# Patient Record
Sex: FEMALE | Race: WHITE
[De-identification: names, ages, dates, MRNs, and addresses within clinical notes are randomized per-mention and may not be internally consistent; named-entity substitution may affect disease eponyms.]

---

## 2017-01-02 RX ORDER — RAMIPRIL 10 MG/1
10 CAPSULE ORAL
Qty: 90 CAPSULE | Refills: 0 | Status: SHIPPED | OUTPATIENT
Start: 2017-01-02 | End: 2018-04-17

## 2017-01-02 RX ORDER — RAMIPRIL 10 MG/1
CAPSULE ORAL
Qty: 90 CAPSULE | Refills: 2 | Status: SHIPPED | OUTPATIENT
Start: 2017-01-02 | End: 2017-01-02

## 2017-01-23 RX ORDER — TEMAZEPAM 15 MG/1
15 CAPSULE ORAL NIGHTLY PRN
Qty: 11 CAPSULE | Refills: 0 | Status: SHIPPED | OUTPATIENT
Start: 2017-01-23 | End: 2017-02-25

## 2017-02-27 ENCOUNTER — PRIOR ORIGINAL RECORDS (OUTPATIENT)
Dept: OTHER | Age: 82
End: 2017-02-27

## 2017-02-27 ENCOUNTER — OFFICE VISIT (OUTPATIENT)
Dept: INTERNAL MEDICINE CLINIC | Facility: CLINIC | Age: 82
End: 2017-02-27

## 2017-02-27 VITALS
SYSTOLIC BLOOD PRESSURE: 160 MMHG | HEIGHT: 62 IN | BODY MASS INDEX: 25.03 KG/M2 | OXYGEN SATURATION: 98 % | RESPIRATION RATE: 16 BRPM | TEMPERATURE: 97 F | DIASTOLIC BLOOD PRESSURE: 100 MMHG | WEIGHT: 136 LBS | HEART RATE: 74 BPM

## 2017-02-27 DIAGNOSIS — J44.9 CHRONIC OBSTRUCTIVE PULMONARY DISEASE, UNSPECIFIED COPD TYPE (HCC): ICD-10-CM

## 2017-02-27 DIAGNOSIS — F41.9 ANXIETY: ICD-10-CM

## 2017-02-27 DIAGNOSIS — H05.20 EXOPHTHALMIA: ICD-10-CM
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
## 2017-02-27 DIAGNOSIS — L30.9 ECZEMA, UNSPECIFIED TYPE: ICD-10-CM

## 2017-02-27 DIAGNOSIS — I89.0 LYMPHEDEMA: ICD-10-CM

## 2017-02-27 DIAGNOSIS — Z85.3 HX OF BREAST CANCER: ICD-10-CM

## 2017-02-27 DIAGNOSIS — M17.12 PRIMARY OSTEOARTHRITIS OF LEFT KNEE: Primary | ICD-10-CM

## 2017-02-27 DIAGNOSIS — M43.16 SPONDYLOLISTHESIS OF LUMBAR REGION: ICD-10-CM

## 2017-02-27 DIAGNOSIS — I10 ESSENTIAL HYPERTENSION WITH GOAL BLOOD PRESSURE LESS THAN 140/90: ICD-10-CM

## 2017-02-27 DIAGNOSIS — E04.1 THYROID NODULE: ICD-10-CM

## 2017-02-27 LAB
ALBUMIN SERPL-MCNC: 3.9 G/DL (ref 3.5–4.8)
ALP LIVER SERPL-CCNC: 121 U/L (ref 55–142)
ALT SERPL-CCNC: 30 U/L (ref 14–54)
AST SERPL-CCNC: 23 U/L (ref 15–41)
BASOPHILS # BLD AUTO: 0.04 X10(3) UL (ref 0–0.1)
BASOPHILS NFR BLD AUTO: 0.9 %
BILIRUB SERPL-MCNC: 0.4 MG/DL (ref 0.1–2)
BUN BLD-MCNC: 22 MG/DL (ref 8–20)
CALCIUM BLD-MCNC: 9.4 MG/DL (ref 8.3–10.3)
CHLORIDE: 104 MMOL/L (ref 101–111)
CO2: 28 MMOL/L (ref 22–32)
CREAT BLD-MCNC: 0.73 MG/DL (ref 0.55–1.02)
EOSINOPHIL # BLD AUTO: 0.12 X10(3) UL (ref 0–0.3)
EOSINOPHIL NFR BLD AUTO: 2.6 %
ERYTHROCYTE [DISTWIDTH] IN BLOOD BY AUTOMATED COUNT: 12.6 % (ref 11.5–16)
GLUCOSE BLD-MCNC: 96 MG/DL (ref 70–99)
HCT VFR BLD AUTO: 43.6 % (ref 34–50)
HGB BLD-MCNC: 14.4 G/DL (ref 12–16)
IMMATURE GRANULOCYTE COUNT: 0.02 X10(3) UL (ref 0–1)
IMMATURE GRANULOCYTE RATIO %: 0.4 %
LYMPHOCYTES # BLD AUTO: 1.33 X10(3) UL (ref 0.9–4)
LYMPHOCYTES NFR BLD AUTO: 29 %
M PROTEIN MFR SERPL ELPH: 7.3 G/DL (ref 6.1–8.3)
MCH RBC QN AUTO: 32.4 PG (ref 27–33.2)
MCHC RBC AUTO-ENTMCNC: 33 G/DL (ref 31–37)
MCV RBC AUTO: 98 FL (ref 81–100)
MONOCYTES # BLD AUTO: 0.47 X10(3) UL (ref 0.1–0.6)
MONOCYTES NFR BLD AUTO: 10.2 %
NEUTROPHIL ABS PRELIM: 2.61 X10 (3) UL (ref 1.3–6.7)
NEUTROPHILS # BLD AUTO: 2.61 X10(3) UL (ref 1.3–6.7)
NEUTROPHILS NFR BLD AUTO: 56.9 %
PLATELET # BLD AUTO: 198 10(3)UL (ref 150–450)
POTASSIUM SERPL-SCNC: 4 MMOL/L (ref 3.6–5.1)
RBC # BLD AUTO: 4.45 X10(6)UL (ref 3.8–5.1)
RED CELL DISTRIBUTION WIDTH-SD: 45.3 FL (ref 35.1–46.3)
SODIUM SERPL-SCNC: 138 MMOL/L (ref 136–144)
WBC # BLD AUTO: 4.6 X10(3) UL (ref 4–13)

## 2017-02-27 PROCEDURE — 80053 COMPREHEN METABOLIC PANEL: CPT | Performed by: INTERNAL MEDICINE

## 2017-02-27 PROCEDURE — G0439 PPPS, SUBSEQ VISIT: HCPCS | Performed by: INTERNAL MEDICINE

## 2017-02-27 PROCEDURE — 85025 COMPLETE CBC W/AUTO DIFF WBC: CPT | Performed by: INTERNAL MEDICINE

## 2017-02-27 PROCEDURE — 36415 COLL VENOUS BLD VENIPUNCTURE: CPT | Performed by: INTERNAL MEDICINE

## 2017-02-27 RX ORDER — TEMAZEPAM 15 MG/1
CAPSULE ORAL
Qty: 90 CAPSULE | Refills: 3 | Status: SHIPPED | OUTPATIENT
Start: 2017-02-27 | End: 2017-03-13

## 2017-02-27 NOTE — PATIENT INSTRUCTIONS
Hansa Whitman's SCREENING SCHEDULE   Tests on this list are recommended by your physician but may not be covered, or covered at this frequency, by your insurer. Please check with your insurance carrier before scheduling to verify coverage.     Evelina Olivarez visit on 05/11/15  -PNEUMOCOCCAL VACC, 13 GARRETT IM   -ADMIN PNEUMOCOCCAL VACCINE    Update Immunization Activity if applicable    Hepatitis B No orders found for this or any previous visit.  Update Immunization Activity if applicable    Tetanus No orders foun

## 2017-02-27 NOTE — PROGRESS NOTES
Emily Woodward is a 80year old female who presents for a Medicare Annual Wellness visit.         Patient Care Team: Patient Care Team:  Zay Thomas MD as PCP - General (Internal Medicine)    Patient Active Problem List:     Lymphedema     Exop 136 lb  12/29/16 : 136 lb  10/06/16 : 136 lb  10/03/16 : 135 lb 3.2 oz  09/22/16 : 137 lb  08/02/16 : 136 lb    Body mass index is 24.87 kg/(m^2).       Lab Results  Component Value Date   * 04/18/2016   GLU 91 11/13/2015   GLU 85 11/17/2014       La without help    Are you able to afford your medications?: Yes    Hearing Problems?: Yes (deaf in right ear)     Functional Status     Hearing Problems?: Yes (deaf in right ear)    Vision Problems? : Yes (glaucoma)    Difficulty walking?: Yes    Difficulty ----------    Cardiovascular Disease Screening     LDL Annually LDL CHOLESTEROL (mg/dL)   Date Value   11/13/2015 97        EKG - w/ Initial Preventative Physical Exam only, or if medically necessary     Colorectal Cancer Screening      Colonoscopy Scree Persistent     Medications (ACE/ARB, digoxin, diuretics)    Potassium  Annually POTASSIUM (mmol/L)   Date Value   04/18/2016 4.2   03/20/2014 5.0    No flowsheet data found.     Creatinine  Annually CREATININE (mg/dL)   Date Value   04/18/2016 0.82   03/20/ HCL-TIMOLOL MAL OP Apply to both eyes twice daily Disp:  Rfl:    Acidophilus/Pectin (PROBIOTIC) Oral Cap Take 1 capsule by mouth daily. Disp:  Rfl:    Multiple Vitamins-Minerals (COMPLETE DAILY/LUTEIN) Oral Tab Take 1 tablet by mouth 3 (three) times daily. Hermann Reddy MD;  Location: [facility]    PATIENT DOCUMENTED NOT TO HAVE EXPERIENCED ANY OF THE FOLLOWING EVENTS N/A 4/4/2016    Comment Procedure: LUMBAR EPIDURAL;  Surgeon: Hermann Reddy MD;  Location: [address] [place] [address] no axillary lymphadenopathy   LUNGS: clear to auscultation  CARDIO: RRR without murmur  GI: good BS's, no masses, HSM or tenderness  : deferred  RECTAL: deferred  MUSCULOSKELETAL: back is not tender, FROM of the back.  Lymphedema Left arm  EXTREMITIES: no and agrees to the plan.   The patient is asked to return in 1 month for HTN  Exercise counseling perfomed    SUGGESTED VACCINATIONS - Influenza, Pneumococcal, Zoster, Tetanus     Immunization History   Administered Date(s) Administered   • HIGH DOSE FLU 65

## 2017-03-13 ENCOUNTER — TELEPHONE (OUTPATIENT)
Dept: INTERNAL MEDICINE CLINIC | Facility: CLINIC | Age: 82
End: 2017-03-13

## 2017-03-13 ENCOUNTER — OFFICE VISIT (OUTPATIENT)
Dept: INTERNAL MEDICINE CLINIC | Facility: CLINIC | Age: 82
End: 2017-03-13

## 2017-03-13 VITALS
DIASTOLIC BLOOD PRESSURE: 90 MMHG | OXYGEN SATURATION: 97 % | TEMPERATURE: 98 F | BODY MASS INDEX: 25 KG/M2 | WEIGHT: 136 LBS | HEART RATE: 84 BPM | RESPIRATION RATE: 16 BRPM | SYSTOLIC BLOOD PRESSURE: 160 MMHG

## 2017-03-13 DIAGNOSIS — I10 ELEVATED BLOOD PRESSURE READING IN OFFICE WITH DIAGNOSIS OF HYPERTENSION: ICD-10-CM

## 2017-03-13 DIAGNOSIS — J22 LRTI (LOWER RESPIRATORY TRACT INFECTION): Primary | ICD-10-CM

## 2017-03-13 DIAGNOSIS — F41.9 ANXIETY: ICD-10-CM

## 2017-03-13 PROCEDURE — 99214 OFFICE O/P EST MOD 30 MIN: CPT | Performed by: INTERNAL MEDICINE

## 2017-03-13 RX ORDER — AMOXICILLIN 500 MG/1
500 CAPSULE ORAL 2 TIMES DAILY
Qty: 14 CAPSULE | Refills: 0 | Status: SHIPPED | OUTPATIENT
Start: 2017-03-13 | End: 2017-03-23

## 2017-03-13 RX ORDER — TEMAZEPAM 15 MG/1
CAPSULE ORAL
Qty: 180 CAPSULE | Refills: 0 | Status: SHIPPED | OUTPATIENT
Start: 2017-03-13 | End: 2017-09-01

## 2017-03-13 RX ORDER — ALPRAZOLAM 0.25 MG/1
TABLET ORAL
Qty: 90 TABLET | Refills: 0 | Status: SHIPPED | OUTPATIENT
Start: 2017-03-13 | End: 2017-04-25

## 2017-03-13 RX ORDER — ALPRAZOLAM 0.25 MG/1
TABLET ORAL
Qty: 180 TABLET | Refills: 3 | Status: SHIPPED | OUTPATIENT
Start: 2017-03-13 | End: 2017-03-13

## 2017-03-13 NOTE — PROGRESS NOTES
Patient states that she has not been feeling well last couple days does have a nonproductive cough feels feverish muscle aches and pains. Patient has history of hypertension.   States she takes her blood pressure at home and systolic and diastolic below 14

## 2017-04-04 ENCOUNTER — OFFICE VISIT (OUTPATIENT)
Dept: INTERNAL MEDICINE CLINIC | Facility: CLINIC | Age: 82
End: 2017-04-04

## 2017-04-04 VITALS
DIASTOLIC BLOOD PRESSURE: 75 MMHG | WEIGHT: 138.38 LBS | SYSTOLIC BLOOD PRESSURE: 160 MMHG | RESPIRATION RATE: 16 BRPM | OXYGEN SATURATION: 99 % | HEART RATE: 82 BPM | BODY MASS INDEX: 23 KG/M2

## 2017-04-04 DIAGNOSIS — I10 ELEVATED BLOOD PRESSURE READING IN OFFICE WITH DIAGNOSIS OF HYPERTENSION: Primary | ICD-10-CM

## 2017-04-04 DIAGNOSIS — F41.9 ANXIETY: ICD-10-CM

## 2017-04-04 DIAGNOSIS — M54.16 LUMBAR RADICULAR PAIN: ICD-10-CM

## 2017-04-04 PROCEDURE — 99213 OFFICE O/P EST LOW 20 MIN: CPT | Performed by: INTERNAL MEDICINE

## 2017-04-04 NOTE — PROGRESS NOTES
Patient is a very anxious individual.  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 She also has chronic low back pain. She is seeing a back specialist and is receiving injections.   She denies any shortness of breath or chest pain no

## 2017-04-06 PROBLEM — M48.061 LUMBAR STENOSIS: Status: ACTIVE | Noted: 2017-04-06

## 2017-04-25 NOTE — PROGRESS NOTES
Patient denies headaches dizziness. She does feel tired and fatigued. She has been taking the temazepam at night along with the appraisal on. She denies cough shortness of breath no wheezing. No chest pain.   Appetite is good bowel movements normal.  No

## 2017-05-23 NOTE — PROGRESS NOTES
Patient is a very anxious individual.  She did stop her beta-blocker because it made her feel tired. She denies any chest pain shortness of breath. No orthopnea PND. She is having a recital in 3 days.   States that the beta-blocker is making her unable t

## 2017-06-27 PROBLEM — M47.816 LUMBAR SPONDYLOSIS: Status: ACTIVE | Noted: 2017-06-27

## 2017-07-03 ENCOUNTER — HOSPITAL ENCOUNTER (OUTPATIENT)
Dept: ULTRASOUND IMAGING | Facility: HOSPITAL | Age: 82
Discharge: HOME | End: 2017-07-03
Attending: OTOLARYNGOLOGY
Payer: MEDICARE

## 2017-07-03 DIAGNOSIS — E04.2 NONTOXIC MULTINODULAR GOITER: ICD-10-CM

## 2017-07-03 PROCEDURE — 76536 US EXAM OF HEAD AND NECK: CPT | Performed by: OTOLARYNGOLOGY

## 2017-07-19 ENCOUNTER — PRIOR ORIGINAL RECORDS (OUTPATIENT)
Dept: OTHER | Age: 82
End: 2017-07-19

## 2017-07-26 LAB
ALBUMIN: 3.9 G/DL
ALKALINE PHOSPHATATE(ALK PHOS): 121 IU/L
BILIRUBIN TOTAL: 0.4 MG/DL
BUN: 22 MG/DL
CALCIUM: 9.4 MG/DL
CHLORIDE: 104 MEQ/L
CREATININE, SERUM: 0.73 MG/DL
GLUCOSE: 96 MG/DL
POTASSIUM, SERUM: 4 MEQ/L
PROTEIN, TOTAL: 7.3 G/DL
SGOT (AST): 23 IU/L
SGPT (ALT): 30 IU/L
SODIUM: 138 MEQ/L

## 2017-08-02 ENCOUNTER — PRIOR ORIGINAL RECORDS (OUTPATIENT)
Dept: OTHER | Age: 82
End: 2017-08-02

## 2017-08-03 ENCOUNTER — HOSPITAL ENCOUNTER (OUTPATIENT)
Dept: CV DIAGNOSTICS | Facility: HOSPITAL | Age: 82
Discharge: HOME | End: 2017-08-03
Attending: INTERNAL MEDICINE
Payer: MEDICARE

## 2017-08-03 DIAGNOSIS — I47.1 PAROXYSMAL SUPRAVENTRICULAR TACHYCARDIA (HCC): ICD-10-CM

## 2017-08-03 DIAGNOSIS — I49.1 SUPRAVENTRICULAR PREMATURE BEATS: ICD-10-CM

## 2017-08-03 PROCEDURE — 93226 XTRNL ECG REC<48 HR SCAN A/R: CPT | Performed by: INTERNAL MEDICINE

## 2017-08-03 PROCEDURE — 93227 XTRNL ECG REC<48 HR R&I: CPT | Performed by: INTERNAL MEDICINE

## 2017-08-03 PROCEDURE — 93225 XTRNL ECG REC<48 HRS REC: CPT | Performed by: INTERNAL MEDICINE

## 2017-08-09 ENCOUNTER — PRIOR ORIGINAL RECORDS (OUTPATIENT)
Dept: OTHER | Age: 82
End: 2017-08-09

## 2017-08-15 ENCOUNTER — TELEPHONE (OUTPATIENT)
Dept: INTERNAL MEDICINE CLINIC | Facility: CLINIC | Age: 82
End: 2017-08-15

## 2017-08-15 NOTE — TELEPHONE ENCOUNTER
Faxed face sheet to [facility] [phone].   Patient reached out to this company for a Pneumatic Compression Device that was recommended by her Lymphedema therapist

## 2017-08-22 ENCOUNTER — TELEPHONE (OUTPATIENT)
Dept: INTERNAL MEDICINE CLINIC | Facility: CLINIC | Age: 82
End: 2017-08-22

## 2017-08-22 NOTE — TELEPHONE ENCOUNTER
Documents received from [facility] concerning pneumatic compression device related to pts lymphedema.     Call placed to pt left message asking who her therapist is treating lymphedema because questions on this paperwork would be best answered by thera

## 2017-08-25 ENCOUNTER — TELEPHONE (OUTPATIENT)
Dept: INTERNAL MEDICINE CLINIC | Facility: CLINIC | Age: 82
End: 2017-08-25

## 2017-08-25 NOTE — TELEPHONE ENCOUNTER
Mrs. Calixto Franklin called back and provided a phone number to call to speak with the appropriate people concerning her lymphedema. She states her therapist was located at a place called [facility] [phone].   Therapist name is Nadja Flores but she is

## 2017-08-28 ENCOUNTER — TELEPHONE (OUTPATIENT)
Dept: INTERNAL MEDICINE CLINIC | Facility: CLINIC | Age: 82
End: 2017-08-28

## 2017-08-28 PROBLEM — M54.32 LEFT SIDED SCIATICA: Status: ACTIVE | Noted: 2017-08-28

## 2017-08-29 ENCOUNTER — MYAURORA ACCOUNT LINK (OUTPATIENT)
Dept: OTHER | Age: 82
End: 2017-08-29

## 2017-08-29 ENCOUNTER — PRIOR ORIGINAL RECORDS (OUTPATIENT)
Dept: OTHER | Age: 82
End: 2017-08-29

## 2017-08-29 NOTE — TELEPHONE ENCOUNTER
Call received from Yoly Marr, Lymphedema Specialist [phone] who stated she is therapist who treated pts arm lymphedema.   She states she would be happy to complete documents supporting request for pneumatic compression device through [employer]

## 2017-09-01 DIAGNOSIS — F41.9 ANXIETY: ICD-10-CM

## 2017-09-01 RX ORDER — TEMAZEPAM 15 MG/1
CAPSULE ORAL
Qty: 180 CAPSULE | Refills: 0 | Status: SHIPPED | OUTPATIENT
Start: 2017-09-01 | End: 2018-02-13

## 2017-09-01 RX ORDER — ALPRAZOLAM 0.25 MG/1
TABLET ORAL
Qty: 90 TABLET | Refills: 0 | Status: SHIPPED | OUTPATIENT
Start: 2017-09-01 | End: 2018-04-12

## 2017-09-05 NOTE — TELEPHONE ENCOUNTER
Medical records received from Adeola Monique and faxed to [facility].  [phone]. I asked her to communicate directly with Adeola Monique.

## 2017-09-06 ENCOUNTER — TELEPHONE (OUTPATIENT)
Dept: INTERNAL MEDICINE CLINIC | Facility: CLINIC | Age: 82
End: 2017-09-06

## 2017-09-06 NOTE — TELEPHONE ENCOUNTER
Patient had been seeing lymphedema specialist Pat Farmer at [facility] [phone] (self pay). Rasheeda Castellon recommended getting a lymphedema pump from [facility], Representative is Mau Arshad [phone].  Fax: [phone] Taina Daniel is Medicare Specialist who work

## 2017-09-26 RX ORDER — RAMIPRIL 10 MG/1
CAPSULE ORAL
Qty: 90 CAPSULE | Refills: 1 | Status: SHIPPED | OUTPATIENT
Start: 2017-09-26 | End: 2017-10-05

## 2017-09-27 ENCOUNTER — IMMUNIZATION (OUTPATIENT)
Dept: INTERNAL MEDICINE CLINIC | Facility: CLINIC | Age: 82
End: 2017-09-27

## 2017-09-27 PROCEDURE — 90653 IIV ADJUVANT VACCINE IM: CPT | Performed by: INTERNAL MEDICINE

## 2017-09-27 PROCEDURE — G0008 ADMIN INFLUENZA VIRUS VAC: HCPCS | Performed by: INTERNAL MEDICINE

## 2017-10-04 ENCOUNTER — PRIOR ORIGINAL RECORDS (OUTPATIENT)
Dept: OTHER | Age: 82
End: 2017-10-04

## 2017-10-05 ENCOUNTER — OFFICE VISIT (OUTPATIENT)
Dept: INTERNAL MEDICINE CLINIC | Facility: CLINIC | Age: 82
End: 2017-10-05

## 2017-10-05 VITALS
HEART RATE: 86 BPM | TEMPERATURE: 97 F | WEIGHT: 141.38 LBS | OXYGEN SATURATION: 98 % | DIASTOLIC BLOOD PRESSURE: 70 MMHG | RESPIRATION RATE: 18 BRPM | SYSTOLIC BLOOD PRESSURE: 132 MMHG | BODY MASS INDEX: 23 KG/M2

## 2017-10-05 DIAGNOSIS — I89.0 SECONDARY LYMPHEDEMA: Primary | ICD-10-CM

## 2017-10-05 DIAGNOSIS — Z85.3 HX OF BREAST CANCER: ICD-10-CM

## 2017-10-05 DIAGNOSIS — J44.9 CHRONIC OBSTRUCTIVE PULMONARY DISEASE, UNSPECIFIED COPD TYPE (HCC): ICD-10-CM

## 2017-10-05 LAB
ALBUMIN: 4.2 G/DL
ALKALINE PHOSPHATATE(ALK PHOS): 92 IU/L
BILIRUBIN TOTAL: 0.6 MG/DL
BUN: 18 MG/DL
CALCIUM: 9.3 MG/DL
CHLORIDE: 102 MEQ/L
CHOLESTEROL, TOTAL: 214 MG/DL
CREATININE, SERUM: 0.72 MG/DL
GLOBULIN: 2.4 G/DL
GLUCOSE: 83 MG/DL
HDL CHOLESTEROL: 59 MG/DL
HEMATOCRIT: 43.7 %
HEMOGLOBIN: 14.5 G/DL
IRON, TOTAL: 104 MCG/DL
LDH: 180 IU/L
LDL CHOLESTEROL: 138 MG/DL
PLATELETS: 184 K/UL
POTASSIUM, SERUM: 4.3 MEQ/L
PROTEIN, TOTAL: 6.6 G/DL
RED BLOOD COUNT: 4.61 X 10-6/U
SGOT (AST): 18 IU/L
SGPT (ALT): 16 IU/L
SODIUM: 143 MEQ/L
TRIGLYCERIDES: 84 MG/DL
URIC ACID: 5.4 MG/DL
WHITE BLOOD COUNT: 4.5 X 10-3/U

## 2017-10-05 PROCEDURE — 99214 OFFICE O/P EST MOD 30 MIN: CPT | Performed by: INTERNAL MEDICINE

## 2017-10-05 NOTE — PROGRESS NOTES
Enrique Hdz has secondary lymphedema left upper extremity due to breast cancer surgery and lymph node dissection. Patient has been using conservative therapies such as compression sleeve without much success. Patient also has a history of COPD.   She denies a

## 2017-10-10 ENCOUNTER — OFFICE VISIT (OUTPATIENT)
Dept: INTERNAL MEDICINE CLINIC | Facility: CLINIC | Age: 82
End: 2017-10-10

## 2017-10-10 VITALS
OXYGEN SATURATION: 95 % | RESPIRATION RATE: 18 BRPM | TEMPERATURE: 98 F | BODY MASS INDEX: 23 KG/M2 | DIASTOLIC BLOOD PRESSURE: 70 MMHG | SYSTOLIC BLOOD PRESSURE: 135 MMHG | WEIGHT: 140.19 LBS | HEART RATE: 88 BPM

## 2017-10-10 DIAGNOSIS — K43.9 VENTRAL HERNIA WITHOUT OBSTRUCTION OR GANGRENE: Primary | ICD-10-CM

## 2017-10-10 PROCEDURE — 99213 OFFICE O/P EST LOW 20 MIN: CPT | Performed by: INTERNAL MEDICINE

## 2017-10-12 ENCOUNTER — OFFICE VISIT (OUTPATIENT)
Dept: SURGERY | Facility: CLINIC | Age: 82
End: 2017-10-12

## 2017-10-12 ENCOUNTER — TELEPHONE (OUTPATIENT)
Dept: SURGERY | Facility: CLINIC | Age: 82
End: 2017-10-12

## 2017-10-12 VITALS
HEIGHT: 63 IN | TEMPERATURE: 98 F | DIASTOLIC BLOOD PRESSURE: 74 MMHG | SYSTOLIC BLOOD PRESSURE: 150 MMHG | WEIGHT: 140 LBS | BODY MASS INDEX: 24.8 KG/M2 | HEART RATE: 90 BPM

## 2017-10-12 DIAGNOSIS — K42.9 UMBILICAL HERNIA WITHOUT OBSTRUCTION OR GANGRENE: Primary | ICD-10-CM

## 2017-10-12 DIAGNOSIS — K42.9 UMBILICAL HERNIA WITHOUT OBSTRUCTION AND WITHOUT GANGRENE: Primary | ICD-10-CM

## 2017-10-12 PROCEDURE — 99204 OFFICE O/P NEW MOD 45 MIN: CPT | Performed by: COLON & RECTAL SURGERY

## 2017-10-12 NOTE — PATIENT INSTRUCTIONS
Assessment   Umbilical hernia without obstruction and without gangrene  (primary encounter diagnosis)    There is no evidence of incarceration or strength relation, there is no evidence of overlying skin breakdown, there is no evidence of intestinal obst

## 2017-10-12 NOTE — H&P
New Patient Visit Note       Active Problems      1.  Umbilical hernia without obstruction and without gangrene        Chief Complaint   Painful bulge at bellybutton    History of Present Illness   Ama Haddad is an 49-year-old woman referred to me b lumpectomy left breast & 19 lymph nodes 1986   • Cancer ([address]    • COPD (chronic obstructive pulmonary disease) (HCC)    • Glaucoma    • High blood pressure    • Lymph edema     left arm     Past Surgical History:  No date: EYE SURGERY      Comment: joyce Age of Onset   • Cancer Father    • Other Tommy Milton Father    • Cancer Mother    • Breast Cancer Mother      dx age 66's   • Breast Cancer Daughter      dx age 36     Social History    Marital status:               Spouse name:                       Emmanuel Gimenez by mouth daily. Disp:  Rfl:    Multiple Vitamins-Minerals (COMPLETE DAILY/LUTEIN) Oral Tab Take 1 tablet by mouth 3 (three) times daily. Disp:  Rfl:          Review of Systems  The Review of Systems has been reviewed by me during today.   Review of Systems upper back off the table  Muskuloskeletal: Hunched over during ambulation, 5/5 strength all four extremities  Lymphatic: Left upper extremity edema with compression garment in place  Skin: No rashes,  No lesions  Neurologic: No focal deficits, sensation in treatment options.     Claudia Shirley MD

## 2017-10-16 ENCOUNTER — NURSE ONLY (OUTPATIENT)
Dept: INTERNAL MEDICINE CLINIC | Facility: CLINIC | Age: 82
End: 2017-10-16

## 2017-10-16 DIAGNOSIS — I10 ESSENTIAL HYPERTENSION WITH GOAL BLOOD PRESSURE LESS THAN 140/90: Primary | ICD-10-CM

## 2017-10-16 PROCEDURE — 93000 ELECTROCARDIOGRAM COMPLETE: CPT | Performed by: INTERNAL MEDICINE

## 2017-10-16 NOTE — PROGRESS NOTES
EKG result and lab results from [facility] sent to [facility] Pre Admission.

## 2017-10-18 ENCOUNTER — HOSPITAL ENCOUNTER (OUTPATIENT)
Facility: HOSPITAL | Age: 82
Setting detail: HOSPITAL OUTPATIENT SURGERY
Discharge: HOME | End: 2017-10-18
Attending: COLON & RECTAL SURGERY | Admitting: COLON & RECTAL SURGERY
Payer: MEDICARE

## 2017-10-18 ENCOUNTER — ANESTHESIA EVENT (OUTPATIENT)
Dept: SURGERY | Facility: HOSPITAL | Age: 82
End: 2017-10-18
Payer: MEDICARE

## 2017-10-18 ENCOUNTER — SURGERY (OUTPATIENT)
Age: 82
End: 2017-10-18

## 2017-10-18 ENCOUNTER — ANESTHESIA (OUTPATIENT)
Dept: SURGERY | Facility: HOSPITAL | Age: 82
End: 2017-10-18
Payer: MEDICARE

## 2017-10-18 VITALS
HEART RATE: 70 BPM | WEIGHT: 136.38 LBS | OXYGEN SATURATION: 96 % | SYSTOLIC BLOOD PRESSURE: 134 MMHG | HEIGHT: 63 IN | TEMPERATURE: 99 F | BODY MASS INDEX: 24.16 KG/M2 | RESPIRATION RATE: 13 BRPM | DIASTOLIC BLOOD PRESSURE: 63 MMHG

## 2017-10-18 DIAGNOSIS — K42.9 UMBILICAL HERNIA WITHOUT OBSTRUCTION OR GANGRENE: ICD-10-CM

## 2017-10-18 PROCEDURE — 49585 REPAIR UMBILICAL HERN,5+Y/O,REDUC: CPT | Performed by: COLON & RECTAL SURGERY

## 2017-10-18 PROCEDURE — 0WQF0ZZ REPAIR ABDOMINAL WALL, OPEN APPROACH: ICD-10-PCS | Performed by: COLON & RECTAL SURGERY

## 2017-10-18 RX ORDER — TRAMADOL HYDROCHLORIDE 50 MG/1
TABLET ORAL
Status: DISCONTINUED
Start: 2017-10-18 | End: 2017-10-18

## 2017-10-18 RX ORDER — TRAMADOL HYDROCHLORIDE 50 MG/1
50 TABLET ORAL EVERY 6 HOURS PRN
Status: DISCONTINUED | OUTPATIENT
Start: 2017-10-18 | End: 2017-10-18

## 2017-10-18 RX ORDER — SODIUM CHLORIDE, SODIUM LACTATE, POTASSIUM CHLORIDE, CALCIUM CHLORIDE 600; 310; 30; 20 MG/100ML; MG/100ML; MG/100ML; MG/100ML
INJECTION, SOLUTION INTRAVENOUS CONTINUOUS
Status: DISCONTINUED | OUTPATIENT
Start: 2017-10-18 | End: 2017-10-18

## 2017-10-18 RX ORDER — MIDAZOLAM HYDROCHLORIDE 1 MG/ML
1 INJECTION INTRAMUSCULAR; INTRAVENOUS EVERY 5 MIN PRN
Status: DISCONTINUED | OUTPATIENT
Start: 2017-10-18 | End: 2017-10-18

## 2017-10-18 RX ORDER — HYDROMORPHONE HYDROCHLORIDE 1 MG/ML
INJECTION, SOLUTION INTRAMUSCULAR; INTRAVENOUS; SUBCUTANEOUS
Status: COMPLETED
Start: 2017-10-18 | End: 2017-10-18

## 2017-10-18 RX ORDER — DEXAMETHASONE SODIUM PHOSPHATE 4 MG/ML
4 VIAL (ML) INJECTION AS NEEDED
Status: DISCONTINUED | OUTPATIENT
Start: 2017-10-18 | End: 2017-10-18

## 2017-10-18 RX ORDER — LABETALOL HYDROCHLORIDE 5 MG/ML
5 INJECTION, SOLUTION INTRAVENOUS EVERY 5 MIN PRN
Status: DISCONTINUED | OUTPATIENT
Start: 2017-10-18 | End: 2017-10-18

## 2017-10-18 RX ORDER — ONDANSETRON 2 MG/ML
4 INJECTION INTRAMUSCULAR; INTRAVENOUS AS NEEDED
Status: DISCONTINUED | OUTPATIENT
Start: 2017-10-18 | End: 2017-10-18

## 2017-10-18 RX ORDER — HYDROCODONE BITARTRATE AND ACETAMINOPHEN 5; 325 MG/1; MG/1
2 TABLET ORAL AS NEEDED
Status: DISCONTINUED | OUTPATIENT
Start: 2017-10-18 | End: 2017-10-18

## 2017-10-18 RX ORDER — DIPHENHYDRAMINE HYDROCHLORIDE 50 MG/ML
12.5 INJECTION INTRAMUSCULAR; INTRAVENOUS AS NEEDED
Status: DISCONTINUED | OUTPATIENT
Start: 2017-10-18 | End: 2017-10-18

## 2017-10-18 RX ORDER — MEPERIDINE HYDROCHLORIDE 25 MG/ML
12.5 INJECTION INTRAMUSCULAR; INTRAVENOUS; SUBCUTANEOUS AS NEEDED
Status: DISCONTINUED | OUTPATIENT
Start: 2017-10-18 | End: 2017-10-18

## 2017-10-18 RX ORDER — METOCLOPRAMIDE HYDROCHLORIDE 5 MG/ML
10 INJECTION INTRAMUSCULAR; INTRAVENOUS AS NEEDED
Status: DISCONTINUED | OUTPATIENT
Start: 2017-10-18 | End: 2017-10-18

## 2017-10-18 RX ORDER — NALOXONE HYDROCHLORIDE 0.4 MG/ML
80 INJECTION, SOLUTION INTRAMUSCULAR; INTRAVENOUS; SUBCUTANEOUS AS NEEDED
Status: DISCONTINUED | OUTPATIENT
Start: 2017-10-18 | End: 2017-10-18

## 2017-10-18 RX ORDER — HYDROCODONE BITARTRATE AND ACETAMINOPHEN 5; 325 MG/1; MG/1
1 TABLET ORAL AS NEEDED
Status: DISCONTINUED | OUTPATIENT
Start: 2017-10-18 | End: 2017-10-18

## 2017-10-18 RX ORDER — CEFAZOLIN SODIUM 1 G/3ML
INJECTION, POWDER, FOR SOLUTION INTRAMUSCULAR; INTRAVENOUS
Status: DISCONTINUED | OUTPATIENT
Start: 2017-10-18 | End: 2017-10-18 | Stop reason: HOSPADM

## 2017-10-18 RX ORDER — HYDROMORPHONE HYDROCHLORIDE 1 MG/ML
0.4 INJECTION, SOLUTION INTRAMUSCULAR; INTRAVENOUS; SUBCUTANEOUS EVERY 5 MIN PRN
Status: DISCONTINUED | OUTPATIENT
Start: 2017-10-18 | End: 2017-10-18

## 2017-10-18 RX ORDER — BUPIVACAINE HYDROCHLORIDE AND EPINEPHRINE 5; 5 MG/ML; UG/ML
INJECTION, SOLUTION EPIDURAL; INTRACAUDAL; PERINEURAL AS NEEDED
Status: DISCONTINUED | OUTPATIENT
Start: 2017-10-18 | End: 2017-10-18 | Stop reason: HOSPADM

## 2017-10-18 RX ORDER — TRAMADOL HYDROCHLORIDE 50 MG/1
50 TABLET ORAL EVERY 6 HOURS PRN
Qty: 20 TABLET | Refills: 0 | Status: SHIPPED | OUTPATIENT
Start: 2017-10-18 | End: 2017-11-06

## 2017-10-18 NOTE — H&P (VIEW-ONLY)
New Patient Visit Note       Active Problems      1.  Umbilical hernia without obstruction and without gangrene        Chief Complaint   [facility]    History of Present Illness   Dawson Alcaraz is an 60-year-old woman referred to me b lumpectomy left breast & 19 lymph nodes 1986   • Cancer ([address]    • COPD (chronic obstructive pulmonary disease) (HCC)    • Glaucoma    • High blood pressure    • Lymph edema     left arm     Past Surgical History:  No date: EYE SURGERY      Comment: joyce Age of Onset   • Cancer Father    • Other Gwenevere Efrain Father    • Cancer Mother    • Breast Cancer Mother      dx age 66's   • Breast Cancer Daughter      dx age 36     Social History    Marital status:               Spouse name:                       Addie Toussaint by mouth daily. Disp:  Rfl:    Multiple Vitamins-Minerals (COMPLETE DAILY/LUTEIN) Oral Tab Take 1 tablet by mouth 3 (three) times daily. Disp:  Rfl:          Review of Systems  The Review of Systems has been reviewed by me during today.   Review of Systems upper back off the table  Muskuloskeletal: Hunched over during ambulation, 5/5 strength all four extremities  Lymphatic: Left upper extremity edema with compression garment in place  Skin: No rashes,  No lesions  Neurologic: No focal deficits, sensation in treatment options.     Allyssa Hermosillo MD

## 2017-10-18 NOTE — INTERVAL H&P NOTE
Pre-op Diagnosis: Umbilical hernia without obstruction or gangrene [K42.9]    The above referenced H&P was reviewed by Evelyn Westfall MD on 10/18/2017, the patient was examined and no significant changes have occurred in the patient's condition sinc

## 2017-10-18 NOTE — ANESTHESIA POSTPROCEDURE EVALUATION
[address] [facility] Patient Status:  Hospital Outpatient Surgery   Age/Gender 80year old female MRN [id number]   [facility] SURGERY Attending Pamla Ahumada, MD   Hosp Day # 0 PCP MD Pedro Cade

## 2017-10-18 NOTE — OPERATIVE REPORT
[facility]  Operative Note    Sedonia Sings Location: OR   CSN [id number] MRN [id number]   Admission Date 10/18/2017 Operation Date 10/18/2017   Attending Physician Casi De La Torre MD Operating Physician MD Analy Clarke scalpel to incise the skin and subcutaneous tissue. Blunt dissection is carried down to the level of the fascia with electrocautery being used to achieve hemostasis.  Blunt dissection is then used to circumferentially dissect the umbilical stalk away from t

## 2017-10-19 ENCOUNTER — TELEPHONE (OUTPATIENT)
Dept: SURGERY | Facility: CLINIC | Age: 82
End: 2017-10-19

## 2017-10-19 NOTE — TELEPHONE ENCOUNTER
Patient 1 day post-op  umbilical hernia repair. C/o pressure to lower abdomen and urgency with urination. Patient denies pin with voiding, just urgency, she does report she feels she is emptying her bladder.  Told patient to monitor and if no improvement to

## 2017-10-25 ENCOUNTER — TELEPHONE (OUTPATIENT)
Dept: INTERNAL MEDICINE CLINIC | Facility: CLINIC | Age: 82
End: 2017-10-25

## 2017-10-25 NOTE — TELEPHONE ENCOUNTER
Returned call to [facility]. He did measurements for Mrs. Whitman on 10/5/17. He needs a copy of the ov and progress notes.      He asked for us to fax it to [phone]

## 2017-11-06 ENCOUNTER — OFFICE VISIT (OUTPATIENT)
Dept: SURGERY | Facility: CLINIC | Age: 82
End: 2017-11-06

## 2017-11-06 VITALS
SYSTOLIC BLOOD PRESSURE: 142 MMHG | WEIGHT: 138 LBS | HEART RATE: 85 BPM | BODY MASS INDEX: 24.45 KG/M2 | HEIGHT: 63 IN | TEMPERATURE: 97 F | DIASTOLIC BLOOD PRESSURE: 82 MMHG

## 2017-11-06 DIAGNOSIS — Z09 FOLLOW-UP EXAMINATION: Primary | ICD-10-CM

## 2017-11-06 DIAGNOSIS — Z87.19 HISTORY OF HERNIA SURGERY: ICD-10-CM

## 2017-11-06 DIAGNOSIS — Z98.890 HISTORY OF HERNIA SURGERY: ICD-10-CM

## 2017-11-06 PROCEDURE — 99024 POSTOP FOLLOW-UP VISIT: CPT | Performed by: COLON & RECTAL SURGERY

## 2017-11-06 NOTE — PROGRESS NOTES
Post Operative Visit Note       Active Problems  1. Follow-up examination    2.  History of hernia surgery         Chief Complaint   Incisional pain       History of Present Illness   Lucila Jacinto is a 80-year-old woman who underwent open umbilical he PAIN MANAGEMENT  4/4/2016: INJECTION, W/WO CONTRAST, DX/THERAPEUTIC SUBST* N/A      Comment: Procedure: LUMBAR EPIDURAL;  Surgeon:                Lamont Lawson MD;  Location: [facility]: LUMPECTOMY LEF status: Never Smoker                                                                Smokeless tobacco: Never Used                        Alcohol use:  No              Drug use: No            Other Topics            Concern  Caffeine Concern        No  Exerc and vomiting. Genitourinary: Negative for difficulty urinating, dysuria, frequency and urgency. Musculoskeletal: Negative for arthralgias and myalgias. Skin: Negative for color change and rash.    Neurological: Negative for tremors, syncope and weakne several weeks. There are no restrictions on the patient's diet. Suggest avoiding any heavy lifting over 15 pounds for another 3 weeks, after that there should be no restrictions on her activity.   The patient is now okay to submerge her incision in wate

## 2017-11-06 NOTE — PATIENT INSTRUCTIONS
Assessment   Follow-up examination  (primary encounter diagnosis)  History of hernia surgery    The patient is progressing well after her surgery. There is no evidence of recurrent hernia at this point.   The firmness deep to the incision is due to healing

## 2017-11-28 ENCOUNTER — PRIOR ORIGINAL RECORDS (OUTPATIENT)
Dept: OTHER | Age: 82
End: 2017-11-28

## 2018-02-13 RX ORDER — TEMAZEPAM 15 MG/1
CAPSULE ORAL
Qty: 180 CAPSULE | Refills: 0 | Status: SHIPPED | OUTPATIENT
Start: 2018-02-13 | End: 2018-04-18

## 2018-02-23 ENCOUNTER — OFFICE VISIT (OUTPATIENT)
Dept: INTERNAL MEDICINE CLINIC | Facility: CLINIC | Age: 83
End: 2018-02-23

## 2018-02-23 VITALS
TEMPERATURE: 97 F | BODY MASS INDEX: 24.75 KG/M2 | DIASTOLIC BLOOD PRESSURE: 86 MMHG | HEART RATE: 102 BPM | RESPIRATION RATE: 16 BRPM | WEIGHT: 139.69 LBS | SYSTOLIC BLOOD PRESSURE: 135 MMHG | OXYGEN SATURATION: 96 % | HEIGHT: 63 IN

## 2018-02-23 DIAGNOSIS — R42 VERTIGO: Primary | ICD-10-CM

## 2018-02-23 DIAGNOSIS — J44.9 CHRONIC OBSTRUCTIVE PULMONARY DISEASE, UNSPECIFIED COPD TYPE (HCC): ICD-10-CM

## 2018-02-23 DIAGNOSIS — M54.16 LUMBAR RADICULOPATHY: ICD-10-CM

## 2018-02-23 PROCEDURE — 99214 OFFICE O/P EST MOD 30 MIN: CPT | Performed by: INTERNAL MEDICINE

## 2018-02-23 RX ORDER — MECLIZINE HCL 12.5 MG/1
12.5 TABLET ORAL 3 TIMES DAILY PRN
Qty: 21 TABLET | Refills: 0 | Status: SHIPPED | OUTPATIENT
Start: 2018-02-23 | End: 2018-04-17 | Stop reason: ALTCHOICE

## 2018-02-23 NOTE — PATIENT INSTRUCTIONS
Gaviota Randhawa want see him back in a week still dizzy. Also no driving until this dizziness clears.

## 2018-02-23 NOTE — PROGRESS NOTES
For last week patient has been complaining of vertigo with rapid head movements. Only lasts for a few seconds. No history of any recent viral infection. Denies any cough shortness of breath or wheezing. Does have history of COPD.   Patient also has lumb

## 2018-03-17 ENCOUNTER — PRIOR ORIGINAL RECORDS (OUTPATIENT)
Dept: OTHER | Age: 83
End: 2018-03-17

## 2018-04-12 DIAGNOSIS — F41.9 ANXIETY: ICD-10-CM

## 2018-04-12 DIAGNOSIS — I10 ESSENTIAL HYPERTENSION: Primary | ICD-10-CM

## 2018-04-16 RX ORDER — ALPRAZOLAM 0.25 MG/1
TABLET ORAL
Qty: 90 TABLET | Refills: 0 | Status: SHIPPED | OUTPATIENT
Start: 2018-04-16 | End: 2018-04-17

## 2018-04-16 RX ORDER — RAMIPRIL 10 MG/1
CAPSULE ORAL
Qty: 90 CAPSULE | Refills: 3 | Status: SHIPPED | OUTPATIENT
Start: 2018-04-16 | End: 2018-04-17

## 2018-04-17 ENCOUNTER — OFFICE VISIT (OUTPATIENT)
Dept: INTERNAL MEDICINE CLINIC | Facility: CLINIC | Age: 83
End: 2018-04-17

## 2018-04-17 VITALS
SYSTOLIC BLOOD PRESSURE: 135 MMHG | OXYGEN SATURATION: 96 % | BODY MASS INDEX: 25 KG/M2 | DIASTOLIC BLOOD PRESSURE: 75 MMHG | HEART RATE: 84 BPM | WEIGHT: 139 LBS | RESPIRATION RATE: 16 BRPM
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
## 2018-04-17 DIAGNOSIS — I10 ESSENTIAL HYPERTENSION: ICD-10-CM

## 2018-04-17 DIAGNOSIS — F41.9 ANXIETY: ICD-10-CM

## 2018-04-17 PROCEDURE — 80053 COMPREHEN METABOLIC PANEL: CPT | Performed by: INTERNAL MEDICINE

## 2018-04-17 PROCEDURE — 36415 COLL VENOUS BLD VENIPUNCTURE: CPT | Performed by: INTERNAL MEDICINE

## 2018-04-17 PROCEDURE — 99213 OFFICE O/P EST LOW 20 MIN: CPT | Performed by: INTERNAL MEDICINE

## 2018-04-17 PROCEDURE — 85025 COMPLETE CBC W/AUTO DIFF WBC: CPT | Performed by: INTERNAL MEDICINE

## 2018-04-17 RX ORDER — ALPRAZOLAM 0.25 MG/1
TABLET ORAL
Qty: 90 TABLET | Refills: 3 | Status: SHIPPED | OUTPATIENT
Start: 2018-04-17 | End: 2019-06-13

## 2018-04-17 RX ORDER — RAMIPRIL 10 MG/1
10 CAPSULE ORAL
Qty: 90 CAPSULE | Refills: 3 | Status: SHIPPED | OUTPATIENT
Start: 2018-04-17 | End: 2019-07-12

## 2018-04-17 NOTE — PROGRESS NOTES
Patient fell once again. Negative had no loss of consciousness.   Problem 2 hypertension she denies headaches dizziness chest pain shortness of breath problem #3 anxiety needs refill on her alprazolam    Pleasant alert well orientated no acute distress nor

## 2018-04-18 RX ORDER — TEMAZEPAM 15 MG/1
CAPSULE ORAL
Qty: 180 CAPSULE | Refills: 0 | Status: SHIPPED | OUTPATIENT
Start: 2018-04-18 | End: 2018-04-19

## 2018-04-19 RX ORDER — TEMAZEPAM 15 MG/1
CAPSULE ORAL
Qty: 180 CAPSULE | Refills: 0 | Status: SHIPPED | OUTPATIENT
Start: 2018-04-19 | End: 2018-10-11

## 2018-04-24 ENCOUNTER — TELEPHONE (OUTPATIENT)
Dept: INTERNAL MEDICINE CLINIC | Facility: CLINIC | Age: 83
End: 2018-04-24

## 2018-04-24 NOTE — TELEPHONE ENCOUNTER
States she has been taking 2 at night due to a recent fall. Script is for 1 - 2 tablets at bedtime. Per Nabor Hwang at [facility] - she received #180 of 2/14/18 and by state law this cannot be refilled until 5/12/18 and she should not be running out.

## 2018-05-04 ENCOUNTER — OFFICE VISIT (OUTPATIENT)
Dept: INTERNAL MEDICINE CLINIC | Facility: CLINIC | Age: 83
End: 2018-05-04

## 2018-05-04 VITALS
BODY MASS INDEX: 24.68 KG/M2 | OXYGEN SATURATION: 97 % | HEIGHT: 63 IN | TEMPERATURE: 97 F | SYSTOLIC BLOOD PRESSURE: 122 MMHG | DIASTOLIC BLOOD PRESSURE: 78 MMHG | HEART RATE: 82 BPM | RESPIRATION RATE: 16 BRPM | WEIGHT: 139.31 LBS

## 2018-05-04 DIAGNOSIS — M41.85 OTHER FORM OF SCOLIOSIS OF THORACOLUMBAR SPINE: ICD-10-CM

## 2018-05-04 DIAGNOSIS — J44.9 CHRONIC OBSTRUCTIVE PULMONARY DISEASE, UNSPECIFIED COPD TYPE (HCC): ICD-10-CM

## 2018-05-04 DIAGNOSIS — M43.16 SPONDYLOLISTHESIS OF LUMBAR REGION: Primary | ICD-10-CM

## 2018-05-04 PROBLEM — M41.9 SCOLIOSIS: Status: ACTIVE | Noted: 2018-05-04

## 2018-05-04 PROCEDURE — 99214 OFFICE O/P EST MOD 30 MIN: CPT | Performed by: INTERNAL MEDICINE

## 2018-05-04 NOTE — PROGRESS NOTES
Patient has spondylolisthesis of lumbar region and scoliosis of the thoracolumbar spine. Recently she was using a walker (however this no longer available. Patient gait now is very unsteady. And she is a fall risk.   Strength in both the upper and lower

## 2018-05-07 ENCOUNTER — TELEPHONE (OUTPATIENT)
Dept: INTERNAL MEDICINE CLINIC | Facility: CLINIC | Age: 83
End: 2018-05-07

## 2018-05-23 ENCOUNTER — TELEPHONE (OUTPATIENT)
Dept: INTERNAL MEDICINE CLINIC | Facility: CLINIC | Age: 83
End: 2018-05-23

## 2018-05-23 NOTE — TELEPHONE ENCOUNTER
Patient received lymphedema therapy from Dr Sade Ahumada. States Dr Erasto Phan is possibly able to come here to give her these treatments if there is a room available with an exam table or massage table.   I informed her that it would be okay for her to do that and to

## 2018-05-24 ENCOUNTER — OFFICE VISIT (OUTPATIENT)
Dept: INTERNAL MEDICINE CLINIC | Facility: CLINIC | Age: 83
End: 2018-05-24

## 2018-05-24 VITALS
HEART RATE: 86 BPM | SYSTOLIC BLOOD PRESSURE: 155 MMHG | DIASTOLIC BLOOD PRESSURE: 80 MMHG | OXYGEN SATURATION: 97 % | WEIGHT: 142.63 LBS | RESPIRATION RATE: 16 BRPM | BODY MASS INDEX: 25 KG/M2 | TEMPERATURE: 97 F

## 2018-05-24 DIAGNOSIS — R42 VERTIGO: Primary | ICD-10-CM

## 2018-05-24 DIAGNOSIS — I10 ELEVATED BLOOD PRESSURE READING IN OFFICE WITH DIAGNOSIS OF HYPERTENSION: ICD-10-CM

## 2018-05-24 PROCEDURE — 99213 OFFICE O/P EST LOW 20 MIN: CPT | Performed by: INTERNAL MEDICINE

## 2018-05-24 RX ORDER — MECLIZINE HCL 12.5 MG/1
12.5 TABLET ORAL 3 TIMES DAILY PRN
Qty: 21 TABLET | Refills: 0 | Status: SHIPPED | OUTPATIENT
Start: 2018-05-24 | End: 2018-07-03

## 2018-05-24 NOTE — PROGRESS NOTES
Lowry Bamberger worked up today with a sensation of vertigo. Also feels tired fatigue and complains of itchy ears. Blood pressure is elevated but she denies any headaches chest pain or shortness of breath. No orthopnea PND. No fever chills or night sweats.     P

## 2018-05-31 ENCOUNTER — OFFICE VISIT (OUTPATIENT)
Dept: INTERNAL MEDICINE CLINIC | Facility: CLINIC | Age: 83
End: 2018-05-31

## 2018-05-31 VITALS
HEART RATE: 88 BPM | BODY MASS INDEX: 25 KG/M2 | OXYGEN SATURATION: 97 % | DIASTOLIC BLOOD PRESSURE: 80 MMHG | WEIGHT: 139.63 LBS | RESPIRATION RATE: 18 BRPM | SYSTOLIC BLOOD PRESSURE: 135 MMHG | TEMPERATURE: 97 F

## 2018-05-31 DIAGNOSIS — I10 ELEVATED BLOOD PRESSURE READING IN OFFICE WITH DIAGNOSIS OF HYPERTENSION: ICD-10-CM

## 2018-05-31 DIAGNOSIS — R42 VERTIGO: Primary | ICD-10-CM

## 2018-05-31 DIAGNOSIS — L29.9 EAR ITCH: ICD-10-CM

## 2018-05-31 PROCEDURE — 99213 OFFICE O/P EST LOW 20 MIN: CPT | Performed by: INTERNAL MEDICINE

## 2018-05-31 NOTE — PROGRESS NOTES
Problem #1 vertigo resolved #2 itch in the right ear has been applying hydrogen peroxide with intermittent improvement. Problem #7 elevated blood pressure the patient with hypertension. Repeat blood pressure within normal range.   Patient states she is st

## 2018-07-03 ENCOUNTER — OFFICE VISIT (OUTPATIENT)
Dept: INTERNAL MEDICINE CLINIC | Facility: CLINIC | Age: 83
End: 2018-07-03

## 2018-07-03 VITALS
BODY MASS INDEX: 25.98 KG/M2 | OXYGEN SATURATION: 98 % | HEIGHT: 61.6 IN | SYSTOLIC BLOOD PRESSURE: 140 MMHG | HEART RATE: 84 BPM | TEMPERATURE: 97 F | WEIGHT: 139.38 LBS | DIASTOLIC BLOOD PRESSURE: 90 MMHG | RESPIRATION RATE: 18 BRPM

## 2018-07-03 DIAGNOSIS — I89.0 SECONDARY LYMPHEDEMA: ICD-10-CM

## 2018-07-03 DIAGNOSIS — J44.9 CHRONIC OBSTRUCTIVE PULMONARY DISEASE, UNSPECIFIED COPD TYPE (HCC): ICD-10-CM

## 2018-07-03 DIAGNOSIS — M43.16 SPONDYLOLISTHESIS OF LUMBAR REGION: ICD-10-CM

## 2018-07-03 DIAGNOSIS — H05.20 EXOPHTHALMIA: Primary | ICD-10-CM

## 2018-07-03 DIAGNOSIS — C44.311 BASAL CELL CARCINOMA (BCC) OF LEFT SIDE OF NOSE: ICD-10-CM

## 2018-07-03 DIAGNOSIS — F41.9 ANXIETY: ICD-10-CM

## 2018-07-03 DIAGNOSIS — Z00.00 ENCOUNTER FOR ANNUAL HEALTH EXAMINATION: ICD-10-CM

## 2018-07-03 DIAGNOSIS — E04.1 THYROID NODULE: ICD-10-CM

## 2018-07-03 DIAGNOSIS — I10 ESSENTIAL HYPERTENSION WITH GOAL BLOOD PRESSURE LESS THAN 140/90: ICD-10-CM

## 2018-07-03 DIAGNOSIS — M51.36 DDD (DEGENERATIVE DISC DISEASE), LUMBAR: ICD-10-CM

## 2018-07-03 DIAGNOSIS — Z85.3 HX OF BREAST CANCER: ICD-10-CM

## 2018-07-03 DIAGNOSIS — M41.85 OTHER FORM OF SCOLIOSIS OF THORACOLUMBAR SPINE: ICD-10-CM

## 2018-07-03 PROBLEM — M48.061 LUMBAR STENOSIS: Status: RESOLVED | Noted: 2017-04-06 | Resolved: 2018-07-03

## 2018-07-03 PROBLEM — M47.816 LUMBAR SPONDYLOSIS: Status: RESOLVED | Noted: 2017-06-27 | Resolved: 2018-07-03

## 2018-07-03 PROBLEM — L30.9 ECZEMA: Status: RESOLVED | Noted: 2017-02-27 | Resolved: 2018-07-03

## 2018-07-03 PROBLEM — M54.32 LEFT SIDED SCIATICA: Status: RESOLVED | Noted: 2017-08-28 | Resolved: 2018-07-03

## 2018-07-03 PROBLEM — K42.9 UMBILICAL HERNIA WITHOUT OBSTRUCTION AND WITHOUT GANGRENE: Status: RESOLVED | Noted: 2017-10-12 | Resolved: 2018-07-03

## 2018-07-03 PROCEDURE — G0439 PPPS, SUBSEQ VISIT: HCPCS | Performed by: INTERNAL MEDICINE

## 2018-07-03 NOTE — PATIENT INSTRUCTIONS
Alejandra Whitman's SCREENING SCHEDULE   Tests on this list are recommended by your physician but may not be covered, or covered at this frequency, by your insurer. Please check with your insurance carrier before scheduling to verify coverage.    PREVENT • Anyone with a family history    Colorectal Cancer Screening  Covered up to Age 76     Colonoscopy Screen   Covered every 10 years- more often if abnormal There are no preventive care reminders to display for this patient.  Update [place] if a [address] ANTIG   Orders placed or performed in visit on 10/26/15  -FLU VACC [employer] ANTIG   Orders placed or performed in visit on 10/22/14  -FLU VACC [address] ANTIG   Orders placed or performed in visit on 10/24/13  -INFLUENZA VIRUS VACCIN anyone to review and print using their home computer and printer. (the forms are also available in [place]  [email or web address] org  This link also has information from the [facility] regarding Advance Directives.

## 2018-07-03 NOTE — PROGRESS NOTES
HPI:   Lucila Jacinto is a 80year old female who presents for a Medicare Subsequent Annual Wellness visit (Pt already had Initial Annual Wellness).     Basal cell left nostril  Her last annual assessment has been over 1 year: Annual Physical due on 02/ functional status. Vision Problems? : Yes (glaucoma)   She has Walking problems based on screening of functional status.    Difficulty walking?: Yes (uses walker)             Depression Screening (PHQ-2/PHQ-9): Over the LAST 2 WEEKS   Little interest or p 04/17/2018   ALT 38 04/17/2018   CA 9.2 04/17/2018   ALB 3.9 04/17/2018   TSH 1.160 04/18/2016   CREATSERUM 0.79 04/17/2018    (H) 04/17/2018        CBC  (most recent labs)     Lab Results  Component Value Date   WBC 4.8 04/17/2018   HGB 14.9 04/17/ documented not to have experienced any of the following events (N/A, 3/11/2016); injection, w/wo contrast, dx/therapeutic substance, epidural/subarachnoid; lumbar/sacral (N/A, 4/4/2016); patient withough preoperative order for iv antibiotic surgical site i Normocephalic, without obvious abnormality, atraumatic   Eyes:  PERRL, conjunctiva/corneas clear, EOM's intact both eyes   Ears:  Normal TM's and external ear canals, both ears   Nose: Nares normal, septum midline,mucosa normal, no drainage or sinus tender spine    Hx of breast cancer S/P lumpecgtony and LN disection    Secondary lymphedema    Basal cell carcinoma (BCC) of left side of nose         Diet assessment: good     PLAN:  The patient indicates understanding of these issues and agrees to the plan.   R Glaucoma, AA>50, > 65 No flowsheet data found. Bone Density Screening      Dexascan Every two years No results found for this or any previous visit. No flowsheet data found.     Pap and Pelvic      Pap: Every 3 yrs age 21-65 or Pap+HPV every 5 yr (mmol/L)   Date Value   04/17/2018 4.0     POTASSIUM (mmol/L)   Date Value   03/20/2014 5.0    No flowsheet data found.     Creatinine  Annually CREATININE (mg/dL)   Date Value   03/20/2014 0.54     Creatinine (mg/dL)   Date Value   04/17/2018 0.79    No fl

## 2018-09-12 ENCOUNTER — TELEPHONE (OUTPATIENT)
Dept: INTERNAL MEDICINE CLINIC | Facility: CLINIC | Age: 83
End: 2018-09-12

## 2018-09-12 NOTE — TELEPHONE ENCOUNTER
PT would like an appt tomorrow for BP check with Dr. Bethany Delcid, because she also needs a letter from him

## 2018-09-20 ENCOUNTER — OFFICE VISIT (OUTPATIENT)
Dept: INTERNAL MEDICINE CLINIC | Facility: CLINIC | Age: 83
End: 2018-09-20
Payer: MEDICARE

## 2018-09-20 VITALS
DIASTOLIC BLOOD PRESSURE: 76 MMHG | WEIGHT: 136.31 LBS | BODY MASS INDEX: 23 KG/M2 | SYSTOLIC BLOOD PRESSURE: 130 MMHG | RESPIRATION RATE: 18 BRPM | HEART RATE: 88 BPM | OXYGEN SATURATION: 96 % | TEMPERATURE: 98 F

## 2018-09-20 DIAGNOSIS — J44.9 CHRONIC OBSTRUCTIVE PULMONARY DISEASE, UNSPECIFIED COPD TYPE (HCC): ICD-10-CM

## 2018-09-20 DIAGNOSIS — M51.36 DDD (DEGENERATIVE DISC DISEASE), LUMBAR: Primary | ICD-10-CM

## 2018-09-20 DIAGNOSIS — C44.311 BASAL CELL CARCINOMA (BCC) OF LEFT SIDE OF NOSE: ICD-10-CM

## 2018-09-20 DIAGNOSIS — Z85.3 HX OF BREAST CANCER: ICD-10-CM

## 2018-09-20 DIAGNOSIS — I10 ESSENTIAL HYPERTENSION WITH GOAL BLOOD PRESSURE LESS THAN 140/90: ICD-10-CM

## 2018-09-20 PROCEDURE — 99214 OFFICE O/P EST MOD 30 MIN: CPT | Performed by: INTERNAL MEDICINE

## 2018-09-20 NOTE — PROGRESS NOTES
Problem 1 lumbar pain. Patient does have scoliosis. Is currently trying new chiropractor and getting some relief.   Problem 2 COPD she denies a cough shortness of breath or wheezing problem #3 hypertension she denies headaches dizziness chest pain shortne

## 2018-10-03 ENCOUNTER — PRIOR ORIGINAL RECORDS (OUTPATIENT)
Dept: OTHER | Age: 83
End: 2018-10-03

## 2018-10-03 ENCOUNTER — MYAURORA ACCOUNT LINK (OUTPATIENT)
Dept: OTHER | Age: 83
End: 2018-10-03

## 2018-10-03 LAB
ALBUMIN: 3.9 G/DL
ALKALINE PHOSPHATATE(ALK PHOS): 203 IU/L
BILIRUBIN TOTAL: 0.4 MG/DL
BUN: 20 MG/DL
CALCIUM: 9.2 MG/DL
CHLORIDE: 106 MEQ/L
CREATININE, SERUM: 0.79 MG/DL
GLUCOSE: 100 MG/DL
HEMATOCRIT: 44.8 %
HEMOGLOBIN: 14.9 G/DL
MCH: 31.4 PG
MCHC: 33.3 G/DL
MCV: 94.5 FL
PLATELETS: 264 K/UL
POTASSIUM, SERUM: 4 MEQ/L
PROTEIN, TOTAL: 7.3 G/DL
RED BLOOD COUNT: 4.74 X 10-6/U
SGOT (AST): 26 IU/L
SGPT (ALT): 38 IU/L
SODIUM: 139 MEQ/L
WHITE BLOOD COUNT: 4.8 X 10-3/U

## 2018-10-11 RX ORDER — TEMAZEPAM 15 MG/1
CAPSULE ORAL
Qty: 180 CAPSULE | Refills: 0 | Status: SHIPPED | OUTPATIENT
Start: 2018-10-11 | End: 2019-06-13 | Stop reason: DRUGHIGH

## 2018-12-03 ENCOUNTER — IMMUNIZATION (OUTPATIENT)
Dept: INTERNAL MEDICINE CLINIC | Facility: CLINIC | Age: 83
End: 2018-12-03
Payer: MEDICARE

## 2018-12-03 PROCEDURE — 90653 IIV ADJUVANT VACCINE IM: CPT | Performed by: INTERNAL MEDICINE

## 2018-12-03 PROCEDURE — G0008 ADMIN INFLUENZA VIRUS VAC: HCPCS | Performed by: INTERNAL MEDICINE

## 2018-12-10 ENCOUNTER — NURSE ONLY (OUTPATIENT)
Dept: INTERNAL MEDICINE CLINIC | Facility: CLINIC | Age: 83
End: 2018-12-10

## 2018-12-10 NOTE — PROGRESS NOTES
Pt comes in for Pneumovax 23    Upon presenting her with ABN she decided not to have immunization until she calls Medicare to ask about coverage.

## 2018-12-17 ENCOUNTER — TELEPHONE (OUTPATIENT)
Dept: INTERNAL MEDICINE CLINIC | Facility: CLINIC | Age: 83
End: 2018-12-17

## 2018-12-17 RX ORDER — AMLODIPINE BESYLATE 5 MG/1
TABLET ORAL
Qty: 90 TABLET | Refills: 1 | Status: SHIPPED | OUTPATIENT
Start: 2018-12-17 | End: 2019-06-13

## 2019-01-09 RX ORDER — TEMAZEPAM 15 MG/1
CAPSULE ORAL
Qty: 180 CAPSULE | Refills: 0 | OUTPATIENT
Start: 2019-01-09

## 2019-01-15 PROCEDURE — 80053 COMPREHEN METABOLIC PANEL: CPT | Performed by: INTERNAL MEDICINE

## 2019-01-15 NOTE — PROGRESS NOTES
Patient needs a refill of temazepam.  States at night for insomnia. We discussed good sleep hygiene. Eliminating caffeine afternoon. Go to bed the same time each evening avoid exercise in the evening.   If that go to sleep she wakes up she is to get out

## 2019-02-07 ENCOUNTER — OFFICE VISIT (OUTPATIENT)
Dept: INTERNAL MEDICINE CLINIC | Facility: CLINIC | Age: 84
End: 2019-02-07
Payer: MEDICARE

## 2019-02-07 VITALS
BODY MASS INDEX: 25 KG/M2 | SYSTOLIC BLOOD PRESSURE: 146 MMHG | DIASTOLIC BLOOD PRESSURE: 70 MMHG | WEIGHT: 132 LBS | RESPIRATION RATE: 18 BRPM | OXYGEN SATURATION: 95 % | HEART RATE: 74 BPM | TEMPERATURE: 98 F

## 2019-02-07 DIAGNOSIS — I10 ELEVATED BLOOD PRESSURE READING IN OFFICE WITH DIAGNOSIS OF HYPERTENSION: ICD-10-CM

## 2019-02-07 DIAGNOSIS — M54.50 LUMBAR PAIN: Primary | ICD-10-CM

## 2019-02-07 DIAGNOSIS — M54.2 CERVICAL SPINE PAIN: ICD-10-CM

## 2019-02-07 PROCEDURE — 99213 OFFICE O/P EST LOW 20 MIN: CPT | Performed by: INTERNAL MEDICINE

## 2019-02-07 NOTE — PROGRESS NOTES
Patient has been complaining of pain essentially the entire back. No history of any recent injury. She does have a scoliosis. On a scale of 1-10 the pain is generally a 7 8 worse with weightbearing. Note blood pressure is elevated at.   She denies any c

## 2019-02-07 NOTE — PATIENT INSTRUCTIONS
Tommy Wood I want to check your blood pressure every day write the numbers down to return office in 2 weeks with those numbers.

## 2019-02-28 VITALS
WEIGHT: 141 LBS | BODY MASS INDEX: 24.07 KG/M2 | DIASTOLIC BLOOD PRESSURE: 90 MMHG | HEIGHT: 64 IN | SYSTOLIC BLOOD PRESSURE: 178 MMHG | HEART RATE: 72 BPM

## 2019-02-28 VITALS
HEIGHT: 64 IN | HEART RATE: 84 BPM | SYSTOLIC BLOOD PRESSURE: 142 MMHG | WEIGHT: 143 LBS | BODY MASS INDEX: 24.41 KG/M2 | DIASTOLIC BLOOD PRESSURE: 66 MMHG

## 2019-02-28 VITALS
HEIGHT: 64 IN | DIASTOLIC BLOOD PRESSURE: 76 MMHG | HEART RATE: 72 BPM | BODY MASS INDEX: 24.07 KG/M2 | SYSTOLIC BLOOD PRESSURE: 138 MMHG | WEIGHT: 141 LBS

## 2019-02-28 VITALS — SYSTOLIC BLOOD PRESSURE: 142 MMHG | DIASTOLIC BLOOD PRESSURE: 72 MMHG | HEART RATE: 80 BPM | WEIGHT: 141 LBS

## 2019-02-28 VITALS
HEART RATE: 76 BPM | DIASTOLIC BLOOD PRESSURE: 72 MMHG | WEIGHT: 137 LBS | SYSTOLIC BLOOD PRESSURE: 135 MMHG | BODY MASS INDEX: 23.39 KG/M2 | HEIGHT: 64 IN

## 2019-03-13 ENCOUNTER — OFFICE VISIT (OUTPATIENT)
Dept: INTERNAL MEDICINE CLINIC | Facility: CLINIC | Age: 84
End: 2019-03-13
Payer: MEDICARE

## 2019-03-13 VITALS
SYSTOLIC BLOOD PRESSURE: 130 MMHG | TEMPERATURE: 98 F | DIASTOLIC BLOOD PRESSURE: 60 MMHG | WEIGHT: 129.19 LBS | BODY MASS INDEX: 24 KG/M2 | RESPIRATION RATE: 16 BRPM | HEART RATE: 87 BPM | OXYGEN SATURATION: 95 %

## 2019-03-13 DIAGNOSIS — F51.01 PRIMARY INSOMNIA: ICD-10-CM

## 2019-03-13 DIAGNOSIS — J22 LRTI (LOWER RESPIRATORY TRACT INFECTION): Primary | ICD-10-CM

## 2019-03-13 DIAGNOSIS — R63.4 WEIGHT LOSS: ICD-10-CM

## 2019-03-13 PROCEDURE — 99214 OFFICE O/P EST MOD 30 MIN: CPT | Performed by: INTERNAL MEDICINE

## 2019-03-13 RX ORDER — TEMAZEPAM 30 MG/1
30 CAPSULE ORAL NIGHTLY PRN
Qty: 30 CAPSULE | Refills: 5 | Status: SHIPPED | OUTPATIENT
Start: 2019-03-13 | End: 2019-06-13

## 2019-03-13 RX ORDER — AMOXICILLIN 500 MG/1
500 CAPSULE ORAL 2 TIMES DAILY
Qty: 30 CAPSULE | Refills: 0 | Status: SHIPPED | OUTPATIENT
Start: 2019-03-13 | End: 2019-03-27

## 2019-03-13 RX ORDER — AMLODIPINE BESYLATE 5 MG/1
5 TABLET ORAL DAILY
Qty: 90 TABLET | Refills: 3 | Status: SHIPPED | OUTPATIENT
Start: 2019-03-13 | End: 2020-03-24

## 2019-03-13 NOTE — PROGRESS NOTES
Asad Garcia states for last week she has had some shortness of breath and coughing. No chest pain. Does feel feverish but no night sweats. She also has been losing weight. Since her last visit she lost about 10 pounds.   She attributed this to the fact that

## 2019-04-01 ENCOUNTER — TELEPHONE (OUTPATIENT)
Dept: INTERNAL MEDICINE CLINIC | Facility: CLINIC | Age: 84
End: 2019-04-01

## 2019-04-01 NOTE — TELEPHONE ENCOUNTER
Meena's dentist gave me a call. Valuate jaw pain. She had a CT scan did show calcifications in the internal carotid arteries in the region of the [place]. No calcification in the carotid artery bifurcation.   He also did fax me 2 pages of image

## 2019-04-01 NOTE — TELEPHONE ENCOUNTER
I left a voicemail today before non asking the patient to call the office back, In regards to an appointment with Dr Jen Haynes     I also called a second time after 3 pm with rocío, but did not leave a voicemail

## 2019-04-02 ENCOUNTER — OFFICE VISIT (OUTPATIENT)
Dept: INTERNAL MEDICINE CLINIC | Facility: CLINIC | Age: 84
End: 2019-04-02
Payer: MEDICARE

## 2019-04-02 VITALS
SYSTOLIC BLOOD PRESSURE: 136 MMHG | OXYGEN SATURATION: 99 % | RESPIRATION RATE: 16 BRPM | HEART RATE: 86 BPM | WEIGHT: 130.31 LBS | TEMPERATURE: 98 F | BODY MASS INDEX: 24 KG/M2 | DIASTOLIC BLOOD PRESSURE: 88 MMHG

## 2019-04-02 DIAGNOSIS — I10 ESSENTIAL HYPERTENSION: ICD-10-CM

## 2019-04-02 DIAGNOSIS — I70.90 ATHEROMATOUS PLAQUE: Primary | ICD-10-CM

## 2019-04-02 DIAGNOSIS — R63.4 WEIGHT LOSS: ICD-10-CM

## 2019-04-02 PROBLEM — H40.1133 PRIMARY OPEN-ANGLE GLAUCOMA, BILATERAL, SEVERE STAGE: Status: ACTIVE | Noted: 2017-11-11

## 2019-04-02 PROBLEM — H02.059 TRICHIASIS: Status: ACTIVE | Noted: 2017-04-25

## 2019-04-02 PROBLEM — H40.1493 PSEUDOEXFOLIATION (PXF) GLAUCOMA, SEVERE STAGE: Status: ACTIVE | Noted: 2017-02-28

## 2019-04-02 PROCEDURE — 99214 OFFICE O/P EST MOD 30 MIN: CPT | Performed by: INTERNAL MEDICINE

## 2019-04-02 NOTE — PROGRESS NOTES
I did receive a phone call and fax information from her dentist.  He apparently ordered a CT scan of the brain that showed calcification of the [place]. 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 She does have history of dyslipidemia.   She is cu

## 2019-04-03 ENCOUNTER — NURSE ONLY (OUTPATIENT)
Dept: INTERNAL MEDICINE CLINIC | Facility: CLINIC | Age: 84
End: 2019-04-03
Payer: MEDICARE

## 2019-04-03 DIAGNOSIS — I70.90 ATHEROMATOUS PLAQUE: ICD-10-CM

## 2019-04-03 PROCEDURE — 80053 COMPREHEN METABOLIC PANEL: CPT | Performed by: INTERNAL MEDICINE

## 2019-04-03 PROCEDURE — 36415 COLL VENOUS BLD VENIPUNCTURE: CPT | Performed by: INTERNAL MEDICINE

## 2019-04-03 PROCEDURE — 80061 LIPID PANEL: CPT | Performed by: INTERNAL MEDICINE

## 2019-04-03 PROCEDURE — 85025 COMPLETE CBC W/AUTO DIFF WBC: CPT | Performed by: INTERNAL MEDICINE

## 2019-06-11 ENCOUNTER — OFFICE VISIT (OUTPATIENT)
Dept: INTERNAL MEDICINE CLINIC | Facility: CLINIC | Age: 84
End: 2019-06-11
Payer: MEDICARE

## 2019-06-11 VITALS
WEIGHT: 130.38 LBS | OXYGEN SATURATION: 96 % | DIASTOLIC BLOOD PRESSURE: 70 MMHG | SYSTOLIC BLOOD PRESSURE: 116 MMHG | HEART RATE: 73 BPM | RESPIRATION RATE: 17 BRPM | BODY MASS INDEX: 24 KG/M2

## 2019-06-11 DIAGNOSIS — I10 ELEVATED BLOOD PRESSURE READING IN OFFICE WITH DIAGNOSIS OF HYPERTENSION: Primary | ICD-10-CM

## 2019-06-11 DIAGNOSIS — I49.9 CARDIAC ARRHYTHMIA, UNSPECIFIED CARDIAC ARRHYTHMIA TYPE: ICD-10-CM

## 2019-06-11 DIAGNOSIS — F41.9 ANXIETY: ICD-10-CM

## 2019-06-11 PROCEDURE — 99214 OFFICE O/P EST MOD 30 MIN: CPT | Performed by: INTERNAL MEDICINE

## 2019-06-11 NOTE — PROGRESS NOTES
This is an anxious individual who is been having some dental issues. A lot of severe pain in the oral cavity. He is seeing an acupuncturist.  Acupuncture is stated that she had ectopic heartbeats.   Recently she is found out that her blood pressure is halie

## 2019-06-11 NOTE — PATIENT INSTRUCTIONS
Rajwinder Zimmer take your ramipril and amlodipine in the evening. Continue monitoring her blood pressure morning and evening. For your next office visit bring all your medications physically with you.

## 2019-06-13 ENCOUNTER — OFFICE VISIT (OUTPATIENT)
Dept: INTERNAL MEDICINE CLINIC | Facility: CLINIC | Age: 84
End: 2019-06-13
Payer: MEDICARE

## 2019-06-13 VITALS
SYSTOLIC BLOOD PRESSURE: 124 MMHG | WEIGHT: 130.63 LBS | RESPIRATION RATE: 18 BRPM | OXYGEN SATURATION: 97 % | TEMPERATURE: 97 F | HEART RATE: 80 BPM | BODY MASS INDEX: 24 KG/M2 | DIASTOLIC BLOOD PRESSURE: 64 MMHG

## 2019-06-13 DIAGNOSIS — I10 ESSENTIAL HYPERTENSION WITH GOAL BLOOD PRESSURE LESS THAN 140/90: ICD-10-CM

## 2019-06-13 DIAGNOSIS — I49.9 CARDIAC ARRHYTHMIA, UNSPECIFIED CARDIAC ARRHYTHMIA TYPE: ICD-10-CM

## 2019-06-13 DIAGNOSIS — F41.9 ANXIETY: ICD-10-CM

## 2019-06-13 PROBLEM — I49.3 PVC (PREMATURE VENTRICULAR CONTRACTION): Status: ACTIVE | Noted: 2019-06-13

## 2019-06-13 PROCEDURE — 93000 ELECTROCARDIOGRAM COMPLETE: CPT | Performed by: INTERNAL MEDICINE

## 2019-06-13 PROCEDURE — 99214 OFFICE O/P EST MOD 30 MIN: CPT | Performed by: INTERNAL MEDICINE

## 2019-06-13 RX ORDER — TEMAZEPAM 15 MG/1
15 CAPSULE ORAL NIGHTLY PRN
Qty: 90 CAPSULE | Refills: 0 | Status: SHIPPED | OUTPATIENT
Start: 2019-06-13 | End: 2019-08-26

## 2019-06-13 RX ORDER — TEMAZEPAM 15 MG/1
15 CAPSULE ORAL NIGHTLY PRN
Qty: 90 CAPSULE | Refills: 0 | Status: SHIPPED | OUTPATIENT
Start: 2019-06-13 | End: 2019-06-13

## 2019-06-13 NOTE — PROGRESS NOTES
Problem 1 hypertension. We did rearrange her medications taken at night.   Did take her blood pressure earlier this morning and it was mildly elevated however bilaterally in our office it was within normal this individual and I suspect this is the cause of

## 2019-07-05 PROBLEM — I49.3 PVC (PREMATURE VENTRICULAR CONTRACTION): Status: RESOLVED | Noted: 2019-06-13 | Resolved: 2019-07-05

## 2019-07-08 ENCOUNTER — OFFICE VISIT (OUTPATIENT)
Dept: INTERNAL MEDICINE CLINIC | Facility: CLINIC | Age: 84
End: 2019-07-08
Payer: MEDICARE

## 2019-07-08 VITALS
TEMPERATURE: 98 F | HEART RATE: 61 BPM | HEIGHT: 64.2 IN | BODY MASS INDEX: 22.2 KG/M2 | SYSTOLIC BLOOD PRESSURE: 138 MMHG | WEIGHT: 130 LBS | RESPIRATION RATE: 17 BRPM | DIASTOLIC BLOOD PRESSURE: 66 MMHG | OXYGEN SATURATION: 97 %

## 2019-07-08 DIAGNOSIS — H40.1493 PSEUDOEXFOLIATION (PXF) GLAUCOMA, SEVERE STAGE: ICD-10-CM

## 2019-07-08 DIAGNOSIS — Z00.00 ENCOUNTER FOR ANNUAL HEALTH EXAMINATION: ICD-10-CM

## 2019-07-08 DIAGNOSIS — M41.85 OTHER FORM OF SCOLIOSIS OF THORACOLUMBAR SPINE: ICD-10-CM

## 2019-07-08 DIAGNOSIS — I10 ESSENTIAL HYPERTENSION WITH GOAL BLOOD PRESSURE LESS THAN 140/90: ICD-10-CM

## 2019-07-08 DIAGNOSIS — Z85.3 HX OF BREAST CANCER: ICD-10-CM

## 2019-07-08 DIAGNOSIS — J44.9 CHRONIC OBSTRUCTIVE PULMONARY DISEASE, UNSPECIFIED COPD TYPE (HCC): ICD-10-CM

## 2019-07-08 DIAGNOSIS — H02.059 TRICHIASIS, UNSPECIFIED LATERALITY: ICD-10-CM

## 2019-07-08 DIAGNOSIS — H40.1133 PRIMARY OPEN-ANGLE GLAUCOMA, BILATERAL, SEVERE STAGE: ICD-10-CM

## 2019-07-08 DIAGNOSIS — M43.16 SPONDYLOLISTHESIS OF LUMBAR REGION: ICD-10-CM

## 2019-07-08 DIAGNOSIS — R30.0 DYSURIA: ICD-10-CM

## 2019-07-08 DIAGNOSIS — F41.9 ANXIETY: ICD-10-CM

## 2019-07-08 DIAGNOSIS — H05.20 EXOPHTHALMIA: Primary | ICD-10-CM

## 2019-07-08 DIAGNOSIS — C44.311 BASAL CELL CARCINOMA (BCC) OF LEFT SIDE OF NOSE: ICD-10-CM

## 2019-07-08 DIAGNOSIS — I89.0 SECONDARY LYMPHEDEMA: ICD-10-CM

## 2019-07-08 DIAGNOSIS — E04.1 THYROID NODULE: ICD-10-CM

## 2019-07-08 DIAGNOSIS — M51.36 DDD (DEGENERATIVE DISC DISEASE), LUMBAR: ICD-10-CM

## 2019-07-08 LAB
APPEARANCE: CLEAR
MULTISTIX LOT#: NORMAL NUMERIC
PH, URINE: 7 (ref 4.5–8)
SPECIFIC GRAVITY: 1.01 (ref 1–1.03)
URINE-COLOR: YELLOW
UROBILINOGEN,SEMI-QN: 0.2 MG/DL (ref 0–1.9)

## 2019-07-08 PROCEDURE — 81003 URINALYSIS AUTO W/O SCOPE: CPT | Performed by: INTERNAL MEDICINE

## 2019-07-08 PROCEDURE — G0439 PPPS, SUBSEQ VISIT: HCPCS | Performed by: INTERNAL MEDICINE

## 2019-07-08 NOTE — PATIENT INSTRUCTIONS
Vee Whitman's SCREENING SCHEDULE   Tests on this list are recommended by your physician but may not be covered, or covered at this frequency, by your insurer. Please check with your insurance carrier before scheduling to verify coverage.    PREVENT family history    Colorectal Cancer Screening  Covered up to Age 76     Colonoscopy Screen   Covered every 10 years- more often if abnormal There are no preventive care reminders to display for this patient.  Update [employer] if applicable    Flex Orders placed or performed in visit on 10/26/15   • FLU VACC [address] ANTIG   Orders placed or performed in visit on 10/22/14   • FLU VACC [address] ANTIG   Orders placed or performed in visit on 10/24/13   • INFLUENZA VIRUS VACCINE, PRESERV NONI review and print using their home computer and printer. (the forms are also available in [facility])  www. [email or web address] org  This link also has information from the [facility] regarding Advance Directives.

## 2019-07-08 NOTE — PROGRESS NOTES
HPI:   Tati Bates is a 80year old female who presents for a Medicare Subsequent Annual Wellness visit (Pt already had Initial Annual Wellness).     Dysuria  Her last annual assessment has been over 1 year: Annual Physical due on 07/03/2019 8.    9.                Advanced Directive:   She does have a Living Will but we do NOT have it on file in [address] [place] [address] Rd.    The patient has this document but we do not have it in [facility], and patient is instructed to get our office a copy of it for scanning into Ep CBC  (most recent labs)   Lab Results   Component Value Date    WBC 4.7 04/03/2019    HGB 15.0 04/03/2019    .0 04/03/2019        ALLERGIES:   She has No Known Allergies.     CURRENT MEDICATIONS:     Outpatient Medications Marked as Taking for th 3/11/2016); patient documented not to have experienced any of the following events (N/A, 3/11/2016); injection, w/wo contrast, dx/therapeutic substance, epidural/subarachnoid; lumbar/sacral (N/A, 4/4/2016); patient withough preoperative order for iv antibi Appearance:  Alert, cooperative, no distress, appears stated age   Head:  Normocephalic, without obvious abnormality, atraumatic   Eyes:  PERRL, conjunctiva/corneas clear, EOM's intact both eyes   Ears:  Normal TM's and external ear canals, both ears   Nos lumbar    Essential hypertension with goal blood pressure less than 140/90    Chronic obstructive pulmonary disease, unspecified COPD type (HCC)    Hx of breast cancer S/P lumpecgtony and LN disection    Secondary lymphedema    Other form of scoliosis of t Maintenance if applicable    Flex Sigmoidoscopy Screen every 10 years No results found for this or any previous visit. No flowsheet data found. Fecal Occult Blood Annually No results found for: FOB No flowsheet data found.     Glaucoma Screening      Op This may be covered with your pharmacy  prescription benefits      SPECIFIC DISEASE MONITORING Internal Lab or Procedure External Lab or Procedure      Annual Monitoring of Persistent     Medications (ACE/ARB, digoxin diuretics, anticonvulsants.)    Kamlesh

## 2019-07-09 ENCOUNTER — TELEPHONE (OUTPATIENT)
Dept: INTERNAL MEDICINE CLINIC | Facility: CLINIC | Age: 84
End: 2019-07-09

## 2019-07-09 NOTE — TELEPHONE ENCOUNTER
Patient states she was in yesterday for an appointment and had to drink water in order to give a specimen but she has to urinate now and would be able to give another one.     I informed patient that her UA was normal yesterday so no need for another one si

## 2019-07-12 RX ORDER — RAMIPRIL 10 MG/1
10 CAPSULE ORAL
Qty: 90 CAPSULE | Refills: 3 | Status: SHIPPED | OUTPATIENT
Start: 2019-07-12 | End: 2020-06-18 | Stop reason: DRUGHIGH

## 2019-07-15 ENCOUNTER — OFFICE VISIT (OUTPATIENT)
Dept: INTERNAL MEDICINE CLINIC | Facility: CLINIC | Age: 84
End: 2019-07-15
Payer: MEDICARE

## 2019-07-15 VITALS
OXYGEN SATURATION: 98 % | DIASTOLIC BLOOD PRESSURE: 68 MMHG | TEMPERATURE: 97 F | SYSTOLIC BLOOD PRESSURE: 128 MMHG | BODY MASS INDEX: 22.36 KG/M2 | WEIGHT: 131 LBS | HEIGHT: 64.2 IN | HEART RATE: 82 BPM | RESPIRATION RATE: 17 BRPM

## 2019-07-15 DIAGNOSIS — N30.00 ACUTE CYSTITIS WITHOUT HEMATURIA: Primary | ICD-10-CM

## 2019-07-15 LAB
APPEARANCE: CLEAR
MULTISTIX LOT#: ABNORMAL NUMERIC
PH, URINE: 7 (ref 4.5–8)
SPECIFIC GRAVITY: 1.01 (ref 1–1.03)
URINE-COLOR: YELLOW
UROBILINOGEN,SEMI-QN: 0.2 MG/DL (ref 0–1.9)

## 2019-07-15 PROCEDURE — 87086 URINE CULTURE/COLONY COUNT: CPT | Performed by: INTERNAL MEDICINE

## 2019-07-15 PROCEDURE — 99213 OFFICE O/P EST LOW 20 MIN: CPT | Performed by: INTERNAL MEDICINE

## 2019-07-15 PROCEDURE — 81003 URINALYSIS AUTO W/O SCOPE: CPT | Performed by: INTERNAL MEDICINE

## 2019-07-15 RX ORDER — SULFAMETHOXAZOLE AND TRIMETHOPRIM 800; 160 MG/1; MG/1
1 TABLET ORAL 2 TIMES DAILY
Qty: 14 TABLET | Refills: 0 | Status: SHIPPED | OUTPATIENT
Start: 2019-07-15 | End: 2019-11-19 | Stop reason: ALTCHOICE

## 2019-07-15 NOTE — PROGRESS NOTES
And is complaining of polyuria dysuria. No fever chills or night sweats. Physical examination very pleasant individual no acute distress abdomen soft nontender no organomegaly rebound or guarding no flank or suprapubic tenderness.   Urine dip was positi

## 2019-07-19 ENCOUNTER — OFFICE VISIT (OUTPATIENT)
Dept: INTERNAL MEDICINE CLINIC | Facility: CLINIC | Age: 84
End: 2019-07-19
Payer: MEDICARE

## 2019-07-19 VITALS
DIASTOLIC BLOOD PRESSURE: 60 MMHG | OXYGEN SATURATION: 97 % | TEMPERATURE: 99 F | BODY MASS INDEX: 22 KG/M2 | RESPIRATION RATE: 16 BRPM | SYSTOLIC BLOOD PRESSURE: 138 MMHG | WEIGHT: 131 LBS | HEART RATE: 52 BPM

## 2019-07-19 DIAGNOSIS — F41.9 ANXIETY: ICD-10-CM

## 2019-07-19 DIAGNOSIS — R10.2 PAIN PELVIC: Primary | ICD-10-CM

## 2019-07-19 PROCEDURE — 99213 OFFICE O/P EST LOW 20 MIN: CPT | Performed by: INTERNAL MEDICINE

## 2019-07-19 RX ORDER — PAROXETINE HYDROCHLORIDE 20 MG/1
20 TABLET, FILM COATED ORAL DAILY
Qty: 30 TABLET | Refills: 3 | Status: SHIPPED | OUTPATIENT
Start: 2019-07-19 | End: 2019-08-20

## 2019-07-19 RX ORDER — PAROXETINE HYDROCHLORIDE 20 MG/1
10 TABLET, FILM COATED ORAL EVERY MORNING
Qty: 30 TABLET | Refills: 5 | Status: SHIPPED | OUTPATIENT
Start: 2019-07-19 | End: 2019-07-19 | Stop reason: CLARIF

## 2019-07-19 NOTE — PROGRESS NOTES
Patient was seen recently complaining of symptoms of UTI. She was swimming in her pool and thought she smelled insecticide. Urine culture was negative. Still complains of a burning sensation in the suprapubic area.   Is a very anxious individual.    Phys

## 2019-08-20 ENCOUNTER — OFFICE VISIT (OUTPATIENT)
Dept: INTERNAL MEDICINE CLINIC | Facility: CLINIC | Age: 84
End: 2019-08-20
Payer: MEDICARE

## 2019-08-20 VITALS
SYSTOLIC BLOOD PRESSURE: 130 MMHG | HEART RATE: 78 BPM | RESPIRATION RATE: 18 BRPM | BODY MASS INDEX: 23 KG/M2 | OXYGEN SATURATION: 96 % | DIASTOLIC BLOOD PRESSURE: 80 MMHG | TEMPERATURE: 98 F | WEIGHT: 133.31 LBS

## 2019-08-20 DIAGNOSIS — F41.9 ANXIETY: ICD-10-CM

## 2019-08-20 DIAGNOSIS — I10 ELEVATED BLOOD PRESSURE READING IN OFFICE WITH DIAGNOSIS OF HYPERTENSION: ICD-10-CM

## 2019-08-20 DIAGNOSIS — I89.0 LYMPHEDEMA OF ARM: Primary | ICD-10-CM

## 2019-08-20 DIAGNOSIS — M54.50 LUMBAR PAIN: ICD-10-CM

## 2019-08-20 PROCEDURE — 99214 OFFICE O/P EST MOD 30 MIN: CPT | Performed by: INTERNAL MEDICINE

## 2019-08-20 NOTE — PROGRESS NOTES
Patient is normal and concern is her lymphedema left upper extremity. States is getting larger. Would like a referral to lymphedema clinic. Problem #2 pain in the lumbar spine. Chronic problem has been reactivated. No recent injury.   It does radiate t

## 2019-08-26 RX ORDER — TEMAZEPAM 15 MG/1
CAPSULE ORAL
Qty: 90 CAPSULE | Refills: 0 | Status: SHIPPED | OUTPATIENT
Start: 2019-08-26 | End: 2019-10-16

## 2019-08-26 NOTE — TELEPHONE ENCOUNTER
Patient would like the script to read 1 - 2 tablets at night as needed. She tries to only take one but most nights she needs two. Is this okay?     Patient requests that staff calls her [facility]

## 2019-09-11 ENCOUNTER — APPOINTMENT (OUTPATIENT)
Dept: OCCUPATIONAL MEDICINE | Facility: HOSPITAL | Age: 84
End: 2019-09-11
Attending: INTERNAL MEDICINE
Payer: MEDICARE

## 2019-09-13 ENCOUNTER — APPOINTMENT (OUTPATIENT)
Dept: OCCUPATIONAL MEDICINE | Facility: HOSPITAL | Age: 84
End: 2019-09-13
Attending: INTERNAL MEDICINE
Payer: MEDICARE

## 2019-09-18 ENCOUNTER — APPOINTMENT (OUTPATIENT)
Dept: OCCUPATIONAL MEDICINE | Facility: HOSPITAL | Age: 84
End: 2019-09-18
Attending: INTERNAL MEDICINE
Payer: MEDICARE

## 2019-09-20 ENCOUNTER — APPOINTMENT (OUTPATIENT)
Dept: OCCUPATIONAL MEDICINE | Facility: HOSPITAL | Age: 84
End: 2019-09-20
Attending: INTERNAL MEDICINE
Payer: MEDICARE

## 2019-09-25 ENCOUNTER — APPOINTMENT (OUTPATIENT)
Dept: OCCUPATIONAL MEDICINE | Facility: HOSPITAL | Age: 84
End: 2019-09-25
Attending: INTERNAL MEDICINE
Payer: MEDICARE

## 2019-09-27 ENCOUNTER — APPOINTMENT (OUTPATIENT)
Dept: OCCUPATIONAL MEDICINE | Facility: HOSPITAL | Age: 84
End: 2019-09-27
Attending: INTERNAL MEDICINE
Payer: MEDICARE

## 2019-10-01 ENCOUNTER — IMMUNIZATION (OUTPATIENT)
Dept: INTERNAL MEDICINE CLINIC | Facility: CLINIC | Age: 84
End: 2019-10-01
Payer: MEDICARE

## 2019-10-01 DIAGNOSIS — Z23 NEED FOR VACCINATION: ICD-10-CM

## 2019-10-01 PROCEDURE — 90662 IIV NO PRSV INCREASED AG IM: CPT | Performed by: INTERNAL MEDICINE

## 2019-10-01 PROCEDURE — G0008 ADMIN INFLUENZA VIRUS VAC: HCPCS | Performed by: INTERNAL MEDICINE

## 2019-10-02 ENCOUNTER — APPOINTMENT (OUTPATIENT)
Dept: OCCUPATIONAL MEDICINE | Facility: HOSPITAL | Age: 84
End: 2019-10-02
Attending: INTERNAL MEDICINE
Payer: MEDICARE

## 2019-10-04 ENCOUNTER — APPOINTMENT (OUTPATIENT)
Dept: OCCUPATIONAL MEDICINE | Facility: HOSPITAL | Age: 84
End: 2019-10-04
Attending: INTERNAL MEDICINE
Payer: MEDICARE

## 2019-10-17 RX ORDER — PAROXETINE HYDROCHLORIDE 20 MG/1
TABLET, FILM COATED ORAL
Qty: 90 TABLET | Refills: 1 | Status: SHIPPED | OUTPATIENT
Start: 2019-10-17 | End: 2019-11-19

## 2019-10-17 RX ORDER — TEMAZEPAM 15 MG/1
CAPSULE ORAL
Qty: 90 CAPSULE | Refills: 0 | Status: SHIPPED | OUTPATIENT
Start: 2019-10-17 | End: 2019-12-01

## 2019-11-19 ENCOUNTER — OFFICE VISIT (OUTPATIENT)
Dept: INTERNAL MEDICINE CLINIC | Facility: CLINIC | Age: 84
End: 2019-11-19
Payer: MEDICARE

## 2019-11-19 ENCOUNTER — TELEPHONE (OUTPATIENT)
Dept: INTERNAL MEDICINE CLINIC | Facility: CLINIC | Age: 84
End: 2019-11-19

## 2019-11-19 VITALS
WEIGHT: 132.69 LBS | BODY MASS INDEX: 23 KG/M2 | RESPIRATION RATE: 18 BRPM | TEMPERATURE: 97 F | SYSTOLIC BLOOD PRESSURE: 160 MMHG | DIASTOLIC BLOOD PRESSURE: 80 MMHG | HEART RATE: 90 BPM | OXYGEN SATURATION: 97 %

## 2019-11-19 DIAGNOSIS — I10 ELEVATED BLOOD PRESSURE READING IN OFFICE WITH DIAGNOSIS OF HYPERTENSION: ICD-10-CM

## 2019-11-19 DIAGNOSIS — R35.89 POLYURIA: ICD-10-CM

## 2019-11-19 DIAGNOSIS — R42 DIZZY: Primary | ICD-10-CM

## 2019-11-19 PROCEDURE — 87086 URINE CULTURE/COLONY COUNT: CPT | Performed by: INTERNAL MEDICINE

## 2019-11-19 PROCEDURE — 81003 URINALYSIS AUTO W/O SCOPE: CPT | Performed by: INTERNAL MEDICINE

## 2019-11-19 PROCEDURE — 99214 OFFICE O/P EST MOD 30 MIN: CPT | Performed by: INTERNAL MEDICINE

## 2019-11-19 RX ORDER — MECLIZINE HCL 12.5 MG/1
25 TABLET ORAL 2 TIMES DAILY
Qty: 20 TABLET | Refills: 0 | Status: SHIPPED | OUTPATIENT
Start: 2019-11-19 | End: 2021-03-22

## 2019-11-19 RX ORDER — SULFAMETHOXAZOLE AND TRIMETHOPRIM 800; 160 MG/1; MG/1
1 TABLET ORAL 2 TIMES DAILY
Qty: 14 TABLET | Refills: 0 | Status: SHIPPED | OUTPATIENT
Start: 2019-11-19 | End: 2019-11-25 | Stop reason: ALTCHOICE

## 2019-11-19 NOTE — PROGRESS NOTES
Patient taken back to her apartment via wheel chair. Helped into bed and placed walker next to the bed within reach. Daughter will be here within the hour to take her to her home.   She will contact us in the meantime if symptoms worsen or contact [facility] EMT'

## 2019-11-19 NOTE — TELEPHONE ENCOUNTER
Daughter did  Meclizine and Antibiotic at Pharmacy. States her mom requests to go to the [facility] for Respite. Graham Capps in Admissions and appropriate paperwork faxed.     Luisito VALENCIA notified

## 2019-11-19 NOTE — PROGRESS NOTES
Kirk Hernandez woke up this morning feeling \"dizzy. Like the room was spinning around. We brought her down for an acute visit. While she was stepping off the scale she was falling however by 2 staff members were able to catch her.   She is also complaining of p

## 2019-11-19 NOTE — TELEPHONE ENCOUNTER
V/O okay for patient to have eye drops at bedside. Patient also brought her OTC cranberry supplement to take daily if that is okay. Solaray D-Mannose with Cran Action - is this okay to take?     She does not want to take red yeast rice extract while she

## 2019-11-21 ENCOUNTER — EXTERNAL FACILITY (OUTPATIENT)
Dept: INTERNAL MEDICINE CLINIC | Facility: CLINIC | Age: 84
End: 2019-11-21

## 2019-11-21 ENCOUNTER — TELEPHONE (OUTPATIENT)
Dept: INTERNAL MEDICINE CLINIC | Facility: CLINIC | Age: 84
End: 2019-11-21

## 2019-11-21 DIAGNOSIS — H83.09 LABYRINTHITIS, UNSPECIFIED LATERALITY: Primary | ICD-10-CM

## 2019-11-21 PROCEDURE — 99307 SBSQ NF CARE SF MDM 10: CPT | Performed by: INTERNAL MEDICINE

## 2019-11-21 NOTE — TELEPHONE ENCOUNTER
Patient was put on bactrim and the culture did not show any infection did you want them to D/C the bactrim? Also want to know if you wanted the temazepam D/C'd also?

## 2019-11-21 NOTE — PROGRESS NOTES
Feels better. Only gets dizzy when looking downward. Patient has been resting. Not moving around a lot. On examination she is pleasant alert no acute distress. She has full range of motion in all spheres without any signs of vertigo.     Impression naida

## 2019-11-22 ENCOUNTER — EXTERNAL FACILITY (OUTPATIENT)
Dept: INTERNAL MEDICINE CLINIC | Facility: CLINIC | Age: 84
End: 2019-11-22

## 2019-11-22 DIAGNOSIS — H83.09 LABYRINTHITIS, UNSPECIFIED LATERALITY: Primary | ICD-10-CM

## 2019-11-22 NOTE — PROGRESS NOTES
States she feels much better no dizziness. On examination she is pleasant alert no acute distress. Range of motion of the neck was full with no dizziness. Inform the nurse okay to discharge continue finishing meclizine and see me next Monday or Tuesday.

## 2019-11-25 ENCOUNTER — OFFICE VISIT (OUTPATIENT)
Dept: INTERNAL MEDICINE CLINIC | Facility: CLINIC | Age: 84
End: 2019-11-25
Payer: MEDICARE

## 2019-11-25 ENCOUNTER — TELEPHONE (OUTPATIENT)
Dept: INTERNAL MEDICINE CLINIC | Facility: CLINIC | Age: 84
End: 2019-11-25

## 2019-11-25 VITALS
OXYGEN SATURATION: 95 % | SYSTOLIC BLOOD PRESSURE: 130 MMHG | TEMPERATURE: 98 F | HEART RATE: 85 BPM | DIASTOLIC BLOOD PRESSURE: 76 MMHG | RESPIRATION RATE: 16 BRPM

## 2019-11-25 DIAGNOSIS — R42 DIZZY: Primary | ICD-10-CM

## 2019-11-25 PROCEDURE — 99213 OFFICE O/P EST LOW 20 MIN: CPT | Performed by: INTERNAL MEDICINE

## 2019-11-25 NOTE — PROGRESS NOTES
Patient was recently discharged from the [facility] where she had a respite stay for dizziness. Felt very good and was discharged home on Friday. Over the weekend patient again states she has been dizzy.   Also states she had little dyspepsia relieved with P

## 2019-11-25 NOTE — PROGRESS NOTES
Took patient back to her apartment and helped her onto couch. She states that her daughter will be there in about 30 minutes. Informed her to call Antonina Shearer at [facility] Admissions regarding transfer to [facility] and which room she will go to.   Antonina Shearer

## 2019-12-02 RX ORDER — TEMAZEPAM 15 MG/1
CAPSULE ORAL
Qty: 90 CAPSULE | Refills: 0 | Status: SHIPPED | OUTPATIENT
Start: 2019-12-02 | End: 2020-01-22

## 2019-12-03 ENCOUNTER — TELEPHONE (OUTPATIENT)
Dept: INTERNAL MEDICINE CLINIC | Facility: CLINIC | Age: 84
End: 2019-12-03

## 2019-12-03 NOTE — TELEPHONE ENCOUNTER
Patient still c/o a little dizziness when bending over. Vitals WNL. Appt with ENT tomorrow.   She will be seen by Nurse twice a week for 2 weeks then once a week for 3 weeks    PT will evaluate her tomorrow

## 2019-12-04 PROBLEM — H81.10 BENIGN PAROXYSMAL POSITIONAL VERTIGO, UNSPECIFIED LATERALITY: Status: ACTIVE | Noted: 2019-12-04

## 2019-12-04 PROBLEM — R42 DIZZINESS: Status: ACTIVE | Noted: 2019-12-04

## 2020-01-02 ENCOUNTER — TELEPHONE (OUTPATIENT)
Dept: INTERNAL MEDICINE CLINIC | Facility: CLINIC | Age: 85
End: 2020-01-02

## 2020-01-02 DIAGNOSIS — R26.89 BALANCE DISORDER: Primary | ICD-10-CM

## 2020-01-02 NOTE — TELEPHONE ENCOUNTER
Patient requests order for out patient physical therapy for balance training. She had some physical therapy in the home a while ago and then she had vestibular pt which helped the vertigo and is now better.     But she wants to have outpatient PT for margarette

## 2020-01-22 RX ORDER — TEMAZEPAM 15 MG/1
CAPSULE ORAL
Qty: 90 CAPSULE | Refills: 0 | Status: SHIPPED | OUTPATIENT
Start: 2020-01-22 | End: 2020-03-09

## 2020-03-09 RX ORDER — TEMAZEPAM 15 MG/1
CAPSULE ORAL
Qty: 90 CAPSULE | Refills: 0 | Status: SHIPPED | OUTPATIENT
Start: 2020-03-09 | End: 2020-04-17

## 2020-03-24 RX ORDER — AMLODIPINE BESYLATE 5 MG/1
TABLET ORAL
Qty: 90 TABLET | Refills: 0 | Status: SHIPPED | OUTPATIENT
Start: 2020-03-24 | End: 2020-06-05

## 2020-04-17 RX ORDER — TEMAZEPAM 15 MG/1
CAPSULE ORAL
Qty: 90 CAPSULE | Refills: 0 | Status: SHIPPED | OUTPATIENT
Start: 2020-04-17 | End: 2020-06-04

## 2020-04-30 ENCOUNTER — TELEPHONE (OUTPATIENT)
Dept: INTERNAL MEDICINE CLINIC | Facility: CLINIC | Age: 85
End: 2020-04-30

## 2020-04-30 NOTE — TELEPHONE ENCOUNTER
Patient wanted to know if MD will look at the inside of her nose where she had a cancer spot removed a couple of years ago. She states that she is having a funny sensation in that area.  She went to the office where she had the surgery done and they stated

## 2020-05-01 NOTE — TELEPHONE ENCOUNTER
Called patient to let her know the conversation that was done to the dermatology office. They are not seeing any AL/IL or nursing home patient s at this time due to COVID-19.  Spoke with Ketan and she stated that they suggested Olinda Hernandes to go see her ENT and

## 2020-06-04 ENCOUNTER — TELEPHONE (OUTPATIENT)
Dept: INTERNAL MEDICINE CLINIC | Facility: CLINIC | Age: 85
End: 2020-06-04

## 2020-06-04 ENCOUNTER — OFFICE VISIT (OUTPATIENT)
Dept: INTERNAL MEDICINE CLINIC | Facility: CLINIC | Age: 85
End: 2020-06-04
Payer: MEDICARE

## 2020-06-04 VITALS
RESPIRATION RATE: 18 BRPM | DIASTOLIC BLOOD PRESSURE: 88 MMHG | TEMPERATURE: 99 F | WEIGHT: 137.19 LBS | SYSTOLIC BLOOD PRESSURE: 174 MMHG | HEART RATE: 92 BPM | OXYGEN SATURATION: 95 % | BODY MASS INDEX: 22 KG/M2

## 2020-06-04 DIAGNOSIS — J44.9 CHRONIC OBSTRUCTIVE PULMONARY DISEASE, UNSPECIFIED COPD TYPE (HCC): ICD-10-CM

## 2020-06-04 DIAGNOSIS — I10 ELEVATED BLOOD PRESSURE READING IN OFFICE WITH DIAGNOSIS OF HYPERTENSION: Primary | ICD-10-CM

## 2020-06-04 PROCEDURE — 85025 COMPLETE CBC W/AUTO DIFF WBC: CPT | Performed by: INTERNAL MEDICINE

## 2020-06-04 PROCEDURE — 80053 COMPREHEN METABOLIC PANEL: CPT | Performed by: INTERNAL MEDICINE

## 2020-06-04 PROCEDURE — 99214 OFFICE O/P EST MOD 30 MIN: CPT | Performed by: INTERNAL MEDICINE

## 2020-06-04 RX ORDER — TEMAZEPAM 15 MG/1
CAPSULE ORAL NIGHTLY PRN
Qty: 90 CAPSULE | Refills: 3 | Status: SHIPPED | OUTPATIENT
Start: 2020-06-04 | End: 2020-11-27

## 2020-06-04 RX ORDER — RAMIPRIL 10 MG/1
CAPSULE ORAL
Qty: 2 CAPSULE | Refills: 0 | Status: SHIPPED | OUTPATIENT
Start: 2020-06-04 | End: 2020-06-04

## 2020-06-04 RX ORDER — RAMIPRIL 10 MG/1
CAPSULE ORAL
Qty: 60 CAPSULE | Refills: 11 | Status: SHIPPED | OUTPATIENT
Start: 2020-06-04 | End: 2021-03-01

## 2020-06-04 RX ORDER — TEMAZEPAM 15 MG/1
CAPSULE ORAL
Qty: 90 CAPSULE | Refills: 0 | OUTPATIENT
Start: 2020-06-04

## 2020-06-04 RX ORDER — ALLOPURINOL 300 MG/1
TABLET ORAL 2 TIMES DAILY
COMMUNITY
Start: 2020-05-08 | End: 2021-03-22

## 2020-06-04 NOTE — PROGRESS NOTES
Problem 1 hypertension she denies headaches dizziness chest pain shortness of breath. Did take her medications today. Problem 2 insomnia patient needs refill her temazepam.  She denies a cough shortness of breath or wheezing. Appetite is good.   Moving h

## 2020-06-05 RX ORDER — AMLODIPINE BESYLATE 5 MG/1
TABLET ORAL
Qty: 90 TABLET | Refills: 3 | Status: SHIPPED | OUTPATIENT
Start: 2020-06-05 | End: 2021-02-24

## 2020-06-18 ENCOUNTER — OFFICE VISIT (OUTPATIENT)
Dept: INTERNAL MEDICINE CLINIC | Facility: CLINIC | Age: 85
End: 2020-06-18
Payer: MEDICARE

## 2020-06-18 VITALS
DIASTOLIC BLOOD PRESSURE: 95 MMHG | HEART RATE: 86 BPM | SYSTOLIC BLOOD PRESSURE: 170 MMHG | WEIGHT: 136 LBS | OXYGEN SATURATION: 97 % | TEMPERATURE: 98 F | BODY MASS INDEX: 22 KG/M2 | RESPIRATION RATE: 18 BRPM

## 2020-06-18 DIAGNOSIS — F32.9 REACTIVE DEPRESSION: ICD-10-CM

## 2020-06-18 DIAGNOSIS — I10 ELEVATED BLOOD PRESSURE READING IN OFFICE WITH DIAGNOSIS OF HYPERTENSION: Primary | ICD-10-CM

## 2020-06-18 PROCEDURE — 99214 OFFICE O/P EST MOD 30 MIN: CPT | Performed by: INTERNAL MEDICINE

## 2020-06-18 RX ORDER — HYDROCHLOROTHIAZIDE 25 MG/1
25 TABLET ORAL DAILY
Qty: 30 TABLET | Refills: 3 | Status: SHIPPED | OUTPATIENT
Start: 2020-06-18 | End: 2020-09-09

## 2020-06-18 NOTE — PROGRESS NOTES
Problem 1 elevated blood pressure. Patient denies headaches dizziness chest pain or shortness of breath. We did increase her ramipril from 10 to 20 mg. Patient also recently received a back injection which is greatly improved her back pain.   She states

## 2020-07-21 ENCOUNTER — OFFICE VISIT (OUTPATIENT)
Dept: INTERNAL MEDICINE CLINIC | Facility: CLINIC | Age: 85
End: 2020-07-21
Payer: MEDICARE

## 2020-07-21 VITALS
HEART RATE: 84 BPM | BODY MASS INDEX: 23.67 KG/M2 | WEIGHT: 138.63 LBS | RESPIRATION RATE: 17 BRPM | OXYGEN SATURATION: 97 % | DIASTOLIC BLOOD PRESSURE: 70 MMHG | HEIGHT: 64.13 IN | SYSTOLIC BLOOD PRESSURE: 158 MMHG | TEMPERATURE: 97 F

## 2020-07-21 DIAGNOSIS — R23.2 FACIAL FLUSHING: ICD-10-CM

## 2020-07-21 DIAGNOSIS — I10 ELEVATED BLOOD PRESSURE READING IN OFFICE WITH DIAGNOSIS OF HYPERTENSION: Primary | ICD-10-CM

## 2020-07-21 PROBLEM — I89.0 LYMPH EDEMA: Status: ACTIVE | Noted: 2017-10-05

## 2020-07-21 PROBLEM — H54.7 VISUAL IMPAIRMENT: Status: ACTIVE | Noted: 2020-07-21

## 2020-07-21 PROBLEM — C50.919 BREAST CA (HCC): Status: ACTIVE | Noted: 2020-07-21

## 2020-07-21 PROBLEM — H40.9 GLAUCOMA: Status: ACTIVE | Noted: 2020-07-21

## 2020-07-21 PROBLEM — H81.10 BENIGN PAROXYSMAL POSITIONAL VERTIGO, UNSPECIFIED LATERALITY: Status: RESOLVED | Noted: 2019-12-04 | Resolved: 2020-07-21

## 2020-07-21 PROBLEM — IMO0001 HEARING IMPAIRMENT: Status: ACTIVE | Noted: 2020-07-21

## 2020-07-21 PROBLEM — H91.90 HEARING IMPAIRMENT: Status: ACTIVE | Noted: 2020-07-21

## 2020-07-21 PROBLEM — H02.059 TRICHIASIS: Status: RESOLVED | Noted: 2017-04-25 | Resolved: 2020-07-21

## 2020-07-21 PROCEDURE — 99214 OFFICE O/P EST MOD 30 MIN: CPT | Performed by: INTERNAL MEDICINE

## 2020-07-21 NOTE — PROGRESS NOTES
Patient was scheduled for wellness exam but changed his to regular office visit. She has flushed feeling. Did receive a lumbar epidural yesterday.   Initially had no pain but later in the evening had pain today she does not have any pain but does have a h

## 2020-07-22 ENCOUNTER — OFFICE VISIT (OUTPATIENT)
Dept: INTERNAL MEDICINE CLINIC | Facility: CLINIC | Age: 85
End: 2020-07-22
Payer: MEDICARE

## 2020-07-22 VITALS
OXYGEN SATURATION: 97 % | DIASTOLIC BLOOD PRESSURE: 72 MMHG | HEIGHT: 64.13 IN | HEART RATE: 89 BPM | BODY MASS INDEX: 23.67 KG/M2 | SYSTOLIC BLOOD PRESSURE: 166 MMHG | RESPIRATION RATE: 18 BRPM | WEIGHT: 138.63 LBS | TEMPERATURE: 97 F

## 2020-07-22 DIAGNOSIS — M43.16 SPONDYLOLISTHESIS OF LUMBAR REGION: ICD-10-CM

## 2020-07-22 DIAGNOSIS — M51.36 DDD (DEGENERATIVE DISC DISEASE), LUMBAR: ICD-10-CM

## 2020-07-22 DIAGNOSIS — C44.311 BASAL CELL CARCINOMA (BCC) OF LEFT SIDE OF NOSE: ICD-10-CM

## 2020-07-22 DIAGNOSIS — Z85.3 HX OF BREAST CANCER: ICD-10-CM

## 2020-07-22 DIAGNOSIS — H40.1133 PRIMARY OPEN-ANGLE GLAUCOMA, BILATERAL, SEVERE STAGE: ICD-10-CM

## 2020-07-22 DIAGNOSIS — I47.1 PAROXYSMAL SVT (SUPRAVENTRICULAR TACHYCARDIA) (HCC): ICD-10-CM

## 2020-07-22 DIAGNOSIS — E04.1 THYROID NODULE: Primary | ICD-10-CM

## 2020-07-22 DIAGNOSIS — F41.9 ANXIETY: ICD-10-CM

## 2020-07-22 DIAGNOSIS — I89.0 LYMPH EDEMA: ICD-10-CM

## 2020-07-22 DIAGNOSIS — E78.5 DYSLIPIDEMIA: ICD-10-CM

## 2020-07-22 DIAGNOSIS — Z00.00 ENCOUNTER FOR ANNUAL HEALTH EXAMINATION: ICD-10-CM

## 2020-07-22 DIAGNOSIS — J44.9 CHRONIC OBSTRUCTIVE PULMONARY DISEASE, UNSPECIFIED COPD TYPE (HCC): ICD-10-CM

## 2020-07-22 DIAGNOSIS — H91.90 DECREASED HEARING, UNSPECIFIED LATERALITY: ICD-10-CM

## 2020-07-22 DIAGNOSIS — M41.85 OTHER FORM OF SCOLIOSIS OF THORACOLUMBAR SPINE: ICD-10-CM

## 2020-07-22 DIAGNOSIS — H05.20 EXOPHTHALMIA: ICD-10-CM

## 2020-07-22 DIAGNOSIS — I10 ESSENTIAL HYPERTENSION: ICD-10-CM

## 2020-07-22 PROCEDURE — 81003 URINALYSIS AUTO W/O SCOPE: CPT | Performed by: INTERNAL MEDICINE

## 2020-07-22 PROCEDURE — G0439 PPPS, SUBSEQ VISIT: HCPCS | Performed by: INTERNAL MEDICINE

## 2020-07-22 PROCEDURE — 93000 ELECTROCARDIOGRAM COMPLETE: CPT | Performed by: INTERNAL MEDICINE

## 2020-07-22 NOTE — PROGRESS NOTES
HPI:   Jake Gambino is a 80year old female who presents for an annual wellness    BP elevated  Her last annual assessment has been over 1 year: Annual Physical due on 07/08/2020         Fall/Risk Assessment abnormal    She has been screened for Fall POA but we do NOT have it on file in Epic. She has never smoked tobacco.    CAGE Alcohol screening   Lucila Jacinto was screened for Alcohol abuse and had a score of 0 so is at low risk.     Patient Care Team: Patient Care Team:  Denny Goode Apply to the nostrils twice daily for 2 weeks  Meclizine HCl 12.5 MG Oral Tab, Take 2 tablets (25 mg total) by mouth 2 (two) times daily. CRANBERRY EXTRACT OR, Take 1 tablet by mouth daily.   cycloSPORINE (RESTASIS MULTIDOSE) 0.05 % Ophthalmic Emulsion, 1 family history includes Breast Cancer in her daughter and mother; Cancer in her father and mother; Other in her father. SOCIAL HISTORY:   She  reports that she has never smoked.  She has never used smokeless tobacco. She reports that she does not drink al YRS AND OLDER PRSV FREE SINGLE D (55725) FLU CLINIC 10/22/2014, 10/26/2015, 10/24/2016   • Influenza 10/24/2013   • Pneumococcal (Prevnar 13) 05/11/2015   • Pneumovax 23 11/01/2015   • Unclassified Drug, Admin In Office 12/08/2016        ASSESSMENT AND OTH problem #13 elevated blood pressure in a patient with hypertension.   She is to monitor record blood pressure and return to office 1 month  #14 dyslipidemia check lipid panel problem #15 history of intermittent supraventricular tachycardia currently asympto Fecal Occult Blood Annually No results found for: FOB No flowsheet data found. Glaucoma Screening      Ophthalmology Visit Annually: Diabetics, FHx Glaucoma, AA>50, > 65 No flowsheet data found.     Bone Density Screening      Dexascan Every t Procedure      Annual Monitoring of Persistent     Medications (ACE/ARB, digoxin diuretics, anticonvulsants.)    Potassium  Annually Potassium (mmol/L)   Date Value   06/04/2020 4.3     POTASSIUM (mmol/L)   Date Value   03/20/2014 5.0    No flowsheet data

## 2020-07-22 NOTE — PATIENT INSTRUCTIONS
Danielito Whitman's SCREENING SCHEDULE   Tests on this list are recommended by your physician but may not be covered, or covered at this frequency, by your insurer. Please check with your insurance carrier before scheduling to verify coverage.    PREVENT family history    Colorectal Cancer Screening  Covered up to Age 76     Colonoscopy Screen   Covered every 10 years- more often if abnormal There are no preventive care reminders to display for this patient.  Update [employer] if applicable    Flex Orders placed or performed in visit on 10/24/16   • FLU VACC [address] ANTIG   Orders placed or performed in visit on 10/26/15   • FLU VACC [address] ANTIG   Orders placed or performed in visit on 10/22/14   • FLU VACC [address] ANTIG   Order Directives. It also has the State forms available on it's website for anyone to review and print using their home computer and printer. (the forms are also available in [address])  www. [employer]. org  This link also has information from the [place]

## 2020-07-29 ENCOUNTER — NURSE ONLY (OUTPATIENT)
Dept: INTERNAL MEDICINE CLINIC | Facility: CLINIC | Age: 85
End: 2020-07-29
Payer: MEDICARE

## 2020-07-29 DIAGNOSIS — I10 ESSENTIAL HYPERTENSION: ICD-10-CM

## 2020-07-29 DIAGNOSIS — R82.90 ABNORMAL URINE: ICD-10-CM

## 2020-07-29 DIAGNOSIS — E78.5 DYSLIPIDEMIA: ICD-10-CM

## 2020-07-29 DIAGNOSIS — R82.90 ABNORMAL URINE: Primary | ICD-10-CM

## 2020-07-29 LAB
ALBUMIN SERPL-MCNC: 3.5 G/DL (ref 3.4–5)
ALBUMIN/GLOB SERPL: 1.1 {RATIO} (ref 1–2)
ALP LIVER SERPL-CCNC: 95 U/L (ref 55–142)
ALT SERPL-CCNC: 26 U/L (ref 13–56)
ANION GAP SERPL CALC-SCNC: 2 MMOL/L (ref 0–18)
APPEARANCE: CLEAR
AST SERPL-CCNC: 17 U/L (ref 15–37)
BILIRUB SERPL-MCNC: 0.4 MG/DL (ref 0.1–2)
BUN BLD-MCNC: 19 MG/DL (ref 7–18)
BUN/CREAT SERPL: 27.1 (ref 10–20)
CALCIUM BLD-MCNC: 9 MG/DL (ref 8.5–10.1)
CHLORIDE SERPL-SCNC: 107 MMOL/L (ref 98–112)
CHOLEST SMN-MCNC: 205 MG/DL (ref ?–200)
CO2 SERPL-SCNC: 29 MMOL/L (ref 21–32)
CREAT BLD-MCNC: 0.7 MG/DL (ref 0.55–1.02)
GLOBULIN PLAS-MCNC: 3.3 G/DL (ref 2.8–4.4)
GLUCOSE BLD-MCNC: 80 MG/DL (ref 70–99)
HDLC SERPL-MCNC: 68 MG/DL (ref 40–59)
LDLC SERPL CALC-MCNC: 124 MG/DL (ref ?–100)
M PROTEIN MFR SERPL ELPH: 6.8 G/DL (ref 6.4–8.2)
MULTISTIX LOT#: NORMAL NUMERIC
NONHDLC SERPL-MCNC: 137 MG/DL (ref ?–130)
OSMOLALITY SERPL CALC.SUM OF ELEC: 287 MOSM/KG (ref 275–295)
PATIENT FASTING Y/N/NP: YES
PATIENT FASTING Y/N/NP: YES
PH, URINE: 7.5 (ref 4.5–8)
POTASSIUM SERPL-SCNC: 3.9 MMOL/L (ref 3.5–5.1)
SODIUM SERPL-SCNC: 138 MMOL/L (ref 136–145)
SPECIFIC GRAVITY: 1.01 (ref 1–1.03)
TRIGL SERPL-MCNC: 67 MG/DL (ref 30–149)
URINE-COLOR: YELLOW
UROBILINOGEN,SEMI-QN: 0.2 MG/DL (ref 0–1.9)
VLDLC SERPL CALC-MCNC: 13 MG/DL (ref 0–30)

## 2020-07-29 PROCEDURE — 80061 LIPID PANEL: CPT | Performed by: INTERNAL MEDICINE

## 2020-07-29 PROCEDURE — 80053 COMPREHEN METABOLIC PANEL: CPT | Performed by: INTERNAL MEDICINE

## 2020-07-29 PROCEDURE — 87086 URINE CULTURE/COLONY COUNT: CPT | Performed by: INTERNAL MEDICINE

## 2020-08-25 ENCOUNTER — OFFICE VISIT (OUTPATIENT)
Dept: INTERNAL MEDICINE CLINIC | Facility: CLINIC | Age: 85
End: 2020-08-25
Payer: MEDICARE

## 2020-08-25 VITALS
SYSTOLIC BLOOD PRESSURE: 130 MMHG | DIASTOLIC BLOOD PRESSURE: 75 MMHG | TEMPERATURE: 97 F | HEART RATE: 76 BPM | OXYGEN SATURATION: 97 % | RESPIRATION RATE: 17 BRPM

## 2020-08-25 DIAGNOSIS — I10 ESSENTIAL HYPERTENSION: Primary | ICD-10-CM

## 2020-08-25 PROCEDURE — 99213 OFFICE O/P EST LOW 20 MIN: CPT | Performed by: INTERNAL MEDICINE

## 2020-08-25 NOTE — PROGRESS NOTES
Subjective patient states her home blood pressures were within normal range we did resume. She denies headaches dizziness chest pain shortness of breath.     Physical examination very pleasant individual no acute distress normocephalic nonicteric lungs myranda

## 2020-09-09 RX ORDER — HYDROCHLOROTHIAZIDE 25 MG/1
TABLET ORAL
Qty: 90 TABLET | Refills: 3 | Status: SHIPPED | OUTPATIENT
Start: 2020-09-09

## 2020-10-19 ENCOUNTER — IMMUNIZATION (OUTPATIENT)
Dept: INTERNAL MEDICINE CLINIC | Facility: CLINIC | Age: 85
End: 2020-10-19
Payer: MEDICARE

## 2020-10-19 DIAGNOSIS — Z23 NEED FOR VACCINATION: ICD-10-CM

## 2020-10-19 PROCEDURE — 90662 IIV NO PRSV INCREASED AG IM: CPT | Performed by: INTERNAL MEDICINE

## 2020-10-19 PROCEDURE — G0008 ADMIN INFLUENZA VIRUS VAC: HCPCS | Performed by: INTERNAL MEDICINE

## 2020-11-27 RX ORDER — TEMAZEPAM 15 MG/1
CAPSULE ORAL NIGHTLY PRN
Qty: 90 CAPSULE | Refills: 1 | Status: SHIPPED | OUTPATIENT
Start: 2020-11-27 | End: 2021-03-01

## 2020-12-03 ENCOUNTER — OFFICE VISIT (OUTPATIENT)
Dept: INTERNAL MEDICINE CLINIC | Facility: CLINIC | Age: 85
End: 2020-12-03
Payer: MEDICARE

## 2020-12-03 VITALS
RESPIRATION RATE: 18 BRPM | HEART RATE: 80 BPM | SYSTOLIC BLOOD PRESSURE: 160 MMHG | DIASTOLIC BLOOD PRESSURE: 80 MMHG | OXYGEN SATURATION: 99 % | TEMPERATURE: 98 F | BODY MASS INDEX: 23 KG/M2 | WEIGHT: 134.63 LBS

## 2020-12-03 DIAGNOSIS — I10 ELEVATED BLOOD PRESSURE READING IN OFFICE WITH DIAGNOSIS OF HYPERTENSION: Primary | ICD-10-CM

## 2020-12-03 PROCEDURE — 99213 OFFICE O/P EST LOW 20 MIN: CPT | Performed by: INTERNAL MEDICINE

## 2020-12-03 NOTE — PROGRESS NOTES
Blood pressure still elevated. We did place patient on a diuretic but she stopped it because she states the blood pressure was too low and felt kind of dizzy. She did not faint. However for some reason she restarted a blood pressure pill 2 weeks ago.   B

## 2020-12-17 ENCOUNTER — OFFICE VISIT (OUTPATIENT)
Dept: INTERNAL MEDICINE CLINIC | Facility: CLINIC | Age: 85
End: 2020-12-17
Payer: MEDICARE

## 2020-12-17 VITALS
BODY MASS INDEX: 23 KG/M2 | OXYGEN SATURATION: 98 % | WEIGHT: 135.31 LBS | DIASTOLIC BLOOD PRESSURE: 80 MMHG | TEMPERATURE: 98 F | RESPIRATION RATE: 18 BRPM | HEART RATE: 94 BPM | SYSTOLIC BLOOD PRESSURE: 180 MMHG

## 2020-12-17 DIAGNOSIS — I10 ELEVATED BLOOD PRESSURE READING IN OFFICE WITH DIAGNOSIS OF HYPERTENSION: Primary | ICD-10-CM

## 2020-12-17 PROCEDURE — 99213 OFFICE O/P EST LOW 20 MIN: CPT | Performed by: INTERNAL MEDICINE

## 2020-12-17 RX ORDER — ERYTHROMYCIN 5 MG/G
1 OINTMENT OPHTHALMIC AS NEEDED
COMMUNITY
Start: 2020-12-02

## 2020-12-17 NOTE — PROGRESS NOTES
Samara Ovalles is here for blood pressure check. Home blood pressure readings were reviewed all within normal range. Today her blood pressure reading was high and repeat blood pressure was also high. She has complaints of back pain a little bit anxiety.   Is to

## 2021-01-13 PROBLEM — M16.0 OSTEOARTHRITIS OF BOTH HIPS, UNSPECIFIED OSTEOARTHRITIS TYPE: Status: ACTIVE | Noted: 2021-01-13

## 2021-01-28 ENCOUNTER — TELEPHONE (OUTPATIENT)
Dept: INTERNAL MEDICINE CLINIC | Facility: CLINIC | Age: 86
End: 2021-01-28

## 2021-01-28 NOTE — TELEPHONE ENCOUNTER
She has had this therapy in the past from [place] [id number] but they are requesting an order from her PCP in order for Medicare to cover. She will have her dentist fax the notes to you and copy of the order.   Patient has an appointment with you on Monday 2/1 /2

## 2021-02-01 ENCOUNTER — OFFICE VISIT (OUTPATIENT)
Dept: INTERNAL MEDICINE CLINIC | Facility: CLINIC | Age: 86
End: 2021-02-01
Payer: MEDICARE

## 2021-02-01 VITALS
RESPIRATION RATE: 17 BRPM | WEIGHT: 134.88 LBS | DIASTOLIC BLOOD PRESSURE: 80 MMHG | BODY MASS INDEX: 23 KG/M2 | SYSTOLIC BLOOD PRESSURE: 150 MMHG | HEART RATE: 90 BPM | TEMPERATURE: 97 F | OXYGEN SATURATION: 98 %

## 2021-02-01 DIAGNOSIS — Z23 NEED FOR VACCINATION: ICD-10-CM

## 2021-02-01 DIAGNOSIS — I10 ELEVATED BLOOD PRESSURE READING IN OFFICE WITH DIAGNOSIS OF HYPERTENSION: ICD-10-CM

## 2021-02-01 DIAGNOSIS — M26.621 ARTHRALGIA OF RIGHT TEMPOROMANDIBULAR JOINT: Primary | ICD-10-CM

## 2021-02-01 PROCEDURE — 99214 OFFICE O/P EST MOD 30 MIN: CPT | Performed by: INTERNAL MEDICINE

## 2021-02-01 NOTE — PROGRESS NOTES
Patient was seen by her dentist diagnosed with TMJ. Pain is in the right jaw ear. She has had it for about 2-1/2 weeks. Had the symptoms in the past.  Her dentist that showed send me a request for physical therapy. The pain is intermittent.   There are

## 2021-02-22 ENCOUNTER — OFFICE VISIT (OUTPATIENT)
Dept: INTERNAL MEDICINE CLINIC | Facility: CLINIC | Age: 86
End: 2021-02-22
Payer: MEDICARE

## 2021-02-22 VITALS
OXYGEN SATURATION: 98 % | BODY MASS INDEX: 23 KG/M2 | TEMPERATURE: 98 F | HEART RATE: 80 BPM | RESPIRATION RATE: 17 BRPM | WEIGHT: 136 LBS | DIASTOLIC BLOOD PRESSURE: 82 MMHG | SYSTOLIC BLOOD PRESSURE: 150 MMHG

## 2021-02-22 DIAGNOSIS — I10 ELEVATED BLOOD PRESSURE READING IN OFFICE WITH DIAGNOSIS OF HYPERTENSION: Primary | ICD-10-CM

## 2021-02-22 DIAGNOSIS — S03.00XD DISLOCATION OF TEMPOROMANDIBULAR JOINT, SUBSEQUENT ENCOUNTER: ICD-10-CM

## 2021-02-22 PROCEDURE — 99213 OFFICE O/P EST LOW 20 MIN: CPT | Performed by: INTERNAL MEDICINE

## 2021-02-22 NOTE — PROGRESS NOTES
Problem 1 elevated blood pressure in a patient with hypertension. Patient's home blood pressure readings and they were multiple all within normal range. Patient denies any chest pain shortness of breath.   She did bring her blood pressure apparatus with h

## 2021-02-24 ENCOUNTER — OFFICE VISIT (OUTPATIENT)
Dept: INTERNAL MEDICINE CLINIC | Facility: CLINIC | Age: 86
End: 2021-02-24
Payer: MEDICARE

## 2021-02-24 VITALS
HEART RATE: 77 BPM | WEIGHT: 134 LBS | DIASTOLIC BLOOD PRESSURE: 80 MMHG | TEMPERATURE: 98 F | OXYGEN SATURATION: 98 % | BODY MASS INDEX: 23 KG/M2 | SYSTOLIC BLOOD PRESSURE: 150 MMHG | RESPIRATION RATE: 18 BRPM

## 2021-02-24 DIAGNOSIS — I10 ELEVATED BLOOD PRESSURE READING IN OFFICE WITH DIAGNOSIS OF HYPERTENSION: Primary | ICD-10-CM

## 2021-02-24 PROCEDURE — 99213 OFFICE O/P EST LOW 20 MIN: CPT | Performed by: INTERNAL MEDICINE

## 2021-02-24 RX ORDER — AMLODIPINE BESYLATE 10 MG/1
10 TABLET ORAL DAILY
Qty: 90 TABLET | Refills: 0 | Status: SHIPPED | OUTPATIENT
Start: 2021-02-24 | End: 2021-03-29 | Stop reason: DRUGHIGH

## 2021-02-24 NOTE — PROGRESS NOTES
Juany Westbrook is concerned about her blood pressure still very high at home. She denies any chest pain shortness of breath. Repeat blood pressure still elevated. Physical examination pleasant alert well orientated no acute distress.   Normocephalic nonicteric

## 2021-03-01 RX ORDER — TEMAZEPAM 15 MG/1
CAPSULE ORAL NIGHTLY PRN
Qty: 90 CAPSULE | Refills: 1 | Status: SHIPPED | OUTPATIENT
Start: 2021-03-01 | End: 2021-04-29

## 2021-03-01 RX ORDER — RAMIPRIL 10 MG/1
CAPSULE ORAL
Qty: 180 CAPSULE | Refills: 3 | Status: SHIPPED | OUTPATIENT
Start: 2021-03-01 | End: 2021-12-08

## 2021-03-01 NOTE — TELEPHONE ENCOUNTER
Future appt:     Your appointments     Date & Time Appointment Department [facility])    Mar 11, 2021 10:00 AM CST Established Patient Office Visit with Katelynn Villaseñor, [facility]    For the

## 2021-03-22 ENCOUNTER — OFFICE VISIT (OUTPATIENT)
Dept: INTERNAL MEDICINE CLINIC | Facility: CLINIC | Age: 86
End: 2021-03-22
Payer: MEDICARE

## 2021-03-22 VITALS
RESPIRATION RATE: 19 BRPM | SYSTOLIC BLOOD PRESSURE: 150 MMHG | OXYGEN SATURATION: 99 % | BODY MASS INDEX: 23 KG/M2 | DIASTOLIC BLOOD PRESSURE: 80 MMHG | HEART RATE: 83 BPM | WEIGHT: 133 LBS | TEMPERATURE: 98 F

## 2021-03-22 DIAGNOSIS — I10 ELEVATED BLOOD PRESSURE READING IN OFFICE WITH DIAGNOSIS OF HYPERTENSION: Primary | ICD-10-CM

## 2021-03-22 DIAGNOSIS — R53.1 WEAK: ICD-10-CM

## 2021-03-22 PROCEDURE — 99213 OFFICE O/P EST LOW 20 MIN: CPT | Performed by: INTERNAL MEDICINE

## 2021-03-22 RX ORDER — AMLODIPINE BESYLATE AND BENAZEPRIL HYDROCHLORIDE 5; 10 MG/1; MG/1
1 CAPSULE ORAL DAILY
Qty: 1 CAPSULE | Refills: 0 | Status: SHIPPED | OUTPATIENT
Start: 2021-03-22 | End: 2021-03-29

## 2021-03-22 RX ORDER — AMLODIPINE BESYLATE 5 MG/1
5 TABLET ORAL DAILY
Qty: 1 TABLET | Refills: 0 | Status: SHIPPED | OUTPATIENT
Start: 2021-03-22 | End: 2021-03-22

## 2021-03-22 NOTE — PROGRESS NOTES
Hansa Pedroza is here to follow-up on her blood pressure. Still elevated. She denies any headaches dizziness chest pain or shortness of breath.   Patient states she is seen multiple doctors since we increased her amlodipine from 5 to 10 mg and each time her bloo

## 2021-03-29 RX ORDER — AMLODIPINE BESYLATE 5 MG/1
5 TABLET ORAL DAILY
COMMUNITY
End: 2021-03-29

## 2021-03-29 RX ORDER — AMLODIPINE BESYLATE 5 MG/1
5 TABLET ORAL DAILY
Qty: 30 TABLET | Refills: 0 | Status: SHIPPED | OUTPATIENT
Start: 2021-03-29 | End: 2021-04-21

## 2021-03-29 NOTE — TELEPHONE ENCOUNTER
Patient has been on Amlodipine 10mg 1/2 tab daily but it is dificult to cut this pill in 1/2 so she [facility]    Patient states that she does not take Amlodipine/Benazapril 5/10 so this was taken off of her list.    Patient has

## 2021-04-12 ENCOUNTER — OFFICE VISIT (OUTPATIENT)
Dept: INTERNAL MEDICINE CLINIC | Facility: CLINIC | Age: 86
End: 2021-04-12
Payer: MEDICARE

## 2021-04-12 ENCOUNTER — TELEPHONE (OUTPATIENT)
Dept: INTERNAL MEDICINE CLINIC | Facility: CLINIC | Age: 86
End: 2021-04-12

## 2021-04-12 VITALS
RESPIRATION RATE: 18 BRPM | BODY MASS INDEX: 23 KG/M2 | DIASTOLIC BLOOD PRESSURE: 80 MMHG | HEART RATE: 81 BPM | TEMPERATURE: 99 F | SYSTOLIC BLOOD PRESSURE: 130 MMHG | OXYGEN SATURATION: 98 % | WEIGHT: 132 LBS

## 2021-04-12 DIAGNOSIS — I10 ESSENTIAL HYPERTENSION: ICD-10-CM

## 2021-04-12 DIAGNOSIS — M47.816 SPONDYLOSIS OF LUMBAR REGION WITHOUT MYELOPATHY OR RADICULOPATHY: ICD-10-CM

## 2021-04-12 DIAGNOSIS — R53.83 OTHER FATIGUE: Primary | ICD-10-CM

## 2021-04-12 PROCEDURE — 85025 COMPLETE CBC W/AUTO DIFF WBC: CPT | Performed by: INTERNAL MEDICINE

## 2021-04-12 PROCEDURE — 85652 RBC SED RATE AUTOMATED: CPT | Performed by: INTERNAL MEDICINE

## 2021-04-12 PROCEDURE — 80053 COMPREHEN METABOLIC PANEL: CPT | Performed by: INTERNAL MEDICINE

## 2021-04-12 PROCEDURE — 99214 OFFICE O/P EST MOD 30 MIN: CPT | Performed by: INTERNAL MEDICINE

## 2021-04-12 RX ORDER — AMLODIPINE BESYLATE 5 MG/1
5 TABLET ORAL DAILY
Qty: 90 TABLET | Refills: 3 | Status: SHIPPED | OUTPATIENT
Start: 2021-04-12 | End: 2022-04-07

## 2021-04-12 RX ORDER — TEMAZEPAM 7.5 MG/1
7.5 CAPSULE ORAL NIGHTLY PRN
Qty: 30 CAPSULE | Refills: 0 | Status: SHIPPED | OUTPATIENT
Start: 2021-04-12 | End: 2021-04-21 | Stop reason: DRUGHIGH

## 2021-04-12 NOTE — PROGRESS NOTES
Problem #1 hypertension. We did decrease her Norvasc to 5 mg. States she is tolerating it well. No headaches dizziness chest pain or shortness of breath. Did review home blood pressure readings all within normal range.   We will refill Norvasc 5 mg for

## 2021-04-12 NOTE — TELEPHONE ENCOUNTER
Patient received the Temazepam 7.5mg from the pharmacy. The prescription cost $80.00    States she has been taking Temzasepam 15mg - 2 tablets at night to equal 30mg.   She has about 8 of the Temazepam 15mg left so she is going to try taking just one night

## 2021-04-13 ENCOUNTER — TELEPHONE (OUTPATIENT)
Dept: INTERNAL MEDICINE CLINIC | Facility: CLINIC | Age: 86
End: 2021-04-13

## 2021-04-13 NOTE — TELEPHONE ENCOUNTER
Suzanne Matute as we discussed over the phone your blood work was unremarkable. No reason for you to be fatigued. We decided to cut your temazepam in half instead of 30 mg each night let us go to 15.   Hopefully this will prevent you from feeling drowsy in the mo

## 2021-04-21 ENCOUNTER — OFFICE VISIT (OUTPATIENT)
Dept: INTERNAL MEDICINE CLINIC | Facility: CLINIC | Age: 86
End: 2021-04-21
Payer: MEDICARE

## 2021-04-21 VITALS
RESPIRATION RATE: 18 BRPM | HEART RATE: 88 BPM | OXYGEN SATURATION: 99 % | SYSTOLIC BLOOD PRESSURE: 150 MMHG | TEMPERATURE: 99 F | DIASTOLIC BLOOD PRESSURE: 80 MMHG

## 2021-04-21 DIAGNOSIS — R35.1 FREQUENT URINATION AT NIGHT: Primary | ICD-10-CM

## 2021-04-21 PROCEDURE — 87088 URINE BACTERIA CULTURE: CPT | Performed by: INTERNAL MEDICINE

## 2021-04-21 PROCEDURE — 81003 URINALYSIS AUTO W/O SCOPE: CPT | Performed by: INTERNAL MEDICINE

## 2021-04-21 PROCEDURE — 87186 SC STD MICRODIL/AGAR DIL: CPT | Performed by: INTERNAL MEDICINE

## 2021-04-21 PROCEDURE — 87086 URINE CULTURE/COLONY COUNT: CPT | Performed by: INTERNAL MEDICINE

## 2021-04-21 PROCEDURE — 99214 OFFICE O/P EST MOD 30 MIN: CPT | Performed by: INTERNAL MEDICINE

## 2021-04-21 NOTE — PROGRESS NOTES
Has been complaining for last several days of burning on urination no fever chills no flank or suprapubic pain. No discharge no malodor. Blood pressure is elevated. States she did take her medication.   She denies headaches dizziness chest pain shortness

## 2021-04-23 RX ORDER — SULFAMETHOXAZOLE AND TRIMETHOPRIM 800; 160 MG/1; MG/1
1 TABLET ORAL 2 TIMES DAILY
Qty: 14 TABLET | Refills: 0 | Status: SHIPPED | OUTPATIENT
Start: 2021-04-23 | End: 2021-06-01

## 2021-04-26 ENCOUNTER — OFFICE VISIT (OUTPATIENT)
Dept: INTERNAL MEDICINE CLINIC | Facility: CLINIC | Age: 86
End: 2021-04-26
Payer: MEDICARE

## 2021-04-26 ENCOUNTER — TELEPHONE (OUTPATIENT)
Dept: INTERNAL MEDICINE CLINIC | Facility: CLINIC | Age: 86
End: 2021-04-26

## 2021-04-26 VITALS
SYSTOLIC BLOOD PRESSURE: 150 MMHG | HEART RATE: 84 BPM | RESPIRATION RATE: 18 BRPM | WEIGHT: 133 LBS | BODY MASS INDEX: 23 KG/M2 | OXYGEN SATURATION: 98 % | DIASTOLIC BLOOD PRESSURE: 75 MMHG | TEMPERATURE: 98 F

## 2021-04-26 DIAGNOSIS — I10 ELEVATED BLOOD PRESSURE READING IN OFFICE WITH DIAGNOSIS OF HYPERTENSION: ICD-10-CM

## 2021-04-26 DIAGNOSIS — R25.1 SHAKY: ICD-10-CM

## 2021-04-26 DIAGNOSIS — I49.9 CARDIAC ARRHYTHMIA, UNSPECIFIED CARDIAC ARRHYTHMIA TYPE: ICD-10-CM

## 2021-04-26 DIAGNOSIS — N89.8 VAGINAL IRRITATION: ICD-10-CM

## 2021-04-26 DIAGNOSIS — R35.1 FREQUENT URINATION AT NIGHT: Primary | ICD-10-CM

## 2021-04-26 DIAGNOSIS — I47.1 PAROXYSMAL SVT (SUPRAVENTRICULAR TACHYCARDIA) (HCC): ICD-10-CM

## 2021-04-26 PROCEDURE — 84443 ASSAY THYROID STIM HORMONE: CPT | Performed by: INTERNAL MEDICINE

## 2021-04-26 PROCEDURE — 93000 ELECTROCARDIOGRAM COMPLETE: CPT | Performed by: INTERNAL MEDICINE

## 2021-04-26 PROCEDURE — 81003 URINALYSIS AUTO W/O SCOPE: CPT | Performed by: INTERNAL MEDICINE

## 2021-04-26 PROCEDURE — 99214 OFFICE O/P EST MOD 30 MIN: CPT | Performed by: INTERNAL MEDICINE

## 2021-04-26 NOTE — PROGRESS NOTES
Alejandra Gomez has multiple complaints feels kind of shaky frequent urination no dysuria however blood pressure is elevated however she denies any headaches or dizziness. Just feels kind of shaky.   On initial evaluation of her heart appeared she had some irregula

## 2021-04-28 ENCOUNTER — TELEPHONE (OUTPATIENT)
Dept: INTERNAL MEDICINE CLINIC | Facility: CLINIC | Age: 86
End: 2021-04-28

## 2021-04-29 RX ORDER — TEMAZEPAM 15 MG/1
CAPSULE ORAL NIGHTLY PRN
Qty: 90 CAPSULE | Refills: 1 | Status: SHIPPED | OUTPATIENT
Start: 2021-04-29 | End: 2021-07-26

## 2021-04-30 ENCOUNTER — TELEPHONE (OUTPATIENT)
Dept: INTERNAL MEDICINE CLINIC | Facility: CLINIC | Age: 86
End: 2021-04-30

## 2021-04-30 NOTE — TELEPHONE ENCOUNTER
Patient states that she woke up during the night with a canker sore and this morning now her entire mouth is sore and burning.  Could it be from the vaginal cream the urologist put her on?

## 2021-05-03 ENCOUNTER — NURSE ONLY (OUTPATIENT)
Dept: INTERNAL MEDICINE CLINIC | Facility: CLINIC | Age: 86
End: 2021-05-03
Payer: MEDICARE

## 2021-05-03 DIAGNOSIS — N36.2 URETHRAL CARUNCLE: ICD-10-CM

## 2021-05-03 PROCEDURE — 81001 URINALYSIS AUTO W/SCOPE: CPT | Performed by: PHYSICIAN ASSISTANT

## 2021-05-10 ENCOUNTER — OFFICE VISIT (OUTPATIENT)
Dept: INTERNAL MEDICINE CLINIC | Facility: CLINIC | Age: 86
End: 2021-05-10
Payer: MEDICARE

## 2021-05-10 VITALS
HEART RATE: 93 BPM | RESPIRATION RATE: 18 BRPM | SYSTOLIC BLOOD PRESSURE: 130 MMHG | DIASTOLIC BLOOD PRESSURE: 70 MMHG | WEIGHT: 172 LBS | BODY MASS INDEX: 29 KG/M2 | OXYGEN SATURATION: 96 %

## 2021-05-10 DIAGNOSIS — R09.89 LABILE HYPERTENSION: Primary | ICD-10-CM

## 2021-05-10 PROCEDURE — 99213 OFFICE O/P EST LOW 20 MIN: CPT | Performed by: INTERNAL MEDICINE

## 2021-05-10 RX ORDER — BRIMONIDINE TARTRATE 2 MG/ML
1 SOLUTION/ DROPS OPHTHALMIC 2 TIMES DAILY
COMMUNITY
Start: 2021-05-04 | End: 2021-11-29

## 2021-05-10 NOTE — PROGRESS NOTES
HPI/Subjective:   Jose Juan Snow is a 80year old female who presents for Follow - Up (very tired and shaky, appetite is better )     Elevated blood pressure  History/Other:   Chief Complaint Reviewed and Verified  Nursing Notes Reviewed and   Verified SpO2 96%   BMI 28.62 kg/m²  Estimated body mass index is 28.62 kg/m² as calculated from the following:    Height as of 4/29/21: 5' 5\" (1.651 m). Weight as of this encounter: 172 lb (78 kg). Physical Exam: Alert well orientated no acute distress.   Nec

## 2021-05-11 ENCOUNTER — NURSE ONLY (OUTPATIENT)
Dept: INTERNAL MEDICINE CLINIC | Facility: CLINIC | Age: 86
End: 2021-05-11
Payer: MEDICARE

## 2021-05-11 DIAGNOSIS — R39.9 UTI SYMPTOMS: ICD-10-CM

## 2021-05-11 PROCEDURE — 87086 URINE CULTURE/COLONY COUNT: CPT | Performed by: INTERNAL MEDICINE

## 2021-05-21 ENCOUNTER — NURSE ONLY (OUTPATIENT)
Dept: INTERNAL MEDICINE CLINIC | Facility: CLINIC | Age: 86
End: 2021-05-21
Payer: MEDICARE

## 2021-05-21 DIAGNOSIS — R82.998 LEUKOCYTES IN URINE: ICD-10-CM

## 2021-05-21 PROCEDURE — 81001 URINALYSIS AUTO W/SCOPE: CPT | Performed by: UROLOGY

## 2021-07-08 ENCOUNTER — OFFICE VISIT (OUTPATIENT)
Dept: INTERNAL MEDICINE CLINIC | Facility: CLINIC | Age: 86
End: 2021-07-08
Payer: MEDICARE

## 2021-07-08 VITALS
HEART RATE: 80 BPM | DIASTOLIC BLOOD PRESSURE: 80 MMHG | TEMPERATURE: 97 F | RESPIRATION RATE: 18 BRPM | SYSTOLIC BLOOD PRESSURE: 130 MMHG | OXYGEN SATURATION: 98 %

## 2021-07-08 DIAGNOSIS — R30.0 BURNING WITH URINATION: Primary | ICD-10-CM

## 2021-07-08 LAB
APPEARANCE: CLEAR
BILIRUBIN: NEGATIVE
GLUCOSE (URINE DIPSTICK): NEGATIVE MG/DL
KETONES (URINE DIPSTICK): NEGATIVE MG/DL
MULTISTIX LOT#: 5077 NUMERIC
NITRITE, URINE: NEGATIVE
OCCULT BLOOD: NEGATIVE
PH, URINE: 6.5 (ref 4.5–8)
PROTEIN (URINE DIPSTICK): NEGATIVE MG/DL
SPECIFIC GRAVITY: 1.02 (ref 1–1.03)
UROBILINOGEN,SEMI-QN: 0.2 MG/DL (ref 0–1.9)

## 2021-07-08 PROCEDURE — 87086 URINE CULTURE/COLONY COUNT: CPT | Performed by: INTERNAL MEDICINE

## 2021-07-08 PROCEDURE — 81003 URINALYSIS AUTO W/O SCOPE: CPT | Performed by: INTERNAL MEDICINE

## 2021-07-08 PROCEDURE — 99213 OFFICE O/P EST LOW 20 MIN: CPT | Performed by: INTERNAL MEDICINE

## 2021-07-08 NOTE — PROGRESS NOTES
Escobar Barreto is here with her daughter. Complaining on a burning on urination. She did feel feverish. Physical examination pleasant individual no acute distress. Normocephalic nonicteric lungs clear to auscultation.   Cardiovascular system regular rate and

## 2021-07-22 ENCOUNTER — OFFICE VISIT (OUTPATIENT)
Dept: INTERNAL MEDICINE CLINIC | Facility: CLINIC | Age: 86
End: 2021-07-22
Payer: MEDICARE

## 2021-07-22 VITALS
SYSTOLIC BLOOD PRESSURE: 150 MMHG | HEIGHT: 61 IN | RESPIRATION RATE: 17 BRPM | BODY MASS INDEX: 24.92 KG/M2 | DIASTOLIC BLOOD PRESSURE: 75 MMHG | WEIGHT: 132 LBS | OXYGEN SATURATION: 98 % | HEART RATE: 76 BPM

## 2021-07-22 DIAGNOSIS — Z00.00 ENCOUNTER FOR ANNUAL HEALTH EXAMINATION: ICD-10-CM

## 2021-07-22 DIAGNOSIS — I10 ESSENTIAL HYPERTENSION: ICD-10-CM

## 2021-07-22 DIAGNOSIS — Z85.3 HX OF BREAST CANCER: ICD-10-CM

## 2021-07-22 DIAGNOSIS — M43.16 SPONDYLOLISTHESIS OF LUMBAR REGION: ICD-10-CM

## 2021-07-22 DIAGNOSIS — M41.85 OTHER FORM OF SCOLIOSIS OF THORACOLUMBAR SPINE: ICD-10-CM

## 2021-07-22 DIAGNOSIS — I89.0 LYMPH EDEMA: ICD-10-CM

## 2021-07-22 DIAGNOSIS — C44.311 BASAL CELL CARCINOMA (BCC) OF LEFT SIDE OF NOSE: ICD-10-CM

## 2021-07-22 DIAGNOSIS — E04.1 THYROID NODULE: Primary | ICD-10-CM

## 2021-07-22 DIAGNOSIS — F41.9 ANXIETY: ICD-10-CM

## 2021-07-22 DIAGNOSIS — H05.20 EXOPHTHALMIA: ICD-10-CM

## 2021-07-22 DIAGNOSIS — Z78.0 MENOPAUSE: ICD-10-CM

## 2021-07-22 DIAGNOSIS — H40.1133 PRIMARY OPEN-ANGLE GLAUCOMA, BILATERAL, SEVERE STAGE: ICD-10-CM

## 2021-07-22 DIAGNOSIS — J44.9 CHRONIC OBSTRUCTIVE PULMONARY DISEASE, UNSPECIFIED COPD TYPE (HCC): ICD-10-CM

## 2021-07-22 DIAGNOSIS — M51.36 DDD (DEGENERATIVE DISC DISEASE), LUMBAR: ICD-10-CM

## 2021-07-22 PROCEDURE — 99214 OFFICE O/P EST MOD 30 MIN: CPT | Performed by: INTERNAL MEDICINE

## 2021-07-22 PROCEDURE — G0439 PPPS, SUBSEQ VISIT: HCPCS | Performed by: INTERNAL MEDICINE

## 2021-07-22 NOTE — PATIENT INSTRUCTIONS
Rosanna Whitman's SCREENING SCHEDULE   Tests on this list are recommended by your physician but may not be covered, or covered at this frequency, by your insurer. Please check with your insurance carrier before scheduling to verify coverage.    Kelsey Mendez recommendations at this time   Pap and Pelvic    Pap   Covered every 2 years for women at normal risk;  Annually if at high risk -  No recommendations at this time    Chlamydia Annually if high risk -  No recommendations at this time   Screening Mammogram Gilmar Jones website. http://www. idph.[facility]. [place]/public/books/edwige.htm  A link to the idiag. This site has a lot of good information including definitions of the different types of Advance Directives.  It also has the

## 2021-07-22 NOTE — PROGRESS NOTES
HPI:   Tolu Kennedy is a 80year old female who presents for a Medicare Subsequent Annual Wellness visit (Pt already had Initial Annual Wellness).     No new C/O  Her last annual assessment has been over 1 year: Annual Physical due on 07/22/2021 Family/surrogate (if present), and forms available to patient in AVS     She does NOT have a Power of  for [employer] on file in [address]    Advance care planning including the explanation and discussion of advance directives standard forms performed vaginally every evening for 30 days, THEN 1 Application 3 (three) times a week. brimonidine Tartrate 0.2 % Ophthalmic Solution, 1 drop 2 (two) times daily. temazepam 15 MG Oral Cap, Take 1-2 capsules (15-30 mg total) by mouth nightly as needed for Sleep. infection prophylaxis. (N/A, 4/4/2016); patient documented not to have experienced any of the following events (N/A, 4/4/2016); glaucoma surgery (7/7/2016); Eye surgery;  Eye surgery; cataract (Bilateral); tonsillectomy; hernia surgery (10/18/2017); and oth abnormality, atraumatic   Eyes:  PERRL, conjunctiva/corneas clear, EOM's intact both eyes.   Exophthalmos right left eye   Ears:  Normal TM's and external ear canals, both ears   Nose: Nares normal, septum midline,mucosa normal, no drainage or sinus tendern form of scoliosis of thoracolumbar spine    Primary open-angle glaucoma, bilateral, severe stage    Lymph edema    Hx of breast cancer S/P lumpecgtony and LN disection    History of basal cell carcinoma nose    Essential hypertension    Exophthalmia    DDD SCREENING SCHEDULE   Tests on this list are recommended by your physician but may not be covered, or covered at this frequency, by your insurer. Please check with your insurance carrier before scheduling to verify coverage.    PREVENTATIVE SERVICES Ankush Marqueso time   Pap and Pelvic    Pap   Covered every 2 years for women at normal risk;  Annually if at high risk -  No recommendations at this time    Chlamydia Annually if high risk -  No recommendations at this time   Screening Mammogram    Mammogram     Recommen

## 2021-07-26 RX ORDER — TEMAZEPAM 15 MG/1
CAPSULE ORAL NIGHTLY PRN
Qty: 90 CAPSULE | Refills: 1 | Status: SHIPPED | OUTPATIENT
Start: 2021-07-26 | End: 2021-07-26

## 2021-07-26 RX ORDER — TEMAZEPAM 15 MG/1
CAPSULE ORAL NIGHTLY PRN
Qty: 90 CAPSULE | Refills: 1 | Status: SHIPPED | OUTPATIENT
Start: 2021-07-26 | End: 2021-10-21

## 2021-08-03 ENCOUNTER — HOSPITAL ENCOUNTER (OUTPATIENT)
Dept: BONE DENSITY | Age: 86
Discharge: HOME | End: 2021-08-03
Attending: INTERNAL MEDICINE
Payer: MEDICARE

## 2021-08-03 DIAGNOSIS — Z78.0 MENOPAUSE: ICD-10-CM

## 2021-08-03 PROCEDURE — 77080 DXA BONE DENSITY AXIAL: CPT | Performed by: INTERNAL MEDICINE

## 2021-08-04 ENCOUNTER — OFFICE VISIT (OUTPATIENT)
Dept: INTERNAL MEDICINE CLINIC | Facility: CLINIC | Age: 86
End: 2021-08-04
Payer: MEDICARE

## 2021-08-04 VITALS
BODY MASS INDEX: 25 KG/M2 | WEIGHT: 133.19 LBS | DIASTOLIC BLOOD PRESSURE: 80 MMHG | TEMPERATURE: 98 F | OXYGEN SATURATION: 97 % | SYSTOLIC BLOOD PRESSURE: 160 MMHG | HEART RATE: 98 BPM | RESPIRATION RATE: 18 BRPM

## 2021-08-04 DIAGNOSIS — F43.9 STRESS: ICD-10-CM

## 2021-08-04 DIAGNOSIS — R22.31 MASS OF ARM, RIGHT: Primary | ICD-10-CM

## 2021-08-04 DIAGNOSIS — I10 ELEVATED BLOOD PRESSURE READING IN OFFICE WITH DIAGNOSIS OF HYPERTENSION: ICD-10-CM

## 2021-08-04 PROCEDURE — 99214 OFFICE O/P EST MOD 30 MIN: CPT | Performed by: INTERNAL MEDICINE

## 2021-08-04 RX ORDER — LOTEPREDNOL ETABONATE 5 MG/ML
SUSPENSION/ DROPS OPHTHALMIC
COMMUNITY
Start: 2021-07-14 | End: 2021-11-29

## 2021-08-04 RX ORDER — LOTEPREDNOL ETABONATE 5 MG/ML
1 SUSPENSION/ DROPS OPHTHALMIC DAILY
COMMUNITY
Start: 2021-07-14 | End: 2021-08-26

## 2021-08-04 NOTE — PROGRESS NOTES
Isabel Alcala had a blood done at another site. There she does in a pulsating mass left elbow. Patient denies any pain or discomfort there. Patient states she had a very stressful day for multiple reasons as were her blood pressure is high.   She denies any jazz

## 2021-08-26 ENCOUNTER — OFFICE VISIT (OUTPATIENT)
Dept: INTERNAL MEDICINE CLINIC | Facility: CLINIC | Age: 86
End: 2021-08-26
Payer: MEDICARE

## 2021-08-26 VITALS
SYSTOLIC BLOOD PRESSURE: 135 MMHG | WEIGHT: 132.19 LBS | RESPIRATION RATE: 18 BRPM | OXYGEN SATURATION: 97 % | DIASTOLIC BLOOD PRESSURE: 70 MMHG | BODY MASS INDEX: 25 KG/M2 | HEART RATE: 92 BPM | TEMPERATURE: 97 F

## 2021-08-26 DIAGNOSIS — Z85.3 HX: BREAST CANCER: Primary | ICD-10-CM

## 2021-08-26 PROCEDURE — 99214 OFFICE O/P EST MOD 30 MIN: CPT | Performed by: INTERNAL MEDICINE

## 2021-08-26 RX ORDER — NEOMYCIN SULFATE, POLYMYXIN B SULFATE, AND DEXAMETHASONE 3.5; 10000; 1 MG/G; [USP'U]/G; MG/G
OINTMENT OPHTHALMIC AS NEEDED
COMMUNITY
Start: 2021-08-06 | End: 2021-12-14

## 2021-08-26 NOTE — PROGRESS NOTES
Problem 1 hypertension she denies headaches dizziness chest pain shortness of breath. Problem #2 history of breast cancer. Patient would like to have mammogram.  She denies any headaches dizziness chest pain or shortness of breath. No chronic cough.   Ap

## 2021-09-02 ENCOUNTER — HOSPITAL ENCOUNTER (OUTPATIENT)
Dept: ULTRASOUND IMAGING | Facility: HOSPITAL | Age: 86
Discharge: HOME | End: 2021-09-02
Attending: INTERNAL MEDICINE
Payer: MEDICARE

## 2021-09-02 DIAGNOSIS — R22.31 MASS OF ARM, RIGHT: ICD-10-CM

## 2021-09-02 PROCEDURE — 76882 US LMTD JT/FCL EVL NVASC XTR: CPT | Performed by: INTERNAL MEDICINE

## 2021-09-09 ENCOUNTER — TELEPHONE (OUTPATIENT)
Dept: INTERNAL MEDICINE CLINIC | Facility: CLINIC | Age: 86
End: 2021-09-09

## 2021-09-09 DIAGNOSIS — I72.1 BRACHIAL ARTERY ANEURYSM (HCC): Primary | ICD-10-CM

## 2021-09-09 NOTE — TELEPHONE ENCOUNTER
Patient states that she an ultrasound and did discuss the results with Dr. Alaina Nascimento but she would like to discuss results further with a vascular surgeon.  Please advise through [employer] as per patient's request.

## 2021-10-20 ENCOUNTER — IMMUNIZATION (OUTPATIENT)
Dept: INTERNAL MEDICINE CLINIC | Facility: CLINIC | Age: 86
End: 2021-10-20
Payer: MEDICARE

## 2021-10-20 DIAGNOSIS — Z23 NEED FOR VACCINATION: Primary | ICD-10-CM

## 2021-10-20 PROCEDURE — 90662 IIV NO PRSV INCREASED AG IM: CPT | Performed by: INTERNAL MEDICINE

## 2021-10-20 PROCEDURE — G0008 ADMIN INFLUENZA VIRUS VAC: HCPCS | Performed by: INTERNAL MEDICINE

## 2021-10-21 ENCOUNTER — TELEPHONE (OUTPATIENT)
Dept: INTERNAL MEDICINE CLINIC | Facility: CLINIC | Age: 86
End: 2021-10-21

## 2021-10-21 RX ORDER — TEMAZEPAM 15 MG/1
CAPSULE ORAL NIGHTLY PRN
Qty: 90 CAPSULE | Refills: 1 | Status: SHIPPED | OUTPATIENT
Start: 2021-10-21 | End: 2021-12-08

## 2021-11-08 ENCOUNTER — TELEPHONE (OUTPATIENT)
Dept: INTERNAL MEDICINE CLINIC | Facility: CLINIC | Age: 86
End: 2021-11-08

## 2021-11-08 NOTE — TELEPHONE ENCOUNTER
Patient calling because for years she has been using eye drops that are mixed somehow with her blood- according to patient. In order to get these drops there is blood work that is to be done before hand.  The lab she usually uses was in contract with a comp

## 2021-11-10 ENCOUNTER — NURSE ONLY (OUTPATIENT)
Dept: INTERNAL MEDICINE CLINIC | Facility: CLINIC | Age: 86
End: 2021-11-10
Payer: MEDICARE

## 2021-11-10 DIAGNOSIS — H40.1133 PRIMARY OPEN-ANGLE GLAUCOMA, BILATERAL, SEVERE STAGE: Primary | ICD-10-CM

## 2021-11-29 ENCOUNTER — OFFICE VISIT (OUTPATIENT)
Dept: INTERNAL MEDICINE CLINIC | Facility: CLINIC | Age: 86
End: 2021-11-29
Payer: MEDICARE

## 2021-11-29 VITALS
DIASTOLIC BLOOD PRESSURE: 60 MMHG | HEART RATE: 85 BPM | RESPIRATION RATE: 18 BRPM | WEIGHT: 133 LBS | SYSTOLIC BLOOD PRESSURE: 150 MMHG | BODY MASS INDEX: 25 KG/M2 | OXYGEN SATURATION: 99 %

## 2021-11-29 DIAGNOSIS — M54.50 PAIN, LUMBAR REGION: ICD-10-CM

## 2021-11-29 DIAGNOSIS — R53.83 FATIGUE, UNSPECIFIED TYPE: ICD-10-CM

## 2021-11-29 DIAGNOSIS — J44.9 CHRONIC OBSTRUCTIVE PULMONARY DISEASE, UNSPECIFIED COPD TYPE (HCC): ICD-10-CM

## 2021-11-29 DIAGNOSIS — I10 ELEVATED BLOOD PRESSURE READING IN OFFICE WITH DIAGNOSIS OF HYPERTENSION: Primary | ICD-10-CM

## 2021-11-29 PROCEDURE — 99214 OFFICE O/P EST MOD 30 MIN: CPT | Performed by: INTERNAL MEDICINE

## 2021-11-29 RX ORDER — ACETAZOLAMIDE 250 MG/1
TABLET ORAL
COMMUNITY
Start: 2021-11-08

## 2021-11-29 NOTE — PROGRESS NOTES
Olinda Hernandes is complaining of feeling tired and fatigued she was a started on acetazolamide for glaucoma. She feels this is causing her problem.   We did ask her to contact her ophthalmologist.  Blood pressure is elevated but she denies any headaches dizziness

## 2021-12-08 RX ORDER — TEMAZEPAM 15 MG/1
CAPSULE ORAL NIGHTLY PRN
Qty: 90 CAPSULE | Refills: 1 | Status: SHIPPED | OUTPATIENT
Start: 2021-12-08 | End: 2022-01-19

## 2021-12-08 RX ORDER — RAMIPRIL 10 MG/1
CAPSULE ORAL
Qty: 180 CAPSULE | Refills: 3 | Status: SHIPPED | OUTPATIENT
Start: 2021-12-08

## 2021-12-11 ENCOUNTER — HOSPITAL ENCOUNTER (OUTPATIENT)
Age: 86
Discharge: HOME | End: 2021-12-11
Payer: MEDICARE

## 2021-12-11 VITALS
DIASTOLIC BLOOD PRESSURE: 68 MMHG | OXYGEN SATURATION: 98 % | HEART RATE: 101 BPM | RESPIRATION RATE: 18 BRPM | BODY MASS INDEX: 26.31 KG/M2 | SYSTOLIC BLOOD PRESSURE: 152 MMHG | WEIGHT: 134 LBS | TEMPERATURE: 98 F | HEIGHT: 60 IN

## 2021-12-11 DIAGNOSIS — N30.01 ACUTE CYSTITIS WITH HEMATURIA: Primary | ICD-10-CM

## 2021-12-11 DIAGNOSIS — R31.9 HEMATURIA: ICD-10-CM

## 2021-12-11 PROCEDURE — 81002 URINALYSIS NONAUTO W/O SCOPE: CPT | Performed by: PHYSICIAN ASSISTANT

## 2021-12-11 PROCEDURE — 99203 OFFICE O/P NEW LOW 30 MIN: CPT | Performed by: PHYSICIAN ASSISTANT

## 2021-12-11 RX ORDER — CEPHALEXIN 500 MG/1
500 CAPSULE ORAL 2 TIMES DAILY
Qty: 14 CAPSULE | Refills: 0 | Status: SHIPPED | OUTPATIENT
Start: 2021-12-11 | End: 2021-12-18

## 2021-12-11 RX ORDER — CEPHALEXIN 500 MG/1
500 CAPSULE ORAL 2 TIMES DAILY
Qty: 14 CAPSULE | Refills: 0 | Status: SHIPPED | OUTPATIENT
Start: 2021-12-11 | End: 2021-12-11

## 2021-12-11 NOTE — ED PROVIDER NOTES
Patient Seen in: Immediate [address] [place], [place] [address]way      History   Patient presents with:  Urinary Symptoms    Stated Complaint: possible uti     Subjective:   HPI    80-year-old female who comes in today with 3 days of urinary frequency, urgency and dysuri Mckenzie Maki MD;  Location: [facility]   • PATIENT [address] FOR IV ANTIBIOTIC SURGICAL SITE INFECTION PROPHYLAXIS.  N/A 3/11/2016    Procedure: LUMBAR EPIDURAL;  Surgeon: Mckenzie Maki MD;  Location: [facility] or abnormal dyspigmentation  Neurologic:   Grossly intact, no cranial nerve deficits, gait normal      ED Course     Labs Reviewed   POCT URINALYSIS DIPSTICK - Abnormal; Notable for the following components:       Result Value    Urine Color Bryan Liming (*) care or ER for new, worsening or any persistent conditions. I've explained the importance of following up with her doctor- Brittaney Mendez MD  - as instructed. The patient verbalized understanding of the discharge instructions and plan.

## 2021-12-11 NOTE — ED INITIAL ASSESSMENT (HPI)
For the past 3 days, patient complains of increased urinary frequency, urgency and dysuria. Also has lower back ache. Denies fevers.

## 2021-12-14 ENCOUNTER — TELEPHONE (OUTPATIENT)
Dept: INTERNAL MEDICINE CLINIC | Facility: CLINIC | Age: 86
End: 2021-12-14

## 2021-12-14 RX ORDER — TRAMADOL HYDROCHLORIDE 50 MG/1
50 TABLET ORAL EVERY 6 HOURS PRN
Qty: 60 TABLET | Refills: 0 | Status: SHIPPED | OUTPATIENT
Start: 2021-12-14 | End: 2021-12-20

## 2021-12-14 NOTE — TELEPHONE ENCOUNTER
She is currently being treated with antibiotic for UTI from Urgent Care and is staying with daughter in [place].     Jj Montenegro - pain specialist has postponed pain injection until she finishes antibiotic    Tylenol is not relieving pain - can you prescr

## 2021-12-14 NOTE — TELEPHONE ENCOUNTER
Patients daughter notified that Tramadol was sent to [place] in [facility].   I also gave her phone number for Jose VALENCIA to discuss possible Respite stay at [facility]

## 2021-12-20 ENCOUNTER — OFFICE VISIT (OUTPATIENT)
Dept: INTERNAL MEDICINE CLINIC | Facility: CLINIC | Age: 86
End: 2021-12-20
Payer: MEDICARE

## 2021-12-20 VITALS
DIASTOLIC BLOOD PRESSURE: 60 MMHG | OXYGEN SATURATION: 98 % | SYSTOLIC BLOOD PRESSURE: 150 MMHG | TEMPERATURE: 98 F | BODY MASS INDEX: 25 KG/M2 | RESPIRATION RATE: 18 BRPM | HEART RATE: 81 BPM | WEIGHT: 130 LBS

## 2021-12-20 DIAGNOSIS — R30.0 DYSURIA: ICD-10-CM

## 2021-12-20 DIAGNOSIS — M54.50 LUMBAR PAIN: Primary | ICD-10-CM

## 2021-12-20 PROCEDURE — 81003 URINALYSIS AUTO W/O SCOPE: CPT | Performed by: INTERNAL MEDICINE

## 2021-12-20 PROCEDURE — 87086 URINE CULTURE/COLONY COUNT: CPT | Performed by: INTERNAL MEDICINE

## 2021-12-20 PROCEDURE — 99214 OFFICE O/P EST MOD 30 MIN: CPT | Performed by: INTERNAL MEDICINE

## 2021-12-20 NOTE — PROGRESS NOTES
Is a very pleasant 80year-old patient with a history of chronic back pain. Just recently over the weekend she has significant burning on urination went to urgent care was placed on antibiotic. Still states she has some dysuria.   She has significant pain

## 2022-01-19 RX ORDER — TEMAZEPAM 15 MG/1
CAPSULE ORAL NIGHTLY PRN
Qty: 90 CAPSULE | Refills: 1 | Status: SHIPPED | OUTPATIENT
Start: 2022-01-19

## 2022-01-20 ENCOUNTER — OFFICE VISIT (OUTPATIENT)
Dept: INTERNAL MEDICINE CLINIC | Facility: CLINIC | Age: 87
End: 2022-01-20
Payer: MEDICARE

## 2022-01-20 VITALS
TEMPERATURE: 97 F | WEIGHT: 129.63 LBS | BODY MASS INDEX: 25 KG/M2 | OXYGEN SATURATION: 98 % | DIASTOLIC BLOOD PRESSURE: 70 MMHG | RESPIRATION RATE: 18 BRPM | SYSTOLIC BLOOD PRESSURE: 130 MMHG | HEART RATE: 88 BPM

## 2022-01-20 DIAGNOSIS — I10 ESSENTIAL HYPERTENSION: ICD-10-CM

## 2022-01-20 DIAGNOSIS — Z00.00 ENCOUNTER FOR ANNUAL HEALTH EXAMINATION: ICD-10-CM

## 2022-01-20 DIAGNOSIS — J44.9 CHRONIC OBSTRUCTIVE PULMONARY DISEASE, UNSPECIFIED COPD TYPE (HCC): Primary | ICD-10-CM

## 2022-01-20 PROCEDURE — 99214 OFFICE O/P EST MOD 30 MIN: CPT | Performed by: INTERNAL MEDICINE

## 2022-01-20 NOTE — PROGRESS NOTES
1.  Hypertension she denies headaches dizziness chest pain or shortness of breath problem #2 COPD no shortness of breath. Denies any anxiety or depression. Sleeping well. Appetite is good.     Physical examination pleasant individual no acute distress no

## 2022-03-02 RX ORDER — RAMIPRIL 10 MG/1
CAPSULE ORAL
Qty: 180 CAPSULE | Refills: 0 | Status: SHIPPED | OUTPATIENT
Start: 2022-03-02

## 2022-03-02 RX ORDER — AMLODIPINE BESYLATE 10 MG/1
10 TABLET ORAL DAILY
Qty: 90 TABLET | Refills: 0 | Status: SHIPPED | OUTPATIENT
Start: 2022-03-02 | End: 2022-03-10

## 2022-03-02 RX ORDER — TEMAZEPAM 15 MG/1
CAPSULE ORAL NIGHTLY PRN
Qty: 90 CAPSULE | Refills: 1 | Status: SHIPPED | OUTPATIENT
Start: 2022-03-02

## 2022-03-02 RX ORDER — AMLODIPINE BESYLATE 10 MG/1
10 TABLET ORAL DAILY
Refills: 0 | OUTPATIENT
Start: 2022-03-02

## 2022-03-10 ENCOUNTER — OFFICE VISIT (OUTPATIENT)
Dept: INTERNAL MEDICINE CLINIC | Facility: CLINIC | Age: 87
End: 2022-03-10
Payer: MEDICARE

## 2022-03-10 VITALS
BODY MASS INDEX: 25 KG/M2 | WEIGHT: 127.19 LBS | HEART RATE: 94 BPM | RESPIRATION RATE: 18 BRPM | TEMPERATURE: 97 F | OXYGEN SATURATION: 96 % | SYSTOLIC BLOOD PRESSURE: 128 MMHG | DIASTOLIC BLOOD PRESSURE: 72 MMHG

## 2022-03-10 DIAGNOSIS — R53.83 FATIGUE, UNSPECIFIED TYPE: Primary | ICD-10-CM

## 2022-03-10 DIAGNOSIS — I47.1 PAROXYSMAL SVT (SUPRAVENTRICULAR TACHYCARDIA) (HCC): ICD-10-CM

## 2022-03-10 DIAGNOSIS — E04.1 THYROID NODULE: ICD-10-CM

## 2022-03-10 DIAGNOSIS — I77.89 ECTASIA OF ARTERY (HCC): ICD-10-CM

## 2022-03-10 DIAGNOSIS — M81.0 AGE-RELATED OSTEOPOROSIS WITHOUT CURRENT PATHOLOGICAL FRACTURE: ICD-10-CM

## 2022-03-10 DIAGNOSIS — R22.43 LOCALIZED SWELLING OF BOTH LOWER LEGS: ICD-10-CM

## 2022-03-10 DIAGNOSIS — I10 PRIMARY HYPERTENSION: ICD-10-CM

## 2022-03-10 PROBLEM — J42 CHRONIC BRONCHITIS (HCC): Status: ACTIVE | Noted: 2022-03-10

## 2022-03-10 PROBLEM — I47.10 PAROXYSMAL SVT (SUPRAVENTRICULAR TACHYCARDIA): Status: ACTIVE | Noted: 2022-03-10

## 2022-03-10 PROBLEM — I47.10 PAROXYSMAL SVT (SUPRAVENTRICULAR TACHYCARDIA) (HCC): Status: ACTIVE | Noted: 2022-03-10

## 2022-03-10 LAB
ALBUMIN SERPL-MCNC: 3.7 G/DL (ref 3.4–5)
ALBUMIN/GLOB SERPL: 1 {RATIO} (ref 1–2)
ALP LIVER SERPL-CCNC: 123 U/L
ALT SERPL-CCNC: 26 U/L
ANION GAP SERPL CALC-SCNC: 4 MMOL/L (ref 0–18)
AST SERPL-CCNC: 17 U/L (ref 15–37)
BILIRUB SERPL-MCNC: 0.2 MG/DL (ref 0.1–2)
BILIRUB UR QL STRIP.AUTO: NEGATIVE
BUN BLD-MCNC: 30 MG/DL (ref 7–18)
CALCIUM BLD-MCNC: 9.5 MG/DL (ref 8.5–10.1)
CHLORIDE SERPL-SCNC: 109 MMOL/L (ref 98–112)
CLARITY UR REFRACT.AUTO: CLEAR
CO2 SERPL-SCNC: 26 MMOL/L (ref 21–32)
COLOR UR AUTO: YELLOW
CREAT BLD-MCNC: 0.95 MG/DL
CRP SERPL-MCNC: <0.29 MG/DL (ref ?–0.3)
ERYTHROCYTE [DISTWIDTH] IN BLOOD BY AUTOMATED COUNT: 12.7 %
ERYTHROCYTE [SEDIMENTATION RATE] IN BLOOD: 14 MM/HR
GLOBULIN PLAS-MCNC: 3.6 G/DL (ref 2.8–4.4)
GLUCOSE BLD-MCNC: 131 MG/DL (ref 70–99)
GLUCOSE UR STRIP.AUTO-MCNC: NEGATIVE MG/DL
HCT VFR BLD AUTO: 44.8 %
HGB BLD-MCNC: 14.6 G/DL
KETONES UR STRIP.AUTO-MCNC: NEGATIVE MG/DL
MCH RBC QN AUTO: 31.2 PG (ref 26–34)
MCHC RBC AUTO-ENTMCNC: 32.6 G/DL (ref 31–37)
MCV RBC AUTO: 95.7 FL
NITRITE UR QL STRIP.AUTO: NEGATIVE
OSMOLALITY SERPL CALC.SUM OF ELEC: 296 MOSM/KG (ref 275–295)
PH UR STRIP.AUTO: 6 [PH] (ref 5–8)
PLATELET # BLD AUTO: 216 10(3)UL (ref 150–450)
POTASSIUM SERPL-SCNC: 3.6 MMOL/L (ref 3.5–5.1)
PROT SERPL-MCNC: 7.3 G/DL (ref 6.4–8.2)
PROT UR STRIP.AUTO-MCNC: NEGATIVE MG/DL
RBC # BLD AUTO: 4.68 X10(6)UL
RBC UR QL AUTO: NEGATIVE
SODIUM SERPL-SCNC: 139 MMOL/L (ref 136–145)
SP GR UR STRIP.AUTO: 1.01 (ref 1–1.03)
TSI SER-ACNC: 1.64 MIU/ML (ref 0.36–3.74)
UROBILINOGEN UR STRIP.AUTO-MCNC: <2 MG/DL
VIT B12 SERPL-MCNC: 1248 PG/ML (ref 193–986)
WBC # BLD AUTO: 7.1 X10(3) UL (ref 4–11)

## 2022-03-10 PROCEDURE — 84443 ASSAY THYROID STIM HORMONE: CPT | Performed by: INTERNAL MEDICINE

## 2022-03-10 PROCEDURE — 82607 VITAMIN B-12: CPT | Performed by: INTERNAL MEDICINE

## 2022-03-10 PROCEDURE — 80053 COMPREHEN METABOLIC PANEL: CPT | Performed by: INTERNAL MEDICINE

## 2022-03-10 PROCEDURE — 99214 OFFICE O/P EST MOD 30 MIN: CPT | Performed by: INTERNAL MEDICINE

## 2022-03-10 PROCEDURE — 85652 RBC SED RATE AUTOMATED: CPT | Performed by: INTERNAL MEDICINE

## 2022-03-10 PROCEDURE — 81001 URINALYSIS AUTO W/SCOPE: CPT | Performed by: INTERNAL MEDICINE

## 2022-03-10 PROCEDURE — 85027 COMPLETE CBC AUTOMATED: CPT | Performed by: INTERNAL MEDICINE

## 2022-03-10 PROCEDURE — 86140 C-REACTIVE PROTEIN: CPT | Performed by: INTERNAL MEDICINE

## 2022-03-10 RX ORDER — AMLODIPINE BESYLATE 5 MG/1
5 TABLET ORAL DAILY
Qty: 90 TABLET | Refills: 3 | Status: SHIPPED | OUTPATIENT
Start: 2022-03-10 | End: 2023-03-05

## 2022-03-10 RX ORDER — METOPROLOL SUCCINATE 25 MG/1
25 TABLET, EXTENDED RELEASE ORAL DAILY
Qty: 30 TABLET | Refills: 1 | Status: SHIPPED | OUTPATIENT
Start: 2022-03-10

## 2022-03-10 NOTE — PATIENT INSTRUCTIONS
When you get home tae the Ramipril and then when you get the delivery take amlo 5 mg and Metoprolol then in the morning tomorrow. Continue the Ramipril. Take Amlodipine 5 mg once a day in the morning. Restart the hydrochlorothiazide in the morning and every morning. Start Metoprolol 25 mg once a day in the morning. I will call you with the blood tests.

## 2022-03-15 ENCOUNTER — OFFICE VISIT (OUTPATIENT)
Dept: INTERNAL MEDICINE CLINIC | Facility: CLINIC | Age: 87
End: 2022-03-15
Payer: MEDICARE

## 2022-03-15 VITALS
TEMPERATURE: 97 F | SYSTOLIC BLOOD PRESSURE: 130 MMHG | OXYGEN SATURATION: 96 % | WEIGHT: 126.19 LBS | HEART RATE: 68 BPM | DIASTOLIC BLOOD PRESSURE: 70 MMHG | BODY MASS INDEX: 25 KG/M2 | RESPIRATION RATE: 18 BRPM

## 2022-03-15 DIAGNOSIS — Z85.3 HX OF BREAST CANCER: ICD-10-CM

## 2022-03-15 DIAGNOSIS — E04.1 THYROID NODULE: ICD-10-CM

## 2022-03-15 DIAGNOSIS — R60.0 LOWER EXTREMITY EDEMA: Primary | ICD-10-CM

## 2022-03-15 DIAGNOSIS — M54.50 CHRONIC BILATERAL LOW BACK PAIN WITHOUT SCIATICA: ICD-10-CM

## 2022-03-15 DIAGNOSIS — Z12.31 ENCOUNTER FOR SCREENING MAMMOGRAM FOR MALIGNANT NEOPLASM OF BREAST: ICD-10-CM

## 2022-03-15 DIAGNOSIS — J43.9 PULMONARY EMPHYSEMA, UNSPECIFIED EMPHYSEMA TYPE (HCC): ICD-10-CM

## 2022-03-15 DIAGNOSIS — G89.29 CHRONIC BILATERAL LOW BACK PAIN WITHOUT SCIATICA: ICD-10-CM

## 2022-03-15 DIAGNOSIS — I10 PRIMARY HYPERTENSION: ICD-10-CM

## 2022-03-15 DIAGNOSIS — Z92.3 HISTORY OF RADIATION EXPOSURE: ICD-10-CM

## 2022-03-15 DIAGNOSIS — R63.4 ABNORMAL WEIGHT LOSS: ICD-10-CM

## 2022-03-15 PROBLEM — I89.0 LYMPHEDEMA OF LEFT ARM: Status: ACTIVE | Noted: 2022-03-15

## 2022-03-15 PROCEDURE — 99214 OFFICE O/P EST MOD 30 MIN: CPT | Performed by: INTERNAL MEDICINE

## 2022-03-15 NOTE — PATIENT INSTRUCTIONS
Start Tylenol for Arthritis 500 mg 2 tabs three times a day: 8am-2 pm-8 pm    Schedule a mammogram and chest X ray  at [facility].     Continue to eat heartily. Add extra calories with oil, butter on all your vegetables. Stop the Hydrochlorthiazide and continue the Ramipril, Metoprolol and Amlodipine 5 mg a day in the morning. Follow up in three weeks.

## 2022-03-21 ENCOUNTER — HOSPITAL ENCOUNTER (OUTPATIENT)
Dept: GENERAL RADIOLOGY | Facility: HOSPITAL | Age: 87
Discharge: HOME | End: 2022-03-21
Attending: INTERNAL MEDICINE
Payer: MEDICARE

## 2022-03-21 ENCOUNTER — HOSPITAL ENCOUNTER (OUTPATIENT)
Dept: MAMMOGRAPHY | Facility: HOSPITAL | Age: 87
Discharge: HOME | End: 2022-03-21
Attending: INTERNAL MEDICINE
Payer: MEDICARE

## 2022-03-21 DIAGNOSIS — Z12.31 ENCOUNTER FOR SCREENING MAMMOGRAM FOR MALIGNANT NEOPLASM OF BREAST: ICD-10-CM

## 2022-03-21 DIAGNOSIS — J43.9 PULMONARY EMPHYSEMA, UNSPECIFIED EMPHYSEMA TYPE (HCC): ICD-10-CM

## 2022-03-21 DIAGNOSIS — R63.4 ABNORMAL WEIGHT LOSS: ICD-10-CM

## 2022-03-21 PROCEDURE — 77063 BREAST TOMOSYNTHESIS BI: CPT | Performed by: INTERNAL MEDICINE

## 2022-03-21 PROCEDURE — 71046 X-RAY EXAM CHEST 2 VIEWS: CPT | Performed by: INTERNAL MEDICINE

## 2022-03-21 PROCEDURE — 77067 SCR MAMMO BI INCL CAD: CPT | Performed by: INTERNAL MEDICINE

## 2022-04-05 ENCOUNTER — HOSPITAL ENCOUNTER (OUTPATIENT)
Dept: MAMMOGRAPHY | Facility: HOSPITAL | Age: 87
Discharge: HOME | End: 2022-04-05
Attending: INTERNAL MEDICINE
Payer: MEDICARE

## 2022-04-05 ENCOUNTER — HOSPITAL ENCOUNTER (OUTPATIENT)
Dept: ULTRASOUND IMAGING | Facility: HOSPITAL | Age: 87
Discharge: HOME | End: 2022-04-05
Attending: INTERNAL MEDICINE
Payer: MEDICARE

## 2022-04-05 DIAGNOSIS — R92.2 INCONCLUSIVE MAMMOGRAM: ICD-10-CM

## 2022-04-05 DIAGNOSIS — R92.8 ABNORMAL MAMMOGRAM: ICD-10-CM

## 2022-04-05 DIAGNOSIS — R63.4 ABNORMAL WEIGHT LOSS: ICD-10-CM

## 2022-04-05 DIAGNOSIS — Z92.3 HISTORY OF RADIATION EXPOSURE: ICD-10-CM

## 2022-04-05 PROCEDURE — 88344 IMHCHEM/IMCYTCHM EA MLT ANTB: CPT | Performed by: INTERNAL MEDICINE

## 2022-04-05 PROCEDURE — 19083 BX BREAST 1ST LESION US IMAG: CPT | Performed by: INTERNAL MEDICINE

## 2022-04-05 PROCEDURE — 76536 US EXAM OF HEAD AND NECK: CPT | Performed by: INTERNAL MEDICINE

## 2022-04-05 PROCEDURE — 77061 BREAST TOMOSYNTHESIS UNI: CPT | Performed by: INTERNAL MEDICINE

## 2022-04-05 PROCEDURE — 77065 DX MAMMO INCL CAD UNI: CPT | Performed by: INTERNAL MEDICINE

## 2022-04-05 PROCEDURE — 76642 ULTRASOUND BREAST LIMITED: CPT | Performed by: INTERNAL MEDICINE

## 2022-04-05 PROCEDURE — 88360 TUMOR IMMUNOHISTOCHEM/MANUAL: CPT | Performed by: INTERNAL MEDICINE

## 2022-04-05 PROCEDURE — 88305 TISSUE EXAM BY PATHOLOGIST: CPT | Performed by: INTERNAL MEDICINE

## 2022-04-05 PROCEDURE — 19084 BX BREAST ADD LESION US IMAG: CPT | Performed by: INTERNAL MEDICINE

## 2022-04-06 ENCOUNTER — TELEPHONE (OUTPATIENT)
Dept: INTERNAL MEDICINE CLINIC | Facility: CLINIC | Age: 87
End: 2022-04-06

## 2022-04-07 ENCOUNTER — OFFICE VISIT (OUTPATIENT)
Dept: INTERNAL MEDICINE CLINIC | Facility: CLINIC | Age: 87
End: 2022-04-07
Payer: MEDICARE

## 2022-04-07 ENCOUNTER — TELEPHONE (OUTPATIENT)
Dept: MAMMOGRAPHY | Facility: HOSPITAL | Age: 87
End: 2022-04-07

## 2022-04-07 VITALS
WEIGHT: 130 LBS | TEMPERATURE: 97 F | HEART RATE: 74 BPM | RESPIRATION RATE: 18 BRPM | SYSTOLIC BLOOD PRESSURE: 130 MMHG | DIASTOLIC BLOOD PRESSURE: 80 MMHG | BODY MASS INDEX: 25 KG/M2 | OXYGEN SATURATION: 99 %

## 2022-04-07 DIAGNOSIS — M54.50 CHRONIC RIGHT-SIDED LOW BACK PAIN WITHOUT SCIATICA: ICD-10-CM

## 2022-04-07 DIAGNOSIS — G89.29 CHRONIC RIGHT-SIDED LOW BACK PAIN WITHOUT SCIATICA: ICD-10-CM

## 2022-04-07 DIAGNOSIS — I10 PRIMARY HYPERTENSION: ICD-10-CM

## 2022-04-07 DIAGNOSIS — C50.911 INVASIVE DUCTAL CARCINOMA OF RIGHT BREAST (HCC): ICD-10-CM

## 2022-04-07 DIAGNOSIS — E04.2 MULTIPLE THYROID NODULES: ICD-10-CM

## 2022-04-07 DIAGNOSIS — E04.2 MULTINODULAR GOITER: ICD-10-CM

## 2022-04-07 DIAGNOSIS — C50.911 INVASIVE LOBULAR CARCINOMA OF RIGHT BREAST IN FEMALE (HCC): ICD-10-CM

## 2022-04-07 DIAGNOSIS — R92.8 ABNORMAL MAMMOGRAM: Primary | ICD-10-CM

## 2022-04-07 PROCEDURE — 99214 OFFICE O/P EST MOD 30 MIN: CPT | Performed by: INTERNAL MEDICINE

## 2022-04-07 NOTE — TELEPHONE ENCOUNTER
Returned Margo Parker from [address] back and their office was closed.  L/M that I would call back in the AM

## 2022-04-07 NOTE — TELEPHONE ENCOUNTER
Telephoned Yenni Whitman and name,  verified with patient. 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 Concordance pending. Casi Whitman reports biopsy site is healing well. Radiologist recommends surgical consultation. Dr Alexandria Rosario office is referring patient to Dr Emilee Briones. Patient provided with the contact information for Dr Emilee Briones, the Breast Program Navigators, and myself and informed that one of us will be calling her back with an appointment date and time with Dr Emilee Briones. Arina Walker Pt verbalized understanding and had no further questions at this time.

## 2022-04-07 NOTE — TELEPHONE ENCOUNTER
Called spoke with Fili Lackey at the mammography department.  Dr Myers Figures refers Dr Jose Aleman for breast surgeon

## 2022-04-07 NOTE — PATIENT INSTRUCTIONS
Talk  Financial person, Liliane Kirkpatrick about being able to go to the [facility]. Make an appointment for your thyroid biopsy.   Have to have a Covid test before the test.

## 2022-04-08 ENCOUNTER — TELEPHONE (OUTPATIENT)
Dept: GENERAL RADIOLOGY | Facility: HOSPITAL | Age: 87
End: 2022-04-08

## 2022-04-08 NOTE — TELEPHONE ENCOUNTER
1206: Spoke with Nima Lazcano at this time. Provided MsCara J Carlos an appointment with Dr. Slava Maynard for Monday, April 11 at 0900. General instruction provided for arrival time and location- arrive 15-30 minutes prior to scheduled appointment at [address] [place] [address] [address]. Confirmed that Nima Lazcano had Dr. Akosua Redding office phone number for additional questions/concerns. Appointment confirmed with breast nurse navigator. Ms. Ankush Roland verbalized gratitude for the call.

## 2022-04-11 ENCOUNTER — NURSE NAVIGATOR ENCOUNTER (OUTPATIENT)
Dept: HEMATOLOGY/ONCOLOGY | Facility: HOSPITAL | Age: 87
End: 2022-04-11

## 2022-04-11 ENCOUNTER — OFFICE VISIT (OUTPATIENT)
Dept: SURGERY | Facility: CLINIC | Age: 87
End: 2022-04-11
Payer: MEDICARE

## 2022-04-11 VITALS
WEIGHT: 132.19 LBS | BODY MASS INDEX: 26 KG/M2 | RESPIRATION RATE: 16 BRPM | HEART RATE: 79 BPM | DIASTOLIC BLOOD PRESSURE: 74 MMHG | OXYGEN SATURATION: 97 % | SYSTOLIC BLOOD PRESSURE: 162 MMHG

## 2022-04-11 DIAGNOSIS — Z85.3 HISTORY OF LEFT BREAST CANCER: ICD-10-CM

## 2022-04-11 DIAGNOSIS — C50.811 MALIGNANT NEOPLASM OF OVERLAPPING SITES OF RIGHT BREAST IN FEMALE, ESTROGEN RECEPTOR POSITIVE (HCC): Primary | ICD-10-CM

## 2022-04-11 DIAGNOSIS — Z17.0 MALIGNANT NEOPLASM OF OVERLAPPING SITES OF RIGHT BREAST IN FEMALE, ESTROGEN RECEPTOR POSITIVE (HCC): Primary | ICD-10-CM

## 2022-04-11 PROCEDURE — 99205 OFFICE O/P NEW HI 60 MIN: CPT | Performed by: SURGERY

## 2022-04-11 NOTE — PROGRESS NOTES
Met with patient and her daughter in clinic. Introduced myself as one the breast nurse navigators and explained the role of the breast nurse navigator and coordination of care. Reviewed over the patients pathology report and discussed receptors including ER/TN/ and Her 2, report was provided to patient. Patient was given the breast cancer treatment handbook and reviewed over how to use this resource. Discussed the breast multidisciplinary conference and that her case will be discussed. Patient was given the contact information for the social workers at the [facility] and resources for support as requested. Next step in care will be to proceed with lumbar spine MRI as ordered by her PCP. Pt has been having recent lumbar back pain, as well as 7# weight loss. She is going to have a thyroid biopsy as well. Pt will come with her daughter on 4/19 to see Dr. Araseli Jarvis to review results and create plan of care with MD. If surgery is pursued, pt will require mastetomy. Pt was provided with breast nurse navigator contact information and was encouraged to phone with any other questions or concerns.

## 2022-04-12 ENCOUNTER — TELEPHONE (OUTPATIENT)
Dept: INTERNAL MEDICINE CLINIC | Facility: CLINIC | Age: 87
End: 2022-04-12

## 2022-04-14 NOTE — PAT NURSING NOTE
Called to schedule kyphoplasty (as okayed with Dr. Ольга Jose). Pt has upcoming appointments with oncologist for discussion re: mastectomy and has upcoming biopsy. Pt has too many other things going on right now to schedule kypho. Given phone number to call when ready to schedule.

## 2022-04-18 ENCOUNTER — TELEPHONE (OUTPATIENT)
Dept: INTERNAL MEDICINE CLINIC | Facility: CLINIC | Age: 87
End: 2022-04-18

## 2022-04-18 ENCOUNTER — LAB ENCOUNTER (OUTPATIENT)
Dept: LAB | Facility: HOSPITAL | Age: 87
End: 2022-04-18
Attending: INTERNAL MEDICINE
Payer: MEDICARE

## 2022-04-18 DIAGNOSIS — E04.2 MULTIPLE THYROID NODULES: ICD-10-CM

## 2022-04-18 LAB — SARS-COV-2 RNA RESP QL NAA+PROBE: NOT DETECTED

## 2022-04-18 RX ORDER — TEMAZEPAM 15 MG/1
CAPSULE ORAL NIGHTLY PRN
Qty: 90 CAPSULE | Refills: 1 | Status: SHIPPED | OUTPATIENT
Start: 2022-04-18

## 2022-04-18 NOTE — TELEPHONE ENCOUNTER
PC from pt. Having her thyroid US on Thursday, April 21 but is scheduled to receive her 2nd Covid booster on Wednesday. Is afraid she may have a reaction but she had no sx or reaction to any of the other Covid vaccine. Advised her to get the booster on Wednesday. She has a kyphoplasty scheduled end of month and will be meeting with oncology for her breast cancer tomorrow.       Zenaida Perry Md

## 2022-04-19 ENCOUNTER — OFFICE VISIT (OUTPATIENT)
Dept: HEMATOLOGY/ONCOLOGY | Facility: HOSPITAL | Age: 87
End: 2022-04-19
Attending: INTERNAL MEDICINE
Payer: MEDICARE

## 2022-04-19 VITALS
WEIGHT: 127 LBS | HEART RATE: 76 BPM | OXYGEN SATURATION: 99 % | BODY MASS INDEX: 24.94 KG/M2 | TEMPERATURE: 97 F | DIASTOLIC BLOOD PRESSURE: 67 MMHG | RESPIRATION RATE: 16 BRPM | HEIGHT: 60 IN | SYSTOLIC BLOOD PRESSURE: 160 MMHG

## 2022-04-19 DIAGNOSIS — Z17.0 MALIGNANT NEOPLASM OF OVERLAPPING SITES OF RIGHT BREAST IN FEMALE, ESTROGEN RECEPTOR POSITIVE (HCC): Primary | ICD-10-CM

## 2022-04-19 DIAGNOSIS — C50.811 MALIGNANT NEOPLASM OF OVERLAPPING SITES OF RIGHT BREAST IN FEMALE, ESTROGEN RECEPTOR POSITIVE (HCC): Primary | ICD-10-CM

## 2022-04-19 DIAGNOSIS — C50.911 INVASIVE LOBULAR CARCINOMA OF RIGHT BREAST IN FEMALE (HCC): ICD-10-CM

## 2022-04-19 PROBLEM — M80.00XA AGE-RELATED OSTEOPOROSIS WITH CURRENT PATHOLOGICAL FRACTURE: Status: ACTIVE | Noted: 2022-04-19

## 2022-04-19 PROCEDURE — 99205 OFFICE O/P NEW HI 60 MIN: CPT | Performed by: INTERNAL MEDICINE

## 2022-04-19 RX ORDER — ANASTROZOLE 1 MG/1
1 TABLET ORAL DAILY
Qty: 90 TABLET | Refills: 3 | Status: SHIPPED | OUTPATIENT
Start: 2022-04-19

## 2022-04-19 NOTE — PROGRESS NOTES
Patient is here for consultation for breast cancer. She has a history of breast cancer in left breast in 1986, having lumpectomy and lymph node dissection. She subsequently developed lymphedema. Recently she saw her primary doctor for exam and had recent weight loss, swelling of her feet. She is having a biopsy of her thyroid on Thursday this week. She had MRI for back pain and is scheduled for kyphoplasty. She has been feeling poorly and tired in the past weeks. She gets short of breath with exertion and her blood pressure goes up. She does think that she is eating well despite the weight loss. She denies any fevers or night sweats. Her only pain is in her neck.       Education Record    Learner:  Patient and Family Member    Disease / Diagnosis: Breast cancer    Barriers / Limitations:  None   Comments:    Method:  Discussion   Comments:    General Topics:  Side effects and symptom management   Comments:    Outcome:  Shows understanding   Comments:

## 2022-04-19 NOTE — TELEPHONE ENCOUNTER
E-script for Temazepam 15mg cap was sent yesterday to [address] called to inform pt requesting med to be transferred there. Per pharmacist need a new e-script to be sent. Pending. Please sing on order.

## 2022-04-20 ENCOUNTER — TELEPHONE (OUTPATIENT)
Dept: INTERNAL MEDICINE CLINIC | Facility: CLINIC | Age: 87
End: 2022-04-20

## 2022-04-20 RX ORDER — TEMAZEPAM 15 MG/1
CAPSULE ORAL NIGHTLY PRN
Qty: 90 CAPSULE | Refills: 1 | OUTPATIENT
Start: 2022-04-20

## 2022-04-21 ENCOUNTER — HOSPITAL ENCOUNTER (OUTPATIENT)
Dept: ULTRASOUND IMAGING | Facility: HOSPITAL | Age: 87
Discharge: HOME | End: 2022-04-21
Attending: INTERNAL MEDICINE
Payer: MEDICARE

## 2022-04-21 DIAGNOSIS — E04.2 MULTIPLE THYROID NODULES: ICD-10-CM

## 2022-04-21 PROCEDURE — 88173 CYTOPATH EVAL FNA REPORT: CPT | Performed by: INTERNAL MEDICINE

## 2022-04-21 PROCEDURE — 10005 FNA BX W/US GDN 1ST LES: CPT | Performed by: INTERNAL MEDICINE

## 2022-04-21 PROCEDURE — 10006 FNA BX W/US GDN EA ADDL: CPT | Performed by: INTERNAL MEDICINE

## 2022-04-25 ENCOUNTER — LAB ENCOUNTER (OUTPATIENT)
Dept: LAB | Facility: HOSPITAL | Age: 87
End: 2022-04-25
Attending: INTERNAL MEDICINE
Payer: MEDICARE

## 2022-04-25 DIAGNOSIS — M80.00XD AGE-RELATED OSTEOPOROSIS WITH CURRENT PATHOLOGICAL FRACTURE WITH ROUTINE HEALING, SUBSEQUENT ENCOUNTER: ICD-10-CM

## 2022-04-25 LAB
BASOPHILS # BLD AUTO: 0.06 X10(3) UL (ref 0–0.2)
BASOPHILS NFR BLD AUTO: 1.1 %
EOSINOPHIL # BLD AUTO: 0.21 X10(3) UL (ref 0–0.7)
EOSINOPHIL NFR BLD AUTO: 3.8 %
ERYTHROCYTE [DISTWIDTH] IN BLOOD BY AUTOMATED COUNT: 12.9 %
HCT VFR BLD AUTO: 43.7 %
HGB BLD-MCNC: 13.9 G/DL
IMM GRANULOCYTES # BLD AUTO: 0.05 X10(3) UL (ref 0–1)
IMM GRANULOCYTES NFR BLD: 0.9 %
LYMPHOCYTES # BLD AUTO: 1.93 X10(3) UL (ref 1–4)
LYMPHOCYTES NFR BLD AUTO: 34.9 %
MCH RBC QN AUTO: 31 PG (ref 26–34)
MCHC RBC AUTO-ENTMCNC: 31.8 G/DL (ref 31–37)
MCV RBC AUTO: 97.5 FL
MONOCYTES # BLD AUTO: 0.56 X10(3) UL (ref 0.1–1)
MONOCYTES NFR BLD AUTO: 10.1 %
NEUTROPHILS # BLD AUTO: 2.72 X10 (3) UL (ref 1.5–7.7)
NEUTROPHILS # BLD AUTO: 2.72 X10(3) UL (ref 1.5–7.7)
NEUTROPHILS NFR BLD AUTO: 49.2 %
PLATELET # BLD AUTO: 214 10(3)UL (ref 150–450)
RBC # BLD AUTO: 4.48 X10(6)UL
WBC # BLD AUTO: 5.5 X10(3) UL (ref 4–11)

## 2022-04-25 PROCEDURE — 36415 COLL VENOUS BLD VENIPUNCTURE: CPT

## 2022-04-25 PROCEDURE — 85025 COMPLETE CBC W/AUTO DIFF WBC: CPT

## 2022-04-26 ENCOUNTER — TELEPHONE (OUTPATIENT)
Dept: INTERNAL MEDICINE CLINIC | Facility: CLINIC | Age: 87
End: 2022-04-26

## 2022-04-26 LAB — SARS-COV-2 RNA RESP QL NAA+PROBE: NOT DETECTED

## 2022-04-28 ENCOUNTER — HOSPITAL ENCOUNTER (OUTPATIENT)
Dept: INTERVENTIONAL RADIOLOGY/VASCULAR | Facility: HOSPITAL | Age: 87
Discharge: HOME | End: 2022-04-28
Attending: INTERNAL MEDICINE | Admitting: INTERNAL MEDICINE
Payer: MEDICARE

## 2022-04-28 VITALS
HEART RATE: 62 BPM | RESPIRATION RATE: 20 BRPM | OXYGEN SATURATION: 100 % | TEMPERATURE: 97 F | SYSTOLIC BLOOD PRESSURE: 145 MMHG | DIASTOLIC BLOOD PRESSURE: 66 MMHG

## 2022-04-28 DIAGNOSIS — M80.00XD AGE-RELATED OSTEOPOROSIS WITH CURRENT PATHOLOGICAL FRACTURE WITH ROUTINE HEALING, SUBSEQUENT ENCOUNTER: Primary | ICD-10-CM

## 2022-04-28 DIAGNOSIS — S32.020A COMPRESSION FRACTURE OF L2 VERTEBRA, INITIAL ENCOUNTER (HCC): ICD-10-CM

## 2022-04-28 LAB — INR: 1.1 (ref 0.8–1.3)

## 2022-04-28 PROCEDURE — 0QS03ZZ REPOSITION LUMBAR VERTEBRA, PERCUTANEOUS APPROACH: ICD-10-PCS | Performed by: RADIOLOGY

## 2022-04-28 PROCEDURE — 99152 MOD SED SAME PHYS/QHP 5/>YRS: CPT | Performed by: RADIOLOGY

## 2022-04-28 PROCEDURE — 22514 PERQ VERTEBRAL AUGMENTATION: CPT | Performed by: RADIOLOGY

## 2022-04-28 PROCEDURE — 0QU03JZ SUPPLEMENT LUMBAR VERTEBRA WITH SYNTHETIC SUBSTITUTE, PERCUTANEOUS APPROACH: ICD-10-PCS | Performed by: RADIOLOGY

## 2022-04-28 PROCEDURE — 85610 PROTHROMBIN TIME: CPT

## 2022-04-28 RX ORDER — CEFAZOLIN SODIUM/WATER 2 G/20 ML
SYRINGE (ML) INTRAVENOUS
Status: COMPLETED
Start: 2022-04-28 | End: 2022-04-28

## 2022-04-28 RX ORDER — SODIUM CHLORIDE 9 MG/ML
INJECTION, SOLUTION INTRAVENOUS CONTINUOUS
Status: DISCONTINUED | OUTPATIENT
Start: 2022-04-28 | End: 2022-04-28

## 2022-04-28 RX ORDER — LIDOCAINE HYDROCHLORIDE 10 MG/ML
INJECTION, SOLUTION INFILTRATION; PERINEURAL
Status: COMPLETED
Start: 2022-04-28 | End: 2022-04-28

## 2022-04-28 RX ORDER — MIDAZOLAM HYDROCHLORIDE 1 MG/ML
INJECTION INTRAMUSCULAR; INTRAVENOUS
Status: COMPLETED
Start: 2022-04-28 | End: 2022-04-28

## 2022-04-28 NOTE — PROGRESS NOTES
Patient discharged home post kyphoplasty in stable condition. Pt is able to sit up and ambulate without difficulty after 2 hours lying flat. Pt's vital signs are stable. Procedural access site is dry and intact with no signs and symptoms of bleeding or hematoma. Pt tolerated food/fluids.  spoke to pt post procedure. Instructions provided and pt verbalized understanding. Patient discharged in wheelchair with all belongings.

## 2022-05-03 ENCOUNTER — TELEPHONE (OUTPATIENT)
Dept: INTERNAL MEDICINE CLINIC | Facility: CLINIC | Age: 87
End: 2022-05-03

## 2022-05-03 NOTE — TELEPHONE ENCOUNTER
Patient has an appointment with you on Thursday but would like to speak to you before then to discuss a few things. Can you please give her a call?

## 2022-05-05 ENCOUNTER — OFFICE VISIT (OUTPATIENT)
Dept: INTERNAL MEDICINE CLINIC | Facility: CLINIC | Age: 87
End: 2022-05-05
Payer: MEDICARE

## 2022-05-05 VITALS
BODY MASS INDEX: 25 KG/M2 | SYSTOLIC BLOOD PRESSURE: 140 MMHG | TEMPERATURE: 97 F | HEART RATE: 76 BPM | RESPIRATION RATE: 16 BRPM | WEIGHT: 127.63 LBS | DIASTOLIC BLOOD PRESSURE: 68 MMHG | OXYGEN SATURATION: 98 %

## 2022-05-05 DIAGNOSIS — M54.50 CHRONIC BILATERAL LOW BACK PAIN WITHOUT SCIATICA: ICD-10-CM

## 2022-05-05 DIAGNOSIS — M80.00XD AGE-RELATED OSTEOPOROSIS WITH CURRENT PATHOLOGICAL FRACTURE WITH ROUTINE HEALING, SUBSEQUENT ENCOUNTER: Primary | ICD-10-CM

## 2022-05-05 DIAGNOSIS — C50.911 INVASIVE LOBULAR CARCINOMA OF RIGHT BREAST IN FEMALE (HCC): ICD-10-CM

## 2022-05-05 DIAGNOSIS — I10 PRIMARY HYPERTENSION: ICD-10-CM

## 2022-05-05 DIAGNOSIS — E04.2 MULTINODULAR GOITER: ICD-10-CM

## 2022-05-05 DIAGNOSIS — G89.29 CHRONIC BILATERAL LOW BACK PAIN WITHOUT SCIATICA: ICD-10-CM

## 2022-05-05 PROCEDURE — 1111F DSCHRG MED/CURRENT MED MERGE: CPT | Performed by: INTERNAL MEDICINE

## 2022-05-05 PROCEDURE — 99214 OFFICE O/P EST MOD 30 MIN: CPT | Performed by: INTERNAL MEDICINE

## 2022-05-05 RX ORDER — GUAIFENESIN 600 MG
1200 TABLET, EXTENDED RELEASE 12 HR ORAL 2 TIMES DAILY
Qty: 45 TABLET | Refills: 0 | Status: SHIPPED | OUTPATIENT
Start: 2022-05-05

## 2022-05-05 NOTE — PATIENT INSTRUCTIONS
Push fluids 48-60 oz per day. Try Mucinex 2 tabs at breakfast and 2 at dinner for sputum. Await call for PT to start. Eat three meals a day and the protein drink. Await call from pharmacy for PROLIA-Denusomab. Call for us for Nurse visit to adminster injection.

## 2022-05-06 PROBLEM — I10 PRIMARY HYPERTENSION: Status: ACTIVE | Noted: 2022-05-06

## 2022-05-06 PROBLEM — I77.89 ECTASIA OF ARTERY (HCC): Status: RESOLVED | Noted: 2022-03-10 | Resolved: 2022-05-06

## 2022-05-06 PROBLEM — M81.0 AGE-RELATED OSTEOPOROSIS WITHOUT CURRENT PATHOLOGICAL FRACTURE: Status: ACTIVE | Noted: 2022-05-06

## 2022-05-06 PROBLEM — I77.89 ECTASIA OF ARTERY: Status: RESOLVED | Noted: 2022-03-10 | Resolved: 2022-05-06

## 2022-05-06 NOTE — TELEPHONE ENCOUNTER
PC to pt on Wednesday, May 4: She wanted to know if she could get PT referral for low back pain. Advised we will discuss tomorrow.

## 2022-05-10 RX ORDER — METOPROLOL SUCCINATE 25 MG/1
25 TABLET, EXTENDED RELEASE ORAL DAILY
Qty: 30 TABLET | Refills: 1 | Status: SHIPPED | OUTPATIENT
Start: 2022-05-10

## 2022-05-11 ENCOUNTER — TELEPHONE (OUTPATIENT)
Dept: INTERNAL MEDICINE CLINIC | Facility: CLINIC | Age: 87
End: 2022-05-11

## 2022-05-11 NOTE — TELEPHONE ENCOUNTER
Patient asking about the prolia injection that you had discussed with her. The injection was not ordered.  Could you send an order to [facility]

## 2022-05-13 ENCOUNTER — TELEPHONE (OUTPATIENT)
Dept: HEMATOLOGY/ONCOLOGY | Facility: HOSPITAL | Age: 87
End: 2022-05-13

## 2022-05-13 NOTE — TELEPHONE ENCOUNTER
Spoke with Natalia, in the past they have used ultrasound and electric stimulation for her chronic back pain. She is asking if this is still an acceptable treatment for her. Will discuss with Dr. Mable Felix and call Natalia back on Monday.

## 2022-05-13 NOTE — TELEPHONE ENCOUNTER
Natalia  Physical Therapist at [facility]     And would like to start a new Physical Therapy Treatment with the patient. And would like to discuss.     Call Back number  Yanelis Britt  [phone]

## 2022-05-16 NOTE — TELEPHONE ENCOUNTER
Left a message for Natalia instructing that Dr. Miko Anaya does not have any objection to the therapy plan but that the patient had recent back surgery and her primary doctor was ordering therapy so it should be confirmed with them that the treatment plan is appropriate.

## 2022-05-23 ENCOUNTER — OFFICE VISIT (OUTPATIENT)
Dept: INTERNAL MEDICINE CLINIC | Facility: CLINIC | Age: 87
End: 2022-05-23
Payer: MEDICARE

## 2022-05-23 VITALS
TEMPERATURE: 99 F | OXYGEN SATURATION: 98 % | HEART RATE: 67 BPM | SYSTOLIC BLOOD PRESSURE: 160 MMHG | BODY MASS INDEX: 25 KG/M2 | RESPIRATION RATE: 18 BRPM | DIASTOLIC BLOOD PRESSURE: 80 MMHG | WEIGHT: 126.63 LBS

## 2022-05-23 DIAGNOSIS — R35.0 FREQUENCY OF URINATION: Primary | ICD-10-CM

## 2022-05-23 DIAGNOSIS — R09.89 BRUIT: ICD-10-CM

## 2022-05-23 DIAGNOSIS — R30.0 BURNING WITH URINATION: ICD-10-CM

## 2022-05-23 LAB
BILIRUBIN: NEGATIVE
GLUCOSE (URINE DIPSTICK): NEGATIVE MG/DL
KETONES (URINE DIPSTICK): NEGATIVE MG/DL
MULTISTIX LOT#: ABNORMAL NUMERIC
NITRITE, URINE: NEGATIVE
PH, URINE: 6 (ref 4.5–8)
PROTEIN (URINE DIPSTICK): NEGATIVE MG/DL
SPECIFIC GRAVITY: 1.02 (ref 1–1.03)
URINE-COLOR: YELLOW
UROBILINOGEN,SEMI-QN: 0.2 MG/DL (ref 0–1.9)

## 2022-05-23 PROCEDURE — 87086 URINE CULTURE/COLONY COUNT: CPT | Performed by: INTERNAL MEDICINE

## 2022-05-23 RX ORDER — CIPROFLOXACIN 250 MG/1
250 TABLET, FILM COATED ORAL 2 TIMES DAILY
Qty: 14 TABLET | Refills: 0 | Status: SHIPPED | OUTPATIENT
Start: 2022-05-23

## 2022-05-23 RX ORDER — RAMIPRIL 10 MG/1
CAPSULE ORAL
Qty: 90 CAPSULE | Refills: 5 | Status: SHIPPED | OUTPATIENT
Start: 2022-05-23

## 2022-05-23 NOTE — PROGRESS NOTES
Is a pleasant 80-year-old female complaining of dysuria. No fever chills or night sweats. Urine dip significant for trace RBCs small leukocytes. Problem #2 hypertension. She denies headaches dizziness chest pain or shortness of breath. Blood pressure still elevated after starting beta-blocker. Alert well orientated no acute distress. Carotids 1+ no bruits no thyromegaly or bruit. Lungs clear to auscultation. Cardiovascular system regular rate and rhythm. Impression elevated blood pressure discussed various options. Maulik Boyer does not want a diuretic. We will increase her ACE from 20 to 30 mg. Return office 2 weeks. Problem #3 weight loss. Patient continues to lose weight in spite of protein drinks and cooking with butter. Recent labs reviewed.   She was recently diagnosed with new breast cancer in the right breast.  We will continue to monitor weight loss

## 2022-05-31 ENCOUNTER — TELEPHONE (OUTPATIENT)
Dept: INTERNAL MEDICINE CLINIC | Facility: CLINIC | Age: 87
End: 2022-05-31

## 2022-05-31 RX ORDER — CIPROFLOXACIN 250 MG/1
250 TABLET, FILM COATED ORAL 2 TIMES DAILY
Qty: 14 TABLET | Refills: 0 | Status: SHIPPED | OUTPATIENT
Start: 2022-05-31

## 2022-05-31 NOTE — TELEPHONE ENCOUNTER
PC to pt: Advised that urine culture     Patient was put on antibiotics by Dr. Eddi Mcfarland for UTI, she is on her last days of it but is still feeling burning when she urinates. I asked her if she wanted to be seen tomorrow and she said no she is asking if you could call her she prefers to talk to you about it.

## 2022-05-31 NOTE — TELEPHONE ENCOUNTER
PC to pt:  C/o persistent intermittent burning on urination despite finishing course of Cipro. Her urine culture was negative. She got PC from Dr. Anna Smith telling her it was negative but did not understand that she could stop the Cipro. Seems to have started since taking the Anastrazole. Has at least one year old Estradiol cream and has been using that for a week resolution of the burning. Also, had high BP when in office last week. Dr. Anna Smith doubles her Ramipril but she would not tolerate it. Went back to 10 mg a day. Also, she has never received a call from the pharmacy re: her Prolia. Will order again. Will order her Estradiol cream and Prolia. Plan: Refill estradiol and Prolia.

## 2022-06-01 ENCOUNTER — TELEPHONE (OUTPATIENT)
Dept: INTERNAL MEDICINE CLINIC | Facility: CLINIC | Age: 87
End: 2022-06-01

## 2022-06-01 RX ORDER — TEMAZEPAM 15 MG/1
CAPSULE ORAL NIGHTLY PRN
Qty: 90 CAPSULE | Refills: 1 | Status: SHIPPED | OUTPATIENT
Start: 2022-06-01

## 2022-06-01 RX ORDER — ESTRADIOL 0.1 MG/G
1 CREAM VAGINAL DAILY
Qty: 42.5 G | Refills: 1 | Status: SHIPPED | OUTPATIENT
Start: 2022-06-01

## 2022-06-01 NOTE — TELEPHONE ENCOUNTER
Sorry.  I was trying to resolve the issue with the Prolia with Edi Byrnes before ordering both. PC to pt: Advised of above. Per Edi Byrnes, she told pharmacist at [facility] not to fill it because her cost was $`!500. I advised pt that Edi Byrnes will work on [employer] to submit to [employer] for her Prolia. Pt had dysuria and cramping again. Urine culture was negative last week. She just started Letrozole. Advised her to call Dr. Mess[facility] office-could these sx  be due to it? Will order Estradiol cream now. Pt requests refill of Temazepam as well. Juliet Bean MD      Patient is requesting Estrodial cream. It was in MD notes that it would be ordered but it was not.  Please order to [facility]

## 2022-06-02 ENCOUNTER — TELEPHONE (OUTPATIENT)
Dept: HEMATOLOGY/ONCOLOGY | Facility: HOSPITAL | Age: 87
End: 2022-06-02

## 2022-06-02 NOTE — TELEPHONE ENCOUNTER
Patient is taking Anastrozole 1 mg oral tablet and now she is itching in her vagina area when she urinates and now she is on an antibiotic Ciprofloxacin 25 mg and was instructed to call Dr. Sherry Person by Dr. Nena Taylor to see if she having any side effects. Dr. Sherry Person pt.

## 2022-06-02 NOTE — TELEPHONE ENCOUNTER
Anastrozole - started in April    Patient has been having itching and burning in the vaginal area for about 3 weeks. Was to her PCP(Dr Fung) who told her she had an infection and started Cipro. She finished the Cipro but symptoms returned with cramping and burning on and off worse at times. PCP did cultures which were negative per patient and was given additional Cipro and Estradiol cream for possible dryness and irritation and patient instructed to contact Dr Ignatius Lombard about possible side effects from Anastrozole(patient is still taking it daily). Please call patient with information.

## 2022-06-02 NOTE — TELEPHONE ENCOUNTER
Dr. Brijesh Richardson reviewed patient symptoms and recommends that she stop the anastrozole until she sees him in July. I called the patient and she reviewed the symptoms with me and what has been happening. She is using the ointment prescribed and it sometime helps. The symptoms that she is having are intermittent at the present time. Answered her questions. She will hold the anastrozole and keep her July appointment.

## 2022-06-07 ENCOUNTER — OFFICE VISIT (OUTPATIENT)
Dept: INTERNAL MEDICINE CLINIC | Facility: CLINIC | Age: 87
End: 2022-06-07
Payer: MEDICARE

## 2022-06-07 VITALS
RESPIRATION RATE: 18 BRPM | WEIGHT: 125.38 LBS | SYSTOLIC BLOOD PRESSURE: 148 MMHG | OXYGEN SATURATION: 96 % | DIASTOLIC BLOOD PRESSURE: 68 MMHG | HEART RATE: 88 BPM | TEMPERATURE: 97 F | BODY MASS INDEX: 24 KG/M2

## 2022-06-07 DIAGNOSIS — N36.2 URETHRAL CARUNCLE: ICD-10-CM

## 2022-06-07 DIAGNOSIS — C50.911 INVASIVE LOBULAR CARCINOMA OF RIGHT BREAST IN FEMALE (HCC): ICD-10-CM

## 2022-06-07 DIAGNOSIS — C50.911 INVASIVE DUCTAL CARCINOMA OF RIGHT BREAST (HCC): ICD-10-CM

## 2022-06-07 DIAGNOSIS — Z85.3 HX OF BREAST CANCER: ICD-10-CM

## 2022-06-07 DIAGNOSIS — R30.0 BURNING WITH URINATION: Primary | ICD-10-CM

## 2022-06-07 PROBLEM — R63.4 ABNORMAL WEIGHT LOSS: Status: ACTIVE | Noted: 2022-06-07

## 2022-06-07 LAB
APPEARANCE: CLEAR
BILIRUBIN: NEGATIVE
GLUCOSE (URINE DIPSTICK): NEGATIVE MG/DL
KETONES (URINE DIPSTICK): NEGATIVE MG/DL
MULTISTIX LOT#: ABNORMAL NUMERIC
NITRITE, URINE: NEGATIVE
OCCULT BLOOD: NEGATIVE
PH, URINE: 6 (ref 4.5–8)
PROTEIN (URINE DIPSTICK): NEGATIVE MG/DL
SPECIFIC GRAVITY: 1.02 (ref 1–1.03)
URINE-COLOR: YELLOW
UROBILINOGEN,SEMI-QN: 0.2 MG/DL (ref 0–1.9)

## 2022-06-07 PROCEDURE — 1126F AMNT PAIN NOTED NONE PRSNT: CPT | Performed by: INTERNAL MEDICINE

## 2022-06-07 PROCEDURE — 81003 URINALYSIS AUTO W/O SCOPE: CPT | Performed by: INTERNAL MEDICINE

## 2022-06-07 PROCEDURE — 99214 OFFICE O/P EST MOD 30 MIN: CPT | Performed by: INTERNAL MEDICINE

## 2022-06-07 NOTE — PATIENT INSTRUCTIONS
I will call Dr. Flavio Napier office for a sooner appt. Use the estradiol cream at night in the vagina.

## 2022-06-16 ENCOUNTER — OFFICE VISIT (OUTPATIENT)
Dept: INTERNAL MEDICINE CLINIC | Facility: CLINIC | Age: 87
End: 2022-06-16
Payer: MEDICARE

## 2022-06-16 VITALS
HEART RATE: 68 BPM | BODY MASS INDEX: 24 KG/M2 | TEMPERATURE: 98 F | SYSTOLIC BLOOD PRESSURE: 138 MMHG | WEIGHT: 123.63 LBS | OXYGEN SATURATION: 97 % | RESPIRATION RATE: 18 BRPM | DIASTOLIC BLOOD PRESSURE: 66 MMHG

## 2022-06-16 DIAGNOSIS — M35.01 KERATOCONJUNCTIVITIS SICCA OF BOTH EYES (HCC): ICD-10-CM

## 2022-06-16 DIAGNOSIS — G89.29 CHRONIC BILATERAL LOW BACK PAIN WITHOUT SCIATICA: ICD-10-CM

## 2022-06-16 DIAGNOSIS — N36.2 URETHRAL CARUNCLE: Primary | ICD-10-CM

## 2022-06-16 DIAGNOSIS — R63.4 ABNORMAL WEIGHT LOSS: ICD-10-CM

## 2022-06-16 DIAGNOSIS — M54.50 CHRONIC BILATERAL LOW BACK PAIN WITHOUT SCIATICA: ICD-10-CM

## 2022-06-16 DIAGNOSIS — C50.911 INVASIVE DUCTAL CARCINOMA OF RIGHT BREAST (HCC): ICD-10-CM

## 2022-06-16 PROBLEM — H57.89 REDNESS OR DISCHARGE OF EYE: Status: ACTIVE | Noted: 2021-10-29

## 2022-06-16 PROBLEM — H16.223 KERATOCONJUNCTIVITIS SICCA OF BOTH EYES: Status: ACTIVE | Noted: 2021-10-29

## 2022-06-16 PROBLEM — H57.13 DISCOMFORT OF BOTH EYES: Status: ACTIVE | Noted: 2021-10-29

## 2022-06-16 PROBLEM — H26.492 PCO (POSTERIOR CAPSULAR OPACIFICATION), LEFT: Status: ACTIVE | Noted: 2022-01-25

## 2022-06-16 PROCEDURE — 1126F AMNT PAIN NOTED NONE PRSNT: CPT | Performed by: INTERNAL MEDICINE

## 2022-06-16 PROCEDURE — 99214 OFFICE O/P EST MOD 30 MIN: CPT | Performed by: INTERNAL MEDICINE

## 2022-06-16 RX ORDER — FLUCONAZOLE 150 MG/1
150 TABLET ORAL ONCE
COMMUNITY
Start: 2022-06-13

## 2022-06-21 ENCOUNTER — TELEPHONE (OUTPATIENT)
Dept: INTERNAL MEDICINE CLINIC | Facility: CLINIC | Age: 87
End: 2022-06-21

## 2022-06-21 NOTE — TELEPHONE ENCOUNTER
PC to pt: Had improvement in burning and pressure in perineal area last few days, but then sx recurred today. Is using estrogen cream.    Advised we get an abd US and I will email Dr. Sharolyn Buerger about visit and persistent sx.       Plan: Continue using cream           Await update from note to Dr. Kale Mcfarlane MD

## 2022-06-28 ENCOUNTER — TELEPHONE (OUTPATIENT)
Dept: INTERNAL MEDICINE CLINIC | Facility: CLINIC | Age: 87
End: 2022-06-28

## 2022-06-28 NOTE — TELEPHONE ENCOUNTER
Called patient to see if she received the message regarding the US of the abdomin that Dr Petra Kelly had ordered. Patient stated that she would like to speak to MD regarding this due to she does not think she wants to have this done and the reason.  Please call patient

## 2022-06-28 NOTE — TELEPHONE ENCOUNTER
PC to pt:  her pelvic sx have improved significantly and she is feeling better overall. She would like to defer any further work-up, eg abdominal US, for now. I agree and am happy she is feeling better.       Verner Franklin MD.

## 2022-07-14 ENCOUNTER — TELEPHONE (OUTPATIENT)
Dept: HEMATOLOGY/ONCOLOGY | Facility: HOSPITAL | Age: 87
End: 2022-07-14

## 2022-07-14 NOTE — TELEPHONE ENCOUNTER
I left a message for Dr. Sunitha Rivera office with my direct phone to call back regarding the Prolia injection. Asked them to call back with additional questions.

## 2022-07-14 NOTE — TELEPHONE ENCOUNTER
Dr. Elaina Mora office called and needs information on the injection the patient will be getting. Please call. Thank you.

## 2022-07-18 RX ORDER — METOPROLOL SUCCINATE 25 MG/1
25 TABLET, EXTENDED RELEASE ORAL DAILY
Qty: 30 TABLET | Refills: 1 | Status: SHIPPED | OUTPATIENT
Start: 2022-07-18

## 2022-07-18 NOTE — TELEPHONE ENCOUNTER
1- Refill request on metoprolol ER 25mg every day  2- Quantity of: 30 tabs  3- Administration instructions: 1 tab every day   4- Last ov r/t med was w Dr Bryant Catron: 3/15/2022. Per ov notes or related lab(s):   \"Stop the Hydrochlorthiazide and continue the Ramipril, Metoprolol and Amlodipine 5 mg a day in the morning. \"    Rx refill sent.

## 2022-07-19 ENCOUNTER — TELEPHONE (OUTPATIENT)
Dept: INTERNAL MEDICINE CLINIC | Facility: CLINIC | Age: 87
End: 2022-07-19

## 2022-07-19 NOTE — TELEPHONE ENCOUNTER
Patient was to get Prolia injection. Patient had made us aware that she was to be getting an injection from Dr Danielle Viramontes office. A call was made to Dr Kaylen Hawkins and it was confirmed that she will be getting the Prolia injection with their office.  Patient is scheduled on 07/20/2022

## 2022-07-20 ENCOUNTER — OFFICE VISIT (OUTPATIENT)
Dept: HEMATOLOGY/ONCOLOGY | Facility: HOSPITAL | Age: 87
End: 2022-07-20
Attending: INTERNAL MEDICINE
Payer: MEDICARE

## 2022-07-20 VITALS
HEIGHT: 60 IN | RESPIRATION RATE: 16 BRPM | TEMPERATURE: 97 F | HEART RATE: 74 BPM | DIASTOLIC BLOOD PRESSURE: 68 MMHG | OXYGEN SATURATION: 96 % | BODY MASS INDEX: 24.15 KG/M2 | SYSTOLIC BLOOD PRESSURE: 162 MMHG | WEIGHT: 123 LBS

## 2022-07-20 DIAGNOSIS — Z17.0 MALIGNANT NEOPLASM OF OVERLAPPING SITES OF RIGHT BREAST IN FEMALE, ESTROGEN RECEPTOR POSITIVE (HCC): Primary | ICD-10-CM

## 2022-07-20 DIAGNOSIS — C50.911 INVASIVE LOBULAR CARCINOMA OF RIGHT BREAST IN FEMALE (HCC): ICD-10-CM

## 2022-07-20 DIAGNOSIS — M80.00XD AGE-RELATED OSTEOPOROSIS WITH CURRENT PATHOLOGICAL FRACTURE WITH ROUTINE HEALING, SUBSEQUENT ENCOUNTER: Primary | ICD-10-CM

## 2022-07-20 DIAGNOSIS — M80.00XD AGE-RELATED OSTEOPOROSIS WITH CURRENT PATHOLOGICAL FRACTURE WITH ROUTINE HEALING, SUBSEQUENT ENCOUNTER: ICD-10-CM

## 2022-07-20 DIAGNOSIS — C50.811 MALIGNANT NEOPLASM OF OVERLAPPING SITES OF RIGHT BREAST IN FEMALE, ESTROGEN RECEPTOR POSITIVE (HCC): Primary | ICD-10-CM

## 2022-07-20 LAB
ALBUMIN SERPL-MCNC: 3.6 G/DL (ref 3.4–5)
CALCIUM BLD-MCNC: 9.5 MG/DL (ref 8.5–10.1)
CREAT BLD-MCNC: 0.84 MG/DL
MAGNESIUM SERPL-MCNC: 2.3 MG/DL (ref 1.6–2.6)
PHOSPHATE SERPL-MCNC: 2.9 MG/DL (ref 2.5–4.9)

## 2022-07-20 PROCEDURE — 96372 THER/PROPH/DIAG INJ SC/IM: CPT

## 2022-07-20 PROCEDURE — 99215 OFFICE O/P EST HI 40 MIN: CPT | Performed by: INTERNAL MEDICINE

## 2022-07-20 RX ORDER — TAMOXIFEN CITRATE 20 MG/1
20 TABLET ORAL DAILY
Qty: 30 TABLET | Refills: 11 | Status: SHIPPED | OUTPATIENT
Start: 2022-07-20

## 2022-07-20 NOTE — PROGRESS NOTES
Patient is here for follow up for breast cancer. She stopped the anastrozole after having symptoms of vaginal burning, dryness and UTI. She says that it took a full month for these symptoms to resolve after stopping the medication. She reports that she is eating well. No bowel problems. She has fatigue and low energy. She does sleep well using temazepam.   She is due for prolia injection today.      Education Record    Learner:  Patient    Disease / Diagnosis: Breast cancer    Barriers / Limitations:  None   Comments:    Method:  Discussion   Comments:    General Topics:  Side effects and symptom management   Comments:    Outcome:  Shows understanding   Comments:

## 2022-07-20 NOTE — PROGRESS NOTES
Education Record    Learner:  Patient    Disease / Diagnosis: Osteoporosis     Barriers / Limitations:  None   Comments:    Method:  Discussion   Comments:    General Topics:  Plan of care reviewed   Comments:    Outcome:  Shows understanding   Comments: Pt tolerated inj well. Will continue to monitor.

## 2022-07-25 ENCOUNTER — TELEPHONE (OUTPATIENT)
Dept: INTERNAL MEDICINE CLINIC | Facility: CLINIC | Age: 87
End: 2022-07-25

## 2022-07-25 RX ORDER — NIRMATRELVIR AND RITONAVIR 150-100 MG
KIT ORAL
Qty: 1 EACH | Refills: 0 | Status: SHIPPED | OUTPATIENT
Start: 2022-07-25 | End: 2022-07-25

## 2022-07-25 RX ORDER — NIRMATRELVIR AND RITONAVIR 150-100 MG
150 KIT ORAL 2 TIMES DAILY
Qty: 1 EACH | Refills: 0 | Status: SHIPPED | OUTPATIENT
Start: 2022-07-25

## 2022-07-25 NOTE — TELEPHONE ENCOUNTER
SAMANTHA [facility]. She took a Covid test on Friday and her test is positive. She would like Paxlovid. She has renal impairment so lower dose will be prescribed. She has no drug interactions. This is her 5th day of sx: started with hoarse voice and chills 5 days ago, then developed runny nose, cough and fatigue. Had to call the EMTs to have assistance getting off the toilet. Energy level is back to normal today. No fever, SOB. Will prescribe to [employer].

## 2022-07-25 NOTE — TELEPHONE ENCOUNTER
Received an incident report from [facility] regarding patient needing assistance getting up off her toilet on 07/21/2022. EMT's arrived to patients chart and found the patient in her bathroom sitting on her toilet leaning forward against her wall. Patient stated that she could not get up after using the washroom and has been sitting there for an hour due to not being able to reach the emergency chord. With assistance and patients walker she was helped to her bed. Patient stated that she felt weaker that morning more than normal and just wanted to sleep. Patient declined further assistance and was told to call the  if symptoms return.      Patient has appointment 07/26/2022 to see MD

## 2022-07-28 ENCOUNTER — TELEPHONE (OUTPATIENT)
Dept: INTERNAL MEDICINE CLINIC | Facility: CLINIC | Age: 87
End: 2022-07-28

## 2022-07-28 NOTE — TELEPHONE ENCOUNTER
Called patient to follow up from positive COVID. Patient states that she has no energy still very fatigue. She is coughing up a lot of phlegm. She has been taking Paxlovid and finishes tomorrow 07/29/2022. Patient is able to go out in [facility] starting on Monday 08/01/2022. The fatigue and phlegm can last for weeks after. Message sent by My chart to her. Verner Franklin. MD

## 2022-08-01 ENCOUNTER — TELEPHONE (OUTPATIENT)
Dept: INTERNAL MEDICINE CLINIC | Facility: CLINIC | Age: 87
End: 2022-08-01

## 2022-08-01 NOTE — TELEPHONE ENCOUNTER
Patient states that she is feeling much better but still has a little fatigue. Red dot has been removed and patient is able to go throughout [employer]. Patient has appointment with dr James George on 08/09/2022 for follow up regarding congestion.

## 2022-08-09 ENCOUNTER — OFFICE VISIT (OUTPATIENT)
Dept: INTERNAL MEDICINE CLINIC | Facility: CLINIC | Age: 87
End: 2022-08-09
Payer: MEDICARE

## 2022-08-09 VITALS
DIASTOLIC BLOOD PRESSURE: 70 MMHG | OXYGEN SATURATION: 95 % | SYSTOLIC BLOOD PRESSURE: 132 MMHG | TEMPERATURE: 97 F | BODY MASS INDEX: 24 KG/M2 | WEIGHT: 121.69 LBS | HEART RATE: 94 BPM | RESPIRATION RATE: 16 BRPM

## 2022-08-09 DIAGNOSIS — G89.29 CHRONIC BILATERAL LOW BACK PAIN WITHOUT SCIATICA: ICD-10-CM

## 2022-08-09 DIAGNOSIS — M54.50 CHRONIC BILATERAL LOW BACK PAIN WITHOUT SCIATICA: ICD-10-CM

## 2022-08-09 DIAGNOSIS — N94.9 VAGINAL BURNING: ICD-10-CM

## 2022-08-09 DIAGNOSIS — M51.36 DDD (DEGENERATIVE DISC DISEASE), LUMBAR: ICD-10-CM

## 2022-08-09 DIAGNOSIS — U09.9 POST COVID-19 CONDITION, UNSPECIFIED: Primary | ICD-10-CM

## 2022-08-09 DIAGNOSIS — R63.4 ABNORMAL WEIGHT LOSS: ICD-10-CM

## 2022-08-09 DIAGNOSIS — C50.911 INVASIVE DUCTAL CARCINOMA OF RIGHT BREAST (HCC): ICD-10-CM

## 2022-08-09 DIAGNOSIS — N64.4 BREAST TENDERNESS IN FEMALE: ICD-10-CM

## 2022-08-09 DIAGNOSIS — I10 PRIMARY HYPERTENSION: ICD-10-CM

## 2022-08-09 PROBLEM — R10.2 PELVIC PRESSURE IN FEMALE: Status: ACTIVE | Noted: 2022-08-09

## 2022-08-09 PROBLEM — N36.2 URETHRAL CARUNCLE: Status: RESOLVED | Noted: 2022-06-07 | Resolved: 2022-08-09

## 2022-08-09 PROBLEM — C44.311 BASAL CELL CARCINOMA (BCC) OF LEFT SIDE OF NOSE: Status: RESOLVED | Noted: 2018-07-03 | Resolved: 2022-08-09

## 2022-08-09 PROBLEM — H57.89 REDNESS OR DISCHARGE OF EYE: Status: RESOLVED | Noted: 2021-10-29 | Resolved: 2022-08-09

## 2022-08-09 PROBLEM — H57.13 DISCOMFORT OF BOTH EYES: Status: RESOLVED | Noted: 2021-10-29 | Resolved: 2022-08-09

## 2022-08-09 PROBLEM — J42 CHRONIC BRONCHITIS (HCC): Status: RESOLVED | Noted: 2022-03-10 | Resolved: 2022-08-09

## 2022-08-09 PROBLEM — R53.82 CHRONIC FATIGUE: Status: ACTIVE | Noted: 2022-08-09

## 2022-08-09 PROBLEM — N94.89 VAGINAL BURNING: Status: ACTIVE | Noted: 2022-08-09

## 2022-08-09 PROCEDURE — 1126F AMNT PAIN NOTED NONE PRSNT: CPT | Performed by: INTERNAL MEDICINE

## 2022-08-09 PROCEDURE — 99215 OFFICE O/P EST HI 40 MIN: CPT | Performed by: INTERNAL MEDICINE

## 2022-08-09 RX ORDER — TAMOXIFEN CITRATE 20 MG/1
1 TABLET ORAL AS DIRECTED
COMMUNITY
End: 2022-08-09

## 2022-08-09 NOTE — PATIENT INSTRUCTIONS
Make a follow-up appointment with Dr. Quintin Nicole at the [facility]. Follow up with Dr. Jai Riley for evaluation of differences in areola. Continue eating well. Have ice cream every night    Follow up in one month.

## 2022-08-11 ENCOUNTER — OFFICE VISIT (OUTPATIENT)
Facility: LOCATION | Age: 87
End: 2022-08-11
Payer: MEDICARE

## 2022-08-11 ENCOUNTER — TELEPHONE (OUTPATIENT)
Dept: SURGERY | Facility: CLINIC | Age: 87
End: 2022-08-11

## 2022-08-11 DIAGNOSIS — H69.82 DYSFUNCTION OF LEFT EUSTACHIAN TUBE: Primary | ICD-10-CM

## 2022-08-11 PROCEDURE — 99213 OFFICE O/P EST LOW 20 MIN: CPT | Performed by: OTOLARYNGOLOGY

## 2022-08-11 NOTE — PROGRESS NOTES
Sienna Diana is a 80year old female. Patient presents with:  Sinus Problem: Congestion  Ear Problem: Pressure in the ears     HPI:   She had COVID 3 weeks ago. Since that she has had pressure on the left ear. She denies hearing loss. She denies tinnitus. She denies vertigo. Her right ear has been done for 65 years. She has had some postnasal drip which is improving with time. REVIEW OF SYSTEMS:   GENERAL HEALTH: feels well otherwise  GENERAL : denies fever, chills, sweats, weight loss, weight gain  SKIN: denies any unusual skin lesions or rashes  RESPIRATORY: denies shortness of breath with exertion  NEURO: denies headaches    EXAM:   There were no vitals taken for this visit. System Findings Details   Constitutional Normal Overall appearance - Normal.   Psychiatric Normal Orientation - Oriented to time, place, person & situation. Appropriate mood and affect. Head/Face Normal Facial features -- Normal. Skull - Normal.   Eyes Normal Pupils equal ,round ,react to light and accomidate   Ears, Nose, Throat, Neck  abnormal  there is some mild wax otherwise the ears are clear without signs of fluid or infection nose clear oropharynx clear neck no masses   Neurological Normal Memory - Normal. Cranial nerves - Cranial nerves II through XII grossly intact. Lymph Detail Normal Submental. Submandibular. Anterior cervical. Posterior cervical. Supraclavicular. Patient refused audiogram today. ASSESSMENT AND PLAN:   1. Dysfunction of left eustachian tube  She will try to pop her ears 3-4 times a day. She will try Flonase and check with her eye doctors first.  Will not use Sudafed as she has high blood pressure. She will call back if she has not improved. The patient indicates understanding of these issues and agrees to the plan. No follow-ups on file.     Isaac Fregoso MD  8/11/2022  5:47 PM

## 2022-08-11 NOTE — TELEPHONE ENCOUNTER
Called to triage patient. No answer, left voicemail for patient to call back to office. Phone number provided.

## 2022-08-12 ENCOUNTER — TELEPHONE (OUTPATIENT)
Dept: SURGERY | Facility: CLINIC | Age: 87
End: 2022-08-12

## 2022-08-12 NOTE — TELEPHONE ENCOUNTER
Phoned patient in regards to new right breast symptoms. Patient described reddened right breast areola as compared with left breast with associated right breast pain extending under her arm and toward her back. Patient stated the pain is lessening each day, more like a dull ache. Patient stated that these symptoms became more pronounced in the last two weeks while she has been taking Tamoxifen. Patient was prescribed Tamoxifen on 7/20/22 but delayed taking it due to positive Covid test on 7/22/22. Patient reached out to Dr Yifan Baron office at the suggestion of her PCP due to these symptoms. Patient stated that PCP briefly discussed possibility of surgery and patient is unsure if she would feel comfortable proceeding with any surgery at this time. Patient is agreeable to appointment with Dr Nova Lopez to discuss further. Patient has daughter Arjun Weiner as support person in her life. Encouraged patient to also update Dr Lilly Oreilly regarding these symptoms.

## 2022-08-15 ENCOUNTER — TELEPHONE (OUTPATIENT)
Dept: SURGERY | Facility: CLINIC | Age: 87
End: 2022-08-15

## 2022-08-15 ENCOUNTER — TELEPHONE (OUTPATIENT)
Dept: HEMATOLOGY/ONCOLOGY | Facility: HOSPITAL | Age: 87
End: 2022-08-15

## 2022-08-15 NOTE — TELEPHONE ENCOUNTER
Patient was offered an appointment to be seen 8/22/22 at 9:45am with Dr. Kala Ordoñez. Patient did schedule, and then called to cancel. She said her issue has resolved and is meeting with her oncologist instead for now. She thinks her body was adjusting to Tamoxifen at first and is now better and wants to have this discussion with oncology. She stated she will call Dr. Cannon Quiet office back if she decides to meet wit her as well.

## 2022-08-15 NOTE — TELEPHONE ENCOUNTER
Patient calling and was advised By Dr Criselda bermeo to transfer her care to Dr Latonya Brannon. I asked patient the reason for Transfer. Stated that at Her Initial consult. She waited 1 hour in the treatment room. On the 7/20/2022 visit. She waited 40 minutes. Stated he came and was rushed looking at his watch. That he is running late  Stated a conversation regarding the Tamoxifin  That it was causing her Eye Dryness . .stated that Dr Zina Cano yelled at her saying are you going to take the medication or Not?? Dont you know you have cancer? Again looking at his watch. Stated she asked about getting a Bone shot-  Stated he walked out of the room. Stated that Dr Jenni Ojeda said she needed to see another Dr..    I tried to advise on the Polices and the transfers of Doctors.   She stated but Dr Jenni Ojeda told me to do thisl

## 2022-08-16 ENCOUNTER — TELEPHONE (OUTPATIENT)
Dept: HEMATOLOGY/ONCOLOGY | Facility: HOSPITAL | Age: 87
End: 2022-08-16

## 2022-08-16 NOTE — TELEPHONE ENCOUNTER
Dr. Marella Councilman called regarding the patient issue from yesterday. She is asking that the patient be transferred to Dr. Cordell Chavarria. I told her that I would investigate and return her call with the outcome.

## 2022-08-17 ENCOUNTER — OFFICE VISIT (OUTPATIENT)
Facility: LOCATION | Age: 87
End: 2022-08-17
Payer: MEDICARE

## 2022-08-17 DIAGNOSIS — H90.3 SENSORINEURAL HEARING LOSS (SNHL) OF BOTH EARS: Primary | ICD-10-CM

## 2022-08-17 PROCEDURE — 92557 COMPREHENSIVE HEARING TEST: CPT | Performed by: AUDIOLOGIST

## 2022-08-17 NOTE — PROGRESS NOTES
Khalida Logan was seen for audiometric evaluation today. Referred back to physician.      Troy Lopez

## 2022-08-23 NOTE — PATIENT INSTRUCTIONS
Trent Berg see me in a week if not better.   Also measure your blood pressure daily record the numbers and then make an appointment to see me in 1 month
Yes

## 2022-08-31 ENCOUNTER — TELEPHONE (OUTPATIENT)
Dept: HEMATOLOGY/ONCOLOGY | Facility: HOSPITAL | Age: 87
End: 2022-08-31

## 2022-08-31 NOTE — TELEPHONE ENCOUNTER
This patient will be switching her care to Dr. Dorota Huynh. Has seen Dr. Qiana Merino in the past. When she calls to make the appt, please schedule her for a 30 min visit. Please reach out to me with any questions.      Alicia Vilchis

## 2022-09-01 ENCOUNTER — OFFICE VISIT (OUTPATIENT)
Dept: INTERNAL MEDICINE CLINIC | Facility: CLINIC | Age: 87
End: 2022-09-01
Payer: MEDICARE

## 2022-09-01 VITALS
RESPIRATION RATE: 16 BRPM | HEART RATE: 72 BPM | SYSTOLIC BLOOD PRESSURE: 132 MMHG | WEIGHT: 123.38 LBS | TEMPERATURE: 98 F | DIASTOLIC BLOOD PRESSURE: 64 MMHG | OXYGEN SATURATION: 97 % | BODY MASS INDEX: 24.22 KG/M2 | HEIGHT: 60 IN

## 2022-09-01 DIAGNOSIS — U09.9 POST COVID-19 CONDITION, UNSPECIFIED: ICD-10-CM

## 2022-09-01 DIAGNOSIS — M54.50 CHRONIC LEFT-SIDED LOW BACK PAIN WITHOUT SCIATICA: ICD-10-CM

## 2022-09-01 DIAGNOSIS — G89.29 CHRONIC LEFT-SIDED LOW BACK PAIN WITHOUT SCIATICA: ICD-10-CM

## 2022-09-01 DIAGNOSIS — M51.36 DDD (DEGENERATIVE DISC DISEASE), LUMBAR: ICD-10-CM

## 2022-09-01 DIAGNOSIS — Z00.01 ENCOUNTER FOR GENERAL ADULT MEDICAL EXAMINATION WITH ABNORMAL FINDINGS: Primary | ICD-10-CM

## 2022-09-01 DIAGNOSIS — E04.2 MULTINODULAR GOITER: ICD-10-CM

## 2022-09-01 DIAGNOSIS — M80.00XD AGE-RELATED OSTEOPOROSIS WITH CURRENT PATHOLOGICAL FRACTURE WITH ROUTINE HEALING, SUBSEQUENT ENCOUNTER: ICD-10-CM

## 2022-09-01 DIAGNOSIS — I10 PRIMARY HYPERTENSION: ICD-10-CM

## 2022-09-01 DIAGNOSIS — N64.4 BREAST TENDERNESS IN FEMALE: ICD-10-CM

## 2022-09-01 DIAGNOSIS — R63.4 ABNORMAL WEIGHT LOSS: ICD-10-CM

## 2022-09-01 RX ORDER — TEMAZEPAM 15 MG/1
CAPSULE ORAL NIGHTLY PRN
Qty: 90 CAPSULE | Refills: 1 | Status: SHIPPED | OUTPATIENT
Start: 2022-09-01

## 2022-09-01 NOTE — TELEPHONE ENCOUNTER
Future appt: Your appointments     Date & Time Appointment Department [facility])    Sep 01, 2022  3:15 PM CDT Medicare Annual Well Visit with Oswald Singh MD [address]  ([address])        Oct 19, 2022 11:15 AM CDT HEMATOLOGY ONCOLOGY FOLLOW UP with Silvino Daniel MD [address] [facility] in [facility]            [address] [facility] in [address] [facility] [address]  [place], [place], [address] [place] [address]  [phone]        Last Appointment:  8/9/2022  Last physical : 7/22/21  Cholesterol, Total (mg/dL)   Date Value   07/29/2020 205 (H)     HDL Cholesterol (mg/dL)   Date Value   07/29/2020 68 (H)     LDL Cholesterol (mg/dL)   Date Value   07/29/2020 124 (H)     Triglycerides (mg/dL)   Date Value   07/29/2020 67     Lab Results   Component Value Date     04/18/2016    A1C 5.5 04/18/2016     Lab Results   Component Value Date    T4F 1.0 04/18/2016    TSH 1.640 03/10/2022       No follow-ups on file.

## 2022-09-02 NOTE — PATIENT INSTRUCTIONS
Follow up with Dr. Disha Macias    Make an appt with Dr. Sandeep Vargas for an injection of your lumbar spine. Continue the same medications. Activity as tolerated    Eat a nutritious adequate-calorie diet. Your Temazepam has been reordered.

## 2022-09-06 RX ORDER — TEMAZEPAM 15 MG/1
CAPSULE ORAL NIGHTLY PRN
Qty: 90 CAPSULE | Refills: 0 | OUTPATIENT
Start: 2022-09-06

## 2022-09-27 ENCOUNTER — OFFICE VISIT (OUTPATIENT)
Dept: HEMATOLOGY/ONCOLOGY | Facility: HOSPITAL | Age: 87
End: 2022-09-27
Attending: INTERNAL MEDICINE

## 2022-09-27 VITALS
OXYGEN SATURATION: 97 % | WEIGHT: 122.38 LBS | TEMPERATURE: 98 F | RESPIRATION RATE: 16 BRPM | DIASTOLIC BLOOD PRESSURE: 72 MMHG | HEIGHT: 60 IN | BODY MASS INDEX: 24.03 KG/M2 | HEART RATE: 73 BPM | SYSTOLIC BLOOD PRESSURE: 164 MMHG

## 2022-09-27 DIAGNOSIS — M80.00XD AGE-RELATED OSTEOPOROSIS WITH CURRENT PATHOLOGICAL FRACTURE WITH ROUTINE HEALING, SUBSEQUENT ENCOUNTER: ICD-10-CM

## 2022-09-27 DIAGNOSIS — C50.811 MALIGNANT NEOPLASM OF OVERLAPPING SITES OF RIGHT BREAST IN FEMALE, ESTROGEN RECEPTOR POSITIVE (HCC): Primary | ICD-10-CM

## 2022-09-27 DIAGNOSIS — T50.905D ADVERSE EFFECT OF DRUG, SUBSEQUENT ENCOUNTER: ICD-10-CM

## 2022-09-27 DIAGNOSIS — Z17.0 MALIGNANT NEOPLASM OF OVERLAPPING SITES OF RIGHT BREAST IN FEMALE, ESTROGEN RECEPTOR POSITIVE (HCC): Primary | ICD-10-CM

## 2022-09-27 PROCEDURE — 99215 OFFICE O/P EST HI 40 MIN: CPT | Performed by: INTERNAL MEDICINE

## 2022-09-27 NOTE — PROGRESS NOTES
Education Record    Learner:  Patient/ family member    Disease / Diagnosis: right breast cancer       Barriers / Limitations:  None   Comments:    Method:  Discussion   Comments:    General Topics:  Plan of care reviewed   Comments:    Outcome:  Shows understanding   Comments:  Pt has been taking tamoxifen. Initially had soreness in her breasts for 2 weeks, resolved. Denies hot flashes. Denies breast issues now. Having a lot of back pain, having video visit soon, hoping to have an injection.

## 2022-10-05 RX ORDER — METOPROLOL SUCCINATE 25 MG/1
25 TABLET, EXTENDED RELEASE ORAL DAILY
Qty: 30 TABLET | Refills: 1 | Status: SHIPPED | OUTPATIENT
Start: 2022-10-05

## 2022-10-18 RX ORDER — TEMAZEPAM 15 MG/1
CAPSULE ORAL NIGHTLY PRN
Qty: 90 CAPSULE | Refills: 1 | Status: SHIPPED | OUTPATIENT
Start: 2022-10-18

## 2022-10-19 ENCOUNTER — APPOINTMENT (OUTPATIENT)
Dept: HEMATOLOGY/ONCOLOGY | Facility: HOSPITAL | Age: 87
End: 2022-10-19
Attending: INTERNAL MEDICINE
Payer: MEDICARE

## 2022-10-27 ENCOUNTER — IMMUNIZATION (OUTPATIENT)
Dept: INTERNAL MEDICINE CLINIC | Facility: CLINIC | Age: 87
End: 2022-10-27
Payer: MEDICARE

## 2022-10-27 DIAGNOSIS — Z23 NEED FOR VACCINATION: Primary | ICD-10-CM

## 2022-10-27 PROCEDURE — 90662 IIV NO PRSV INCREASED AG IM: CPT | Performed by: INTERNAL MEDICINE

## 2022-10-27 PROCEDURE — G0008 ADMIN INFLUENZA VIRUS VAC: HCPCS | Performed by: INTERNAL MEDICINE

## 2022-11-08 ENCOUNTER — HOSPITAL ENCOUNTER (OUTPATIENT)
Dept: MAMMOGRAPHY | Facility: HOSPITAL | Age: 87
Discharge: HOME | End: 2022-11-08
Attending: INTERNAL MEDICINE
Payer: MEDICARE

## 2022-11-08 DIAGNOSIS — C50.811 MALIGNANT NEOPLASM OF OVERLAPPING SITES OF RIGHT BREAST IN FEMALE, ESTROGEN RECEPTOR POSITIVE (HCC): ICD-10-CM

## 2022-11-08 DIAGNOSIS — Z17.0 MALIGNANT NEOPLASM OF OVERLAPPING SITES OF RIGHT BREAST IN FEMALE, ESTROGEN RECEPTOR POSITIVE (HCC): ICD-10-CM

## 2022-11-08 PROCEDURE — 76642 ULTRASOUND BREAST LIMITED: CPT | Performed by: INTERNAL MEDICINE

## 2022-11-08 PROCEDURE — 77062 BREAST TOMOSYNTHESIS BI: CPT | Performed by: INTERNAL MEDICINE

## 2022-11-08 PROCEDURE — 77066 DX MAMMO INCL CAD BI: CPT | Performed by: INTERNAL MEDICINE

## 2022-11-09 ENCOUNTER — TELEPHONE (OUTPATIENT)
Dept: HEMATOLOGY/ONCOLOGY | Facility: HOSPITAL | Age: 87
End: 2022-11-09

## 2022-11-09 NOTE — TELEPHONE ENCOUNTER
Oleg Ortiz daughter would like to be included in the call on 11/11/22@ 2:15   she said that she can merge her mom on the call her number is [phone] .   Thanks Wedia

## 2022-11-11 ENCOUNTER — VIRTUAL PHONE E/M (OUTPATIENT)
Dept: HEMATOLOGY/ONCOLOGY | Facility: HOSPITAL | Age: 87
End: 2022-11-11
Attending: INTERNAL MEDICINE
Payer: MEDICARE

## 2022-11-11 DIAGNOSIS — C50.911 INVASIVE LOBULAR CARCINOMA OF RIGHT BREAST IN FEMALE (HCC): ICD-10-CM

## 2022-11-15 ENCOUNTER — VIRTUAL PHONE E/M (OUTPATIENT)
Dept: HEMATOLOGY/ONCOLOGY | Facility: HOSPITAL | Age: 87
End: 2022-11-15
Attending: INTERNAL MEDICINE
Payer: MEDICARE

## 2022-11-15 ENCOUNTER — NURSE NAVIGATOR ENCOUNTER (OUTPATIENT)
Dept: HEMATOLOGY/ONCOLOGY | Facility: HOSPITAL | Age: 87
End: 2022-11-15

## 2022-11-15 DIAGNOSIS — C50.811 MALIGNANT NEOPLASM OF OVERLAPPING SITES OF RIGHT BREAST IN FEMALE, ESTROGEN RECEPTOR POSITIVE (HCC): ICD-10-CM

## 2022-11-15 DIAGNOSIS — Z17.0 MALIGNANT NEOPLASM OF OVERLAPPING SITES OF RIGHT BREAST IN FEMALE, ESTROGEN RECEPTOR POSITIVE (HCC): ICD-10-CM

## 2022-11-15 PROCEDURE — 99442 PHONE E/M BY PHYS 11-20 MIN: CPT | Performed by: INTERNAL MEDICINE

## 2022-11-15 NOTE — PROGRESS NOTES
Called patient to offer appointment with Dr. Marquis Corbin to discuss surgical options. Patient declined appointment at this time, will discuss with her daughter and PCP. Provided name and phone number for her to call back if she would like an appointment.

## 2022-11-21 ENCOUNTER — NURSE NAVIGATOR ENCOUNTER (OUTPATIENT)
Dept: HEMATOLOGY/ONCOLOGY | Facility: HOSPITAL | Age: 87
End: 2022-11-21

## 2022-11-28 RX ORDER — TEMAZEPAM 15 MG/1
CAPSULE ORAL NIGHTLY PRN
Qty: 90 CAPSULE | Refills: 1 | Status: SHIPPED | OUTPATIENT
Start: 2022-11-28

## 2022-12-01 ENCOUNTER — OFFICE VISIT (OUTPATIENT)
Dept: INTERNAL MEDICINE CLINIC | Facility: CLINIC | Age: 87
End: 2022-12-01
Payer: MEDICARE

## 2022-12-01 VITALS
HEART RATE: 74 BPM | WEIGHT: 118 LBS | BODY MASS INDEX: 23 KG/M2 | RESPIRATION RATE: 16 BRPM | TEMPERATURE: 97 F | OXYGEN SATURATION: 95 % | SYSTOLIC BLOOD PRESSURE: 134 MMHG | DIASTOLIC BLOOD PRESSURE: 72 MMHG

## 2022-12-01 DIAGNOSIS — I47.1 PAROXYSMAL SVT (SUPRAVENTRICULAR TACHYCARDIA) (HCC): ICD-10-CM

## 2022-12-01 DIAGNOSIS — R63.4 ABNORMAL WEIGHT LOSS: Primary | ICD-10-CM

## 2022-12-01 DIAGNOSIS — G89.29 CHRONIC LEFT-SIDED LOW BACK PAIN WITHOUT SCIATICA: ICD-10-CM

## 2022-12-01 DIAGNOSIS — L65.9 HAIR LOSS: ICD-10-CM

## 2022-12-01 DIAGNOSIS — M54.50 CHRONIC LEFT-SIDED LOW BACK PAIN WITHOUT SCIATICA: ICD-10-CM

## 2022-12-01 DIAGNOSIS — I10 PRIMARY HYPERTENSION: ICD-10-CM

## 2022-12-01 DIAGNOSIS — Z85.3 HX OF BREAST CANCER: ICD-10-CM

## 2022-12-01 DIAGNOSIS — C50.911 INVASIVE LOBULAR CARCINOMA OF RIGHT BREAST IN FEMALE (HCC): ICD-10-CM

## 2022-12-01 PROCEDURE — 1125F AMNT PAIN NOTED PAIN PRSNT: CPT | Performed by: INTERNAL MEDICINE

## 2022-12-01 PROCEDURE — 99214 OFFICE O/P EST MOD 30 MIN: CPT | Performed by: INTERNAL MEDICINE

## 2022-12-01 RX ORDER — AZITHROMYCIN 250 MG/1
TABLET, FILM COATED ORAL
Qty: 6 TABLET | Refills: 0 | Status: SHIPPED | OUTPATIENT
Start: 2022-12-01 | End: 2022-12-06

## 2022-12-01 NOTE — PATIENT INSTRUCTIONS
Get Supplement for Hair, Skin and Nails Gummies at [facility] with Biotin (about 2500 mg a day). Investigate senior housing in the area. Talk to people at the [facility] about a room. Follow up with me in one month for a weight check. Aim to eat 46486 steve per day, including vegetables and breads with butter, ice cream frequently, added vegetable oil to foods. Also have a bottle of Boost or Ensure daily.

## 2022-12-02 PROBLEM — R10.2 PELVIC PRESSURE IN FEMALE: Status: RESOLVED | Noted: 2022-08-09 | Resolved: 2022-12-02

## 2022-12-02 PROBLEM — N64.4 BREAST TENDERNESS IN FEMALE: Status: RESOLVED | Noted: 2022-08-09 | Resolved: 2022-12-02

## 2022-12-02 PROBLEM — U09.9 POST COVID-19 CONDITION, UNSPECIFIED: Status: RESOLVED | Noted: 2022-08-09 | Resolved: 2022-12-02

## 2022-12-02 PROBLEM — N94.89 VAGINAL BURNING: Status: RESOLVED | Noted: 2022-08-09 | Resolved: 2022-12-02

## 2022-12-02 PROBLEM — L65.9 HAIR LOSS: Status: ACTIVE | Noted: 2022-12-02

## 2022-12-02 PROBLEM — N94.9 VAGINAL BURNING: Status: RESOLVED | Noted: 2022-08-09 | Resolved: 2022-12-02

## 2022-12-08 ENCOUNTER — MOBILE ENCOUNTER (OUTPATIENT)
Dept: INTERNAL MEDICINE CLINIC | Facility: CLINIC | Age: 87
End: 2022-12-08

## 2022-12-08 ENCOUNTER — TELEPHONE (OUTPATIENT)
Dept: INTERNAL MEDICINE CLINIC | Facility: CLINIC | Age: 87
End: 2022-12-08

## 2022-12-08 NOTE — TELEPHONE ENCOUNTER
Rec's phone call from last this morning. She saw Dr. Beltran Gaytan re: inflamed/enfected toes after nail trimming. She has been on Z pack I gave her since and redess and tenderness have improved. Dr. Beltran Gaytan could not understand what happened but recommended she start Keflex. Now feet and ankles are swollen but not tender. Tatyana Call is on amlodipine. She feels it is related to her toes. Advised her to take the Keflex for 3-4 days and see if redness of toes and swelling improves. If it does finish it. If not, stop the Amlodipine for 3-4 days to see if swelling improves. I will be back in the office on the 15th. Call and make an appt today to be see me at 4:15 pm.  That is my only open appt.       Alyse Quintero MD

## 2022-12-19 RX ORDER — METOPROLOL SUCCINATE 25 MG/1
25 TABLET, EXTENDED RELEASE ORAL DAILY
Qty: 30 TABLET | Refills: 1 | Status: SHIPPED | OUTPATIENT
Start: 2022-12-19

## 2022-12-29 ENCOUNTER — TELEPHONE (OUTPATIENT)
Dept: INTERNAL MEDICINE CLINIC | Facility: CLINIC | Age: 87
End: 2022-12-29

## 2022-12-29 NOTE — TELEPHONE ENCOUNTER
Patient was calling to leave a message for Dr. Esme Dang' nurse. Patient states that she has a book in her apartment that is signed and ready to be picked up by Dr. Esme Dang' nurse. Patient stated that she is currently residing in [place] [address].      Patient Phone Number: [phone]

## 2023-01-04 ENCOUNTER — OFFICE VISIT (OUTPATIENT)
Dept: INTERNAL MEDICINE CLINIC | Facility: CLINIC | Age: 88
End: 2023-01-04
Payer: MEDICARE

## 2023-01-04 VITALS
BODY MASS INDEX: 23 KG/M2 | WEIGHT: 117.19 LBS | HEART RATE: 68 BPM | TEMPERATURE: 99 F | DIASTOLIC BLOOD PRESSURE: 70 MMHG | OXYGEN SATURATION: 100 % | SYSTOLIC BLOOD PRESSURE: 160 MMHG

## 2023-01-04 DIAGNOSIS — M47.26 OSTEOARTHRITIS OF SPINE WITH RADICULOPATHY, LUMBAR REGION: ICD-10-CM

## 2023-01-04 DIAGNOSIS — I10 PRIMARY HYPERTENSION: ICD-10-CM

## 2023-01-04 DIAGNOSIS — K59.09 OTHER CONSTIPATION: Primary | ICD-10-CM

## 2023-01-04 DIAGNOSIS — R63.4 ABNORMAL WEIGHT LOSS: ICD-10-CM

## 2023-01-04 DIAGNOSIS — G89.29 CHRONIC LEFT-SIDED LOW BACK PAIN WITHOUT SCIATICA: ICD-10-CM

## 2023-01-04 DIAGNOSIS — C50.911 INVASIVE LOBULAR CARCINOMA OF RIGHT BREAST IN FEMALE (HCC): ICD-10-CM

## 2023-01-04 DIAGNOSIS — I47.1 PAROXYSMAL SVT (SUPRAVENTRICULAR TACHYCARDIA) (HCC): ICD-10-CM

## 2023-01-04 DIAGNOSIS — F41.9 ANXIETY: ICD-10-CM

## 2023-01-04 DIAGNOSIS — Z85.3 HX OF BREAST CANCER: ICD-10-CM

## 2023-01-04 DIAGNOSIS — M51.36 DDD (DEGENERATIVE DISC DISEASE), LUMBAR: ICD-10-CM

## 2023-01-04 DIAGNOSIS — M54.50 CHRONIC LEFT-SIDED LOW BACK PAIN WITHOUT SCIATICA: ICD-10-CM

## 2023-01-04 PROCEDURE — 1125F AMNT PAIN NOTED PAIN PRSNT: CPT | Performed by: INTERNAL MEDICINE

## 2023-01-04 PROCEDURE — 99215 OFFICE O/P EST HI 40 MIN: CPT | Performed by: INTERNAL MEDICINE

## 2023-01-04 RX ORDER — MULTIVIT-MIN/IRON/FOLIC ACID/K 18-600-40
1000 CAPSULE ORAL DAILY
Qty: 270 TABLET | Refills: 3 | COMMUNITY
Start: 2023-01-04

## 2023-01-04 NOTE — PATIENT INSTRUCTIONS
Have a bowl of raisin bran or other high fiber cereal four days a week. Increase fiber in diet by reading the handout I gave you. Have 2-3 servings of high fiber fruit per day. Have 6-8 glass of fluids per day. Increase the Ramipril to 3 tabs per day. Follow up in one week    Make an appt with Dr. Natalia Reddy. Await call from physical therapy.

## 2023-01-16 ENCOUNTER — TELEPHONE (OUTPATIENT)
Dept: INTERNAL MEDICINE CLINIC | Facility: CLINIC | Age: 88
End: 2023-01-16

## 2023-01-16 NOTE — TELEPHONE ENCOUNTER
Per Bobby Mcadams, pt has one refill pending, but they ask for pt to call herself to request.   Pt aware and will call pharmacy for refill on Temazepam 15mg

## 2023-01-17 ENCOUNTER — OFFICE VISIT (OUTPATIENT)
Dept: SURGERY | Facility: CLINIC | Age: 88
End: 2023-01-17
Payer: MEDICARE

## 2023-01-17 VITALS
WEIGHT: 118.19 LBS | TEMPERATURE: 98 F | DIASTOLIC BLOOD PRESSURE: 73 MMHG | SYSTOLIC BLOOD PRESSURE: 185 MMHG | HEART RATE: 76 BPM | OXYGEN SATURATION: 98 % | BODY MASS INDEX: 23.2 KG/M2 | HEIGHT: 60 IN

## 2023-01-17 DIAGNOSIS — Z85.3 HISTORY OF LEFT BREAST CANCER: ICD-10-CM

## 2023-01-17 DIAGNOSIS — C50.811 MALIGNANT NEOPLASM OF OVERLAPPING SITES OF RIGHT BREAST IN FEMALE, ESTROGEN RECEPTOR POSITIVE (HCC): Primary | ICD-10-CM

## 2023-01-17 DIAGNOSIS — Z17.0 MALIGNANT NEOPLASM OF OVERLAPPING SITES OF RIGHT BREAST IN FEMALE, ESTROGEN RECEPTOR POSITIVE (HCC): Primary | ICD-10-CM

## 2023-01-17 PROCEDURE — 99213 OFFICE O/P EST LOW 20 MIN: CPT | Performed by: SURGERY

## 2023-01-17 PROCEDURE — 1126F AMNT PAIN NOTED NONE PRSNT: CPT | Performed by: SURGERY

## 2023-02-09 ENCOUNTER — TELEPHONE (OUTPATIENT)
Dept: INTERNAL MEDICINE CLINIC | Facility: CLINIC | Age: 88
End: 2023-02-09

## 2023-02-09 ENCOUNTER — OFFICE VISIT (OUTPATIENT)
Dept: INTERNAL MEDICINE CLINIC | Facility: CLINIC | Age: 88
End: 2023-02-09
Payer: MEDICARE

## 2023-02-09 VITALS
WEIGHT: 119 LBS | DIASTOLIC BLOOD PRESSURE: 78 MMHG | OXYGEN SATURATION: 99 % | RESPIRATION RATE: 16 BRPM | HEART RATE: 68 BPM | BODY MASS INDEX: 23 KG/M2 | TEMPERATURE: 97 F | SYSTOLIC BLOOD PRESSURE: 148 MMHG

## 2023-02-09 DIAGNOSIS — R53.82 CHRONIC FATIGUE: ICD-10-CM

## 2023-02-09 DIAGNOSIS — R63.4 ABNORMAL WEIGHT LOSS: Primary | ICD-10-CM

## 2023-02-09 DIAGNOSIS — C50.911 INVASIVE LOBULAR CARCINOMA OF RIGHT BREAST IN FEMALE (HCC): ICD-10-CM

## 2023-02-09 DIAGNOSIS — F41.9 ANXIETY: ICD-10-CM

## 2023-02-09 DIAGNOSIS — M54.50 CHRONIC LEFT-SIDED LOW BACK PAIN WITHOUT SCIATICA: ICD-10-CM

## 2023-02-09 DIAGNOSIS — M43.16 SPONDYLOLISTHESIS OF LUMBAR REGION: ICD-10-CM

## 2023-02-09 DIAGNOSIS — M35.01 KERATOCONJUNCTIVITIS SICCA OF BOTH EYES (HCC): ICD-10-CM

## 2023-02-09 DIAGNOSIS — I10 PRIMARY HYPERTENSION: ICD-10-CM

## 2023-02-09 DIAGNOSIS — G89.29 CHRONIC LEFT-SIDED LOW BACK PAIN WITHOUT SCIATICA: ICD-10-CM

## 2023-02-09 LAB
ALBUMIN SERPL-MCNC: 3.6 G/DL (ref 3.4–5)
ALBUMIN/GLOB SERPL: 1.3 {RATIO} (ref 1–2)
ALP LIVER SERPL-CCNC: 61 U/L
ALT SERPL-CCNC: 24 U/L
ANION GAP SERPL CALC-SCNC: 2 MMOL/L (ref 0–18)
AST SERPL-CCNC: 22 U/L (ref 15–37)
BASOPHILS # BLD AUTO: 0.06 X10(3) UL (ref 0–0.2)
BASOPHILS NFR BLD AUTO: 1.2 %
BILIRUB SERPL-MCNC: 0.3 MG/DL (ref 0.1–2)
BUN BLD-MCNC: 23 MG/DL (ref 7–18)
CALCIUM BLD-MCNC: 9.5 MG/DL (ref 8.5–10.1)
CHLORIDE SERPL-SCNC: 114 MMOL/L (ref 98–112)
CO2 SERPL-SCNC: 25 MMOL/L (ref 21–32)
CREAT BLD-MCNC: 0.78 MG/DL
CRP SERPL-MCNC: <0.29 MG/DL (ref ?–0.3)
EOSINOPHIL # BLD AUTO: 0.19 X10(3) UL (ref 0–0.7)
EOSINOPHIL NFR BLD AUTO: 3.8 %
ERYTHROCYTE [DISTWIDTH] IN BLOOD BY AUTOMATED COUNT: 12 %
ERYTHROCYTE [SEDIMENTATION RATE] IN BLOOD: 3 MM/HR
FASTING STATUS PATIENT QL REPORTED: NO
GFR SERPLBLD BASED ON 1.73 SQ M-ARVRAT: 73 ML/MIN/1.73M2 (ref 60–?)
GLOBULIN PLAS-MCNC: 2.7 G/DL (ref 2.8–4.4)
GLUCOSE BLD-MCNC: 106 MG/DL (ref 70–99)
HCT VFR BLD AUTO: 41 %
HGB BLD-MCNC: 13.6 G/DL
IMM GRANULOCYTES # BLD AUTO: 0.01 X10(3) UL (ref 0–1)
IMM GRANULOCYTES NFR BLD: 0.2 %
LYMPHOCYTES # BLD AUTO: 1.79 X10(3) UL (ref 1–4)
LYMPHOCYTES NFR BLD AUTO: 35.9 %
MCH RBC QN AUTO: 32.8 PG (ref 26–34)
MCHC RBC AUTO-ENTMCNC: 33.2 G/DL (ref 31–37)
MCV RBC AUTO: 98.8 FL
MONOCYTES # BLD AUTO: 0.52 X10(3) UL (ref 0.1–1)
MONOCYTES NFR BLD AUTO: 10.4 %
NEUTROPHILS # BLD AUTO: 2.42 X10 (3) UL (ref 1.5–7.7)
NEUTROPHILS # BLD AUTO: 2.42 X10(3) UL (ref 1.5–7.7)
NEUTROPHILS NFR BLD AUTO: 48.5 %
OSMOLALITY SERPL CALC.SUM OF ELEC: 296 MOSM/KG (ref 275–295)
PLATELET # BLD AUTO: 184 10(3)UL (ref 150–450)
POTASSIUM SERPL-SCNC: 4 MMOL/L (ref 3.5–5.1)
PROT SERPL-MCNC: 6.3 G/DL (ref 6.4–8.2)
RBC # BLD AUTO: 4.15 X10(6)UL
SODIUM SERPL-SCNC: 141 MMOL/L (ref 136–145)
WBC # BLD AUTO: 5 X10(3) UL (ref 4–11)

## 2023-02-09 PROCEDURE — 99214 OFFICE O/P EST MOD 30 MIN: CPT | Performed by: INTERNAL MEDICINE

## 2023-02-09 PROCEDURE — 80053 COMPREHEN METABOLIC PANEL: CPT | Performed by: INTERNAL MEDICINE

## 2023-02-09 PROCEDURE — 1126F AMNT PAIN NOTED NONE PRSNT: CPT | Performed by: INTERNAL MEDICINE

## 2023-02-09 PROCEDURE — 85652 RBC SED RATE AUTOMATED: CPT | Performed by: INTERNAL MEDICINE

## 2023-02-09 PROCEDURE — 85025 COMPLETE CBC W/AUTO DIFF WBC: CPT | Performed by: INTERNAL MEDICINE

## 2023-02-09 PROCEDURE — 86140 C-REACTIVE PROTEIN: CPT | Performed by: INTERNAL MEDICINE

## 2023-02-09 RX ORDER — TRAMADOL HYDROCHLORIDE 50 MG/1
TABLET ORAL
Qty: 30 TABLET | Refills: 3 | Status: SHIPPED | OUTPATIENT
Start: 2023-02-09

## 2023-02-09 NOTE — PATIENT INSTRUCTIONS
Tylenol 1000 mg ( 2 500 mg tabs) twice a day: at breakfast and at bedtime. Will not affect your stomach or liver adversely. Tramadol 50 mg one tab as a test dose: If not nausea or dizziness take one Tramadol at noon with lunch and one with dinner daily. Tramadol is a medication to be used long-tem in people with degenerative arthritis when it cannot be resolved. Will not affect you stomach, kidneys or liver adversely. You may need both of these medications to give you adequate pain relief.

## 2023-02-21 ENCOUNTER — OFFICE VISIT (OUTPATIENT)
Dept: INTERNAL MEDICINE CLINIC | Facility: CLINIC | Age: 88
End: 2023-02-21
Payer: MEDICARE

## 2023-02-21 VITALS
DIASTOLIC BLOOD PRESSURE: 76 MMHG | RESPIRATION RATE: 16 BRPM | HEART RATE: 70 BPM | OXYGEN SATURATION: 96 % | WEIGHT: 117 LBS | TEMPERATURE: 98 F | BODY MASS INDEX: 23 KG/M2 | SYSTOLIC BLOOD PRESSURE: 160 MMHG

## 2023-02-21 DIAGNOSIS — F41.9 ANXIETY: ICD-10-CM

## 2023-02-21 DIAGNOSIS — R53.82 CHRONIC FATIGUE: ICD-10-CM

## 2023-02-21 DIAGNOSIS — I10 PRIMARY HYPERTENSION: ICD-10-CM

## 2023-02-21 DIAGNOSIS — R35.0 FREQUENCY OF URINATION: Primary | ICD-10-CM

## 2023-02-21 DIAGNOSIS — G89.29 CHRONIC LEFT-SIDED LOW BACK PAIN WITHOUT SCIATICA: ICD-10-CM

## 2023-02-21 DIAGNOSIS — F43.9 STRESS: ICD-10-CM

## 2023-02-21 DIAGNOSIS — Z85.3 HX OF BREAST CANCER: ICD-10-CM

## 2023-02-21 DIAGNOSIS — M54.50 CHRONIC LEFT-SIDED LOW BACK PAIN WITHOUT SCIATICA: ICD-10-CM

## 2023-02-21 LAB
APPEARANCE: CLEAR
BILIRUBIN: NEGATIVE
GLUCOSE (URINE DIPSTICK): NEGATIVE MG/DL
KETONES (URINE DIPSTICK): NEGATIVE MG/DL
MULTISTIX LOT#: ABNORMAL NUMERIC
NITRITE, URINE: NEGATIVE
OCCULT BLOOD: NEGATIVE
PH, URINE: 6 (ref 4.5–8)
PROTEIN (URINE DIPSTICK): NEGATIVE MG/DL
SPECIFIC GRAVITY: 1.01 (ref 1–1.03)
URINE-COLOR: YELLOW
UROBILINOGEN,SEMI-QN: 0.2 MG/DL (ref 0–1.9)

## 2023-02-21 PROCEDURE — 81003 URINALYSIS AUTO W/O SCOPE: CPT | Performed by: INTERNAL MEDICINE

## 2023-02-21 PROCEDURE — 99214 OFFICE O/P EST MOD 30 MIN: CPT | Performed by: INTERNAL MEDICINE

## 2023-02-21 PROCEDURE — 1126F AMNT PAIN NOTED NONE PRSNT: CPT | Performed by: INTERNAL MEDICINE

## 2023-02-21 NOTE — PATIENT INSTRUCTIONS
Take an extra dose of Metorpolol now     On Wednesday take Metoprolol two tabs and Thursday two tabs    Make appt with nurses on Thursday for BP check. Try to reduce your stress.

## 2023-02-23 ENCOUNTER — OFFICE VISIT (OUTPATIENT)
Dept: INTERNAL MEDICINE CLINIC | Facility: CLINIC | Age: 88
End: 2023-02-23
Payer: MEDICARE

## 2023-02-23 DIAGNOSIS — F41.9 ANXIETY: ICD-10-CM

## 2023-02-23 DIAGNOSIS — I10 PRIMARY HYPERTENSION: Primary | ICD-10-CM

## 2023-02-23 PROCEDURE — 99214 OFFICE O/P EST MOD 30 MIN: CPT | Performed by: INTERNAL MEDICINE

## 2023-02-23 RX ORDER — METOPROLOL SUCCINATE 25 MG/1
TABLET, EXTENDED RELEASE ORAL
Qty: 90 TABLET | Refills: 3 | Status: SHIPPED | OUTPATIENT
Start: 2023-02-23

## 2023-02-23 NOTE — PATIENT INSTRUCTIONS
Sera Metoprolol 3 25 mg a day     Take your blood pressure a few times a week about 2 pm if you have taken the Metoprolol in the morning      Follow up in one week-next Thursday. Arina Walker

## 2023-02-24 VITALS
RESPIRATION RATE: 16 BRPM | DIASTOLIC BLOOD PRESSURE: 76 MMHG | SYSTOLIC BLOOD PRESSURE: 160 MMHG | OXYGEN SATURATION: 99 % | HEART RATE: 66 BPM

## 2023-02-27 RX ORDER — CEPHALEXIN 500 MG/1
500 CAPSULE ORAL 2 TIMES DAILY
COMMUNITY
Start: 2022-12-08

## 2023-02-27 RX ORDER — FLUOCINOLONE ACETONIDE 0.1 MG/ML
SOLUTION TOPICAL
COMMUNITY
Start: 2022-10-27

## 2023-02-28 RX ORDER — TEMAZEPAM 15 MG/1
CAPSULE ORAL NIGHTLY PRN
Qty: 90 CAPSULE | Refills: 0 | Status: SHIPPED | OUTPATIENT
Start: 2023-02-28

## 2023-03-02 ENCOUNTER — OFFICE VISIT (OUTPATIENT)
Dept: INTERNAL MEDICINE CLINIC | Facility: CLINIC | Age: 88
End: 2023-03-02
Payer: MEDICARE

## 2023-03-02 VITALS
WEIGHT: 115.38 LBS | HEART RATE: 64 BPM | SYSTOLIC BLOOD PRESSURE: 160 MMHG | RESPIRATION RATE: 16 BRPM | DIASTOLIC BLOOD PRESSURE: 70 MMHG | OXYGEN SATURATION: 98 % | TEMPERATURE: 97 F | BODY MASS INDEX: 23 KG/M2

## 2023-03-02 DIAGNOSIS — R63.4 ABNORMAL WEIGHT LOSS: ICD-10-CM

## 2023-03-02 DIAGNOSIS — R53.82 CHRONIC FATIGUE: ICD-10-CM

## 2023-03-02 DIAGNOSIS — Z85.3 HX OF BREAST CANCER: ICD-10-CM

## 2023-03-02 DIAGNOSIS — I10 PRIMARY HYPERTENSION: Primary | ICD-10-CM

## 2023-03-02 PROCEDURE — 1126F AMNT PAIN NOTED NONE PRSNT: CPT | Performed by: INTERNAL MEDICINE

## 2023-03-02 PROCEDURE — 99214 OFFICE O/P EST MOD 30 MIN: CPT | Performed by: INTERNAL MEDICINE

## 2023-03-02 RX ORDER — METOPROLOL SUCCINATE 25 MG/1
TABLET, EXTENDED RELEASE ORAL
Qty: 90 TABLET | Refills: 3 | COMMUNITY
Start: 2023-03-02

## 2023-03-02 NOTE — PATIENT INSTRUCTIONS
Increase the Metoprolol to 100 mg per day which is 4 tabs per day.      Bring a record of 2-3 blood pressures in the next week

## 2023-03-09 ENCOUNTER — OFFICE VISIT (OUTPATIENT)
Dept: INTERNAL MEDICINE CLINIC | Facility: CLINIC | Age: 88
End: 2023-03-09
Payer: MEDICARE

## 2023-03-09 VITALS
SYSTOLIC BLOOD PRESSURE: 150 MMHG | RESPIRATION RATE: 16 BRPM | HEART RATE: 60 BPM | DIASTOLIC BLOOD PRESSURE: 80 MMHG | BODY MASS INDEX: 22 KG/M2 | OXYGEN SATURATION: 97 % | TEMPERATURE: 97 F | WEIGHT: 115 LBS

## 2023-03-09 DIAGNOSIS — D25.1 FIBROIDS, INTRAMURAL: ICD-10-CM

## 2023-03-09 DIAGNOSIS — R10.2 PELVIC PRESSURE IN FEMALE: ICD-10-CM

## 2023-03-09 DIAGNOSIS — R30.0 BURNING WITH URINATION: ICD-10-CM

## 2023-03-09 DIAGNOSIS — I10 PRIMARY HYPERTENSION: Primary | ICD-10-CM

## 2023-03-09 DIAGNOSIS — R53.82 CHRONIC FATIGUE: ICD-10-CM

## 2023-03-09 DIAGNOSIS — F41.9 ANXIETY: ICD-10-CM

## 2023-03-09 LAB
APPEARANCE: CLEAR
BILIRUBIN: NEGATIVE
GLUCOSE (URINE DIPSTICK): NEGATIVE MG/DL
KETONES (URINE DIPSTICK): NEGATIVE MG/DL
MULTISTIX LOT#: ABNORMAL NUMERIC
NITRITE, URINE: NEGATIVE
OCCULT BLOOD: NEGATIVE
PH, URINE: 7.5 (ref 4.5–8)
PROTEIN (URINE DIPSTICK): NEGATIVE MG/DL
SPECIFIC GRAVITY: 1.01 (ref 1–1.03)
UROBILINOGEN,SEMI-QN: 0.2 MG/DL (ref 0–1.9)

## 2023-03-09 PROCEDURE — 81003 URINALYSIS AUTO W/O SCOPE: CPT | Performed by: INTERNAL MEDICINE

## 2023-03-09 PROCEDURE — 1126F AMNT PAIN NOTED NONE PRSNT: CPT | Performed by: INTERNAL MEDICINE

## 2023-03-09 PROCEDURE — 99214 OFFICE O/P EST MOD 30 MIN: CPT | Performed by: INTERNAL MEDICINE

## 2023-03-09 RX ORDER — LOSARTAN POTASSIUM 100 MG/1
100 TABLET ORAL DAILY
Qty: 30 TABLET | Refills: 0 | Status: SHIPPED | OUTPATIENT
Start: 2023-03-09

## 2023-03-09 NOTE — PATIENT INSTRUCTIONS
Stop the Ramipril    Take Losartan one tab tonight and in the morning. Decrease Metoprolol to two tablets a day. Use a liberal amount of estradiol cream to urethral area. Daily. Make an appt for the pelvic ultrasound.

## 2023-03-14 ENCOUNTER — OFFICE VISIT (OUTPATIENT)
Dept: INTERNAL MEDICINE CLINIC | Facility: CLINIC | Age: 88
End: 2023-03-14
Payer: MEDICARE

## 2023-03-14 VITALS
OXYGEN SATURATION: 100 % | TEMPERATURE: 98 F | SYSTOLIC BLOOD PRESSURE: 160 MMHG | HEART RATE: 46 BPM | RESPIRATION RATE: 17 BRPM | WEIGHT: 115.81 LBS | BODY MASS INDEX: 23 KG/M2 | DIASTOLIC BLOOD PRESSURE: 80 MMHG

## 2023-03-14 DIAGNOSIS — R63.4 ABNORMAL WEIGHT LOSS: ICD-10-CM

## 2023-03-14 DIAGNOSIS — F41.9 ANXIETY: ICD-10-CM

## 2023-03-14 DIAGNOSIS — I10 PRIMARY HYPERTENSION: ICD-10-CM

## 2023-03-14 DIAGNOSIS — R53.82 CHRONIC FATIGUE: ICD-10-CM

## 2023-03-14 DIAGNOSIS — I10 HYPERTENSION, UNCONTROLLED: Primary | ICD-10-CM

## 2023-03-14 PROCEDURE — 1126F AMNT PAIN NOTED NONE PRSNT: CPT | Performed by: INTERNAL MEDICINE

## 2023-03-14 PROCEDURE — 99214 OFFICE O/P EST MOD 30 MIN: CPT | Performed by: INTERNAL MEDICINE

## 2023-03-14 RX ORDER — METOPROLOL SUCCINATE 25 MG/1
TABLET, EXTENDED RELEASE ORAL
Qty: 90 TABLET | Refills: 3 | COMMUNITY
Start: 2023-03-14

## 2023-03-14 RX ORDER — HYDRALAZINE HYDROCHLORIDE 25 MG/1
25 TABLET, FILM COATED ORAL 3 TIMES DAILY
Qty: 90 TABLET | Refills: 1 | Status: SHIPPED | OUTPATIENT
Start: 2023-03-14

## 2023-03-14 NOTE — PATIENT INSTRUCTIONS
Start Hydralazine 25 mg at breakfast, lunch and dinner    Continue the Losartan one in the morning    Continue the Metoprolol 2 tabs for a total of 50 mg per day. Return in one week.

## 2023-03-15 PROBLEM — I10 HYPERTENSION, UNCONTROLLED: Status: ACTIVE | Noted: 2023-03-15

## 2023-03-21 RX ORDER — METOPROLOL SUCCINATE 50 MG/1
TABLET, EXTENDED RELEASE ORAL
Qty: 30 TABLET | Refills: 0 | COMMUNITY
Start: 2023-03-21 | End: 2023-03-27

## 2023-03-23 ENCOUNTER — OFFICE VISIT (OUTPATIENT)
Dept: INTERNAL MEDICINE CLINIC | Facility: CLINIC | Age: 88
End: 2023-03-23
Payer: MEDICARE

## 2023-03-23 DIAGNOSIS — I10 PRIMARY HYPERTENSION: ICD-10-CM

## 2023-03-23 DIAGNOSIS — G89.29 CHRONIC LEFT-SIDED LOW BACK PAIN WITHOUT SCIATICA: ICD-10-CM

## 2023-03-23 DIAGNOSIS — M54.50 CHRONIC LEFT-SIDED LOW BACK PAIN WITHOUT SCIATICA: ICD-10-CM

## 2023-03-23 DIAGNOSIS — F41.9 ANXIETY: ICD-10-CM

## 2023-03-23 DIAGNOSIS — I10 HYPERTENSION, UNCONTROLLED: Primary | ICD-10-CM

## 2023-03-23 PROCEDURE — 99214 OFFICE O/P EST MOD 30 MIN: CPT | Performed by: INTERNAL MEDICINE

## 2023-03-23 PROCEDURE — 1126F AMNT PAIN NOTED NONE PRSNT: CPT | Performed by: INTERNAL MEDICINE

## 2023-03-23 RX ORDER — DULOXETIN HYDROCHLORIDE 30 MG/1
30 CAPSULE, DELAYED RELEASE ORAL DAILY
Qty: 30 CAPSULE | Refills: 1 | Status: SHIPPED | OUTPATIENT
Start: 2023-03-23

## 2023-03-23 RX ORDER — AMLODIPINE BESYLATE 2.5 MG/1
2.5 TABLET ORAL DAILY
Qty: 30 TABLET | Refills: 1 | Status: SHIPPED | OUTPATIENT
Start: 2023-03-23

## 2023-03-23 NOTE — PATIENT INSTRUCTIONS
Start Amlodipine 2.5 mg every morning. Continue the same Metoprolol 50 2 tabs in the morning and bedtime    Continue the Losartan in the morning. I will call you with decision re: adding a diuretic. Also  take the Duloxetine 30 mg per day in the morning for pain and anxiety/depression/stress.

## 2023-03-27 VITALS
BODY MASS INDEX: 23 KG/M2 | RESPIRATION RATE: 17 BRPM | DIASTOLIC BLOOD PRESSURE: 70 MMHG | WEIGHT: 118 LBS | TEMPERATURE: 97 F | OXYGEN SATURATION: 99 % | HEART RATE: 68 BPM | SYSTOLIC BLOOD PRESSURE: 160 MMHG

## 2023-03-27 PROBLEM — R10.2 PELVIC PRESSURE IN FEMALE: Status: RESOLVED | Noted: 2022-08-09 | Resolved: 2023-03-27

## 2023-03-27 RX ORDER — METOPROLOL SUCCINATE 50 MG/1
TABLET, EXTENDED RELEASE ORAL
Qty: 30 TABLET | Refills: 0 | COMMUNITY
Start: 2023-03-27

## 2023-03-28 ENCOUNTER — TELEPHONE (OUTPATIENT)
Dept: INTERNAL MEDICINE CLINIC | Facility: CLINIC | Age: 88
End: 2023-03-28

## 2023-03-28 NOTE — TELEPHONE ENCOUNTER
Jennifer Navarro is calling that her BP is running high still. 172/88    She wanted to see Dr. Jenni Marshr today-I explained I didn't have anything and I would send a message to Dr. Jenni Ojeda to see if she needs to be seen today. Pt already has appointment 3/30/23.   I moved her time up to 1:15    Please advise

## 2023-03-30 ENCOUNTER — APPOINTMENT (OUTPATIENT)
Dept: CT IMAGING | Facility: HOSPITAL | Age: 88
End: 2023-03-30
Attending: EMERGENCY MEDICINE
Payer: MEDICARE

## 2023-03-30 ENCOUNTER — HOSPITAL ENCOUNTER (EMERGENCY)
Facility: HOSPITAL | Age: 88
Discharge: HOME | End: 2023-03-30
Attending: EMERGENCY MEDICINE
Payer: MEDICARE

## 2023-03-30 ENCOUNTER — OFFICE VISIT (OUTPATIENT)
Dept: INTERNAL MEDICINE CLINIC | Facility: CLINIC | Age: 88
End: 2023-03-30
Payer: MEDICARE

## 2023-03-30 VITALS
TEMPERATURE: 98 F | RESPIRATION RATE: 18 BRPM | DIASTOLIC BLOOD PRESSURE: 50 MMHG | SYSTOLIC BLOOD PRESSURE: 117 MMHG | HEART RATE: 63 BPM | OXYGEN SATURATION: 98 %

## 2023-03-30 VITALS
OXYGEN SATURATION: 99 % | RESPIRATION RATE: 18 BRPM | WEIGHT: 115.81 LBS | TEMPERATURE: 97 F | DIASTOLIC BLOOD PRESSURE: 80 MMHG | SYSTOLIC BLOOD PRESSURE: 190 MMHG | HEART RATE: 64 BPM | BODY MASS INDEX: 23 KG/M2

## 2023-03-30 DIAGNOSIS — R53.82 CHRONIC FATIGUE: ICD-10-CM

## 2023-03-30 DIAGNOSIS — I10 HYPERTENSION, UNSPECIFIED TYPE: Primary | ICD-10-CM

## 2023-03-30 DIAGNOSIS — I16.0 HYPERTENSIVE URGENCY: Primary | ICD-10-CM

## 2023-03-30 DIAGNOSIS — I47.1 PAROXYSMAL SVT (SUPRAVENTRICULAR TACHYCARDIA) (HCC): ICD-10-CM

## 2023-03-30 DIAGNOSIS — Z85.3 HX OF BREAST CANCER: ICD-10-CM

## 2023-03-30 DIAGNOSIS — I10 PRIMARY HYPERTENSION: ICD-10-CM

## 2023-03-30 DIAGNOSIS — R42 LIGHT-HEADED FEELING: ICD-10-CM

## 2023-03-30 DIAGNOSIS — M54.50 CHRONIC LEFT-SIDED LOW BACK PAIN WITHOUT SCIATICA: ICD-10-CM

## 2023-03-30 DIAGNOSIS — G89.29 CHRONIC LEFT-SIDED LOW BACK PAIN WITHOUT SCIATICA: ICD-10-CM

## 2023-03-30 LAB
ALBUMIN SERPL-MCNC: 3.4 G/DL (ref 3.4–5)
ALBUMIN/GLOB SERPL: 1.1 {RATIO} (ref 1–2)
ALP LIVER SERPL-CCNC: 64 U/L
ALT SERPL-CCNC: 34 U/L
ANION GAP SERPL CALC-SCNC: 6 MMOL/L (ref 0–18)
AST SERPL-CCNC: 62 U/L (ref 15–37)
BASOPHILS # BLD AUTO: 0.05 X10(3) UL (ref 0–0.2)
BASOPHILS NFR BLD AUTO: 0.8 %
BILIRUB SERPL-MCNC: 0.4 MG/DL (ref 0.1–2)
BILIRUB UR QL STRIP.AUTO: NEGATIVE
BUN BLD-MCNC: 23 MG/DL (ref 7–18)
CALCIUM BLD-MCNC: 9.1 MG/DL (ref 8.5–10.1)
CHLORIDE SERPL-SCNC: 110 MMOL/L (ref 98–112)
CO2 SERPL-SCNC: 24 MMOL/L (ref 21–32)
COLOR UR AUTO: YELLOW
CREAT BLD-MCNC: 0.82 MG/DL
EOSINOPHIL # BLD AUTO: 0.07 X10(3) UL (ref 0–0.7)
EOSINOPHIL NFR BLD AUTO: 1.1 %
ERYTHROCYTE [DISTWIDTH] IN BLOOD BY AUTOMATED COUNT: 12.7 %
GFR SERPLBLD BASED ON 1.73 SQ M-ARVRAT: 68 ML/MIN/1.73M2 (ref 60–?)
GLOBULIN PLAS-MCNC: 3.2 G/DL (ref 2.8–4.4)
GLUCOSE BLD-MCNC: 100 MG/DL (ref 70–99)
GLUCOSE UR STRIP.AUTO-MCNC: NEGATIVE MG/DL
HCT VFR BLD AUTO: 43.1 %
HGB BLD-MCNC: 14.4 G/DL
IMM GRANULOCYTES # BLD AUTO: 0.03 X10(3) UL (ref 0–1)
IMM GRANULOCYTES NFR BLD: 0.5 %
KETONES UR STRIP.AUTO-MCNC: NEGATIVE MG/DL
LYMPHOCYTES # BLD AUTO: 2.15 X10(3) UL (ref 1–4)
LYMPHOCYTES NFR BLD AUTO: 32.7 %
MCH RBC QN AUTO: 32.6 PG (ref 26–34)
MCHC RBC AUTO-ENTMCNC: 33.4 G/DL (ref 31–37)
MCV RBC AUTO: 97.5 FL
MONOCYTES # BLD AUTO: 0.7 X10(3) UL (ref 0.1–1)
MONOCYTES NFR BLD AUTO: 10.7 %
NEUTROPHILS # BLD AUTO: 3.57 X10 (3) UL (ref 1.5–7.7)
NEUTROPHILS # BLD AUTO: 3.57 X10(3) UL (ref 1.5–7.7)
NEUTROPHILS NFR BLD AUTO: 54.2 %
NITRITE UR QL STRIP.AUTO: NEGATIVE
OSMOLALITY SERPL CALC.SUM OF ELEC: 294 MOSM/KG (ref 275–295)
PH UR STRIP.AUTO: 7 [PH] (ref 5–8)
PLATELET # BLD AUTO: 212 10(3)UL (ref 150–450)
POTASSIUM SERPL-SCNC: 4.9 MMOL/L (ref 3.5–5.1)
PROT SERPL-MCNC: 6.6 G/DL (ref 6.4–8.2)
PROT UR STRIP.AUTO-MCNC: NEGATIVE MG/DL
RBC # BLD AUTO: 4.42 X10(6)UL
RBC UR QL AUTO: NEGATIVE
SARS-COV-2 RNA RESP QL NAA+PROBE: NOT DETECTED
SODIUM SERPL-SCNC: 140 MMOL/L (ref 136–145)
SP GR UR STRIP.AUTO: 1.01 (ref 1–1.03)
TROPONIN I HIGH SENSITIVITY: 8 NG/L
UROBILINOGEN UR STRIP.AUTO-MCNC: <2 MG/DL
WBC # BLD AUTO: 6.6 X10(3) UL (ref 4–11)

## 2023-03-30 PROCEDURE — 70450 CT HEAD/BRAIN W/O DYE: CPT | Performed by: EMERGENCY MEDICINE

## 2023-03-30 PROCEDURE — 93005 ELECTROCARDIOGRAM TRACING: CPT

## 2023-03-30 PROCEDURE — 93010 ELECTROCARDIOGRAM REPORT: CPT

## 2023-03-30 PROCEDURE — 87086 URINE CULTURE/COLONY COUNT: CPT | Performed by: EMERGENCY MEDICINE

## 2023-03-30 PROCEDURE — 85025 COMPLETE CBC W/AUTO DIFF WBC: CPT | Performed by: EMERGENCY MEDICINE

## 2023-03-30 PROCEDURE — 99285 EMERGENCY DEPT VISIT HI MDM: CPT

## 2023-03-30 PROCEDURE — 81001 URINALYSIS AUTO W/SCOPE: CPT | Performed by: EMERGENCY MEDICINE

## 2023-03-30 PROCEDURE — 80053 COMPREHEN METABOLIC PANEL: CPT | Performed by: EMERGENCY MEDICINE

## 2023-03-30 PROCEDURE — 96374 THER/PROPH/DIAG INJ IV PUSH: CPT

## 2023-03-30 PROCEDURE — 99214 OFFICE O/P EST MOD 30 MIN: CPT | Performed by: INTERNAL MEDICINE

## 2023-03-30 PROCEDURE — 84484 ASSAY OF TROPONIN QUANT: CPT | Performed by: EMERGENCY MEDICINE

## 2023-03-30 PROCEDURE — 1126F AMNT PAIN NOTED NONE PRSNT: CPT | Performed by: INTERNAL MEDICINE

## 2023-03-30 RX ORDER — AMLODIPINE BESYLATE 5 MG/1
5 TABLET ORAL DAILY
Qty: 14 TABLET | Refills: 0 | Status: SHIPPED | OUTPATIENT
Start: 2023-03-30 | End: 2023-04-13

## 2023-03-30 RX ORDER — LABETALOL HYDROCHLORIDE 5 MG/ML
10 INJECTION, SOLUTION INTRAVENOUS ONCE
Status: COMPLETED | OUTPATIENT
Start: 2023-03-30 | End: 2023-03-30

## 2023-03-30 RX ORDER — LABETALOL 100 MG/1
100 TABLET, FILM COATED ORAL 2 TIMES DAILY
Qty: 28 TABLET | Refills: 0 | Status: SHIPPED | OUTPATIENT
Start: 2023-03-30 | End: 2023-04-13

## 2023-03-30 RX ORDER — LABETALOL 100 MG/1
100 TABLET, FILM COATED ORAL 2 TIMES DAILY
Qty: 28 TABLET | Refills: 0 | Status: SHIPPED | OUTPATIENT
Start: 2023-03-30 | End: 2023-04-03

## 2023-03-30 RX ORDER — LABETALOL 200 MG/1
200 TABLET, FILM COATED ORAL ONCE
Status: COMPLETED | OUTPATIENT
Start: 2023-03-30 | End: 2023-03-30

## 2023-03-30 RX ORDER — AMLODIPINE BESYLATE 5 MG/1
5 TABLET ORAL DAILY
Qty: 14 TABLET | Refills: 0 | Status: SHIPPED | OUTPATIENT
Start: 2023-03-30 | End: 2023-04-03

## 2023-03-30 NOTE — DISCHARGE INSTRUCTIONS
At the request of Dr. Gage Díaz your primary care physician she wants her blood pressure regimen to be:    Labetalol 100 mg twice a day  Amlodipine 5 mg daily, this is increased from 2.5 mg daily  Stop metoprolol  Hold losartan for now talk to Dr Peggy Gaytan will need to follow-up with her for reevaluation in the next couple of days.

## 2023-03-30 NOTE — ED INITIAL ASSESSMENT (HPI)
Patient received via [facility] for hypertension and weakness x 1 month. Patient states she just feels tired. Patient denies pain.

## 2023-03-31 ENCOUNTER — TELEPHONE (OUTPATIENT)
Dept: INTERNAL MEDICINE CLINIC | Facility: CLINIC | Age: 88
End: 2023-03-31

## 2023-03-31 LAB
ATRIAL RATE: 58 BPM
P AXIS: 78 DEGREES
P-R INTERVAL: 164 MS
Q-T INTERVAL: 430 MS
QRS DURATION: 84 MS
QTC CALCULATION (BEZET): 422 MS
R AXIS: 5 DEGREES
T AXIS: 52 DEGREES
VENTRICULAR RATE: 58 BPM

## 2023-03-31 NOTE — TELEPHONE ENCOUNTER
PC to pt:  BP at 9:45 was 130's. She is resting now. She was told to take the Labetolol 100 mg bid, increase the Amlodipine to 5 mg per day and and hold the Metoprolol and Losartan. She is in bed right now, walked this morning without unsteadiness. Family wants her to go to the [facility], but cost is an issue. She is not sure what to do. She and family will talk and decide. Plan:  Continue meds as above. Take BP at 2-3 pm not in the morning. If BP systolic goes above 792 to start Losartan 100 mg per day. Decide whether or not you feel a stay at the [facility] is necessary. They will call you when there is a bed this afternoon or your daughter will call them, as she indicated to them. Make an appointment for Thursday if you do not go to the [facility].      Myrna Lopez MD

## 2023-04-03 ENCOUNTER — TELEPHONE (OUTPATIENT)
Dept: INTERNAL MEDICINE CLINIC | Facility: CLINIC | Age: 88
End: 2023-04-03

## 2023-04-06 ENCOUNTER — OFFICE VISIT (OUTPATIENT)
Dept: INTERNAL MEDICINE CLINIC | Facility: CLINIC | Age: 88
End: 2023-04-06
Payer: MEDICARE

## 2023-04-06 VITALS
TEMPERATURE: 97 F | BODY MASS INDEX: 23 KG/M2 | HEART RATE: 68 BPM | WEIGHT: 116.63 LBS | DIASTOLIC BLOOD PRESSURE: 80 MMHG | RESPIRATION RATE: 18 BRPM | SYSTOLIC BLOOD PRESSURE: 140 MMHG | OXYGEN SATURATION: 97 %

## 2023-04-06 DIAGNOSIS — G89.29 CHRONIC BILATERAL LOW BACK PAIN WITHOUT SCIATICA: Primary | ICD-10-CM

## 2023-04-06 DIAGNOSIS — I10 HYPERTENSION, UNCONTROLLED: ICD-10-CM

## 2023-04-06 DIAGNOSIS — I47.1 PAROXYSMAL SVT (SUPRAVENTRICULAR TACHYCARDIA) (HCC): ICD-10-CM

## 2023-04-06 DIAGNOSIS — I10 PRIMARY HYPERTENSION: ICD-10-CM

## 2023-04-06 DIAGNOSIS — M54.50 CHRONIC BILATERAL LOW BACK PAIN WITHOUT SCIATICA: Primary | ICD-10-CM

## 2023-04-06 DIAGNOSIS — M51.36 DDD (DEGENERATIVE DISC DISEASE), LUMBAR: ICD-10-CM

## 2023-04-06 PROBLEM — M35.01 KERATOCONJUNCTIVITIS SICCA OF BOTH EYES (HCC): Status: RESOLVED | Noted: 2021-10-29 | Resolved: 2023-04-06

## 2023-04-06 PROBLEM — R63.4 ABNORMAL WEIGHT LOSS: Status: RESOLVED | Noted: 2022-06-07 | Resolved: 2023-04-06

## 2023-04-06 PROBLEM — R30.0 BURNING WITH URINATION: Status: RESOLVED | Noted: 2023-03-09 | Resolved: 2023-04-06

## 2023-04-06 PROBLEM — H16.223 KERATOCONJUNCTIVITIS SICCA OF BOTH EYES: Status: RESOLVED | Noted: 2021-10-29 | Resolved: 2023-04-06

## 2023-04-06 PROBLEM — M35.01 KERATOCONJUNCTIVITIS SICCA OF BOTH EYES: Status: RESOLVED | Noted: 2021-10-29 | Resolved: 2023-04-06

## 2023-04-06 PROCEDURE — 1111F DSCHRG MED/CURRENT MED MERGE: CPT | Performed by: INTERNAL MEDICINE

## 2023-04-06 PROCEDURE — 99214 OFFICE O/P EST MOD 30 MIN: CPT | Performed by: INTERNAL MEDICINE

## 2023-04-06 PROCEDURE — 1126F AMNT PAIN NOTED NONE PRSNT: CPT | Performed by: INTERNAL MEDICINE

## 2023-04-10 RX ORDER — LOSARTAN POTASSIUM 100 MG/1
100 TABLET ORAL DAILY
Qty: 30 TABLET | Refills: 0 | Status: SHIPPED | OUTPATIENT
Start: 2023-04-10

## 2023-04-14 RX ORDER — LABETALOL 100 MG/1
100 TABLET, FILM COATED ORAL 2 TIMES DAILY
Qty: 180 TABLET | Refills: 3 | Status: SHIPPED | OUTPATIENT
Start: 2023-04-14

## 2023-04-14 RX ORDER — TEMAZEPAM 15 MG/1
CAPSULE ORAL NIGHTLY PRN
Qty: 135 CAPSULE | Refills: 1 | Status: SHIPPED | OUTPATIENT
Start: 2023-04-14

## 2023-05-08 RX ORDER — LOSARTAN POTASSIUM 100 MG/1
100 TABLET ORAL DAILY
Qty: 30 TABLET | Refills: 0 | Status: SHIPPED | OUTPATIENT
Start: 2023-05-08

## 2023-05-08 NOTE — TELEPHONE ENCOUNTER
Per Dr Annette Davies last office visit note dated 4/6, to continue w Losartan 100mg every day and return to clinic in 1 month. Upcoming apt scheduled for 5/16  Rx refill sent to local pharmacy.

## 2023-05-16 ENCOUNTER — OFFICE VISIT (OUTPATIENT)
Dept: INTERNAL MEDICINE CLINIC | Facility: CLINIC | Age: 88
End: 2023-05-16
Payer: MEDICARE

## 2023-05-16 VITALS
BODY MASS INDEX: 23 KG/M2 | HEART RATE: 76 BPM | SYSTOLIC BLOOD PRESSURE: 136 MMHG | TEMPERATURE: 99 F | OXYGEN SATURATION: 98 % | DIASTOLIC BLOOD PRESSURE: 76 MMHG | WEIGHT: 116.19 LBS

## 2023-05-16 DIAGNOSIS — M54.50 CHRONIC LEFT-SIDED LOW BACK PAIN WITHOUT SCIATICA: ICD-10-CM

## 2023-05-16 DIAGNOSIS — G89.29 CHRONIC LEFT-SIDED LOW BACK PAIN WITHOUT SCIATICA: ICD-10-CM

## 2023-05-16 DIAGNOSIS — I10 HYPERTENSION, UNCONTROLLED: Primary | ICD-10-CM

## 2023-05-16 DIAGNOSIS — F43.9 STRESS AT HOME: ICD-10-CM

## 2023-05-16 PROCEDURE — 99214 OFFICE O/P EST MOD 30 MIN: CPT | Performed by: INTERNAL MEDICINE

## 2023-05-18 RX ORDER — AMLODIPINE BESYLATE 5 MG/1
5 TABLET ORAL DAILY
Qty: 90 TABLET | Refills: 3 | Status: SHIPPED | OUTPATIENT
Start: 2023-05-18 | End: 2023-05-19

## 2023-05-18 RX ORDER — AMLODIPINE BESYLATE 2.5 MG/1
2.5 TABLET ORAL DAILY
Qty: 30 TABLET | Refills: 1 | OUTPATIENT
Start: 2023-05-18

## 2023-05-19 RX ORDER — AMLODIPINE BESYLATE 2.5 MG/1
2.5 TABLET ORAL DAILY
Qty: 90 TABLET | Refills: 3 | Status: SHIPPED | OUTPATIENT
Start: 2023-05-19

## 2023-06-12 RX ORDER — LOSARTAN POTASSIUM 100 MG/1
100 TABLET ORAL DAILY
Qty: 30 TABLET | Refills: 0 | Status: SHIPPED | OUTPATIENT
Start: 2023-06-12

## 2023-08-08 ENCOUNTER — OFFICE VISIT (OUTPATIENT)
Dept: SURGERY | Facility: CLINIC | Age: 88
End: 2023-08-08
Payer: MEDICARE

## 2023-08-08 VITALS
HEART RATE: 69 BPM | OXYGEN SATURATION: 97 % | TEMPERATURE: 97 F | DIASTOLIC BLOOD PRESSURE: 70 MMHG | SYSTOLIC BLOOD PRESSURE: 165 MMHG | WEIGHT: 112.38 LBS | RESPIRATION RATE: 16 BRPM | BODY MASS INDEX: 22 KG/M2

## 2023-08-08 DIAGNOSIS — C50.911 LOBULAR BREAST CANCER, RIGHT (HCC): Primary | ICD-10-CM

## 2023-08-08 DIAGNOSIS — Z85.3 HISTORY OF LEFT BREAST CANCER: ICD-10-CM

## 2023-08-08 PROCEDURE — 99213 OFFICE O/P EST LOW 20 MIN: CPT | Performed by: SURGERY

## 2023-08-08 PROCEDURE — 1126F AMNT PAIN NOTED NONE PRSNT: CPT | Performed by: SURGERY

## 2023-08-14 PROBLEM — C50.911 LOBULAR BREAST CANCER, RIGHT (HCC): Status: ACTIVE | Noted: 2023-08-14

## 2023-08-15 ENCOUNTER — OFFICE VISIT (OUTPATIENT)
Dept: INTERNAL MEDICINE CLINIC | Facility: CLINIC | Age: 88
End: 2023-08-15
Payer: MEDICARE

## 2023-08-15 VITALS
TEMPERATURE: 97 F | WEIGHT: 119.38 LBS | DIASTOLIC BLOOD PRESSURE: 64 MMHG | SYSTOLIC BLOOD PRESSURE: 160 MMHG | BODY MASS INDEX: 23 KG/M2 | HEART RATE: 64 BPM | OXYGEN SATURATION: 100 %

## 2023-08-15 DIAGNOSIS — I10 PRIMARY HYPERTENSION: ICD-10-CM

## 2023-08-15 DIAGNOSIS — C50.911 INVASIVE LOBULAR CARCINOMA OF RIGHT BREAST IN FEMALE (HCC): ICD-10-CM

## 2023-08-15 DIAGNOSIS — M25.562 ACUTE PAIN OF LEFT KNEE: ICD-10-CM

## 2023-08-15 DIAGNOSIS — G89.29 CHRONIC PAIN OF LEFT KNEE: Primary | ICD-10-CM

## 2023-08-15 DIAGNOSIS — M25.562 CHRONIC PAIN OF LEFT KNEE: Primary | ICD-10-CM

## 2023-08-15 PROBLEM — F43.9 STRESS AT HOME: Status: RESOLVED | Noted: 2023-05-16 | Resolved: 2023-08-15

## 2023-08-15 PROCEDURE — 99214 OFFICE O/P EST MOD 30 MIN: CPT | Performed by: INTERNAL MEDICINE

## 2023-08-15 RX ORDER — AMLODIPINE BESYLATE 5 MG/1
5 TABLET ORAL DAILY
Qty: 90 TABLET | Refills: 3 | COMMUNITY
Start: 2023-08-15

## 2023-08-25 ENCOUNTER — HOSPITAL ENCOUNTER (OUTPATIENT)
Dept: MAMMOGRAPHY | Facility: HOSPITAL | Age: 88
Discharge: HOME | End: 2023-08-25
Attending: SURGERY
Payer: MEDICARE

## 2023-08-25 DIAGNOSIS — C50.911 LOBULAR BREAST CANCER, RIGHT (HCC): ICD-10-CM

## 2023-08-25 DIAGNOSIS — Z85.3 HISTORY OF LEFT BREAST CANCER: ICD-10-CM

## 2023-08-25 PROCEDURE — 76642 ULTRASOUND BREAST LIMITED: CPT | Performed by: SURGERY

## 2023-08-25 PROCEDURE — 77066 DX MAMMO INCL CAD BI: CPT | Performed by: SURGERY

## 2023-08-25 PROCEDURE — 77062 BREAST TOMOSYNTHESIS BI: CPT | Performed by: SURGERY

## 2023-08-25 RX ORDER — LABETALOL 100 MG/1
100 TABLET, FILM COATED ORAL 2 TIMES DAILY
Qty: 180 TABLET | Refills: 3 | Status: SHIPPED | OUTPATIENT
Start: 2023-08-25

## 2023-08-25 RX ORDER — TEMAZEPAM 15 MG/1
CAPSULE ORAL NIGHTLY PRN
Qty: 135 CAPSULE | Refills: 1 | Status: SHIPPED | OUTPATIENT
Start: 2023-08-25

## 2023-08-29 ENCOUNTER — OFFICE VISIT (OUTPATIENT)
Dept: HEMATOLOGY/ONCOLOGY | Facility: HOSPITAL | Age: 88
End: 2023-08-29
Attending: INTERNAL MEDICINE
Payer: MEDICARE

## 2023-08-29 DIAGNOSIS — Z17.0 MALIGNANT NEOPLASM OF OVERLAPPING SITES OF RIGHT BREAST IN FEMALE, ESTROGEN RECEPTOR POSITIVE: Primary | ICD-10-CM

## 2023-08-29 DIAGNOSIS — T50.905D ADVERSE EFFECT OF DRUG, SUBSEQUENT ENCOUNTER: ICD-10-CM

## 2023-08-29 DIAGNOSIS — M80.00XD AGE-RELATED OSTEOPOROSIS WITH CURRENT PATHOLOGICAL FRACTURE WITH ROUTINE HEALING, SUBSEQUENT ENCOUNTER: ICD-10-CM

## 2023-08-29 DIAGNOSIS — C50.811 MALIGNANT NEOPLASM OF OVERLAPPING SITES OF RIGHT BREAST IN FEMALE, ESTROGEN RECEPTOR POSITIVE: Primary | ICD-10-CM

## 2023-08-29 PROCEDURE — 99215 OFFICE O/P EST HI 40 MIN: CPT | Performed by: INTERNAL MEDICINE

## 2023-08-29 RX ORDER — ESTRADIOL 0.1 MG/G
1 CREAM VAGINAL DAILY
Qty: 42.5 G | Refills: 3 | Status: SHIPPED | OUTPATIENT
Start: 2023-08-29

## 2023-08-31 ENCOUNTER — TELEPHONE (OUTPATIENT)
Dept: INTERNAL MEDICINE CLINIC | Facility: CLINIC | Age: 88
End: 2023-08-31

## 2023-09-12 ENCOUNTER — OFFICE VISIT (OUTPATIENT)
Dept: INTERNAL MEDICINE CLINIC | Facility: CLINIC | Age: 88
End: 2023-09-12
Payer: MEDICARE

## 2023-09-12 ENCOUNTER — TELEPHONE (OUTPATIENT)
Dept: HEMATOLOGY/ONCOLOGY | Facility: HOSPITAL | Age: 88
End: 2023-09-12

## 2023-09-12 VITALS
BODY MASS INDEX: 24.19 KG/M2 | WEIGHT: 120 LBS | RESPIRATION RATE: 20 BRPM | HEIGHT: 59 IN | OXYGEN SATURATION: 96 % | HEART RATE: 63 BPM | SYSTOLIC BLOOD PRESSURE: 150 MMHG | DIASTOLIC BLOOD PRESSURE: 66 MMHG | TEMPERATURE: 98 F

## 2023-09-12 DIAGNOSIS — I47.1 PAROXYSMAL SVT (SUPRAVENTRICULAR TACHYCARDIA) (HCC): ICD-10-CM

## 2023-09-12 DIAGNOSIS — C50.911 INVASIVE LOBULAR CARCINOMA OF RIGHT BREAST IN FEMALE (HCC): ICD-10-CM

## 2023-09-12 DIAGNOSIS — Z85.3 HX OF BREAST CANCER: ICD-10-CM

## 2023-09-12 DIAGNOSIS — E04.2 MULTINODULAR GOITER: ICD-10-CM

## 2023-09-12 DIAGNOSIS — R73.9 BLOOD GLUCOSE ELEVATED: ICD-10-CM

## 2023-09-12 DIAGNOSIS — M80.00XD AGE-RELATED OSTEOPOROSIS WITH CURRENT PATHOLOGICAL FRACTURE WITH ROUTINE HEALING, SUBSEQUENT ENCOUNTER: ICD-10-CM

## 2023-09-12 DIAGNOSIS — H40.1133 PRIMARY OPEN-ANGLE GLAUCOMA, BILATERAL, SEVERE STAGE: ICD-10-CM

## 2023-09-12 DIAGNOSIS — I10 PRIMARY HYPERTENSION: Primary | ICD-10-CM

## 2023-09-12 DIAGNOSIS — Z00.00 ENCOUNTER FOR ANNUAL HEALTH EXAMINATION: ICD-10-CM

## 2023-09-12 PROCEDURE — 99214 OFFICE O/P EST MOD 30 MIN: CPT | Performed by: STUDENT IN AN ORGANIZED HEALTH CARE EDUCATION/TRAINING PROGRAM

## 2023-09-12 PROCEDURE — G0439 PPPS, SUBSEQ VISIT: HCPCS | Performed by: STUDENT IN AN ORGANIZED HEALTH CARE EDUCATION/TRAINING PROGRAM

## 2023-09-12 PROCEDURE — 1125F AMNT PAIN NOTED PAIN PRSNT: CPT | Performed by: STUDENT IN AN ORGANIZED HEALTH CARE EDUCATION/TRAINING PROGRAM

## 2023-09-13 ENCOUNTER — LAB ENCOUNTER (OUTPATIENT)
Dept: LAB | Age: 88
End: 2023-09-13
Attending: STUDENT IN AN ORGANIZED HEALTH CARE EDUCATION/TRAINING PROGRAM
Payer: MEDICARE

## 2023-09-13 DIAGNOSIS — E04.2 MULTINODULAR GOITER: ICD-10-CM

## 2023-09-13 DIAGNOSIS — I10 PRIMARY HYPERTENSION: ICD-10-CM

## 2023-09-13 DIAGNOSIS — R73.9 BLOOD GLUCOSE ELEVATED: ICD-10-CM

## 2023-09-13 DIAGNOSIS — I47.1 PAROXYSMAL SVT (SUPRAVENTRICULAR TACHYCARDIA) (HCC): ICD-10-CM

## 2023-09-13 LAB
ALBUMIN SERPL-MCNC: 3.4 G/DL (ref 3.4–5)
ALBUMIN/GLOB SERPL: 1.1 {RATIO} (ref 1–2)
ALP LIVER SERPL-CCNC: 89 U/L
ALT SERPL-CCNC: 28 U/L
ANION GAP SERPL CALC-SCNC: 3 MMOL/L (ref 0–18)
AST SERPL-CCNC: 20 U/L (ref 15–37)
BASOPHILS # BLD AUTO: 0.05 X10(3) UL (ref 0–0.2)
BASOPHILS NFR BLD AUTO: 1.4 %
BILIRUB SERPL-MCNC: 0.6 MG/DL (ref 0.1–2)
BUN BLD-MCNC: 23 MG/DL (ref 7–18)
CALCIUM BLD-MCNC: 9.5 MG/DL (ref 8.5–10.1)
CHLORIDE SERPL-SCNC: 112 MMOL/L (ref 98–112)
CHOLEST SERPL-MCNC: 173 MG/DL (ref ?–200)
CO2 SERPL-SCNC: 25 MMOL/L (ref 21–32)
CREAT BLD-MCNC: 0.89 MG/DL
EGFRCR SERPLBLD CKD-EPI 2021: 62 ML/MIN/1.73M2 (ref 60–?)
EOSINOPHIL # BLD AUTO: 0.18 X10(3) UL (ref 0–0.7)
EOSINOPHIL NFR BLD AUTO: 5.1 %
ERYTHROCYTE [DISTWIDTH] IN BLOOD BY AUTOMATED COUNT: 12.5 %
EST. AVERAGE GLUCOSE BLD GHB EST-MCNC: 105 MG/DL (ref 68–126)
FASTING PATIENT LIPID ANSWER: YES
FASTING STATUS PATIENT QL REPORTED: YES
GLOBULIN PLAS-MCNC: 3.1 G/DL (ref 2.8–4.4)
GLUCOSE BLD-MCNC: 97 MG/DL (ref 70–99)
HBA1C MFR BLD: 5.3 % (ref ?–5.7)
HCT VFR BLD AUTO: 41.1 %
HDLC SERPL-MCNC: 64 MG/DL (ref 40–59)
HGB BLD-MCNC: 13.2 G/DL
IMM GRANULOCYTES # BLD AUTO: 0.01 X10(3) UL (ref 0–1)
IMM GRANULOCYTES NFR BLD: 0.3 %
LDLC SERPL CALC-MCNC: 96 MG/DL (ref ?–100)
LYMPHOCYTES # BLD AUTO: 1.56 X10(3) UL (ref 1–4)
LYMPHOCYTES NFR BLD AUTO: 44.2 %
MCH RBC QN AUTO: 30.9 PG (ref 26–34)
MCHC RBC AUTO-ENTMCNC: 32.1 G/DL (ref 31–37)
MCV RBC AUTO: 96.3 FL
MONOCYTES # BLD AUTO: 0.43 X10(3) UL (ref 0.1–1)
MONOCYTES NFR BLD AUTO: 12.2 %
NEUTROPHILS # BLD AUTO: 1.3 X10 (3) UL (ref 1.5–7.7)
NEUTROPHILS # BLD AUTO: 1.3 X10(3) UL (ref 1.5–7.7)
NEUTROPHILS NFR BLD AUTO: 36.8 %
NONHDLC SERPL-MCNC: 109 MG/DL (ref ?–130)
OSMOLALITY SERPL CALC.SUM OF ELEC: 294 MOSM/KG (ref 275–295)
PLATELET # BLD AUTO: 183 10(3)UL (ref 150–450)
POTASSIUM SERPL-SCNC: 4.5 MMOL/L (ref 3.5–5.1)
PROT SERPL-MCNC: 6.5 G/DL (ref 6.4–8.2)
RBC # BLD AUTO: 4.27 X10(6)UL
SODIUM SERPL-SCNC: 140 MMOL/L (ref 136–145)
TRIGL SERPL-MCNC: 69 MG/DL (ref 30–149)
TSI SER-ACNC: 3.05 MIU/ML (ref 0.36–3.74)
VLDLC SERPL CALC-MCNC: 11 MG/DL (ref 0–30)
WBC # BLD AUTO: 3.5 X10(3) UL (ref 4–11)

## 2023-09-13 PROCEDURE — 80061 LIPID PANEL: CPT

## 2023-09-13 PROCEDURE — 80053 COMPREHEN METABOLIC PANEL: CPT

## 2023-09-13 PROCEDURE — 85025 COMPLETE CBC W/AUTO DIFF WBC: CPT

## 2023-09-13 PROCEDURE — 36415 COLL VENOUS BLD VENIPUNCTURE: CPT

## 2023-09-13 PROCEDURE — 83036 HEMOGLOBIN GLYCOSYLATED A1C: CPT

## 2023-09-13 PROCEDURE — 84443 ASSAY THYROID STIM HORMONE: CPT

## 2023-09-18 ENCOUNTER — TELEPHONE (OUTPATIENT)
Dept: HEMATOLOGY/ONCOLOGY | Facility: HOSPITAL | Age: 88
End: 2023-09-18

## 2023-09-18 NOTE — TELEPHONE ENCOUNTER
The patient called and would like to speak to someone regarding what type of appointment she should be making. Please call back.

## 2023-09-19 RX ORDER — LOSARTAN POTASSIUM 100 MG/1
100 TABLET ORAL DAILY
Qty: 90 TABLET | Refills: 1 | Status: SHIPPED | OUTPATIENT
Start: 2023-09-19

## 2023-09-19 NOTE — TELEPHONE ENCOUNTER
Last OV relevant to medication: 9/12/23  Last refill date: 8/21/23 #30/refills: 0  When pt was asked to return for OV: 12/12/23  Upcoming appt/reason: no future apts at [facility]  Was pt informed of any over due labs: up to date  Lab Results   Component Value Date    GLU 97 09/13/2023    BUN 23 (H) 09/13/2023    BUNCREA 39.0 (H) 04/12/2021    CREATSERUM 0.89 09/13/2023    ANIONGAP 3 09/13/2023    GFR 76 02/27/2017    GFRNAA 62 07/20/2022    GFRAA 71 07/20/2022    CA 9.5 09/13/2023    OSMOCALC 294 09/13/2023    ALKPHO 89 09/13/2023    AST 20 09/13/2023    ALT 28 09/13/2023    BILT 0.6 09/13/2023    TP 6.5 09/13/2023    ALB 3.4 09/13/2023    GLOBULIN 3.1 09/13/2023     09/13/2023    K 4.5 09/13/2023     09/13/2023    CO2 25.0 09/13/2023

## 2023-09-19 NOTE — TELEPHONE ENCOUNTER
Returned the call, multiple questions addressed. Pt reports that when she tried the anastrozole, she experienced HA and dizziness, and stopped taking after 3 days. Advised that dr Veronica Rock said she can discontinue due to side effects. Questions addressed regarding when her next mammogram is due, in Feb, given CS phone number to arrange. In addition will need FU with Dr Veronica Rock after the mammogram.     Also asking if she should still be getting injection for osteoporosis, received it in the past from Dr Miko Anaya. Established with new PCP who advised she check with us regarding continuing. Let her know I would ask Dr Veronica Rock and call her back. Pt verbalized understanding, and comfortable with the plan.

## 2023-09-19 NOTE — TELEPHONE ENCOUNTER
Is this medication prescribed by the [facility] Providers? Yes - new patient for Dr Aranza Borjas    Did the patient contact the pharmacy directly?:  yes - medication requested by pharmacy    Is patient out of meds or supply very low?:  low    Medication Requested:  losartan 100 MG Oral Tab     Dose:  100 MG    Is patient requesting a 30 or 90 day supply?:  90    Pharmacy name and phone # or location:  [facility], [employer] [facility], [address]     Is the patient due for an appointment?: Seen on Sept 12th  (if so, please schedule appt)    Additional Notes:  none    Please advise the patient refills take up to 72 business hours.

## 2023-09-21 ENCOUNTER — TELEPHONE (OUTPATIENT)
Dept: HEMATOLOGY/ONCOLOGY | Facility: HOSPITAL | Age: 88
End: 2023-09-21

## 2023-09-21 NOTE — TELEPHONE ENCOUNTER
Updated pt that we will give her Prolia when she sees MD in Feb after her mammogram.   Verbalized understanding.

## 2023-10-10 ENCOUNTER — TELEPHONE (OUTPATIENT)
Dept: INTERNAL MEDICINE CLINIC | Facility: CLINIC | Age: 88
End: 2023-10-10

## 2023-10-10 RX ORDER — AMLODIPINE BESYLATE 5 MG/1
5 TABLET ORAL DAILY
Qty: 90 TABLET | Refills: 0 | Status: SHIPPED | OUTPATIENT
Start: 2023-10-10

## 2023-10-10 NOTE — TELEPHONE ENCOUNTER
Pt called with update, she has been very bus with apts and apologized for delay in sending update. She has completed living will and will bring to office. BP machine was calibrated with  he advised machine is consistent. Pt has been unable to measure bid as she is unable to use left arm and she had multiple vaccination of right arm that caused her pain for some time.      Recent BP readings  9/2/23: 129/58 63 PM  147/61 HR:66 PM  126/58 HR:63 PM  134/62  no HR PM  133/58 HR:61 PM  130/60 HR:62 AM

## 2023-10-10 NOTE — TELEPHONE ENCOUNTER
Noted. Please call the patient. Her BP appears to be in acceptable range. I would like to schedule a follow up appointment for BP follow up in 1- 2 weeks. She should also bring her vaccine records for Flu and COVID. Please inform patient that she needs to also get Shingrix vaccine. Medicare should fully cover Shingrix from January 2023.

## 2023-10-10 NOTE — TELEPHONE ENCOUNTER
Hypertension Medications Protocol Eeweul33/10/2023 11:29 AM   Protocol Details CMP or BMP in past 12 months    Last serum creatinine< 2.0    Appointment in past 6 or next 3 months      1. Primary hypertension (Primary)  Patient's BP at home in the range of 140s/60s, however has episodes when its higher in 160's range. Patient instructed to measure her BP at home BID and make a diary. Will continue current regimen at this time.  Will order labs  - Continue labetalol 100 mg twice daily  - Continue losartan 100 mg daily  - Continue amlodipine 5 mg daily

## 2023-10-11 NOTE — TELEPHONE ENCOUNTER
Future Appointments   Date Time Provider Bobby Jade   2/27/2024  1:00 PM [address] [facility] RM[facility] [address] [facility]   3/5/2024  1:15 PM Justice Wilkinson MD [address] [facility] Adis Frazier   3/5/2024  1:30 PM Ollie Rutledge [address] [place] [address]     Patient notified.  Patient verbalized understanding     Pt will call back for appt

## 2023-10-12 ENCOUNTER — TELEPHONE (OUTPATIENT)
Dept: INTERNAL MEDICINE CLINIC | Facility: CLINIC | Age: 88
End: 2023-10-12

## 2023-10-12 NOTE — TELEPHONE ENCOUNTER
Incoming (mail or fax):   Mail  Received from:  Patient (Living Will)  Documentation given to:  Triage

## 2023-10-16 ENCOUNTER — TELEPHONE (OUTPATIENT)
Dept: FAMILY MEDICINE CLINIC | Facility: CLINIC | Age: 88
End: 2023-10-16

## 2023-10-17 ENCOUNTER — OFFICE VISIT (OUTPATIENT)
Dept: INTERNAL MEDICINE CLINIC | Facility: CLINIC | Age: 88
End: 2023-10-17
Payer: MEDICARE

## 2023-10-17 ENCOUNTER — LAB ENCOUNTER (OUTPATIENT)
Dept: LAB | Age: 88
End: 2023-10-17
Attending: STUDENT IN AN ORGANIZED HEALTH CARE EDUCATION/TRAINING PROGRAM
Payer: MEDICARE

## 2023-10-17 VITALS
HEART RATE: 82 BPM | DIASTOLIC BLOOD PRESSURE: 70 MMHG | RESPIRATION RATE: 20 BRPM | OXYGEN SATURATION: 98 % | SYSTOLIC BLOOD PRESSURE: 160 MMHG | TEMPERATURE: 99 F | BODY MASS INDEX: 24.19 KG/M2 | WEIGHT: 120 LBS | HEIGHT: 59 IN

## 2023-10-17 DIAGNOSIS — I10 PRIMARY HYPERTENSION: ICD-10-CM

## 2023-10-17 DIAGNOSIS — R39.9 UTI SYMPTOMS: ICD-10-CM

## 2023-10-17 DIAGNOSIS — R39.9 UTI SYMPTOMS: Primary | ICD-10-CM

## 2023-10-17 DIAGNOSIS — G47.00 INSOMNIA, UNSPECIFIED TYPE: ICD-10-CM

## 2023-10-17 LAB
APPEARANCE: CLEAR
BASOPHILS # BLD AUTO: 0.05 X10(3) UL (ref 0–0.2)
BASOPHILS NFR BLD AUTO: 0.6 %
BILIRUB UR QL STRIP.AUTO: NEGATIVE
BILIRUBIN: NEGATIVE
CLARITY UR REFRACT.AUTO: CLEAR
EOSINOPHIL # BLD AUTO: 0.2 X10(3) UL (ref 0–0.7)
EOSINOPHIL NFR BLD AUTO: 2.4 %
ERYTHROCYTE [DISTWIDTH] IN BLOOD BY AUTOMATED COUNT: 12.9 %
GLUCOSE (URINE DIPSTICK): NEGATIVE MG/DL
GLUCOSE UR STRIP.AUTO-MCNC: NORMAL MG/DL
HCT VFR BLD AUTO: 40.6 %
HGB BLD-MCNC: 13.4 G/DL
IMM GRANULOCYTES # BLD AUTO: 0.03 X10(3) UL (ref 0–1)
IMM GRANULOCYTES NFR BLD: 0.4 %
KETONES (URINE DIPSTICK): NEGATIVE MG/DL
KETONES UR STRIP.AUTO-MCNC: NEGATIVE MG/DL
LEUKOCYTE ESTERASE UR QL STRIP.AUTO: NEGATIVE
LYMPHOCYTES # BLD AUTO: 1.79 X10(3) UL (ref 1–4)
LYMPHOCYTES NFR BLD AUTO: 21.8 %
MCH RBC QN AUTO: 31.8 PG (ref 26–34)
MCHC RBC AUTO-ENTMCNC: 33 G/DL (ref 31–37)
MCV RBC AUTO: 96.4 FL
MONOCYTES # BLD AUTO: 0.89 X10(3) UL (ref 0.1–1)
MONOCYTES NFR BLD AUTO: 10.8 %
MULTISTIX LOT#: ABNORMAL NUMERIC
NEUTROPHILS # BLD AUTO: 5.25 X10 (3) UL (ref 1.5–7.7)
NEUTROPHILS # BLD AUTO: 5.25 X10(3) UL (ref 1.5–7.7)
NEUTROPHILS NFR BLD AUTO: 64 %
NITRITE UR QL STRIP.AUTO: NEGATIVE
NITRITE, URINE: NEGATIVE
OCCULT BLOOD: NEGATIVE
PH UR STRIP.AUTO: 6.5 [PH] (ref 5–8)
PH, URINE: 7 (ref 4.5–8)
PLATELET # BLD AUTO: 200 10(3)UL (ref 150–450)
PROT UR STRIP.AUTO-MCNC: NEGATIVE MG/DL
PROTEIN (URINE DIPSTICK): NEGATIVE MG/DL
RBC # BLD AUTO: 4.21 X10(6)UL
RBC UR QL AUTO: NEGATIVE
SP GR UR STRIP.AUTO: 1.01 (ref 1–1.03)
SPECIFIC GRAVITY: 1.02 (ref 1–1.03)
URINE-COLOR: YELLOW
UROBILINOGEN UR STRIP.AUTO-MCNC: NORMAL MG/DL
UROBILINOGEN,SEMI-QN: 0.2 MG/DL (ref 0–1.9)
WBC # BLD AUTO: 8.2 X10(3) UL (ref 4–11)

## 2023-10-17 PROCEDURE — 99213 OFFICE O/P EST LOW 20 MIN: CPT | Performed by: STUDENT IN AN ORGANIZED HEALTH CARE EDUCATION/TRAINING PROGRAM

## 2023-10-17 PROCEDURE — 81003 URINALYSIS AUTO W/O SCOPE: CPT | Performed by: STUDENT IN AN ORGANIZED HEALTH CARE EDUCATION/TRAINING PROGRAM

## 2023-10-17 PROCEDURE — 36415 COLL VENOUS BLD VENIPUNCTURE: CPT

## 2023-10-17 PROCEDURE — 87086 URINE CULTURE/COLONY COUNT: CPT | Performed by: STUDENT IN AN ORGANIZED HEALTH CARE EDUCATION/TRAINING PROGRAM

## 2023-10-17 PROCEDURE — 85025 COMPLETE CBC W/AUTO DIFF WBC: CPT

## 2023-10-17 RX ORDER — NITROFURANTOIN 25; 75 MG/1; MG/1
100 CAPSULE ORAL 2 TIMES DAILY
Qty: 10 CAPSULE | Refills: 0 | Status: SHIPPED | OUTPATIENT
Start: 2023-10-17

## 2023-10-17 NOTE — TELEPHONE ENCOUNTER
PA denied because 1) short term treatment for insomnia and inappropriate in older adults  2)  pt had to try doxepin 3mg or 6 mg first.   Form in Dr bin for review

## 2023-10-17 NOTE — PATIENT INSTRUCTIONS
- Gatorade for electrolyte supplementation.    - Take Nitrofurantoin 100 mg two times a day for the total of 5 days    - Try to taper Temazepam from from 30 mg daily to 15 mg daily.    - Follow up in 1 week to discuss the alternatives for sleep ( can be a video visit)

## 2023-10-17 NOTE — TELEPHONE ENCOUNTER
Incoming (mail or fax):  fax  Received from:  [facility]   Documentation given to:  Triage    Denial of Temazepam

## 2023-10-18 PROBLEM — G47.00 INSOMNIA: Status: ACTIVE | Noted: 2023-10-18

## 2023-10-20 ENCOUNTER — TELEPHONE (OUTPATIENT)
Dept: INTERNAL MEDICINE CLINIC | Facility: CLINIC | Age: 88
End: 2023-10-20

## 2023-10-20 RX ORDER — FLUCONAZOLE 150 MG/1
150 TABLET ORAL ONCE
Qty: 1 TABLET | Refills: 0 | Status: SHIPPED | OUTPATIENT
Start: 2023-10-20 | End: 2023-10-20

## 2023-10-20 RX ORDER — METRONIDAZOLE 7.5 MG/G
1 GEL VAGINAL 2 TIMES DAILY
Qty: 70 G | Refills: 0 | Status: SHIPPED | OUTPATIENT
Start: 2023-10-20 | End: 2023-10-25

## 2023-10-20 NOTE — TELEPHONE ENCOUNTER
Patient called. States spoke to provider this AM.    States not feeling good currently since this morning. \"Burning vaginal area\". Felt better yesterday. Weak. Shaky. /65. Denies the following symptoms: blood in urine, flank pain, cp, sob, and fever. Please advise- thanks!

## 2023-10-20 NOTE — TELEPHONE ENCOUNTER
Patient called in today c/o increasing fatigue and burning with urination. Initially she presented to the office on 10/17/2023 with UTI symptoms, fatigue, urinary frequency, bladder discomfort and burning sensation. Macrobid for 5 days was prescribed and urine sample obtained. UC did not show growth, however symptoms were getting better initially, so she was advised to complete Macrobid. She states that the urinary frequency and bladder discomfort is better, however burning came back today and she is feeling weak today again. She is unable to come to clinic today for evaluation. Plan:  Would treat empirically with for yeast infection and possible BV (given odor that was present on Physical exam during her last visit):   - Fluconazole 150 mg  x 1 time  - Metronidazole gel to apply BID for 5 days.  - Patient to go to the ED if symptoms persist or get worse over the weekend, also if she develops fevers, chills, nausea, vomiting and worsening abdominal pain.

## 2023-10-24 ENCOUNTER — TELEMEDICINE (OUTPATIENT)
Dept: INTERNAL MEDICINE CLINIC | Facility: CLINIC | Age: 88
End: 2023-10-24

## 2023-10-24 DIAGNOSIS — G47.00 INSOMNIA, UNSPECIFIED TYPE: ICD-10-CM

## 2023-10-24 DIAGNOSIS — N95.1 VAGINAL DRYNESS, MENOPAUSAL: ICD-10-CM

## 2023-10-24 DIAGNOSIS — N30.00 ACUTE CYSTITIS WITHOUT HEMATURIA: Primary | ICD-10-CM

## 2023-10-24 PROCEDURE — 99443 PHONE E/M BY PHYS 21-30 MIN: CPT | Performed by: STUDENT IN AN ORGANIZED HEALTH CARE EDUCATION/TRAINING PROGRAM

## 2023-10-24 RX ORDER — TRAZODONE HYDROCHLORIDE 50 MG/1
50 TABLET ORAL NIGHTLY
Qty: 30 TABLET | Refills: 0 | Status: SHIPPED | OUTPATIENT
Start: 2023-10-24 | End: 2024-10-18

## 2023-10-24 NOTE — PROGRESS NOTES
Virtual Telephone Check-In    Cornelio Whitman verbally consents to a Virtual/Telephone Check-In visit on 10/24/23. Patient has been referred to the [facility] website at [email or web address] to review the yearly Consent to Treat document. Patient understands and accepts financial responsibility for any deductible, co-insurance and/or co-pays associated with this service. [facility]    CHIEF COMPLAINT:  Patient presents with: Follow - Up: Follow up UTI -pt stated symptoms are completely gone- no burning no urinating often and no strong smell        HISTORY OF PRESENT ILLNESS: Tony Bryant is a 80year old female with PMH of HTN, paroxysmal SVT, multinodular goiter, degenerative disc disease, scoliosis, osteoarthritis, anxiety, osteoporosis, history of breast cancer of bilateral breasts, glaucoma, lymphedema of left arm (s/p lymp node resection and radiation),who calls in to the office today with to follow up on UTI symptoms and sleeping issue (on Temazepam). UTI:  Since Sunday patient has burning during urination and increased frequency, she took Macrobid, which initially helped, however symptoms came back. Her UC came back negative. Was prescribed 1 dose of  fluconazole , which she took on Saturday, with resolution of her symptoms. She also tried Metronidazole gel, however it caused irritation, so she stopped using it. Denies any urinary symptoms now, no fevers, no chills, feels much better. Vaginal dryness: Continues to have vaginal dryness while using estradiol cream. Uses it 2 times a week. Estradiol was held while she had urinary symptoms. Temazepam:  takes 30 mg QPM for sleep. Now has problems with insurance covering it. Was taking it for 10 years. Wants to try to taper temazepam and try another options.        REVIEW OF SYSTEMS:  See HPI    Current Medications:    Current Outpatient Medications   Medication Sig Dispense Refill    traZODone 50 MG Oral Tab Take 1 tablet (50 mg total) by mouth nightly. 30 tablet 0    nitrofurantoin monohydrate macro 100 MG Oral Cap Take 1 capsule (100 mg total) by mouth 2 (two) times daily. 10 capsule 0    amLODIPine 5 MG Oral Tab Take 1 tablet (5 mg total) by mouth daily. 90 tablet 0    losartan 100 MG Oral Tab Take 1 tablet (100 mg total) by mouth daily. 90 tablet 1    estradiol 0.1 MG/GM Vaginal Cream Place 1 g vaginally daily. 42.5 g 3    labetalol 100 MG Oral Tab Take 1 tablet (100 mg total) by mouth 2 (two) times daily. 180 tablet 3    temazepam 15 MG Oral Cap Take 1-2 capsules (15-30 mg total) by mouth nightly as needed for Sleep. 135 capsule 1    Fluocinolone Acetonide 0.01 % External Solution Apply a thin layer topically every night for 2 weeks as needed for scalp itching, then take an 1 week break. Repeat as needed after a week break. Do not apply to face, groin, or armpit/underarms. Vitamin D, Cholecalciferol, 25 MCG (1000 UT) Oral Tab Take 1,000 Units by mouth daily. Take 3 caps po q am 270 tablet 3    acetaZOLAMIDE 250 MG Oral Tab TAKE ONE TABLET BY MOUTH TWICE DAILY AT BREAKFAST AND DINNER      Multiple Vitamins-Minerals (COMPLETE DAILY/LUTEIN) Oral Tab Take 1 tablet by mouth 3 (three) times daily. PAST MEDICAL, SOCIAL, AND FAMILY HISTORY:  Tobacco:  Smoking status:   Never      Smokeless tobacco:   Never       PHYSICAL EXAM:  There were no vitals taken for this visit. Unable to perform physical exam because this is a virtual visit.     DATA:     Lab Results   Component Value Date    WBC 8.2 10/17/2023    RBC 4.21 10/17/2023    HGB 13.4 10/17/2023    HCT 40.6 10/17/2023    .0 10/17/2023    MCV 96.4 10/17/2023    MCH 31.8 10/17/2023    MCHC 33.0 10/17/2023    RDW 12.9 10/17/2023    NEPRELIM 5.25 10/17/2023    NEPERCENT 64.0 10/17/2023    LYPERCENT 21.8 10/17/2023    MOPERCENT 10.8 10/17/2023    EOPERCENT 2.4 10/17/2023    BAPERCENT 0.6 10/17/2023    NE 5.25 10/17/2023    LYMABS 1.79 10/17/2023    MOABSO 0.89 10/17/2023 EOABSO 0.20 10/17/2023    BAABSO 0.05 10/17/2023       Lab Results   Component Value Date    COLORUR Light-Yellow 10/17/2023    CLARITY Clear 10/17/2023    SPECGRAVITY 1.025 10/17/2023    GLUUR Normal 10/17/2023    BILUR Negative 10/17/2023    KETUR Negative 10/17/2023    BLOODURINE Negative 10/17/2023    PHURINE 7.0 10/17/2023    PROUR Negative 10/17/2023    UROBILINOGEN Normal 10/17/2023    NITRITE Negative 10/17/2023    LEUUR Negative 10/17/2023    NMIC Microscopic not indicated 10/17/2023    WBCUR 6-10 (A) 03/30/2023    RBCUR 0-2 03/30/2023    EPIUR Few (A) 03/30/2023    BACUR Rare (A) 03/30/2023         ASSESSMENT AND PLAN:    1. Acute cystitis without hematuria  Patient completed the course of Macrobid x 5 days and took Fluconazole X 1. Patient urinary frequency and burning with urination have resolved. 2. Vaginal dryness, menopausal  C/o vaginal dryness and has been using vaginal estradiol, however she continues to have dryness. Would like to try something in addition to estradiol.  - Can start using estradiol again in 2 days.  - Recommended OTC Uberlube    3. Insomnia, unspecified type  Would like to taper Temazepam.   - Decrease the dose of Temazepam to 15 mg daily   - Start traZODone 50 MG Oral Tab; Take 1 tablet (50 mg total) by mouth nightly. Dispense: 30 tablet; Refill: 0. Consider increasing the dose if no effect. (Duloxetine was on the list of medications for this patient, however she does not take it)     Pt understands phone/video evaluation is not a substitute for face to face examination or emergency care. Pt advised to go to the ER or call 911 for worsening symptoms or acute distress. Please note that the following visit was completed using two-way, real-time interactive audio and/or video communication.   This has been done in good olegario to provide continuity of care in the best interest of the provider-patient relationship, due to the ongoing public health crisis/national emergency and because of restrictions of visitation. There are limitations of this visit as no physical exam could be performed. Every conscious effort was taken to allow for sufficient and adequate time. This billing was spent on reviewing labs, medications, radiology tests and decision making. Appropriate medical decision-making and tests are ordered as detailed in the plan of care above. No follow-ups on file.

## 2023-11-02 ENCOUNTER — TELEPHONE (OUTPATIENT)
Dept: INTERNAL MEDICINE CLINIC | Facility: CLINIC | Age: 88
End: 2023-11-02

## 2023-11-02 NOTE — TELEPHONE ENCOUNTER
Patient said Dr Thea Noland prescribed traZODone 50 MG Oral Tab at her appt on 10/24/23. Patient said the medication makes her dizzy and she does not want to take it anymore. She wants to take the medication she was taking before for insomnia. Please advise. Thank you!

## 2023-11-03 NOTE — TELEPHONE ENCOUNTER
Pt informed of potential risks and side effects and verbalized understanding. She would like to go back to Temazepam as it has worked successfully for her in the past without issue, she understands insurance will not cover. She will contact the pharmacy when a refill is needed.

## 2023-11-03 NOTE — TELEPHONE ENCOUNTER
I do not recommend Temazepam to patients of her age group as it can cause lethargy, increased confusion, increased risk of falls and fractures, significant impairment of driving skills with increased crash risk, and increased risk of an emergency room visits. Patient should be aware of the risks. She can continue the medication if she agrees to this risk, keeping in mind that insurance is not going to cover this medication. We could try other alternatives if she is willing to try.

## 2024-01-15 DIAGNOSIS — F41.9 ANXIETY: ICD-10-CM

## 2024-01-15 DIAGNOSIS — G47.00 INSOMNIA, UNSPECIFIED TYPE: Primary | ICD-10-CM

## 2024-01-15 RX ORDER — TEMAZEPAM 15 MG/1
CAPSULE ORAL NIGHTLY PRN
Qty: 60 CAPSULE | Refills: 2 | Status: SHIPPED | OUTPATIENT
Start: 2024-01-15

## 2024-01-15 NOTE — TELEPHONE ENCOUNTER
Is this medication prescribed by the [facility] Providers? Yes    Did the patient contact the pharmacy directly?:  Yes    Is patient out of meds or supply very low?:  Yes, 0    Medication Requested:  temazepam     Dose:  15 MG Oral Cap     Is patient requesting a 30 or 90 day supply?:  90    Pharmacy name and phone # or location:  [place] [address] [address], [address] [phone], [phone]     Is the patient due for an appointment?: No  (if so, please schedule appt)    Additional Notes:      Please advise the patient refills take up to 72 business hours.

## 2024-01-15 NOTE — TELEPHONE ENCOUNTER
Last OV relevant to medication: 10/17/23  Last refill date: 23 #135/refills: 1(previous prescriber so this is )  When pt was asked to return for OV: 10/24/23Return in about 1 week (around 10/24/2023) for for sleep problems .      Upcoming appt/reason:   Future Appointments   Date Time Provider Department Center   2024  1:00 PM  MELISSA RM3  MAMMO [facility]   3/5/2024  1:15 PM Krista Warren MD  HEM [facility]   3/5/2024  1:30 PM  TX RN4  [facility]   Was pt informed of any over due labs: utd    Please steve pt back to schedule follow up visit, thank you.

## 2024-01-15 NOTE — TELEPHONE ENCOUNTER
Patient said she is unable to schedule a BP check due to appointments she has for back pain and for cancer treatments.  She is unable to drive so she relies on others to take her.  Between the weather, transportation issues and a complicated appt schedule, she was not willing to schedule an appt.  Patient has been out of medication for 2 days and is wondering if Dr Dnaielson would refill the prescription.  PSR explained the importance of scheduling a BP check.  Please advise.  Thank you!

## 2024-02-20 ENCOUNTER — OFFICE VISIT (OUTPATIENT)
Dept: INTERNAL MEDICINE CLINIC | Facility: CLINIC | Age: 89
End: 2024-02-20
Payer: MEDICARE

## 2024-02-20 VITALS
HEIGHT: 59 IN | RESPIRATION RATE: 20 BRPM | OXYGEN SATURATION: 98 % | DIASTOLIC BLOOD PRESSURE: 70 MMHG | HEART RATE: 80 BPM | BODY MASS INDEX: 23.59 KG/M2 | TEMPERATURE: 98 F | SYSTOLIC BLOOD PRESSURE: 160 MMHG | WEIGHT: 117 LBS

## 2024-02-20 DIAGNOSIS — F41.9 ANXIETY: ICD-10-CM

## 2024-02-20 DIAGNOSIS — Z79.899 HIGH RISK MEDICATION USE: ICD-10-CM

## 2024-02-20 DIAGNOSIS — I1A.0 RESISTANT HYPERTENSION: Primary | ICD-10-CM

## 2024-02-20 DIAGNOSIS — K59.01 SLOW TRANSIT CONSTIPATION: ICD-10-CM

## 2024-02-20 DIAGNOSIS — Z23 NEED FOR VACCINATION: ICD-10-CM

## 2024-02-20 DIAGNOSIS — G47.00 INSOMNIA, UNSPECIFIED TYPE: ICD-10-CM

## 2024-02-20 PROCEDURE — 99214 OFFICE O/P EST MOD 30 MIN: CPT | Performed by: STUDENT IN AN ORGANIZED HEALTH CARE EDUCATION/TRAINING PROGRAM

## 2024-02-20 RX ORDER — TEMAZEPAM 15 MG/1
CAPSULE ORAL NIGHTLY PRN
Qty: 60 CAPSULE | Refills: 0 | Status: SHIPPED | OUTPATIENT
Start: 2024-02-20

## 2024-02-20 RX ORDER — HYDROCHLOROTHIAZIDE 12.5 MG/1
12.5 TABLET ORAL DAILY
Qty: 90 TABLET | Refills: 0 | Status: SHIPPED | OUTPATIENT
Start: 2024-02-20

## 2024-02-20 RX ORDER — TRAZODONE HYDROCHLORIDE 50 MG/1
50 TABLET ORAL NIGHTLY
Qty: 90 TABLET | Refills: 0 | Status: SHIPPED | OUTPATIENT
Start: 2024-02-20

## 2024-02-20 NOTE — PROGRESS NOTES
OFFICE NOTE     Patient ID: Meena Whitman is a 89 year old female.  Today's Date: 02/20/24  Chief Complaint: Blood Pressure (Follow up Blood pressure:)    Meena Whitman is a 89 year old female with PMH of HTN, paroxysmal SVT, multinodular goiter, degenerative disc disease, scoliosis, osteoarthritis, anxiety, osteoporosis, history of breast cancer of bilateral breasts, glaucoma, lymphedema of left arm (s/p lymp node resection and radiation),who comes to the office today for the blood pressure follow-up.  She states that her blood pressures at home are in the 150s-160s systolic/70 diastolic.  She is mostly asymptomatic and the only thing she attributes to her high blood pressure she is feeling tired.  Patient takes labetalol 100 mg twice daily, losartan 100 mg daily and amlodipine 5 mg daily.  Increase dose of amlodipine at 10 mg in the past caused her to have lower extremity swelling.  She would like to try to get her blood pressure under better control, however some medications in the past caused her to feel dizzy.  She states it might have been hydrochlorothiazide, however she is not sure.      Vitals:    02/20/24 1325   BP: 160/70   Pulse: 80   Resp: 20   Temp: 97.8 °F (36.6 °C)   SpO2: 98%   Weight: 117 lb (53.1 kg)   Height: 4' 11\" (1.499 m)     body mass index is 23.63 kg/m².  BP Readings from Last 3 Encounters:   02/20/24 160/70   10/17/23 160/70   09/12/23 150/66     The ASCVD Risk score (Terra Bella DK, et al., 2019) failed to calculate for the following reasons:    The 2019 ASCVD risk score is only valid for ages 40 to 79      Medications reviewed:  Current Outpatient Medications   Medication Sig Dispense Refill    amLODIPine 5 MG Oral Tab Take 1 tablet (5 mg total) by mouth daily. 90 tablet 1    labetalol 100 MG Oral Tab Take 1 tablet (100 mg total) by mouth 2 (two) times daily. 180 tablet 1    losartan 100 MG Oral Tab Take 1 tablet (100 mg total) by mouth daily. 90 tablet 1     hydroCHLOROthiazide 12.5 MG Oral Tab Take 1 tablet (12.5 mg total) by mouth daily. 90 tablet 0    temazepam 15 MG Oral Cap Take 1-2 capsules (15-30 mg total) by mouth nightly as needed for Sleep. 60 capsule 0    traZODone 50 MG Oral Tab Take 1 tablet (50 mg total) by mouth nightly. 90 tablet 0    nitrofurantoin monohydrate macro 100 MG Oral Cap Take 1 capsule (100 mg total) by mouth 2 (two) times daily. 10 capsule 0    estradiol 0.1 MG/GM Vaginal Cream Place 1 g vaginally daily. 42.5 g 3    Fluocinolone Acetonide 0.01 % External Solution Apply a thin layer topically every night for 2 weeks as needed for scalp itching, then take an 1 week break. Repeat as needed after a week break.  Do not apply to face, groin, or armpit/underarms.      Vitamin D, Cholecalciferol, 25 MCG (1000 UT) Oral Tab Take 1,000 Units by mouth daily. Take 3 caps po q am 270 tablet 3    acetaZOLAMIDE 250 MG Oral Tab TAKE ONE TABLET BY MOUTH TWICE DAILY AT BREAKFAST AND DINNER      Multiple Vitamins-Minerals (COMPLETE DAILY/LUTEIN) Oral Tab Take 1 tablet by mouth 3 (three) times daily.           Assessment & Plan    1. Resistant hypertension (Primary)  Patient still has elevated BP in 150- 160 systolic. We will try to add hydrochlorothiazide very low dose to her regiment to see if that helps to better control.  Considering patient's age we need to be very careful with adding any more medicines.  Patient is instructed to watch her symptoms really closely and if any symptoms, such as dizziness or lightheadedness appear that she needs to stop hydrochlorothiazide and let us know about those symptoms. Will refill her other BP medications.  -     hydroCHLOROthiazide; Take 1 tablet (12.5 mg total) by mouth daily.  Dispense: 90 tablet; Refill: 0  -     amLODIPine Besylate; Take 1 tablet (5 mg total) by mouth daily.  Dispense: 90 tablet; Refill: 1  -     Labetalol HCl; Take 1 tablet (100 mg total) by mouth 2 (two) times daily.  Dispense: 180 tablet;  Refill: 1  -     Losartan Potassium; Take 1 tablet (100 mg total) by mouth daily.  Dispense: 90 tablet; Refill: 1    2. Anxiety  -     Temazepam; Take 1-2 capsules (15-30 mg total) by mouth nightly as needed for Sleep.  Dispense: 60 capsule; Refill: 0    3. Insomnia, unspecified type  Patient states that for the past week she tried taking trazodone 50 mg and temazepam just 1 tablet to help her with sleep and she was doing good with that.  She would like a refill on trazodone and will try to come off temazepam.  -     Temazepam; Take 1-2 capsules (15-30 mg total) by mouth nightly as needed for Sleep.  Dispense: 60 capsule; Refill: 0  -     traZODone HCl; Take 1 tablet (50 mg total) by mouth nightly.  Dispense: 90 tablet; Refill: 0    4. High risk medication use  We are starting hydrochlorothiazide.  Patient should get her CMP done in 1 months to ensure stable kidney, liver and electrolytes.  -     Comp Metabolic Panel (14); Future; Expected date: 03/20/2024    5. Slow transit constipation  Patient reports hard stool and going to the bathroom every other day.   She should try Stool softener, such as colace or senna every other day to see if that helps with her constipation.    6. Need for vaccination  Patient should schedule an appointment with nurse for Prevnar 20 after her Prolia shot in the beginning of March.  -     Prevnar 20 (PCV20) [81231]        Follow Up: in 6 months for med check        Objective/ Results:   Physical Exam  Constitutional:       General: She is not in acute distress.     Appearance: Normal appearance. She is normal weight. She is not ill-appearing, toxic-appearing or diaphoretic.      Comments: Frail appearing   Eyes:      Extraocular Movements: Extraocular movements intact.      Conjunctiva/sclera: Conjunctivae normal.      Pupils: Pupils are equal, round, and reactive to light.   Cardiovascular:      Pulses: Normal pulses.      Heart sounds: Murmur heard.   Pulmonary:      Effort: No  respiratory distress.      Breath sounds: Normal breath sounds. No stridor. No wheezing, rhonchi or rales.   Chest:      Chest wall: No tenderness.   Abdominal:      General: Abdomen is flat.      Palpations: Abdomen is soft.   Skin:     General: Skin is warm.      Capillary Refill: Capillary refill takes less than 2 seconds.   Neurological:      General: No focal deficit present.      Mental Status: She is alert and oriented to person, place, and time. Mental status is at baseline.      Cranial Nerves: No cranial nerve deficit.   Psychiatric:         Mood and Affect: Mood normal.         Behavior: Behavior normal.         Thought Content: Thought content normal.         Judgment: Judgment normal.        Reviewed:    Patient Active Problem List    Diagnosis    Vaginal dryness, menopausal    Insomnia    Lobular breast cancer, right (HCC)    Fibroids, intramural    Hair loss    Chronic fatigue    Primary hypertension    Age-related osteoporosis with current pathological fracture    Invasive lobular carcinoma of right breast in female (HCC)    Multinodular goiter; bx 2022 benign    Lymphedema of left arm    Chronic left-sided low back pain without sciatica    Paroxysmal SVT (supraventricular tachycardia)    PCO (posterior capsular opacification), left    Osteoarthritis of both hips, unspecified osteoarthritis type    Scoliosis    Primary open-angle glaucoma, bilateral, severe stage    Hx of left breast cancer S/P lumpectomy & LN dissection 1986    DDD (degenerative disc disease), lumbar    Spondylolisthesis of lumbar region    Anxiety    Exophthalmia      Allergies   Allergen Reactions    Hydralazine OTHER (SEE COMMENTS)     Headache    Bactrim [Sulfamethoxazole W/Trimethoprim] RASH     Per patient    Metronidazole ITCHING     Metronidazole gel caused irritation in vaginal area.        Social History     Socioeconomic History    Marital status:     Number of children: 4   Occupational History    Occupation:  Retired   Tobacco Use    Smoking status: Never    Smokeless tobacco: Never   Substance and Sexual Activity    Alcohol use: No     Alcohol/week: 0.0 standard drinks of alcohol    Drug use: No   Other Topics Concern    Caffeine Concern No    Exercise Yes     Comment: 3 times per week, PK [employer] [place]      Review of Systems   Constitutional:  Positive for fatigue. Negative for activity change, appetite change and fever.   HENT: Negative.     Eyes: Negative.    Respiratory: Negative.     Cardiovascular: Negative.    Gastrointestinal:  Positive for constipation.   Endocrine: Negative.    Genitourinary: Negative.    Musculoskeletal: Negative.    Skin: Negative.    Neurological: Negative.      All other systems negative unless otherwise stated in ROS or HPI above.     30 minutes spent on provision of medical services today, including chart review, obtaining and reviewing history, performing medically appropriate examination, counseling and educating patient and/or caregiver, ordering testing , medications, referrals or procedures, my independent interpretation of results, communication of results to patient and /or caregiver, care coordination, and documentation of all of the above.   Bonnie Danielson MD  Internal Medicine       Call office with any questions or seek emergency care if necessary.   Patient understands and agrees to follow directions and advice.      ----------------------------------------- PATIENT INSTRUCTIONS-----------------------------------------     There are no Patient Instructions on file for this visit.

## 2024-02-21 ENCOUNTER — TELEPHONE (OUTPATIENT)
Dept: INTERNAL MEDICINE CLINIC | Facility: CLINIC | Age: 89
End: 2024-02-21

## 2024-02-21 RX ORDER — LOSARTAN POTASSIUM 100 MG/1
100 TABLET ORAL DAILY
Qty: 90 TABLET | Refills: 1 | Status: SHIPPED | OUTPATIENT
Start: 2024-02-21

## 2024-02-21 RX ORDER — LABETALOL 100 MG/1
100 TABLET, FILM COATED ORAL 2 TIMES DAILY
Qty: 180 TABLET | Refills: 1 | Status: SHIPPED | OUTPATIENT
Start: 2024-02-21

## 2024-02-21 RX ORDER — AMLODIPINE BESYLATE 5 MG/1
5 TABLET ORAL DAILY
Qty: 90 TABLET | Refills: 1 | Status: SHIPPED | OUTPATIENT
Start: 2024-02-21

## 2024-02-21 NOTE — TELEPHONE ENCOUNTER
Please call the patient to let her know that she needs a Pneumonia vaccine. I forgot to let her know about it during our visit .  She could schedule a nurse visit to get it done.   Thanks.

## 2024-02-21 NOTE — TELEPHONE ENCOUNTER
Spoke to provider. Patient aware. Verbalizes understanding. Receiving prolia injection 3/5. Advised to wait two weeks and then rec pneumonia vaccine. Patient would like to call back to schedule.

## 2024-02-27 ENCOUNTER — HOSPITAL ENCOUNTER (OUTPATIENT)
Dept: MAMMOGRAPHY | Facility: HOSPITAL | Age: 89
Discharge: HOME | End: 2024-02-27
Attending: INTERNAL MEDICINE
Payer: MEDICARE

## 2024-02-27 DIAGNOSIS — Z17.0 MALIGNANT NEOPLASM OF OVERLAPPING SITES OF RIGHT BREAST IN FEMALE, ESTROGEN RECEPTOR POSITIVE (HCC): ICD-10-CM

## 2024-02-27 DIAGNOSIS — C50.811 MALIGNANT NEOPLASM OF OVERLAPPING SITES OF RIGHT BREAST IN FEMALE, ESTROGEN RECEPTOR POSITIVE (HCC): ICD-10-CM

## 2024-02-27 PROCEDURE — 77065 DX MAMMO INCL CAD UNI: CPT | Performed by: INTERNAL MEDICINE

## 2024-02-27 PROCEDURE — 77061 BREAST TOMOSYNTHESIS UNI: CPT | Performed by: INTERNAL MEDICINE

## 2024-02-27 PROCEDURE — 76642 ULTRASOUND BREAST LIMITED: CPT | Performed by: INTERNAL MEDICINE

## 2024-03-04 NOTE — PROGRESS NOTES
[facility] Progress Note      Patient Name:  Meena Whitman  YOB: 1933  Medical Record Number:  [id number]    Date of visit:  3/5/2024    CHIEF COMPLAINT: R breast ca    HPI:     89 year old female that I have followed since 9/22. H/o R  breast carcinoma in 4/22.      Mammogram 4/22 as w/u of wt loss and back pain-8 mm and 9 mm mass R breast. Had 2 site bx.     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  Path R  4:00-grade 1 ILC, 1 mm core, 100% ER, 40% WI, Her 2 neg, Ki 67 1%.    AI started.  Initial surgery was deferred due to age and PS.  W/u of back pain showed a compression fracture at L2 for which she underwent kyphoplasty 4/22.  Then she developed severe vaginal atrophy, itching, dryness and UTI and elected to discontinue AI.  Tamoxifen was prescribed by another provider 7/22.  When I met her in 9/22, we decided to discontinue tamoxifen as I was concerned about the risk of strokes.  After that, she has tried AI's sporadically but held off because of worsening vaginal dryness.     Mammogram 8/23 showed increasing right-sided calcifications.  She met with surgical oncology and elected to defer surgery.    H/o L breast carcinoma 1996.  S/p surgery, ALND, RT.  L  arm lymphedema since then.    Daughter had breast cancer at 44, genetic testing was negative.      SOCIAL HISTORY:     .  Lives in [facility].  Active.  Daughter is an .  Involved in mother's care.      ROS:     Constitutional: Fatigue, weight loss.  Neurologic: no headache, seizures, diplopia or weakness  Respiratory: no cough, SOB or hemoptysis  Cardiovascular: no chest pain, ankle swelling, [place]  GI: Anorexia, weight loss.  Musculoskeletal: Chronic stable back pain.    Dermatologic: Vaginal dryness and pruritus.  : Vaginal dryness.  Psych: Mood swings.    Heme: no easy bruising or bleeding     ALLERGIES:    Allergies   Allergen Reactions    Hydralazine OTHER  (SEE COMMENTS)     Headache    Bactrim [Sulfamethoxazole W/Trimethoprim] RASH     Per patient    Metronidazole ITCHING     Metronidazole gel caused irritation in vaginal area.       MEDICATIONS:    Current Outpatient Medications   Medication Sig Dispense Refill    Elastic Bandages & Supports (SKINEEZ COMPRESSION ARM SLEEVE) Does not apply Misc 2 Units daily as needed. 2 each 3    amLODIPine 5 MG Oral Tab Take 1 tablet (5 mg total) by mouth daily. 90 tablet 1    labetalol 100 MG Oral Tab Take 1 tablet (100 mg total) by mouth 2 (two) times daily. 180 tablet 1    losartan 100 MG Oral Tab Take 1 tablet (100 mg total) by mouth daily. 90 tablet 1    hydroCHLOROthiazide 12.5 MG Oral Tab Take 1 tablet (12.5 mg total) by mouth daily. 90 tablet 0    temazepam 15 MG Oral Cap Take 1-2 capsules (15-30 mg total) by mouth nightly as needed for Sleep. 60 capsule 0    traZODone 50 MG Oral Tab Take 1 tablet (50 mg total) by mouth nightly. 90 tablet 0    estradiol 0.1 MG/GM Vaginal Cream Place 1 g vaginally daily. 42.5 g 3    Vitamin D, Cholecalciferol, 25 MCG (1000 UT) Oral Tab Take 1,000 Units by mouth daily. Take 3 caps po q am 270 tablet 3    acetaZOLAMIDE 250 MG Oral Tab TAKE ONE TABLET BY MOUTH TWICE DAILY AT BREAKFAST AND DINNER      Multiple Vitamins-Minerals (COMPLETE DAILY/LUTEIN) Oral Tab Take 1 tablet by mouth 3 (three) times daily.         VITALS:    Vitals:    03/05/24 1325   BP: (!) 182/70   Pulse: 73   Temp: 97.7 °F (36.5 °C)         PS:  ECOG: 3    PHYSICAL EXAM:    General: alert and oriented x 3, not in acute distress.  HEENT: DELMY, oropharynx  clear.  Neck: supple.  No JVD /adenopathy.  CVS: S1S2, regular  Rhythm, no murmurs.   Lungs: Clear to auscultation and percussion.  Abdomen: Soft, non tender, no hepato-splenomegaly.  Extremities:  No edema.  CNS: no focal deficit  Breast exam done when she was sitting upright.  No abnormal masses noted.  No axillary adenopathy.      Emotional well being: Patient's emotional  well being was assessed.  No issues requiring acute psychosocial intervention were identified.     IMAGING:    [facility] SHANNEN 2D+3D DIAGNOSTIC MELISSA RIGHT (CPT=77065/76306)    Result Date: 2/27/2024           CONCLUSION:  No overall significant change in previously image pleomorphic calcifications at the 9:30 position right breast.  These are viewed with suspicion and amenable to ultrasound-guided biopsy if clinically indicated.  Previously biopsied cancers at the 3 o'clock position right breast and 4 o'clock position right breast are stable versus slightly smaller than on prior exam.  Cortical thickening of the right axillary lymph node is again noted.     LABS:     Recent Results (from the past 24 hour(s))   Comp Metabolic Panel (14)    Collection Time: 03/05/24  2:01 PM   Result Value Ref Range    Glucose 102 (H) 70 - 99 mg/dL    Sodium 139 136 - 145 mmol/L    Potassium 4.1 3.5 - 5.1 mmol/L    Chloride 112 98 - 112 mmol/L    CO2 26.0 21.0 - 32.0 mmol/L    Anion Gap 1 0 - 18 mmol/L    BUN 21 9 - 23 mg/dL    Creatinine 0.80 0.55 - 1.02 mg/dL    Calcium, Total 9.8 8.5 - 10.1 mg/dL    Calculated Osmolality 291 275 - 295 mOsm/kg    eGFR-Cr 70 >=60 mL/min/1.73m2    AST 30 15 - 37 U/L    ALT 32 13 - 56 U/L    Alkaline Phosphatase 97 55 - 142 U/L    Bilirubin, Total 0.4 0.1 - 2.0 mg/dL    Total Protein 7.0 6.4 - 8.2 g/dL    Albumin 3.9 3.4 - 5.0 g/dL    Globulin  3.1 2.8 - 4.4 g/dL    A/G Ratio 1.3 1.0 - 2.0    Patient Fasting for CMP? Patient not present    MAGNESIUM [E]    Collection Time: 03/05/24  2:01 PM   Result Value Ref Range    Magnesium 2.3 1.6 - 2.6 mg/dL   PHOSPHORUS [E]    Collection Time: 03/05/24  2:01 PM   Result Value Ref Range    Phosphorus 2.8 2.5 - 4.9 mg/dL   CBC W/ DIFFERENTIAL    Collection Time: 03/05/24  2:01 PM   Result Value Ref Range    WBC 4.6 4.0 - 11.0 x10(3) uL    RBC 4.41 3.80 - 5.30 x10(6)uL    HGB 14.0 12.0 - 16.0 g/dL    HCT 41.9 35.0 - 48.0 %    .0 150.0 - 450.0 10(3)uL    MCV 95.0  80.0 - 100.0 fL    MCH 31.7 26.0 - 34.0 pg    MCHC 33.4 31.0 - 37.0 g/dL    RDW 12.1 %    Neutrophil Absolute Prelim 1.99 1.50 - 7.70 x10 (3) uL    Neutrophil Absolute 1.99 1.50 - 7.70 x10(3) uL    Lymphocyte Absolute 1.88 1.00 - 4.00 x10(3) uL    Monocyte Absolute 0.49 0.10 - 1.00 x10(3) uL    Eosinophil Absolute 0.17 0.00 - 0.70 x10(3) uL    Basophil Absolute 0.06 0.00 - 0.20 x10(3) uL    Immature Granulocyte Absolute 0.01 0.00 - 1.00 x10(3) uL    Neutrophil % 43.2 %    Lymphocyte % 40.9 %    Monocyte % 10.7 %    Eosinophil % 3.7 %    Basophil % 1.3 %    Immature Granulocyte % 0.2 %   Vitamin D, 25-Hydroxy    Collection Time: 03/05/24  2:01 PM   Result Value Ref Range    Vitamin D, 25OH, Total 89.2 30.0 - 100.0 ng/mL       ASSESSMENT AND PLAN:     # R breast ca: Diag 4/22.  Clinical T1N0.  2 site biopsy shows grade 1 IDC and grade 1 ILC, strongly ER/ND+, HER2 neg  with low Ki-67.  AI started 4/22.  Severe vaginal dryness and atrophy.  AI discontinued 6/22, tamoxifen started 7/22 and discontinued 9/22 due to VTE risk.     Mammogram 11/22-stable.  Mammogram 8/23-increasing calcifications.  Met with surgical oncology and wishes to defer surgery.  She feels it will affect her mobility and put her in the nursing home at [facility].    Mammogram 2/24-unchanged.  She has been off all endocrine therapy since 9/22.  Continue observation.  Due for bilateral mammogram in 6 months.    # Compression fracture: S/p kyphoplasty L2 by IR 4/22.    # Osteoporosis: DEXA 8/21 showed osteoporosis with T score -2.5.  Was on Prolia twice a year.  Did not receive it since 2022.  Wishes to resume here.  Discussed ONJ risk.    A total of 45 minutes were spent  during this encounter including  face-to-face time, review of pertinent test results, and documentation.       ORDERS PLACED:    Return in about 6 months (around 9/5/2024) for Labs, MD visit, Lamine Warren M.D.    [facility]    [facility]  [address] [place], [address]    3/5/2024

## 2024-03-05 ENCOUNTER — OFFICE VISIT (OUTPATIENT)
Dept: HEMATOLOGY/ONCOLOGY | Facility: HOSPITAL | Age: 89
End: 2024-03-05
Attending: INTERNAL MEDICINE
Payer: MEDICARE

## 2024-03-05 VITALS
OXYGEN SATURATION: 97 % | DIASTOLIC BLOOD PRESSURE: 70 MMHG | HEART RATE: 73 BPM | TEMPERATURE: 98 F | BODY MASS INDEX: 23.75 KG/M2 | HEIGHT: 59.02 IN | SYSTOLIC BLOOD PRESSURE: 182 MMHG | WEIGHT: 117.81 LBS

## 2024-03-05 DIAGNOSIS — T50.905D ADVERSE EFFECT OF DRUG, SUBSEQUENT ENCOUNTER: ICD-10-CM

## 2024-03-05 DIAGNOSIS — I89.0 LYMPHEDEMA OF ARM: ICD-10-CM

## 2024-03-05 DIAGNOSIS — Z17.0 MALIGNANT NEOPLASM OF OVERLAPPING SITES OF RIGHT BREAST IN FEMALE, ESTROGEN RECEPTOR POSITIVE (HCC): ICD-10-CM

## 2024-03-05 DIAGNOSIS — M89.9 BONE DISORDER: ICD-10-CM

## 2024-03-05 DIAGNOSIS — C50.811 MALIGNANT NEOPLASM OF OVERLAPPING SITES OF RIGHT BREAST IN FEMALE, ESTROGEN RECEPTOR POSITIVE (HCC): Primary | ICD-10-CM

## 2024-03-05 DIAGNOSIS — Z17.0 MALIGNANT NEOPLASM OF OVERLAPPING SITES OF RIGHT BREAST IN FEMALE, ESTROGEN RECEPTOR POSITIVE (HCC): Primary | ICD-10-CM

## 2024-03-05 DIAGNOSIS — M80.00XD AGE-RELATED OSTEOPOROSIS WITH CURRENT PATHOLOGICAL FRACTURE WITH ROUTINE HEALING, SUBSEQUENT ENCOUNTER: Primary | ICD-10-CM

## 2024-03-05 DIAGNOSIS — C50.811 MALIGNANT NEOPLASM OF OVERLAPPING SITES OF RIGHT BREAST IN FEMALE, ESTROGEN RECEPTOR POSITIVE (HCC): ICD-10-CM

## 2024-03-05 DIAGNOSIS — C50.911 INVASIVE LOBULAR CARCINOMA OF RIGHT BREAST IN FEMALE (HCC): ICD-10-CM

## 2024-03-05 DIAGNOSIS — M80.00XD AGE-RELATED OSTEOPOROSIS WITH CURRENT PATHOLOGICAL FRACTURE WITH ROUTINE HEALING, SUBSEQUENT ENCOUNTER: ICD-10-CM

## 2024-03-05 LAB
ALBUMIN SERPL-MCNC: 3.9 G/DL (ref 3.4–5)
ALBUMIN/GLOB SERPL: 1.3 {RATIO} (ref 1–2)
ALP LIVER SERPL-CCNC: 97 U/L
ALT SERPL-CCNC: 32 U/L
ANION GAP SERPL CALC-SCNC: 1 MMOL/L (ref 0–18)
AST SERPL-CCNC: 30 U/L (ref 15–37)
BASOPHILS # BLD AUTO: 0.06 X10(3) UL (ref 0–0.2)
BASOPHILS NFR BLD AUTO: 1.3 %
BILIRUB SERPL-MCNC: 0.4 MG/DL (ref 0.1–2)
BUN BLD-MCNC: 21 MG/DL (ref 9–23)
CALCIUM BLD-MCNC: 9.8 MG/DL (ref 8.5–10.1)
CHLORIDE SERPL-SCNC: 112 MMOL/L (ref 98–112)
CO2 SERPL-SCNC: 26 MMOL/L (ref 21–32)
CREAT BLD-MCNC: 0.8 MG/DL
EGFRCR SERPLBLD CKD-EPI 2021: 70 ML/MIN/1.73M2 (ref 60–?)
EOSINOPHIL # BLD AUTO: 0.17 X10(3) UL (ref 0–0.7)
EOSINOPHIL NFR BLD AUTO: 3.7 %
ERYTHROCYTE [DISTWIDTH] IN BLOOD BY AUTOMATED COUNT: 12.1 %
GLOBULIN PLAS-MCNC: 3.1 G/DL (ref 2.8–4.4)
GLUCOSE BLD-MCNC: 102 MG/DL (ref 70–99)
HCT VFR BLD AUTO: 41.9 %
HGB BLD-MCNC: 14 G/DL
IMM GRANULOCYTES # BLD AUTO: 0.01 X10(3) UL (ref 0–1)
IMM GRANULOCYTES NFR BLD: 0.2 %
LYMPHOCYTES # BLD AUTO: 1.88 X10(3) UL (ref 1–4)
LYMPHOCYTES NFR BLD AUTO: 40.9 %
MAGNESIUM SERPL-MCNC: 2.3 MG/DL (ref 1.6–2.6)
MCH RBC QN AUTO: 31.7 PG (ref 26–34)
MCHC RBC AUTO-ENTMCNC: 33.4 G/DL (ref 31–37)
MCV RBC AUTO: 95 FL
MONOCYTES # BLD AUTO: 0.49 X10(3) UL (ref 0.1–1)
MONOCYTES NFR BLD AUTO: 10.7 %
NEUTROPHILS # BLD AUTO: 1.99 X10 (3) UL (ref 1.5–7.7)
NEUTROPHILS # BLD AUTO: 1.99 X10(3) UL (ref 1.5–7.7)
NEUTROPHILS NFR BLD AUTO: 43.2 %
OSMOLALITY SERPL CALC.SUM OF ELEC: 291 MOSM/KG (ref 275–295)
PHOSPHATE SERPL-MCNC: 2.8 MG/DL (ref 2.5–4.9)
PLATELET # BLD AUTO: 164 10(3)UL (ref 150–450)
POTASSIUM SERPL-SCNC: 4.1 MMOL/L (ref 3.5–5.1)
PROT SERPL-MCNC: 7 G/DL (ref 6.4–8.2)
RBC # BLD AUTO: 4.41 X10(6)UL
SODIUM SERPL-SCNC: 139 MMOL/L (ref 136–145)
VIT D+METAB SERPL-MCNC: 89.2 NG/ML (ref 30–100)
WBC # BLD AUTO: 4.6 X10(3) UL (ref 4–11)

## 2024-03-05 PROCEDURE — G2211 COMPLEX E/M VISIT ADD ON: HCPCS | Performed by: INTERNAL MEDICINE

## 2024-03-05 PROCEDURE — 82306 VITAMIN D 25 HYDROXY: CPT

## 2024-03-05 PROCEDURE — 99215 OFFICE O/P EST HI 40 MIN: CPT | Performed by: INTERNAL MEDICINE

## 2024-03-05 PROCEDURE — 84100 ASSAY OF PHOSPHORUS: CPT

## 2024-03-05 PROCEDURE — 83735 ASSAY OF MAGNESIUM: CPT

## 2024-03-05 PROCEDURE — 36415 COLL VENOUS BLD VENIPUNCTURE: CPT

## 2024-03-05 PROCEDURE — 80053 COMPREHEN METABOLIC PANEL: CPT

## 2024-03-05 PROCEDURE — 85025 COMPLETE CBC W/AUTO DIFF WBC: CPT

## 2024-03-05 PROCEDURE — 96372 THER/PROPH/DIAG INJ SC/IM: CPT

## 2024-03-05 NOTE — PROGRESS NOTES
Education Record    Learner:  Patient    Disease / Diagnosis:breast cancer      Barriers / Limitations:  None   Comments:    Method:  Discussion   Comments:    General Topics:  Plan of care reviewed   Comments:    Outcome:  Shows understanding   Comments:   Pt has been off AI since last visit.   Here to review last mammogram result.   Pt reports she is much more sedentary than in the past.   She is more tired.

## 2024-03-05 NOTE — PROGRESS NOTES
Education Record    Learner:  Patient    Disease / Diagnosis: breast CA    Barriers / Limitations:  None   Comments:    Method:  Brief focused   Comments:    General Topics:  Plan of care reviewed   Comments:    Outcome:  Shows understanding   Comments:    Xgeva inj 60mg every 6 months - tolerated well without issue. Appt scheduled for patient to return Aug 27th for md magdalena, inj; needs mammogram around this time, encouraged patient to call to schedule week before so could be discussed with MD the following Tuesday. Verbalizes understanding, AVS printed and provided to patient.

## 2024-03-06 ENCOUNTER — TELEPHONE (OUTPATIENT)
Dept: HEMATOLOGY/ONCOLOGY | Facility: HOSPITAL | Age: 89
End: 2024-03-06

## 2024-03-06 NOTE — TELEPHONE ENCOUNTER
Krista Warren MD  [facility]  Can you please let her know that all labs look okay, thanks.    Reviewed the above with patient.   Verbalized understanding.

## 2024-03-25 ENCOUNTER — TELEPHONE (OUTPATIENT)
Dept: INTERNAL MEDICINE CLINIC | Facility: CLINIC | Age: 89
End: 2024-03-25

## 2024-03-25 NOTE — TELEPHONE ENCOUNTER
Patient scheduled appt w/dr. Clancy tomorrow for Bp check and pennicillin (pneumonia?) shot\".    Future Appointments   Date Time Provider Department Center   3/26/2024 12:30 PM Bonnie Danielson MD [facility] [place]   8/23/2024  1:00 PM  MELISSA RM3  MAMMO [facility]   8/23/2024  1:45 PM  [facility]  LAB [facility]   8/27/2024  1:15 PM Krista Warren MD  HEM [facility]   8/27/2024  1:30 PM  TX RN1  CHEMO [facility]        From what I see patient due for pneumonia shot. Looks like last note says patient to follow up with provider in August for med check. Patient is under impression provider wanted to see her regarding her bp though now.    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 Patient also has to practice this week. Concerned she wont be able to use her arm and concerned about the side effects. Advised regarding se's of vaccine. Patient asking if she should still rec shot tomorrow or wait until next week.     Please advise- thanks!

## 2024-03-25 NOTE — TELEPHONE ENCOUNTER
We can reschedule appt for the next week.   It will be BP and med check at the same time.  thanks

## 2024-04-01 ENCOUNTER — TELEPHONE (OUTPATIENT)
Dept: HEMATOLOGY/ONCOLOGY | Facility: HOSPITAL | Age: 89
End: 2024-04-01

## 2024-04-02 ENCOUNTER — LAB ENCOUNTER (OUTPATIENT)
Dept: LAB | Age: 89
End: 2024-04-02
Attending: STUDENT IN AN ORGANIZED HEALTH CARE EDUCATION/TRAINING PROGRAM
Payer: MEDICARE

## 2024-04-02 ENCOUNTER — OFFICE VISIT (OUTPATIENT)
Dept: INTERNAL MEDICINE CLINIC | Facility: CLINIC | Age: 89
End: 2024-04-02
Payer: MEDICARE

## 2024-04-02 ENCOUNTER — HOSPITAL ENCOUNTER (OUTPATIENT)
Dept: GENERAL RADIOLOGY | Age: 89
Discharge: HOME | End: 2024-04-02
Attending: STUDENT IN AN ORGANIZED HEALTH CARE EDUCATION/TRAINING PROGRAM
Payer: MEDICARE

## 2024-04-02 VITALS
OXYGEN SATURATION: 97 % | DIASTOLIC BLOOD PRESSURE: 70 MMHG | HEIGHT: 59.2 IN | WEIGHT: 113 LBS | SYSTOLIC BLOOD PRESSURE: 160 MMHG | BODY MASS INDEX: 22.78 KG/M2 | HEART RATE: 70 BPM | RESPIRATION RATE: 20 BRPM | TEMPERATURE: 98 F

## 2024-04-02 DIAGNOSIS — R53.82 CHRONIC FATIGUE: ICD-10-CM

## 2024-04-02 DIAGNOSIS — R63.4 WEIGHT LOSS: ICD-10-CM

## 2024-04-02 DIAGNOSIS — K59.00 CONSTIPATION, UNSPECIFIED CONSTIPATION TYPE: ICD-10-CM

## 2024-04-02 DIAGNOSIS — I10 PRIMARY HYPERTENSION: Primary | ICD-10-CM

## 2024-04-02 DIAGNOSIS — Z23 NEED FOR VACCINATION: ICD-10-CM

## 2024-04-02 DIAGNOSIS — R10.9 ABDOMINAL CRAMPING: ICD-10-CM

## 2024-04-02 LAB
T4 FREE SERPL-MCNC: 1 NG/DL (ref 0.8–1.7)
TSI SER-ACNC: 1.35 MIU/ML (ref 0.36–3.74)

## 2024-04-02 PROCEDURE — G0009 ADMIN PNEUMOCOCCAL VACCINE: HCPCS | Performed by: STUDENT IN AN ORGANIZED HEALTH CARE EDUCATION/TRAINING PROGRAM

## 2024-04-02 PROCEDURE — 36415 COLL VENOUS BLD VENIPUNCTURE: CPT

## 2024-04-02 PROCEDURE — 99214 OFFICE O/P EST MOD 30 MIN: CPT | Performed by: STUDENT IN AN ORGANIZED HEALTH CARE EDUCATION/TRAINING PROGRAM

## 2024-04-02 PROCEDURE — 90677 PCV20 VACCINE IM: CPT | Performed by: STUDENT IN AN ORGANIZED HEALTH CARE EDUCATION/TRAINING PROGRAM

## 2024-04-02 PROCEDURE — 84443 ASSAY THYROID STIM HORMONE: CPT

## 2024-04-02 PROCEDURE — 74018 RADEX ABDOMEN 1 VIEW: CPT | Performed by: STUDENT IN AN ORGANIZED HEALTH CARE EDUCATION/TRAINING PROGRAM

## 2024-04-02 PROCEDURE — 84439 ASSAY OF FREE THYROXINE: CPT

## 2024-04-02 RX ORDER — PSYLLIUM HUSK 0.4 G
400 CAPSULE ORAL DAILY
Qty: 30 CAPSULE | Refills: 2 | Status: SHIPPED | OUTPATIENT
Start: 2024-04-02

## 2024-04-02 RX ORDER — SIMETHICONE 80 MG
80 TABLET,CHEWABLE ORAL
Qty: 120 TABLET | Refills: 0 | Status: SHIPPED | OUTPATIENT
Start: 2024-04-02

## 2024-04-02 NOTE — PROGRESS NOTES
OFFICE NOTE     Patient ID: Meena Whitman is a 89 year old female.  Today's Date: 04/02/24  Chief Complaint: Hypertension (Follow up HTN/Stared taking hydroCHLOROthiazide 12.5 MG for b/p pt states that medication was making dizzy and fatguie) and Medication Follow-Up    Patient is a 89 year old female with PMH of HTN, paroxysmal SVT, multinodular goiter, degenerative disc disease, scoliosis, osteoarthritis, anxiety, osteoporosis, history of breast cancer of bilateral breasts, glaucoma, lymphedema of left arm (s/p lymp node resection and radiation),who presents  today for the follow up on HTN. She also has concerns for constipation for the past several weeks.    On last visit we started hydrochlorothiazide 12.5 mg , however patient states she felt dizzy with the medication, so she stopped taking it. She states she feels more fatigue when her BP is elevated. She states that when he BP is lover, around 130-140/60-70 she feels better.  Reports not often taking her evening dose of labetalol because she forgets.    She also c/o constipation for the past several weeks and has been using fleet enemas to help her go. She reports daily BM and that stool is hard and lumpy. She has strong sensation to go, but when she gets to the bathroom she will go.  After the Fleet enema she had some soft narrow stool.  No bleeding, no pain no stool color change.  She does have associated cramping, however cramping goes away after bowel movement.  She has not been eating well for the past week because she was afraid to make her constipation worse.  Lost 2 pounds since her last appointment.    Vitals:    04/02/24 1235   BP: 160/70   Pulse: 70   Resp: 20   Temp: 97.8 °F (36.6 °C)   SpO2: 97%   Weight: 113 lb (51.3 kg)   Height: 4' 11.2\" (1.504 m)     body mass index is 22.67 kg/m².  BP Readings from Last 3 Encounters:   04/02/24 160/70   03/05/24 (!) 182/70   02/20/24 160/70     The ASCVD Risk score (Elizabeth DK, et al., 2019)  failed to calculate for the following reasons:    The 2019 ASCVD risk score is only valid for ages 40 to 79      Medications reviewed:  Current Outpatient Medications   Medication Sig Dispense Refill    simethicone 80 MG Oral Chew Tab Chew 1 tablet (80 mg total) by mouth 4 (four) times daily with meals and nightly. 120 tablet 0    Psyllium (METAMUCIL 3 IN 1 DAILY FIBER) 400 MG Oral Cap Take 400 mg by mouth daily. 30 capsule 2    Elastic Bandages & Supports (SKINEEZ COMPRESSION ARM SLEEVE) Does not apply Misc 2 Units daily as needed. 2 each 3    amLODIPine 5 MG Oral Tab Take 1 tablet (5 mg total) by mouth daily. 90 tablet 1    labetalol 100 MG Oral Tab Take 1 tablet (100 mg total) by mouth 2 (two) times daily. 180 tablet 1    losartan 100 MG Oral Tab Take 1 tablet (100 mg total) by mouth daily. 90 tablet 1    temazepam 15 MG Oral Cap Take 1-2 capsules (15-30 mg total) by mouth nightly as needed for Sleep. 60 capsule 0    traZODone 50 MG Oral Tab Take 1 tablet (50 mg total) by mouth nightly. 90 tablet 0    estradiol 0.1 MG/GM Vaginal Cream Place 1 g vaginally daily. 42.5 g 3    Vitamin D, Cholecalciferol, 25 MCG (1000 UT) Oral Tab Take 1,000 Units by mouth daily. Take 3 caps po q am 270 tablet 3    acetaZOLAMIDE 250 MG Oral Tab TAKE ONE TABLET BY MOUTH TWICE DAILY AT BREAKFAST AND DINNER      Multiple Vitamins-Minerals (COMPLETE DAILY/LUTEIN) Oral Tab Take 1 tablet by mouth 3 (three) times daily.           Assessment & Plan    1. Primary hypertension (Primary)  Patient's blood pressure in the office today 160/70.  She did not tolerate hydrochlorothiazide due to dizziness.  She frequently forgets to take her evening dose of labetalol.  Reiterated with patient the need to take her medications.  Advised to take labetalol evening dose with dinner.  If blood pressure does not improve when patient takes her evening dose labetalol regularly, with consider increasing labetalol dose.    2. Abdominal cramping  Patient states  that she does have some abdominal cramping from time to time, which gets better after a bowel movement.  Will try simethicone.  Referral to gastroenterology in case her symptoms do not improve provided.  -     Simethicone; Chew 1 tablet (80 mg total) by mouth 4 (four) times daily with meals and nightly.  Dispense: 120 tablet; Refill: 0  -     Gastro Referral - In Network    3. Constipation, unspecified constipation type  Patient's rectal exam is unremarkable.  No masses or internal hemorrhoids palpated.  No bleeding.  No pain.  Will order x-ray of abdomen to evaluate the stool burden.  Patient advised to drink more water, stay more active.  For now we will try to increase fiber intake and start her on Metamucil 2 capsules daily.  If above intervention is not effective, will try MiraLAX next.  Gastroenterology referral provided.  -     Assay, Thyroid Stim Hormone; Future; Expected date: 04/02/2024  -     Free T4, (Free Thyroxine); Future; Expected date: 04/02/2024  -     Metamucil 3 in 1 Daily Fiber; Take 400 mg by mouth daily.  Dispense: 30 capsule; Refill: 2  -     XR ABDOMEN (1 VIEW) (CPT=74018); Future; Expected date: 04/02/2024  -     Gastro Referral - In Network    4. Chronic fatigue  Patient complains of fatigue.  Will check thyroid function tests.    5. Weight loss  Patient lost 2 pounds since last visit and has not been eating well due to fear to worsen her constipation.  Will monitor her weight and see if she gains weight back after her constipation issue is resolved.  Would consider mirtazapine in future to help with appetite and sleep.    6. Need for vaccination  Will receive in the office today  -     Prevnar 20 (PCV20) [30575]        Follow Up: in 1 - 2 month for HTN and constipation follow up        Objective/ Results:   Physical Exam  Constitutional:       General: She is not in acute distress.     Appearance: Normal appearance. She is not ill-appearing, toxic-appearing or diaphoretic.   HENT:       Head: Normocephalic and atraumatic.      Mouth/Throat:      Pharynx: Oropharynx is clear. No oropharyngeal exudate or posterior oropharyngeal erythema.   Eyes:      Extraocular Movements: Extraocular movements intact.      Conjunctiva/sclera: Conjunctivae normal.      Pupils: Pupils are equal, round, and reactive to light.   Cardiovascular:      Rate and Rhythm: Normal rate and regular rhythm.      Heart sounds: Murmur heard.      No friction rub. No gallop.      Comments: Systolic mur mur at the apex  Pulmonary:      Effort: No respiratory distress.      Breath sounds: Normal breath sounds. No stridor. No wheezing, rhonchi or rales.   Chest:      Chest wall: No tenderness.   Abdominal:      General: Abdomen is flat. There is no distension.      Palpations: Abdomen is soft. There is no mass.      Tenderness: There is no abdominal tenderness. There is no right CVA tenderness, left CVA tenderness, guarding or rebound.      Hernia: No hernia is present.   Musculoskeletal:         General: No swelling or deformity.      Right lower leg: No edema.      Left lower leg: No edema.   Skin:     General: Skin is warm.      Capillary Refill: Capillary refill takes less than 2 seconds.      Coloration: Skin is not jaundiced.      Findings: No bruising.   Neurological:      General: No focal deficit present.      Mental Status: She is alert and oriented to person, place, and time.      Cranial Nerves: No cranial nerve deficit.      Motor: No weakness.      Gait: Gait normal.   Psychiatric:         Mood and Affect: Mood normal.         Behavior: Behavior normal.         Thought Content: Thought content normal.        Reviewed:    Patient Active Problem List    Diagnosis    Bone disorder    Vaginal dryness, menopausal    Insomnia    Lobular breast cancer, right (HCC)    Fibroids, intramural    Hair loss    Chronic fatigue    Primary hypertension    Age-related osteoporosis with current pathological fracture    Invasive lobular  carcinoma of right breast in female (HCC)    Multinodular goiter; bx 2022 benign    Lymphedema of left arm    Chronic left-sided low back pain without sciatica    Paroxysmal SVT (supraventricular tachycardia) ([facility])    PCO (posterior capsular opacification), left    Osteoarthritis of both hips, unspecified osteoarthritis type    Scoliosis    Primary open-angle glaucoma, bilateral, severe stage    Hx of left breast cancer S/P lumpectomy & LN dissection 1986    DDD (degenerative disc disease), lumbar    Spondylolisthesis of lumbar region    Anxiety    Exophthalmia      Allergies   Allergen Reactions    Hydralazine OTHER (SEE COMMENTS)     Headache    Bactrim [Sulfamethoxazole W/Trimethoprim] RASH     Per patient    Metronidazole ITCHING     Metronidazole gel caused irritation in vaginal area.        Social History     Socioeconomic History    Marital status:     Number of children: 4   Occupational History    Occupation: Retired   Tobacco Use    Smoking status: Never    Smokeless tobacco: Never   Substance and Sexual Activity    Alcohol use: No     Alcohol/week: 0.0 standard drinks of alcohol    Drug use: No   Other Topics Concern    Caffeine Concern No    Exercise Yes     Comment: 3 times per week, [employer]      Review of Systems   Constitutional:  Positive for fatigue. Negative for activity change, appetite change and fever.   HENT: Negative.     Eyes: Negative.    Respiratory: Negative.     Cardiovascular: Negative.    Gastrointestinal:  Positive for constipation.   Endocrine: Negative.    Genitourinary: Negative.    Musculoskeletal: Negative.    Skin: Negative.    Neurological: Negative.      All other systems negative unless otherwise stated in ROS or HPI above.     30 minutes spent on provision of medical services today, including chart review, obtaining and reviewing history, performing medically appropriate examination, counseling and educating patient and/or caregiver, ordering  testing , medications, referrals or procedures, my independent interpretation of results, communication of results to patient and /or caregiver, care coordination, and documentation of all of the above.     Bonnie Danielson MD  Internal Medicine       Call office with any questions or seek emergency care if necessary.   Patient understands and agrees to follow directions and advice.      ----------------------------------------- PATIENT INSTRUCTIONS-----------------------------------------     Patient Instructions       Constipation:      Start 1 cap of MiraLAX daily to help soften your stools, this can be mixed with prune juice or water.  MiraLAX acts like a sponge in your gut to help your stools absorb water and therefore soften them.  Stop using MiraLAX if you have diarrhea.  After 2 servings of MiraLAX start taking senna, this is a stimulant laxative to help stimulate the bowels and create a bowel movement.  Take this daily for the next 3 to 5 days or until you have a bowel movement.  If you have pain stop senna, repeat step 1 for another day, then resume senna.  Drink at least 2 to 3 L of water per day  Increase your physical activity with walking or running to help move your abdominal muscles  You should have a good bowel movement over the next few days, once you produce a bowel movement then start using Metamucil daily for regularity.

## 2024-04-02 NOTE — PATIENT INSTRUCTIONS
Constipation:      Start 1 cap of MiraLAX daily to help soften your stools, this can be mixed with prune juice or water.  MiraLAX acts like a sponge in your gut to help your stools absorb water and therefore soften them.  Stop using MiraLAX if you have diarrhea.  After 2 servings of MiraLAX start taking senna, this is a stimulant laxative to help stimulate the bowels and create a bowel movement.  Take this daily for the next 3 to 5 days or until you have a bowel movement.  If you have pain stop senna, repeat step 1 for another day, then resume senna.  Drink at least 2 to 3 L of water per day  Increase your physical activity with walking or running to help move your abdominal muscles  You should have a good bowel movement over the next few days, once you produce a bowel movement then start using Metamucil daily for regularity.

## 2024-04-14 RX ORDER — TEMAZEPAM 15 MG/1
CAPSULE ORAL NIGHTLY PRN
Qty: 135 CAPSULE | Refills: 0 | OUTPATIENT
Start: 2024-04-14

## 2024-04-19 ENCOUNTER — TELEPHONE (OUTPATIENT)
Dept: HEMATOLOGY/ONCOLOGY | Facility: HOSPITAL | Age: 89
End: 2024-04-19

## 2024-04-19 NOTE — TELEPHONE ENCOUNTER
[employer] received the note sent on April 1st.  Additional information is needed for Medicare. They will refax the required form with the additional information they require.

## 2024-04-19 NOTE — TELEPHONE ENCOUNTER
Elaine from [facility] requesting a call back to confirm fax number [phone] as a request for a letter of medical necessity has been fax to this number several times with no respond, Please call if any question or if more information is needed.

## 2024-04-29 ENCOUNTER — TELEPHONE (OUTPATIENT)
Dept: HEMATOLOGY/ONCOLOGY | Facility: HOSPITAL | Age: 89
End: 2024-04-29

## 2024-05-03 ENCOUNTER — TELEPHONE (OUTPATIENT)
Dept: HEMATOLOGY/ONCOLOGY | Facility: HOSPITAL | Age: 89
End: 2024-05-03

## 2024-05-03 NOTE — TELEPHONE ENCOUNTER
Patient called and said she needs the arm sleeves and medicare is supposed to cover the cost.  [employer] has not received paperwork from Dr Warren.  Please call patient regarding this matter.  Thank you.

## 2024-05-03 NOTE — TELEPHONE ENCOUNTER
Contacted [employer].   Medicare needs a letter of medical necessity with certain verbage to cover the compression sleeve.   He will refax the information and I will sen the letter.

## 2024-05-03 NOTE — TELEPHONE ENCOUNTER
Returned the call.   Let her know that the records have been faxed multiple times with confirmation fax. That it was received.   I will attempt to reach [employer] in follow up to ask what information is needed for coverage.

## 2024-06-22 ENCOUNTER — HOSPITAL ENCOUNTER (OUTPATIENT)
Age: 89
Discharge: HOME | End: 2024-06-22

## 2024-06-22 VITALS
TEMPERATURE: 98 F | SYSTOLIC BLOOD PRESSURE: 159 MMHG | RESPIRATION RATE: 20 BRPM | OXYGEN SATURATION: 96 % | HEART RATE: 80 BPM | DIASTOLIC BLOOD PRESSURE: 79 MMHG

## 2024-06-22 DIAGNOSIS — B02.9 HERPES ZOSTER WITHOUT COMPLICATION: Primary | ICD-10-CM

## 2024-06-22 RX ORDER — ACETAMINOPHEN 500 MG
1000 TABLET ORAL ONCE
Status: DISCONTINUED | OUTPATIENT
Start: 2024-06-22 | End: 2024-06-22

## 2024-06-22 RX ORDER — VALACYCLOVIR HYDROCHLORIDE 1 G/1
1000 TABLET, FILM COATED ORAL 3 TIMES DAILY
Qty: 21 TABLET | Refills: 0 | Status: SHIPPED | OUTPATIENT
Start: 2024-06-22 | End: 2024-06-29

## 2024-06-22 NOTE — ED PROVIDER NOTES
Patient Seen in: [facility]      History     Chief Complaint   Patient presents with    Rash     Stated Complaint: rash x 4 days    Subjective:   HPI    89-year-old female here with complaint of burning sensation to the skin of the left side of her chest wrapping around to the back that started on Wednesday.  This morning confluent vesicular lesions popped up.  Patient has not had the shingles vaccine.  Patient denies chest pain, shortness of breath, cough, abdominal pain, nausea, vomiting or diarrhea.  Patient took Tylenol prior to arrival.  Patient  has an appointment with her PCP this week.  Afebrile.    Objective:   Past Medical History:    Abnormal weight loss    Basal cell carcinoma (BCC) of left side of nose    Breast CA (HCC)    Breast cancer (HCC)    ILC, IDC, LCIS, ALH, DCIS    COPD (chronic obstructive pulmonary disease) (HCC)    Ductal carcinoma in situ of breast    Exposure to radiation    High blood pressure    Keratoconjunctivitis sicca of both eyes (HCC)    Lobular carcinoma in situ of breast    Lymph edema    left arm    Pelvic pressure in female    Post covid-19 condition, unspecified              The patient's medication list, past medical history and social history elements  as listed in today's nurse's notes are reviewed and agree.   The patient's family history is reviewed and is noncontributory to the presenting problem, except as indicated as above.   Past Surgical History:   Procedure Laterality Date    Cataract Bilateral     Eye surgery      both eyes - trabulectomys 7-8 TIMES    Eye surgery      DETACHED RETINA SURGERY    Glaucoma surgery  07/07/2016    Hernia surgery  10/18/2017    umbilical hernia    Hx breast cancer Left 1986    Injection, w/wo contrast, dx/therapeutic substance, epidural/subarachnoid; lumbar/sacral N/A 03/11/2016    Procedure: LUMBAR EPIDURAL;  Surgeon: Eric Montenegro MD;  Location: [facility]    Injection, w/wo contrast,  dx/therapeutic substance, epidural/subarachnoid; lumbar/sacral N/A 04/04/2016    Procedure: LUMBAR EPIDURAL;  Surgeon: Eric Montenegro MD;  Location: [facility]    Lumpectomy left  1986    Mammogram, both breasts  07/22/2013    stable results (done in [place])    Needle biopsy right Right 04/2022    IDC, DCIS, LCIS    Other surgical history  06/01/2021    Cystoscopy (Dr. Brand)    Patient documented not to have experienced any of the following events N/A 03/11/2016    Procedure: LUMBAR EPIDURAL;  Surgeon: Eric Montenegro MD;  Location: [facility]    Patient documented not to have experienced any of the following events N/A 04/04/2016    Procedure: LUMBAR EPIDURAL;  Surgeon: Eric Montenegro MD;  Location: [facility]    Patient withough preoperative order for iv antibiotic surgical site infection prophylaxis. N/A 03/11/2016    Procedure: LUMBAR EPIDURAL;  Surgeon: Eric Montenegro MD;  Location: [facility]    Patient withough preoperative order for iv antibiotic surgical site infection prophylaxis. N/A 04/04/2016    Procedure: LUMBAR EPIDURAL;  Surgeon: Eric Montenegro MD;  Location: [facility]    Radiation left  1986    Skin surgery Left     Scar to L chin s/p removal of BCC \"years ago.\" Scar C/D/I    Tonsillectomy                  Social History     Socioeconomic History    Marital status:     Number of children: 4   Occupational History    Occupation: Retired   Tobacco Use    Smoking status: Never    Smokeless tobacco: Never   Substance and Sexual Activity    Alcohol use: No     Alcohol/week: 0.0 standard drinks of alcohol    Drug use: No   Other Topics Concern    Caffeine Concern No    Exercise Yes     Comment: 3 times per week, [employer]              Review of Systems    Positive for stated complaint: rash x 4 days  Other systems are as noted in HPI.  Constitutional and vital  signs reviewed.      All other systems reviewed and negative except as noted above.    Physical Exam     ED Triage Vitals [06/22/24 0901]   /79   Pulse 80   Resp 20   Temp 97.8 °F (36.6 °C)   Temp src Temporal   SpO2 96 %   O2 Device None (Room air)       Current Vitals:   Vital Signs  BP: 159/79  Pulse: 80  Resp: 20  Temp: 97.8 °F (36.6 °C)  Temp src: Temporal    Oxygen Therapy  SpO2: 96 %  O2 Device: None (Room air)            Physical Exam  Vitals and nursing note reviewed.   Constitutional:       Appearance: Normal appearance. She is well-developed.   HENT:      Head: Normocephalic.      Right Ear: External ear normal.      Left Ear: External ear normal.      Nose: Nose normal.      Mouth/Throat:      Mouth: Mucous membranes are moist.   Eyes:      Conjunctiva/sclera: Conjunctivae normal.      Pupils: Pupils are equal, round, and reactive to light.   Cardiovascular:      Rate and Rhythm: Normal rate and regular rhythm.      Heart sounds: Normal heart sounds.   Pulmonary:      Effort: Pulmonary effort is normal.      Breath sounds: Normal breath sounds.   Musculoskeletal:      Cervical back: Normal range of motion and neck supple.   Skin:     General: Skin is warm.      Capillary Refill: Capillary refill takes less than 2 seconds.      Findings: Erythema and rash present. Rash is vesicular. Rash is not pustular.             Comments: SEBASTIAN chest/back: Clusters of vesicular lesions noted   Neurological:      General: No focal deficit present.      Mental Status: She is alert and oriented to person, place, and time.   Psychiatric:         Mood and Affect: Mood normal.         Behavior: Behavior normal.         Thought Content: Thought content normal.         Judgment: Judgment normal.             ED Course                      MDM   Clinical Impression: shingles  Course of Treatment:   Take Motrin and/or Tylenol for discomfort.  Avoid picking or scratching at the lesions especially do not touch the eye region.   Wear loose cotton clothing over it.  Take the full course of Valtrex as prescribed.  Make a follow-up appointment with your PCP for further evaluation and treatment.    The patient is encouraged to return if any concerning symptoms arise. Additional verbal discharge instructions are given and discussed. Discharge medications are discussed. The patient is in good condition throughout the visit today and remains so upon discharge. I discuss the plan of care with the patient, who expresses understanding. All questions and concerns are addressed to the patient's satisfaction prior to discharge today.  Previous conversations with PCP and charts were reviewed.                                           Disposition and Plan     Clinical Impression:  1. Herpes zoster without complication         Disposition:  Discharge  6/22/2024  9:45 am    Follow-up:  Bonnie Danielson MD  [address] [address]  [phone]                Medications Prescribed:  Current Discharge Medication List        START taking these medications    Details   valACYclovir 1 G Oral Tab Take 1 tablet (1,000 mg total) by mouth 3 (three) times daily for 7 days.  Qty: 21 tablet, Refills: 0

## 2024-06-22 NOTE — DISCHARGE INSTRUCTIONS
Please return to the ER/clinic if symptoms worsen. Follow-up with your PCP in 24-48 hours as needed.    Take Motrin and/or Tylenol for discomfort.  Avoid picking or scratching at the lesions especially do not touch the eye region.  Wear loose cotton clothing over it.  Take the full course of Valtrex as prescribed.  Make a follow-up appointment with your PCP for further evaluation and treatment.

## 2024-06-22 NOTE — ED INITIAL ASSESSMENT (HPI)
Patient reports rash to mid to left chest as well as left upper back x several days. Painful and tender to touch. Blistering red rash observed. States she has also felt some irritation to left under arm as well.   
good

## 2024-06-24 ENCOUNTER — TELEPHONE (OUTPATIENT)
Dept: INTERNAL MEDICINE CLINIC | Facility: CLINIC | Age: 89
End: 2024-06-24

## 2024-06-24 NOTE — TELEPHONE ENCOUNTER
Called the pt but VM full unable to leave message. Pt notified through [email or web address]     Appointment canceled.     Front dest, please call the pt also to make appt, thank you . Please schedule for next week thanks

## 2024-06-24 NOTE — TELEPHONE ENCOUNTER
Patient cancelled her appointment for a BP check for tomorrow.  She was recently diagnosed with shingles and is wondering if there's anything else she should do besides taking the medicine prescribed for her.  She is uncomfortable and does not want to come in to the office.  Can she do a video visit instead?  When should she reschedule the BP check?  Please advise.  Thank you!

## 2024-06-24 NOTE — TELEPHONE ENCOUNTER
She should continue Valacyclovir that was prescribed in the UC for 7 days. She should reschedule BP check appt to the next week, we will follow up on shingles as well at that time. Thanks

## 2024-06-24 NOTE — TELEPHONE ENCOUNTER
Pt has shingles, is it okay to change to VV to discuss shingles/urgent care visit (notes in epic) and BP? Thanks!

## 2024-06-24 NOTE — TELEPHONE ENCOUNTER
Can patient shower daily and keep any lesions clean? Can she wear sleeve or try to wear looser clothing for the time being? Please advise thanks!

## 2024-06-24 NOTE — TELEPHONE ENCOUNTER
Patient has questions about her shingles.  Should she shower every day and wash the affected area?  Should she wear her \"sleeve\" on her affected arm?  Please advise.  Thank you!    Patient will contact her daughter to see if she is able to bring her in for an appointment next Monday.

## 2024-06-25 NOTE — TELEPHONE ENCOUNTER
She can shower as she usually does.    To reduce the risk of transmitting VZV to others, patients should keep the rash covered until all lesions have crusted. The clothing can be loose, but should cover the area.  They should also avoid contact with individuals who may be at risk for acquiring VZV. This includes:  All immunocompromised individuals  Immunocompetent individuals who have neither had varicella (chickenpox) nor received the shingles vaccine

## 2024-06-25 NOTE — TELEPHONE ENCOUNTER
Future Appointments   Date Time Provider Department Center   7/1/2024  2:30 PM Bonnie Danielson MD [facility] [address] [facility] [address] [place]   8/23/2024  1:00 PM  MELISSA RM3  [facility]   8/23/2024  1:45 PM  [facility]  [facility]   8/27/2024  1:15 PM Krista Warren MD  [facility]   8/27/2024  1:30 PM  [place] RN1  [facility]     Patient notified. Patient verbalized understanding

## 2024-07-01 ENCOUNTER — TELEMEDICINE (OUTPATIENT)
Dept: INTERNAL MEDICINE CLINIC | Facility: CLINIC | Age: 89
End: 2024-07-01
Payer: MEDICARE

## 2024-07-01 DIAGNOSIS — B02.29 POSTHERPETIC NEURALGIA: Primary | ICD-10-CM

## 2024-07-01 DIAGNOSIS — G47.00 INSOMNIA, UNSPECIFIED TYPE: ICD-10-CM

## 2024-07-01 DIAGNOSIS — F41.9 ANXIETY: ICD-10-CM

## 2024-07-01 DIAGNOSIS — L03.319 CELLULITIS OF TRUNK, UNSPECIFIED SITE OF TRUNK: ICD-10-CM

## 2024-07-01 PROCEDURE — 99213 OFFICE O/P EST LOW 20 MIN: CPT | Performed by: STUDENT IN AN ORGANIZED HEALTH CARE EDUCATION/TRAINING PROGRAM

## 2024-07-01 RX ORDER — GABAPENTIN 300 MG/1
CAPSULE ORAL
Qty: 60 CAPSULE | Refills: 1 | Status: SHIPPED | OUTPATIENT
Start: 2024-07-01

## 2024-07-01 RX ORDER — TEMAZEPAM 15 MG/1
CAPSULE ORAL NIGHTLY PRN
Qty: 60 CAPSULE | Refills: 0 | Status: SHIPPED | OUTPATIENT
Start: 2024-07-01

## 2024-07-01 RX ORDER — VALACYCLOVIR HYDROCHLORIDE 1 G/1
1000 TABLET, FILM COATED ORAL 3 TIMES DAILY
Qty: 16 TABLET | Refills: 0 | Status: SHIPPED | OUTPATIENT
Start: 2024-07-01 | End: 2024-07-06

## 2024-07-01 RX ORDER — CEPHALEXIN 500 MG/1
500 CAPSULE ORAL 3 TIMES DAILY
Qty: 20 CAPSULE | Refills: 0 | Status: SHIPPED | OUTPATIENT
Start: 2024-07-01 | End: 2024-07-01

## 2024-07-01 RX ORDER — CEPHALEXIN 500 MG/1
500 CAPSULE ORAL 3 TIMES DAILY
Qty: 21 CAPSULE | Refills: 0 | Status: SHIPPED | OUTPATIENT
Start: 2024-07-01

## 2024-07-01 RX ORDER — BACITRACIN ZINC, POLYMYXIN B SULFATE, NEOMYCIN SULFATE 400; 5000; 3.5 [USP'U]/G; [USP'U]/G; MG/G
1 OINTMENT TOPICAL 2 TIMES DAILY
Qty: 9.35 G | Refills: 0 | Status: SHIPPED | OUTPATIENT
Start: 2024-07-01 | End: 2024-07-05 | Stop reason: ALTCHOICE

## 2024-07-01 RX ORDER — NEOMYCIN SULFATE, POLYMYXIN B SULFATE, AND DEXAMETHASONE 3.5; 10000; 1 MG/G; [USP'U]/G; MG/G
OINTMENT OPHTHALMIC
COMMUNITY
Start: 2024-04-23

## 2024-07-01 NOTE — PROGRESS NOTES
TELEHEALTH VIDEO VISIT    I spoke with Meena Whitman by secure Epic  video service on 07/05/24, verified date of birth, patient stated they are in the state [facility], and discussed their concerns as below:      Patient was recently diagnosed with shingles and completed the 7 day course of Valtrex 2-3 days ago. She continues to have significant pain in the area, which is severe, sometimes shooting pain around her left breast. Patient states that she was doing well few days ago, however since yesterday pain got worse, and now she has very little energy.  She took tylenol 1000 mg BID and Alieve 220 mg once a day. No nausea or vomiting, able to tolerate oral intake. No fevers, chills.         There were no vitals filed for this visit.  There is no height or weight on file to calculate BMI.  BP Readings from Last 3 Encounters:   07/04/24 147/62   06/22/24 159/79   04/02/24 160/70     The ASCVD Risk score (Elizabeth DK, et al., 2019) failed to calculate for the following reasons:    The 2019 ASCVD risk score is only valid for ages 40 to 79  Reviewed Current Medications:  Current Outpatient Medications   Medication Sig Dispense Refill    neomycin-polymyxin-dexamethasone [id number] Ophthalmic Ointment APPLY A SMALL AMOUNT TO LEFT EYE ONCE DAILY FOR 5 DAYS      gabapentin 300 MG Oral Cap Start with 1 tab(300 mg) at night for the first 3 days, then take 1 tab (300 mg) in the morning and 1 tab (300 mg) at night 60 capsule 1    Neomycin-Bacitracin-Polymyxin (TRIPLE ANTIBIOTIC) External Ointment Apply 1 Application topically in the morning and 1 Application before bedtime. 9.35 g 0    valACYclovir 1 G Oral Tab Take 1 tablet (1,000 mg total) by mouth 3 (three) times daily for 5 days. 16 tablet 0    cephalexin 500 MG Oral Cap Take 1 capsule (500 mg total) by mouth 3 (three) times daily. 21 capsule 0    simethicone 80 MG Oral Chew Tab Chew 1 tablet (80 mg total) by mouth 4 (four) times daily with meals and  nightly. 120 tablet 0    Elastic Bandages & Supports (SKINEEZ COMPRESSION ARM SLEEVE) Does not apply Misc 2 Units daily as needed. 2 each 3    amLODIPine 5 MG Oral Tab Take 1 tablet (5 mg total) by mouth daily. 90 tablet 1    labetalol 100 MG Oral Tab Take 1 tablet (100 mg total) by mouth 2 (two) times daily. 180 tablet 1    losartan 100 MG Oral Tab Take 1 tablet (100 mg total) by mouth daily. 90 tablet 1    estradiol 0.1 MG/GM Vaginal Cream Place 1 g vaginally daily. 42.5 g 3    Vitamin D, Cholecalciferol, 25 MCG (1000 UT) Oral Tab Take 1,000 Units by mouth daily. Take 3 caps po q am 270 tablet 3    acetaZOLAMIDE 250 MG Oral Tab TAKE ONE TABLET BY MOUTH TWICE DAILY AT BREAKFAST AND DINNER      Multiple Vitamins-Minerals (COMPLETE DAILY/LUTEIN) Oral Tab Take 1 tablet by mouth 3 (three) times daily.      ondansetron 4 MG Oral Tablet Dispersible Take 1 tablet (4 mg total) by mouth every 4 (four) hours as needed for Nausea. 10 tablet 0    HYDROcodone-acetaminophen 5-325 MG Oral Tab Take 1 tablet by mouth every 6 (six) hours as needed for Pain. 10 tablet 0    docusate sodium 100 MG Oral Cap Take 1 capsule (100 mg total) by mouth 2 (two) times daily. 60 capsule 0    Lidocaine 0.5 % External Gel Apply 1 Application topically every 6 (six) hours as needed. 1 each 0    docusate sodium 100 MG Oral Cap Take 1 capsule (100 mg total) by mouth 2 (two) times daily. 60 capsule 0    TEMAZEPAM 15 MG Oral Cap TAKE 1-2 CAPSULES (15-30 MG TOTAL) BY MOUTH NIGHTLY AS NEEDED FOR SLEEP. 60 capsule 0       Assessment & Plan    1. Postherpetic neuralgia (Primary)  2. Cellulitis of trunk, unspecified site of trunk  Patient with increased pain in the area of recent shingles infection. Could be due to postherpetic neuralgia, however could also represent possible secondary skin infection as she does have increased redness and yellowish crusting, which could be seen with limitations of this video visit. Would prescribe gabapentin. SE discussed.  Will prescribe additional 5 days of valacyclovir as well.   Can use Triple antibiotic ointment topically once a day  Cephalexin course for possible developing secondary bacterial skin infection. Patient should call back to clinic if pain persists- would consider increasing the dose of gabapentin.  -     Gabapentin; Start with 1 tab(300 mg) at night for the first 3 days, then take 1 tab (300 mg) in the morning and 1 tab (300 mg) at night  Dispense: 60 capsule; Refill: 1  -     valACYclovir HCl; Take 1 tablet (1,000 mg total) by mouth 3 (three) times daily for 5 days.  Dispense: 16 tablet; Refill: 0  -     Triple Antibiotic; Apply 1 Application topically in the morning and 1 Application before bedtime.  Dispense: 9.35 g; Refill: 0  -     Cephalexin; Take 1 capsule (500 mg total) by mouth 3 (three) times daily.  Dispense: 21 capsule; Refill: 0            Follow Up: As needed/if symptoms worsen    Objective  Physical Exam  Constitutional:       Appearance: Normal appearance.   HENT:      Head: Normocephalic and atraumatic.   Eyes:      Extraocular Movements: Extraocular movements intact.      Conjunctiva/sclera: Conjunctivae normal.      Pupils: Pupils are equal, round, and reactive to light.   Skin:     Findings: Lesion and rash present.      Comments: There is a rash in the C8-T2 distribution on the left upper back, with ruptured vesucles, crusted, no discharge, but appears to be with increased redness.  There is a rash on the anterior left chest at T1-T2 distribution with few unruptured vesicles. There is yellowish crusting present. Please see the picture below.   Neurological:      Mental Status: She is alert.                Reviewed:  Patient Active Problem List    Diagnosis    Bone disorder    Vaginal dryness, menopausal    Insomnia    Lobular breast cancer, right (HCC)    Fibroids, intramural    Hair loss    Chronic fatigue    Primary hypertension    Age-related osteoporosis with current pathological fracture     Invasive lobular carcinoma of right breast in female (HCC)    Multinodular goiter; bx 2022 benign    Lymphedema of left arm    Chronic left-sided low back pain without sciatica    Paroxysmal SVT (supraventricular tachycardia) (HCC)    PCO (posterior capsular opacification), left    Osteoarthritis of both hips, unspecified osteoarthritis type    Scoliosis    Primary open-angle glaucoma, bilateral, severe stage    Hx of left breast cancer S/P lumpectomy & LN dissection 1986    DDD (degenerative disc disease), lumbar    Spondylolisthesis of lumbar region    Anxiety    Exophthalmia      Allergies   Allergen Reactions    Hydralazine OTHER (SEE COMMENTS)     Headache    Bactrim [Sulfamethoxazole W/Trimethoprim] RASH     Per patient    Metronidazole ITCHING     Metronidazole gel caused irritation in vaginal area.        Social History     Socioeconomic History    Marital status:     Number of children: 4   Occupational History    Occupation: Retired   Tobacco Use    Smoking status: Never    Smokeless tobacco: Never   Substance and Sexual Activity    Alcohol use: No     Alcohol/week: 0.0 standard drinks of alcohol    Drug use: No   Other Topics Concern    Caffeine Concern No    Exercise Yes     Comment: 3 times per week, [employer] [place]      Review of Systems   Constitutional:  Positive for activity change and fatigue. Negative for appetite change, chills, diaphoresis and fever.   HENT: Negative.     Eyes: Negative.    Respiratory: Negative.     Cardiovascular: Negative.    Gastrointestinal: Negative.    Skin:  Positive for color change, rash and wound.     All other systems negative unless stated in ROS or in HPI.       Bonnie Danielson MD  Internal Medicine       Call office with any questions or seek emergency care if necessary.   Patient understands and agrees to follow directions and advice.    Telehealth Verbal Consent   I conducted a telehealth visit with Meena Whitman today, 07/05/24, which  was completed using two-way, real-time interactive audio and video communication. This has been done in good olegario to provide continuity of care in the best interest of the provider-patient relationship. Every conscious effort was taken to allow for sufficient and adequate time to complete the visit.The patient was made aware of the limitations of the telehealth visit, including treatment limitations as no physical exam could be performed.  The patient was advised to call 911 or to go to the ER in case there was an emergency.  The patient was also advised of the potential privacy & security concerns related to the telehealth platform. The patient was made aware of where to find [facility]'s notice of privacy practices, telehealth consent form and other related consent forms and documents.  which are located on the [facility] website. The patient verbally agreed to telehealth consent form, related consents and the risks discussed.  Lastly, the patient confirmed that they were in [place]. Included in this visit, time may have been spent reviewing labs, medications, radiology tests and decision making. Appropriate medical decision-making and tests are ordered as detailed in the plan of care above.  Coding/billing information is submitted for this visit based on complexity of care and/or time spent for the visit.  ----------------------------------------- PATIENT INSTRUCTIONS-----------------------------------------     There are no Patient Instructions on file for this visit.

## 2024-07-01 NOTE — TELEPHONE ENCOUNTER
Last OV relevant to medication: today  Last refill date: 2/20/24 #60/refills: 0  When pt was asked to return for OV:   Upcoming appt/reason:   Future Appointments   Date Time Provider Department Center   8/23/2024  1:00 PM  MEL[facility]SA RM3  MAMMO [facility]   8/23/2024  1:45 PM  [facility]  LAB [facility]   8/27/2024  1:15 PM Krista Warren MD  HEM [facility]   8/27/2024  1:30 PM  TX RN1  CHEMO [facility]   Was pt informed of any over due labs: utd

## 2024-07-04 ENCOUNTER — APPOINTMENT (OUTPATIENT)
Dept: GENERAL RADIOLOGY | Facility: HOSPITAL | Age: 89
End: 2024-07-04
Attending: EMERGENCY MEDICINE
Payer: MEDICARE

## 2024-07-04 ENCOUNTER — HOSPITAL ENCOUNTER (EMERGENCY)
Facility: HOSPITAL | Age: 89
Discharge: HOME | End: 2024-07-04
Attending: EMERGENCY MEDICINE
Payer: MEDICARE

## 2024-07-04 VITALS
RESPIRATION RATE: 17 BRPM | DIASTOLIC BLOOD PRESSURE: 62 MMHG | HEART RATE: 71 BPM | WEIGHT: 114 LBS | SYSTOLIC BLOOD PRESSURE: 147 MMHG | OXYGEN SATURATION: 98 % | HEIGHT: 63 IN | BODY MASS INDEX: 20.2 KG/M2 | TEMPERATURE: 98 F

## 2024-07-04 DIAGNOSIS — B02.9 HERPES ZOSTER WITHOUT COMPLICATION: Primary | ICD-10-CM

## 2024-07-04 DIAGNOSIS — R74.8 ELEVATED LIVER ENZYMES: ICD-10-CM

## 2024-07-04 DIAGNOSIS — R53.83 FATIGUE, UNSPECIFIED TYPE: ICD-10-CM

## 2024-07-04 LAB
ALBUMIN SERPL-MCNC: 3.7 G/DL (ref 3.4–5)
ALBUMIN/GLOB SERPL: 1.2 {RATIO} (ref 1–2)
ALP LIVER SERPL-CCNC: 66 U/L
ALT SERPL-CCNC: 60 U/L
ANION GAP SERPL CALC-SCNC: 2 MMOL/L (ref 0–18)
AST SERPL-CCNC: 40 U/L (ref 15–37)
BASOPHILS # BLD AUTO: 0.04 X10(3) UL (ref 0–0.2)
BASOPHILS NFR BLD AUTO: 0.9 %
BILIRUB SERPL-MCNC: 0.5 MG/DL (ref 0.1–2)
BUN BLD-MCNC: 26 MG/DL (ref 9–23)
CALCIUM BLD-MCNC: 9.2 MG/DL (ref 8.5–10.1)
CHLORIDE SERPL-SCNC: 109 MMOL/L (ref 98–112)
CO2 SERPL-SCNC: 28 MMOL/L (ref 21–32)
CREAT BLD-MCNC: 0.87 MG/DL
EGFRCR SERPLBLD CKD-EPI 2021: 63 ML/MIN/1.73M2 (ref 60–?)
EOSINOPHIL # BLD AUTO: 0.16 X10(3) UL (ref 0–0.7)
EOSINOPHIL NFR BLD AUTO: 3.4 %
ERYTHROCYTE [DISTWIDTH] IN BLOOD BY AUTOMATED COUNT: 13.2 %
GLOBULIN PLAS-MCNC: 3.2 G/DL (ref 2.8–4.4)
GLUCOSE BLD-MCNC: 129 MG/DL (ref 70–99)
HCT VFR BLD AUTO: 39.5 %
HGB BLD-MCNC: 13.8 G/DL
IMM GRANULOCYTES # BLD AUTO: 0.02 X10(3) UL (ref 0–1)
IMM GRANULOCYTES NFR BLD: 0.4 %
LYMPHOCYTES # BLD AUTO: 1.44 X10(3) UL (ref 1–4)
LYMPHOCYTES NFR BLD AUTO: 31 %
MCH RBC QN AUTO: 32.9 PG (ref 26–34)
MCHC RBC AUTO-ENTMCNC: 34.9 G/DL (ref 31–37)
MCV RBC AUTO: 94.3 FL
MONOCYTES # BLD AUTO: 0.45 X10(3) UL (ref 0.1–1)
MONOCYTES NFR BLD AUTO: 9.7 %
NEUTROPHILS # BLD AUTO: 2.53 X10 (3) UL (ref 1.5–7.7)
NEUTROPHILS # BLD AUTO: 2.53 X10(3) UL (ref 1.5–7.7)
NEUTROPHILS NFR BLD AUTO: 54.6 %
OSMOLALITY SERPL CALC.SUM OF ELEC: 294 MOSM/KG (ref 275–295)
PLATELET # BLD AUTO: 169 10(3)UL (ref 150–450)
POTASSIUM SERPL-SCNC: 4.1 MMOL/L (ref 3.5–5.1)
PROT SERPL-MCNC: 6.9 G/DL (ref 6.4–8.2)
RBC # BLD AUTO: 4.19 X10(6)UL
SODIUM SERPL-SCNC: 139 MMOL/L (ref 136–145)
TROPONIN I SERPL HS-MCNC: 8 NG/L
WBC # BLD AUTO: 4.6 X10(3) UL (ref 4–11)

## 2024-07-04 PROCEDURE — 93010 ELECTROCARDIOGRAM REPORT: CPT

## 2024-07-04 PROCEDURE — 99285 EMERGENCY DEPT VISIT HI MDM: CPT

## 2024-07-04 PROCEDURE — 99284 EMERGENCY DEPT VISIT MOD MDM: CPT

## 2024-07-04 PROCEDURE — 96361 HYDRATE IV INFUSION ADD-ON: CPT

## 2024-07-04 PROCEDURE — 84484 ASSAY OF TROPONIN QUANT: CPT | Performed by: EMERGENCY MEDICINE

## 2024-07-04 PROCEDURE — 85025 COMPLETE CBC W/AUTO DIFF WBC: CPT | Performed by: EMERGENCY MEDICINE

## 2024-07-04 PROCEDURE — 80053 COMPREHEN METABOLIC PANEL: CPT | Performed by: EMERGENCY MEDICINE

## 2024-07-04 PROCEDURE — 71045 X-RAY EXAM CHEST 1 VIEW: CPT | Performed by: EMERGENCY MEDICINE

## 2024-07-04 PROCEDURE — 93005 ELECTROCARDIOGRAM TRACING: CPT

## 2024-07-04 PROCEDURE — 96374 THER/PROPH/DIAG INJ IV PUSH: CPT

## 2024-07-04 RX ORDER — ONDANSETRON 4 MG/1
4 TABLET, ORALLY DISINTEGRATING ORAL EVERY 4 HOURS PRN
Qty: 10 TABLET | Refills: 0 | Status: SHIPPED | OUTPATIENT
Start: 2024-07-04 | End: 2024-07-11

## 2024-07-04 RX ORDER — DOCUSATE SODIUM 100 MG/1
100 CAPSULE, LIQUID FILLED ORAL 2 TIMES DAILY
Qty: 60 CAPSULE | Refills: 0 | Status: SHIPPED | OUTPATIENT
Start: 2024-07-04 | End: 2024-08-03

## 2024-07-04 RX ORDER — HYDROCODONE BITARTRATE AND ACETAMINOPHEN 5; 325 MG/1; MG/1
1 TABLET ORAL ONCE
Status: COMPLETED | OUTPATIENT
Start: 2024-07-04 | End: 2024-07-04

## 2024-07-04 RX ORDER — ONDANSETRON 2 MG/ML
4 INJECTION INTRAMUSCULAR; INTRAVENOUS ONCE
Status: COMPLETED | OUTPATIENT
Start: 2024-07-04 | End: 2024-07-04

## 2024-07-04 RX ORDER — HYDROCODONE BITARTRATE AND ACETAMINOPHEN 5; 325 MG/1; MG/1
1 TABLET ORAL EVERY 6 HOURS PRN
Qty: 10 TABLET | Refills: 0 | Status: SHIPPED | OUTPATIENT
Start: 2024-07-04 | End: 2024-07-09

## 2024-07-04 RX ORDER — DOCUSATE SODIUM 100 MG/1
100 CAPSULE, LIQUID FILLED ORAL 2 TIMES DAILY
Qty: 60 CAPSULE | Refills: 0 | Status: SHIPPED | OUTPATIENT
Start: 2024-07-04 | End: 2024-07-05

## 2024-07-04 NOTE — ED PROVIDER NOTES
Patient Seen in: [facility] Emergency Department      History     Chief Complaint   Patient presents with    Shingles     Stated Complaint: shingles to L side of chest, shoulder.  on 2nd course of antivirals.    Subjective:   HPI    Patient presents with shingles pain.  The patient has had the symptoms for about 2 weeks.  She has a rash to her left chest and upper back.  The back portion still has open ulcers that are intensely painful.  She has not had any significant drainage.  She had a video conference with her physician on Monday.  She was placed on a repeat course of Valtrex as well as cephalexin and gabapentin.  She had also tried Aleve and Tylenol for pain.  Her daughter states she is putting in Neosporin cream with a pain reliever on the ulcers but ultimately the patient states the pain is only getting worse.  It is a sharp stabbing pain.  She is also now starting to feel just very weak and tired.  She states she is eating and drinking.  She is not short of breath.  She has not had a fever.  Daughter states when she has had narcotics in the past she had some depression.  She has concerns about the patient taking narcotics as she lives in an independent living facility.    Objective:   Past Medical History:    Abnormal weight loss    Basal cell carcinoma (BCC) of left side of nose    Breast CA (HCC)    Breast cancer (HCC)    ILC, IDC, LCIS, ALH, DCIS    Cellulitis    COPD (chronic obstructive pulmonary disease) (HCC)    Ductal carcinoma in situ of breast    Exposure to radiation    High blood pressure    Keratoconjunctivitis sicca of both eyes (HCC)    Lobular carcinoma in situ of breast    Lymph edema    left arm    Pelvic pressure in female    Post covid-19 condition, unspecified    Post herpetic neuralgia    Shingles              Past Surgical History:   Procedure Laterality Date    Cataract Bilateral     Eye surgery      both eyes - trabulectomys 7-8 TIMES    Eye surgery      DETACHED RETINA  SURGERY    Glaucoma surgery  07/07/2016    Hernia surgery  10/18/2017    umbilical hernia    Hx breast cancer Left 1986    Injection, w/wo contrast, dx/therapeutic substance, epidural/subarachnoid; lumbar/sacral N/A 03/11/2016    Procedure: LUMBAR EPIDURAL;  Surgeon: Eric Montenegro MD;  Location: [facility]    Injection, w/wo contrast, dx/therapeutic substance, epidural/subarachnoid; lumbar/sacral N/A 04/04/2016    Procedure: LUMBAR EPIDURAL;  Surgeon: Eric Montenegro MD;  Location: [facility]    Lumpectomy left  1986    Mammogram, both breasts  07/22/2013    stable results (done in [place])    Needle biopsy right Right 04/2022    IDC, DCIS, LCIS    Other surgical history  06/01/2021    Cystoscopy (Dr. Brand)    Patient documented not to have experienced any of the following events N/A 03/11/2016    Procedure: LUMBAR EPIDURAL;  Surgeon: Eric Montenegro MD;  Location: [facility]    Patient documented not to have experienced any of the following events N/A 04/04/2016    Procedure: LUMBAR EPIDURAL;  Surgeon: Eric Montenegro MD;  Location: [facility]    Patient withough preoperative order for iv antibiotic surgical site infection prophylaxis. N/A 03/11/2016    Procedure: LUMBAR EPIDURAL;  Surgeon: Eric Montenegro MD;  Location: [facility]    Patient withough preoperative order for iv antibiotic surgical site infection prophylaxis. N/A 04/04/2016    Procedure: LUMBAR EPIDURAL;  Surgeon: Eric Montenegro MD;  Location: [facility]    Radiation left  1986    Skin surgery Left     Scar to L chin s/p removal of BCC \"years ago.\" Scar C/D/I    Tonsillectomy                  Social History     Socioeconomic History    Marital status:     Number of children: 4   Occupational History    Occupation: Retired   Tobacco Use    Smoking status: Never    Smokeless tobacco: Never   Substance and Sexual  Activity    Alcohol use: No     Alcohol/week: 0.0 standard drinks of alcohol    Drug use: No   Other Topics Concern    Caffeine Concern No    Exercise Yes     Comment: 3 times per week, [employer]              Review of Systems    Positive for stated Chief Complaint: Shingles    Other systems are as noted in HPI.  Constitutional and vital signs reviewed.      All other systems reviewed and negative except as noted above.    Physical Exam     ED Triage Vitals [07/04/24 1318]   BP (!) 181/72   Pulse 69   Resp 20   Temp 97.6 °F (36.4 °C)   Temp src Oral   SpO2 97 %   O2 Device None (Room air)       Current Vitals:   Vital Signs  BP: 147/62  Pulse: 71  Resp: 17  Temp: 97.6 °F (36.4 °C)  Temp src: Oral  MAP (mmHg): 88    Oxygen Therapy  SpO2: 98 %  O2 Device: None (Room air)            Physical Exam    General: Alert and oriented x3 in no acute distress, appears uncomfortable.  Cardiovascular: Regular rate and rhythm, no murmurs.  Respiratory: Lungs clear to auscultation.  Patient very sensitive to even light touch in the left chest and upper back.  Extremities: No CCE.  Skin: See below picture.  No purulent discharge or foul odor associated with the back ulcerations, no significant increased warmth to palpation.      ED Course     Labs Reviewed   COMP METABOLIC PANEL (14) - Abnormal; Notable for the following components:       Result Value    Glucose 129 (*)     BUN 26 (*)     AST 40 (*)     ALT 60 (*)     All other components within normal limits   TROPONIN I HIGH SENSITIVITY - Normal   CBC WITH DIFFERENTIAL WITH PLATELET    Narrative:     The following orders were created for panel order CBC With Differential With Platelet.  Procedure                               Abnormality         Status                     ---------                               -----------         ------                     CBC W/ DIFFERENTIAL[[id number]]                              Final result                 Please view  results for these tests on the individual orders.   CBC W/ DIFFERENTIAL     EKG    Rate, intervals and axes as noted on EKG Report.  Rate: 71  Rhythm: Sinus Rhythm  Reading: No acute ischemic abnormality         I personally reviewed the chest films and no infiltrate or edema noted.        XR CHEST AP PORTABLE  (CPT=71045)    Result Date: 7/4/2024  PROCEDURE:  XR CHEST AP PORTABLE  (CPT=71045)  TECHNIQUE:  AP chest radiograph was obtained.  COMPARISON:  [facility] , XR, XR CHEST PA + LAT CHEST (CPT=71046), 3/21/2022, 5:32 PM.  INDICATIONS:  SHINGLES to L side of chest, shoulder.  on 2nd course of antivirals.  PATIENT STATED HISTORY: (As transcribed by Technologist)  Patient shares that she has active shingles on her left side of the chest, and left shoulder.   FINDINGS:  The heart is borderline in size.  The lungs are clear of acute-appearing disease process.  The costophrenic angles are sharp.  There is no active disease seen on the basis of portable radiography.  Left axillary clips            CONCLUSION:  Borderline heart size. No active disease seen.   LOCATION:  [facility]      Dictated by (CST): Jose Francisco Thompson MD on 7/04/2024 at 3:16 PM     Finalized by (CST): Jose Francisco Thompson MD on 7/04/2024 at 3:16 PM        Medications   HYDROcodone-acetaminophen (Norco) 5-325 MG per tab 1 tablet (1 tablet Oral Given 7/4/24 1417)   ondansetron (Zofran) 4 MG/2ML injection 4 mg (4 mg Intravenous Given 7/4/24 1426)   sodium chloride 0.9 % IV bolus 1,000 mL (0 mL Intravenous Stopped 7/4/24 1719)     The patient was given Norco with Zofran for her pain.  She was bolused with normal saline given her complaints of fatigue.  She did feel some improvement in her symptoms with the medicine.       MDM      Patient presents with intractable pain and rash to left chest and upper back.  Differential diagnosis includes but is not limited to herpes zoster, bacterial superinfection, pneumonia and acute coronary syndrome.  Patient and daughter have  been concerned as the patient seems to be worsening instead of improving with treatment.  Her EKG does not show ischemic changes and her cardiac enzymes are negative.  Her chest x-ray is clear.  She does not have a fever or leukocytosis.  I do not suspect that there is a superimposed bacterial infection based on my exam but suggested that she finish her course of antibiotics that she has started.  She does have an elevated BUN which may suggest relative dehydration.  She has some slight increase in her LFTs which is relatively minor and have counseled her to follow-up on.  Case management was consulted regarding upgrading the patient to a higher level of care.  They were able to arrange this.  Given that the patient is going to have more attention at the facility, daughter and patient are comfortable discharging her on a narcotic.  They are aware of the side effects.  The patient should start a stool softener.  The patient will continue her other medication, including gabapentin, and try to wean off the narcotics when able.  They should continue with wound care and topical antibiotics for the open ulcers on her back.  I will also give her topical lidocaine to hopefully give her some additional pain relief.  Daughter will take patient directly to the care facility from here.                           Medical Decision Making      Disposition and Plan     Clinical Impression:  1. Herpes zoster without complication    2. Fatigue, unspecified type    3. Elevated liver enzymes         Disposition:  Discharge  7/4/2024  4:25 pm    Follow-up:  Bonnie Danielson MD  [address] [address]  [phone]    Follow up            Medications Prescribed:  Discharge Medication List as of 7/4/2024  5:23 PM        START taking these medications    Details   ondansetron 4 MG Oral Tablet Dispersible Take 1 tablet (4 mg total) by mouth every 4 (four) hours as needed for Nausea., Print, Disp-10 tablet, R-0       HYDROcodone-acetaminophen 5-325 MG Oral Tab Take 1 tablet by mouth every 6 (six) hours as needed for Pain., Print, Disp-10 tablet, R-0      docusate sodium 100 MG Oral Cap Take 1 capsule (100 mg total) by mouth 2 (two) times daily., Normal, Disp-60 capsule, R-0      Lidocaine 0.5 % External Gel Apply 1 Application topically every 6 (six) hours as needed., Print, Disp-1 each, R-0

## 2024-07-04 NOTE — CM/SW NOTE
CM assistance requested by Dr. Terry to discuss discharge planning options with patient and her daughter.     CM at bedside. Per Daughter, Miranda, pt resides at [facility] and is currently being treated for shingles. Pt has been having an increase in pain. Pt is being given narcotic pain med and daughter is requesting patient go to the [facility]/SNF so she has more assistance while she recovers and is taking the narcotic.    JANICE called [facility] [phone] and [facility] for admissions to return CM call. Per Skylar at the , someone will return CM call today.    @1515 JANICE received a call from Deedee at [facility]. Per Deedee, she request that the patient daughter call her to discuss financials. Per Deedee she will also page her  since the pt will need a private iso room.    @1540 Per Deedee they can accept the patient. All requested forms uploaded via aidin, PASRUTHIE completed. Deedee request patient not leave [facility] prior to 1700.  notified Mini CHRISTIAN and Dr. Terry.

## 2024-07-04 NOTE — ED INITIAL ASSESSMENT (HPI)
Patient has shingles to left breast and around her left upper back. On 2nd course of Valtrex, and an antibiotic (Cephalexin) for infected shingles. Taking Gabapentin for nerve pain.

## 2024-07-05 ENCOUNTER — INITIAL APN SNF VISIT (OUTPATIENT)
Dept: INTERNAL MEDICINE CLINIC | Age: 89
End: 2024-07-05

## 2024-07-05 VITALS
HEART RATE: 75 BPM | OXYGEN SATURATION: 95 % | WEIGHT: 122.38 LBS | SYSTOLIC BLOOD PRESSURE: 116 MMHG | RESPIRATION RATE: 16 BRPM | BODY MASS INDEX: 22 KG/M2 | TEMPERATURE: 98 F | DIASTOLIC BLOOD PRESSURE: 77 MMHG

## 2024-07-05 DIAGNOSIS — G47.00 INSOMNIA, UNSPECIFIED TYPE: ICD-10-CM

## 2024-07-05 DIAGNOSIS — R74.8 ELEVATED LIVER ENZYMES: ICD-10-CM

## 2024-07-05 DIAGNOSIS — B02.29 POSTHERPETIC NEURALGIA: Primary | ICD-10-CM

## 2024-07-05 DIAGNOSIS — B02.7 DISSEMINATED HERPES ZOSTER: ICD-10-CM

## 2024-07-05 DIAGNOSIS — I10 PRIMARY HYPERTENSION: ICD-10-CM

## 2024-07-05 LAB
ATRIAL RATE: 71 BPM
P AXIS: 76 DEGREES
P-R INTERVAL: 170 MS
Q-T INTERVAL: 390 MS
QRS DURATION: 68 MS
QTC CALCULATION (BEZET): 423 MS
R AXIS: 26 DEGREES
T AXIS: 37 DEGREES
VENTRICULAR RATE: 71 BPM

## 2024-07-05 PROCEDURE — 1123F ACP DISCUSS/DSCN MKR DOCD: CPT | Performed by: NURSE PRACTITIONER

## 2024-07-05 PROCEDURE — 99310 SBSQ NF CARE HIGH MDM 45: CPT | Performed by: NURSE PRACTITIONER

## 2024-07-05 RX ORDER — HYDROCODONE BITARTRATE AND ACETAMINOPHEN 10; 325 MG/1; MG/1
1 TABLET ORAL EVERY 8 HOURS
Qty: 30 TABLET | Refills: 0 | Status: SHIPPED | OUTPATIENT
Start: 2024-07-05

## 2024-07-05 NOTE — PROGRESS NOTES
Meena Whitman  : 1933  Age 89 year old  female patient is admitted to Facility: [facility] for subacute rehab.    [facility] Admit date:  24  Discharge date to [facility]:  24  ELOS:  1 week   Anticipated discharge date:  24    Chief Complaint   Patient presents with    Follow - Up     Pain, shingles        Discharge Diagnoses:  Shingles  Post herpetic neuralgia     HPI  Meena Whitman is a 89-year-old female with a past medical history significant for HTN, glaucoma, and keratoconjuctivitis sicca. Meena was evaluated and discharged from [facility] ED for shingles and persistent post herpetic neuralgia. She was recently diagnosed with Shingles about two weeks ago and was started on Valcyclovir. She was seen by her PCP via telemedicine on Monday d/t the persistent pain and concern for possible bacterial infection with her lesions. She was prescribed a repeat course of Valacyclovir and was started on Cephalexin and Gabapentin. The patient reports also taking Aleve and Tylenol for her pain without any relief. Daughter was concerned on starting narcotics while at home d/t the side effects and the patient experiencing depression while taking narcotics in the past. Now discharged to subacute rehab as respite for closer monitoring while on narcotics for pain management.     Today:  Patient seen today for initial aprn visit. She was seen sitting up in her bed accompanied by her daughter. Patient reports she is in extreme pain rating 10 out of 10 on the numerical pain scale. She describes the pain as sharp burning pain to her L chest and L upper back where her lesions are located. Did receive a dose of Gabapentin and Norco this AM without any relief. Denies fatigue, fever, body ache/chills, SOB, chest pain, cough, abdominal pain, nausea, vomiting, urinary or bowel complaints.     Past Medical History:    Abnormal weight loss    Basal cell carcinoma (BCC) of left side of  nose    Breast CA (HCC)    Breast cancer (HCC)    ILC, IDC, LCIS, ALH, DCIS    Cellulitis    COPD (chronic obstructive pulmonary disease) (HCC)    Ductal carcinoma in situ of breast    Exposure to radiation    High blood pressure    Keratoconjunctivitis sicca of both eyes (HCC)    Lobular carcinoma in situ of breast    Lymph edema    left arm    Pelvic pressure in female    Post covid-19 condition, unspecified    Post herpetic neuralgia    Shingles     Past Surgical History:   Procedure Laterality Date    Cataract Bilateral     Eye surgery      both eyes - trabulectomys 7-8 TIMES    Eye surgery      DETACHED RETINA SURGERY    Glaucoma surgery  07/07/2016    Hernia surgery  10/18/2017    umbilical hernia    Hx breast cancer Left 1986    Injection, w/wo contrast, dx/therapeutic substance, epidural/subarachnoid; lumbar/sacral N/A 03/11/2016    Procedure: LUMBAR EPIDURAL;  Surgeon: Eric Montenegro MD;  Location: [facility] PAIN MANAGEMENT    Injection, w/wo contrast, dx/therapeutic substance, epidural/subarachnoid; lumbar/sacral N/A 04/04/2016    Procedure: LUMBAR EPIDURAL;  Surgeon: Eric Montenegro MD;  Location: [facility] PAIN MANAGEMENT    Lumpectomy left  1986    Mammogram, both breasts  07/22/2013    stable results (done in [place])    Needle biopsy right Right 04/2022    IDC, DCIS, LCIS    Other surgical history  06/01/2021    Cystoscopy (Dr. Brand)    Patient documented not to have experienced any of the following events N/A 03/11/2016    Procedure: LUMBAR EPIDURAL;  Surgeon: Eric Montenegro MD;  Location: [facility] FOR PAIN MANAGEMENT    Patient documented not to have experienced any of the following events N/A 04/04/2016    Procedure: LUMBAR EPIDURAL;  Surgeon: Eric Montenegro MD;  Location: [facility] FOR PAIN MANAGEMENT    Patient withough preoperative order for iv antibiotic surgical site infection prophylaxis. N/A 03/11/2016    Procedure: LUMBAR EPIDURAL;  Surgeon: Eric Montenegro MD;   Location[facility]    Patient withough preoperative order for iv antibiotic surgical site infection prophylaxis. N/A 04/04/2016    Procedure: LUMBAR EPIDURAL;  Surgeon: Eric Montenegro MD;  Location: [facility] FOR PAIN MANAGEMENT    Radiation left  1986    Skin surgery Left     Scar to L chin s/p removal of BCC \"years ago.\" Scar C/D/I    Tonsillectomy       Family History   Problem Relation Age of Onset    DCIS Self     LCIS Self     Breast Cancer Self 55    Cancer Mother     Breast Cancer Mother         dx age 70's    Cancer Father     Other (Other) Father     Breast Cancer Daughter         dx age 40     Social History     Socioeconomic History    Marital status:     Number of children: 4   Occupational History    Occupation: Retired   Tobacco Use    Smoking status: Never    Smokeless tobacco: Never   Substance and Sexual Activity    Alcohol use: No     Alcohol/week: 0.0 standard drinks of alcohol    Drug use: No   Other Topics Concern    Caffeine Concern No    Exercise Yes     Comment: 3 times per week, [employer]       ALLERGIES:  Allergies   Allergen Reactions    Hydralazine OTHER (SEE COMMENTS)     Headache    Bactrim [Sulfamethoxazole W/Trimethoprim] RASH     Per patient    Metronidazole ITCHING     Metronidazole gel caused irritation in vaginal area.       SANDY Jeong (daughter)    CODE STATUS:  Full Code    ADVANCED CARE PLANNING TEAM: None      CURRENT MEDICATIONS   Current Outpatient Medications   Medication Sig Dispense Refill    HYDROcodone-acetaminophen (NORCO)  MG Oral Tab Take 1 tablet by mouth every 8 (eight) hours. 30 tablet 0    ondansetron 4 MG Oral Tablet Dispersible Take 1 tablet (4 mg total) by mouth every 4 (four) hours as needed for Nausea. 10 tablet 0    HYDROcodone-acetaminophen 5-325 MG Oral Tab Take 1 tablet by mouth every 6 (six) hours as needed for Pain. 10 tablet 0    docusate sodium 100 MG Oral Cap Take 1 capsule (100 mg  total) by mouth 2 (two) times daily. 60 capsule 0    Lidocaine 0.5 % External Gel Apply 1 Application topically every 6 (six) hours as needed. 1 each 0    TEMAZEPAM 15 MG Oral Cap TAKE 1-2 CAPSULES (15-30 MG TOTAL) BY MOUTH NIGHTLY AS NEEDED FOR SLEEP. 60 capsule 0    gabapentin 300 MG Oral Cap Start with 1 tab(300 mg) at night for the first 3 days, then take 1 tab (300 mg) in the morning and 1 tab (300 mg) at night 60 capsule 1    valACYclovir 1 G Oral Tab Take 1 tablet (1,000 mg total) by mouth 3 (three) times daily for 5 days. 16 tablet 0    cephalexin 500 MG Oral Cap Take 1 capsule (500 mg total) by mouth 3 (three) times daily. 21 capsule 0    Elastic Bandages & Supports (SKINEEZ COMPRESSION ARM SLEEVE) Does not apply Misc 2 Units daily as needed. 2 each 3    amLODIPine 5 MG Oral Tab Take 1 tablet (5 mg total) by mouth daily. 90 tablet 1    labetalol 100 MG Oral Tab Take 1 tablet (100 mg total) by mouth 2 (two) times daily. 180 tablet 1    losartan 100 MG Oral Tab Take 1 tablet (100 mg total) by mouth daily. 90 tablet 1    estradiol 0.1 MG/GM Vaginal Cream Place 1 g vaginally daily. 42.5 g 3    Vitamin D, Cholecalciferol, 25 MCG (1000 UT) Oral Tab Take 1,000 Units by mouth daily. Take 3 caps po q am 270 tablet 3    acetaZOLAMIDE 250 MG Oral Tab TAKE ONE TABLET BY MOUTH TWICE DAILY AT BREAKFAST AND DINNER      neomycin-polymyxin-dexamethasone 3.5-26607-5.1 Ophthalmic Ointment APPLY A SMALL AMOUNT TO LEFT EYE ONCE DAILY FOR 5 DAYS      Multiple Vitamins-Minerals (COMPLETE DAILY/LUTEIN) Oral Tab Take 1 tablet by mouth 3 (three) times daily.         VITALS:  /77   Pulse 75   Temp 98.4 °F (36.9 °C)   Resp 16   Wt 122 lb 6.4 oz (55.5 kg)   SpO2 95%   BMI 21.68 kg/m²      REVIEW OF SYSTEMS:  GENERAL HEALTH: in pain   SKIN: shingles rash to L chest and L posterior upper back   WOUNDS: scabbed vesicular rash to L chest and L posterior upper back   EYES:no visual complaints or deficits  HENT: denies nasal  congestion, sinus pain or sore throat;  RESPIRATORY: denies shortness of breath, wheezing or cough   CARDIOVASCULAR:denies chest pain, no palpitations , denies cough  GI: denies nausea, vomiting, constipation, diarrhea; no rectal bleeding; no heartburn  Gu: No urinary frequency, urgency or dysuria  MUSCULOSKELETAL:no joint complaints upper or lower extremities  NEURO:no sensory or motor complaint  PSYCHE: no symptoms of depression and anxiety   HEMATOLOGY:denies hx anemia, denies excessive bleeding  ENDOCRINE: denies excessive thirst or urination; denies unexpected wt gain or wt loss  ALLERGY/IMM.: denies food or seasonal allergies      PHYSICAL EXAM:  GENERAL HEALTH: well developed, well nourished, sitting up in bed appears uncomfortable and in pain  LINES, TUBES, DRAINS:  none  SKIN: dried, scabbed vesicular lesions with surrounding erythema to L chest and L posterior back, no drainage noted   WOUND: refer to wound RN assessment  EYES: sclera anicteric, conjunctiva normal; there is no nystagmus, no drainage from eyes  HENT: normocephalic; normal nose, no nasal drainage, mucous membranes pink, moist.  NECK: supple, non tender, FROM  BREAST: deferred  RESPIRATORY:clear, no crackles, no wheezing, no dyspnea, no cough, room air  CARDIOVASCULAR: RRR, S1 and S2, no murmurs, no edema  ABDOMEN:  normal active BS+, soft, nondistended; no organomegaly, no masses; nontender, no guarding, no rebound tenderness.  :no suprapubic distension  LYMPHATIC:no lymphedema  MUSCULOSKELETAL: no acute synovitis upper or lower extremity  EXTREMITIES/VASCULAR:no cyanosis, clubbing or edema, radial pulses 2+, and dorsalis pedal pulses 2+  NEUROLOGIC: A&OX3, no focal deficits, follows commands  PSYCHIATRIC: calm, cooperative, mood and affect appropriate to situation    Therapy Update  Ambulation:  tbd  ADLs/Transfers:  tbd  DC planning:  tbs    DIAGNOSTICS REVIEWED AT THIS VISIT:    Lab Results   Component Value Date    WBC 4.6 07/04/2024     RBC 4.19 07/04/2024    HGB 13.8 07/04/2024    HCT 39.5 07/04/2024    MCV 94.3 07/04/2024    MCH 32.9 07/04/2024    MCHC 34.9 07/04/2024    RDW 13.2 07/04/2024    .0 07/04/2024     Lab Results   Component Value Date     (H) 07/04/2024    BUN 26 (H) 07/04/2024    BUNCREA 39.0 (H) 04/12/2021    CREATSERUM 0.87 07/04/2024    ANIONGAP 2 07/04/2024    GFR 76 02/27/2017    GFRNAA 62 07/20/2022    GFRAA 71 07/20/2022    CA 9.2 07/04/2024    OSMOCALC 294 07/04/2024    ALKPHO 66 07/04/2024    AST 40 (H) 07/04/2024    ALT 60 (H) 07/04/2024    BILT 0.5 07/04/2024    TP 6.9 07/04/2024    ALB 3.7 07/04/2024    GLOBULIN 3.2 07/04/2024     07/04/2024    K 4.1 07/04/2024     07/04/2024    CO2 28.0 07/04/2024       [facility] medical records reviewed.  Medication reconciliation completed.        SEE PLAN BELOW    Shingles  Post herpetic neuralgia   Pain management  - Monitor and assess pain  - Now on second round of Valcyclovir per PCP   - Continue Valcyclovir 1g TID. EOT 7/7   - Increase Norco 5/325 prn to scheduled Norco 10/325 q8hrs   - Increase gabapentin 300 BID to TID  - Lidocaine 3% cream TID   - Continue Tylenol 650 q4hrs prn   - Completed cephalexin 500 TID course by her PCP with concern with additional bacterial infection.   - Vesicular shingle lesions crusted and scabbed over now, does not appear infection, can discontinue contact isolation precautions and able to ambulate in halls and eat in dining area.   - Monitor     Elevated LFTs   - ALT 60, AFT 40 now, previously normal in the past   - may be related to OTC Tylenol use at home for pain  - no abdominal pain, no nausea, no vomiting  - monitor   - Repeat CMP Monday     HTN  - vital signs q shift and prn  - continue amlodipine 5 daily   - continue losartan 200 daily  - continue labetalol 10 daily     Glaucoma  - continue Diamox 250 bid     Keratoconjuctivitis sicca  - continue flebogamma QID   - continue heparin PF eye gtts  TID    Insomnia  - continue Temazepam 30 mg HS    Weakness and deconditioning   PT/OT eval and treat  ELOS 7 days   DC planning with MDT, SW, therapies, anticipated DC date : 7/11   Services on DC: C RN/PT/OT/SLP/SW  Equipment on DC:tbd     Vitamins/supplements as r/t deficiencies  KAREN RD to follow while in rehab; supplementation/diet as per KAREN RD  May continue home supplements  Vitamin D  Vitamin C     At risk for malnutrition  Dietician consult  Weekly weights  Supplements as indicated  Encourage po intake    Bowel management  Monitor for Bms  Docusate 100 BID   Miralax daily prn       DVT prophylaxis  none    GI prophylaxis  none    Labs  CBC, CMP weekly and prn   Repeat labs Monday 7/8       Follow Ups:  PCP within 7 days of DC    Future Appointments   Date Time Provider Department Center   8/23/2024  1:00 PM  MELISSA RM3  [facility]   8/23/2024  1:45 PM  [facility]   8/27/2024  1:15 PM Krista Warren MD  HEM [facility]   8/27/2024  1:30 PM  TX RN1  [facility]       Please note, voice recognition software ([email or web address]) was used to create this document. An attempt at proofreading has been made to minimize errors. Errors may still exist.       75 spent w/ patient and reviewing medical records, labs, completing medication reconciliation and entering orders to establish plan of care in [facility].      Note to patient: The 21st Century Cures Act makes medical notes like these available to patients in the interest of transparency. However, this is a medical document intended as peer to peer communication. It is written in medical language and may contain abbreviations or verbiage that are unfamiliar. It may appear blunt or direct. Medical documents are intended to carry relevant information, facts as evident, and the clinical opinion of the practitioner who signs the document.    Joanne Bernal, ILA  07/05/24   4:37 PM

## 2024-07-06 ENCOUNTER — MOBILE ENCOUNTER (OUTPATIENT)
Dept: INTERNAL MEDICINE CLINIC | Facility: CLINIC | Age: 89
End: 2024-07-06

## 2024-07-06 NOTE — PROGRESS NOTES
Late entry. Patient’s daughter paged on 7/4 regarding patient. Patient is being treated for shingles on left upper chest/back area. Finished 2nd course of valacyclovir. She was given topical and oral antibiotic by Dr. Danielson on 7/1 which she feels are not helping. She has open ulcers that are really painful. She is taking gabapentin, aleve, and tylenol which are not helping. Per daughter, patient's pain is unbearable and she is not sleeping well. Wondering what to do since it is a holiday. Advised to go to UC if open, otherwise to ER for further evaluation.

## 2024-07-08 ENCOUNTER — SNF VISIT (OUTPATIENT)
Dept: INTERNAL MEDICINE CLINIC | Age: 89
End: 2024-07-08

## 2024-07-08 ENCOUNTER — LAB REQUISITION (OUTPATIENT)
Dept: LAB | Facility: HOSPITAL | Age: 89
End: 2024-07-08
Payer: MEDICARE

## 2024-07-08 DIAGNOSIS — B02.9 ZOSTER WITHOUT COMPLICATIONS: ICD-10-CM

## 2024-07-08 DIAGNOSIS — B02.29 POSTHERPETIC NEURALGIA: Primary | ICD-10-CM

## 2024-07-08 DIAGNOSIS — R52 PAIN: ICD-10-CM

## 2024-07-08 DIAGNOSIS — I89.0 LYMPHEDEMA OF LEFT ARM: ICD-10-CM

## 2024-07-08 DIAGNOSIS — I10 PRIMARY HYPERTENSION: ICD-10-CM

## 2024-07-08 LAB
ALBUMIN SERPL-MCNC: 3.1 G/DL (ref 3.4–5)
ALBUMIN/GLOB SERPL: 1 {RATIO} (ref 1–2)
ALP LIVER SERPL-CCNC: 58 U/L
ALT SERPL-CCNC: 67 U/L
ANION GAP SERPL CALC-SCNC: 5 MMOL/L (ref 0–18)
AST SERPL-CCNC: 32 U/L (ref 15–37)
BASOPHILS # BLD AUTO: 0.06 X10(3) UL (ref 0–0.2)
BASOPHILS NFR BLD AUTO: 1.3 %
BILIRUB SERPL-MCNC: 0.4 MG/DL (ref 0.1–2)
BUN BLD-MCNC: 23 MG/DL (ref 9–23)
CALCIUM BLD-MCNC: 9 MG/DL (ref 8.5–10.1)
CHLORIDE SERPL-SCNC: 110 MMOL/L (ref 98–112)
CO2 SERPL-SCNC: 23 MMOL/L (ref 21–32)
CREAT BLD-MCNC: 0.7 MG/DL
EGFRCR SERPLBLD CKD-EPI 2021: 82 ML/MIN/1.73M2 (ref 60–?)
EOSINOPHIL # BLD AUTO: 0.23 X10(3) UL (ref 0–0.7)
EOSINOPHIL NFR BLD AUTO: 4.8 %
ERYTHROCYTE [DISTWIDTH] IN BLOOD BY AUTOMATED COUNT: 13.6 %
FASTING STATUS PATIENT QL REPORTED: YES
GLOBULIN PLAS-MCNC: 3 G/DL (ref 2.8–4.4)
GLUCOSE BLD-MCNC: 90 MG/DL (ref 70–99)
HCT VFR BLD AUTO: 38.6 %
HGB BLD-MCNC: 12.8 G/DL
IMM GRANULOCYTES # BLD AUTO: 0.02 X10(3) UL (ref 0–1)
IMM GRANULOCYTES NFR BLD: 0.4 %
LYMPHOCYTES # BLD AUTO: 2.14 X10(3) UL (ref 1–4)
LYMPHOCYTES NFR BLD AUTO: 45 %
MCH RBC QN AUTO: 32.5 PG (ref 26–34)
MCHC RBC AUTO-ENTMCNC: 33.2 G/DL (ref 31–37)
MCV RBC AUTO: 98 FL
MONOCYTES # BLD AUTO: 0.56 X10(3) UL (ref 0.1–1)
MONOCYTES NFR BLD AUTO: 11.8 %
NEUTROPHILS # BLD AUTO: 1.75 X10 (3) UL (ref 1.5–7.7)
NEUTROPHILS # BLD AUTO: 1.75 X10(3) UL (ref 1.5–7.7)
NEUTROPHILS NFR BLD AUTO: 36.7 %
OSMOLALITY SERPL CALC.SUM OF ELEC: 289 MOSM/KG (ref 275–295)
PLATELET # BLD AUTO: 219 10(3)UL (ref 150–450)
POTASSIUM SERPL-SCNC: 4.5 MMOL/L (ref 3.5–5.1)
PROT SERPL-MCNC: 6.1 G/DL (ref 6.4–8.2)
RBC # BLD AUTO: 3.94 X10(6)UL
SODIUM SERPL-SCNC: 138 MMOL/L (ref 136–145)
WBC # BLD AUTO: 4.8 X10(3) UL (ref 4–11)

## 2024-07-08 PROCEDURE — 80053 COMPREHEN METABOLIC PANEL: CPT | Performed by: FAMILY MEDICINE

## 2024-07-08 PROCEDURE — 85025 COMPLETE CBC W/AUTO DIFF WBC: CPT | Performed by: FAMILY MEDICINE

## 2024-07-08 PROCEDURE — 99309 SBSQ NF CARE MODERATE MDM 30: CPT | Performed by: NURSE PRACTITIONER

## 2024-07-09 ENCOUNTER — EXTERNAL FACILITY (OUTPATIENT)
Dept: FAMILY MEDICINE CLINIC | Facility: CLINIC | Age: 89
End: 2024-07-09

## 2024-07-09 VITALS
RESPIRATION RATE: 18 BRPM | BODY MASS INDEX: 21 KG/M2 | SYSTOLIC BLOOD PRESSURE: 134 MMHG | DIASTOLIC BLOOD PRESSURE: 64 MMHG | TEMPERATURE: 98 F | OXYGEN SATURATION: 94 % | WEIGHT: 121.31 LBS | HEART RATE: 79 BPM

## 2024-07-09 DIAGNOSIS — M51.36 DDD (DEGENERATIVE DISC DISEASE), LUMBAR: ICD-10-CM

## 2024-07-09 DIAGNOSIS — C50.911 LOBULAR BREAST CANCER, RIGHT (HCC): ICD-10-CM

## 2024-07-09 DIAGNOSIS — M43.16 SPONDYLOLISTHESIS OF LUMBAR REGION: ICD-10-CM

## 2024-07-09 DIAGNOSIS — R26.81 GAIT INSTABILITY: ICD-10-CM

## 2024-07-09 DIAGNOSIS — M80.00XD AGE-RELATED OSTEOPOROSIS WITH CURRENT PATHOLOGICAL FRACTURE WITH ROUTINE HEALING, SUBSEQUENT ENCOUNTER: ICD-10-CM

## 2024-07-09 DIAGNOSIS — G89.29 CHRONIC LEFT-SIDED LOW BACK PAIN WITHOUT SCIATICA: ICD-10-CM

## 2024-07-09 DIAGNOSIS — I10 PRIMARY HYPERTENSION: ICD-10-CM

## 2024-07-09 DIAGNOSIS — G47.00 INSOMNIA, UNSPECIFIED TYPE: ICD-10-CM

## 2024-07-09 DIAGNOSIS — R53.81 PHYSICAL DECONDITIONING: ICD-10-CM

## 2024-07-09 DIAGNOSIS — I89.0 LYMPHEDEMA OF LEFT UPPER EXTREMITY: ICD-10-CM

## 2024-07-09 DIAGNOSIS — R53.1 GENERALIZED WEAKNESS: ICD-10-CM

## 2024-07-09 DIAGNOSIS — M54.50 CHRONIC LEFT-SIDED LOW BACK PAIN WITHOUT SCIATICA: ICD-10-CM

## 2024-07-09 DIAGNOSIS — M16.0 OSTEOARTHRITIS OF BOTH HIPS, UNSPECIFIED OSTEOARTHRITIS TYPE: ICD-10-CM

## 2024-07-09 DIAGNOSIS — B02.29 HZV (HERPES ZOSTER VIRUS) POST HERPETIC NEURALGIA: Primary | ICD-10-CM

## 2024-07-09 DIAGNOSIS — Z91.89 AT RISK FOR MALNUTRITION: ICD-10-CM

## 2024-07-09 DIAGNOSIS — I47.10 PAROXYSMAL SVT (SUPRAVENTRICULAR TACHYCARDIA) (HCC): ICD-10-CM

## 2024-07-09 DIAGNOSIS — F41.9 ANXIETY: ICD-10-CM

## 2024-07-09 PROCEDURE — 99499 UNLISTED E&M SERVICE: CPT | Performed by: FAMILY MEDICINE

## 2024-07-09 PROCEDURE — 99306 1ST NF CARE HIGH MDM 50: CPT | Performed by: FAMILY MEDICINE

## 2024-07-09 NOTE — PROGRESS NOTES
Meena Whitman, 6/13/1933, 89 year old, female    Chief Complaint:    Chief Complaint   Patient presents with    Follow - Up     Shingles with pain  LUE lymphedema        Subjective:   TODAY:  Meena is seen sitting up in the recliner today. Still with shooting pain at her lefty posterior shoulder from shingles. Increasing her gabapentin to 400 mg TID and Norco 10/325 to Q6 scheduled.   She is concerned about her lymphedema in the LUE and has brought her pump. Applied and tolerated without increase in pain.   DW nursing.       Denies insomnia, fatigue, fever/chills, cough, SOB, dyspnea, angina, palpitations, n/v, diarrhea, constipation, and urinary sxs.      Objective:  /64   Pulse 79   Temp 98.1 °F (36.7 °C)   Resp 18   Wt 121 lb 4.8 oz (55 kg)   SpO2 94%   BMI 21.49 kg/m²      PHYSICAL EXAM:  GENERAL HEALTH: well developed, well nourished, sitting up, appears uncomfortable and in pain  LINES, TUBES, DRAINS:  none  SKIN: dried, scabbed vesicular lesions with surrounding erythema to L chest and L posterior back, no drainage noted   WOUND: refer to wound RN assessment  EYES: sclera anicteric, conjunctiva normal; there is no nystagmus, no drainage from eyes  HENT: normocephalic; normal nose, no nasal drainage, mucous membranes pink, moist.  NECK: supple, non tender, FROM  BREAST: deferred  RESPIRATORY:clear, no crackles, no wheezing, no dyspnea, no cough, room air  CARDIOVASCULAR: RRR, S1 and S2, no murmurs, no edema  ABDOMEN:  normal active BS+, soft, nondistended; no organomegaly, no masses; nontender, no guarding, no rebound tenderness.  :no suprapubic distension  LYMPHATIC:LUE lymphedema post surgical, worse today she states  MUSCULOSKELETAL: no acute synovitis upper or lower extremity  EXTREMITIES/VASCULAR:no cyanosis, clubbing or edema, radial pulses 2+, and dorsalis pedal pulses 2+  NEUROLOGIC: A&OX3, no focal deficits, follows commands  PSYCHIATRIC: calm, cooperative, mood and affect  appropriate to situation       Therapy update:  Transfers: CGA  ADLS:  CGA  Ambulation: 100 ft with RW CGA  [facility]     Medications reviewed: Yes      Current Outpatient Medications:     HYDROcodone-acetaminophen (NORCO)  MG Oral Tab, Take 1 tablet by mouth every 8 (eight) hours., Disp: 30 tablet, Rfl: 0    ondansetron 4 MG Oral Tablet Dispersible, Take 1 tablet (4 mg total) by mouth every 4 (four) hours as needed for Nausea., Disp: 10 tablet, Rfl: 0    HYDROcodone-acetaminophen 5-325 MG Oral Tab, Take 1 tablet by mouth every 6 (six) hours as needed for Pain., Disp: 10 tablet, Rfl: 0    docusate sodium 100 MG Oral Cap, Take 1 capsule (100 mg total) by mouth 2 (two) times daily., Disp: 60 capsule, Rfl: 0    Lidocaine 0.5 % External Gel, Apply 1 Application topically every 6 (six) hours as needed., Disp: 1 each, Rfl: 0    TEMAZEPAM 15 MG Oral Cap, TAKE 1-2 CAPSULES (15-30 MG TOTAL) BY MOUTH NIGHTLY AS NEEDED FOR SLEEP., Disp: 60 capsule, Rfl: 0    neomycin-polymyxin-dexamethasone 3.5-53731-9.1 Ophthalmic Ointment, APPLY A SMALL AMOUNT TO LEFT EYE ONCE DAILY FOR 5 DAYS, Disp: , Rfl:     gabapentin 300 MG Oral Cap, Start with 1 tab(300 mg) at night for the first 3 days, then take 1 tab (300 mg) in the morning and 1 tab (300 mg) at night, Disp: 60 capsule, Rfl: 1    cephalexin 500 MG Oral Cap, Take 1 capsule (500 mg total) by mouth 3 (three) times daily., Disp: 21 capsule, Rfl: 0    Elastic Bandages & Supports (SKINEEZ COMPRESSION ARM SLEEVE) Does not apply Misc, 2 Units daily as needed., Disp: 2 each, Rfl: 3    amLODIPine 5 MG Oral Tab, Take 1 tablet (5 mg total) by mouth daily., Disp: 90 tablet, Rfl: 1    labetalol 100 MG Oral Tab, Take 1 tablet (100 mg total) by mouth 2 (two) times daily., Disp: 180 tablet, Rfl: 1    losartan 100 MG Oral Tab, Take 1 tablet (100 mg total) by mouth daily., Disp: 90 tablet, Rfl: 1    estradiol 0.1 MG/GM Vaginal Cream, Place 1 g vaginally daily., Disp:  42.5 g, Rfl: 3    Vitamin D, Cholecalciferol, 25 MCG (1000 UT) Oral Tab, Take 1,000 Units by mouth daily. Take 3 caps po q am, Disp: 270 tablet, Rfl: 3    acetaZOLAMIDE 250 MG Oral Tab, TAKE ONE TABLET BY MOUTH TWICE DAILY AT BREAKFAST AND DINNER, Disp: , Rfl:     Multiple Vitamins-Minerals (COMPLETE DAILY/LUTEIN) Oral Tab, Take 1 tablet by mouth 3 (three) times daily., Disp: , Rfl:       Diagnostics reviewed:    Lab Results   Component Value Date    WBC 4.8 07/08/2024    RBC 3.94 07/08/2024    HGB 12.8 07/08/2024    HCT 38.6 07/08/2024    MCV 98.0 07/08/2024    MCH 32.5 07/08/2024    MCHC 33.2 07/08/2024    RDW 13.6 07/08/2024    .0 07/08/2024     Lab Results   Component Value Date    GLU 90 07/08/2024    BUN 23 07/08/2024    BUNCREA 39.0 (H) 04/12/2021    CREATSERUM 0.70 07/08/2024    ANIONGAP 5 07/08/2024    GFR 76 02/27/2017    GFRNAA 62 07/20/2022    GFRAA 71 07/20/2022    CA 9.0 07/08/2024    OSMOCALC 289 07/08/2024    ALKPHO 58 07/08/2024    AST 32 07/08/2024    ALT 67 (H) 07/08/2024    BILT 0.4 07/08/2024    TP 6.1 (L) 07/08/2024    ALB 3.1 (L) 07/08/2024    GLOBULIN 3.0 07/08/2024     07/08/2024    K 4.5 07/08/2024     07/08/2024    CO2 23.0 07/08/2024         Assessment and plan:  Shingles  Post herpetic neuralgia   Pain management  - Monitor and assess pain  - Now on second round of Valcyclovir per PCP   - Completed Valcyclovir 1g TID. EOT 7/7   - Increase Scheduled Norco 10/325 q6 hours  - Increase gabapentin 300 TID to 400 mg TID  - Lidocaine 3% cream TID   - Continue Tylenol 650 q4hrs prn   - Completed cephalexin 500 TID course by her PCP with concern with additional bacterial infection.   - Vesicular shingle lesions crusted and scabbed over now, does not appear infection, can discontinue contact isolation precautions and able to ambulate in halls and eat in dining area.   - Monitor      Elevated LFTs   - ALT 60 today 67, AFT 40 now 32, previously normal in the past   - may be  related to OTC Tylenol use at home for pain  - no abdominal pain, no nausea, no vomiting  - monitor   - Repeat CMP and trend      HTN  - vital signs q shift and prn  - continue amlodipine 5 daily   - continue losartan 200 daily  - continue labetalol 10 daily   - BP controlled  - continue Diamox 250 bid      Keratoconjuctivitis sicca  - continue flebogamma QID   - continue heparin PF eye gtts TID     Insomnia  - continue Temazepam 30 mg HS     Weakness and deconditioning   PT/OT eval and treat  DC planning with MDT, SW, therapies  Services on DC: C RN/PT/OT/SW  Equipment on DC:tbd      Vitamins/supplements as r/t deficiencies  [facility] RD to follow while in rehab; supplementation/diet as per [facility] RD  May continue home supplements  Vitamin D  Vitamin C      At risk for malnutrition  Dietician consult  Weekly weights  Supplements as indicated  Encourage po intake     Bowel management  Monitor for Bms  Docusate 100 BID   Miralax daily prn         DVT prophylaxis  none     GI prophylaxis  none     Labs  CBC, CMP weekly and prn        Follow Ups:  PCP within 7 days of DC            Future Appointments   Date Time Provider Department Center   8/23/2024  1:00 PM  MELISSA RM3  [facility]   8/23/2024  1:45 PM  [facility]  [facility]   8/27/2024  1:15 PM Krista Warren MD  [facility]   8/27/2024  1:30 PM  TX RN1  [facility]           *Established patient; follow-up moderately complex visit/ greater than 30       35 minutes spent w/ patient and staff, including but not limited to/ reviewing present status, needs, abilities with disciplines, reviewing medical records, vital signs, labs, completing medication reconciliation and entering orders for continued care in [facility].    Note to patient: The 21st Century Cures Act makes medical notes like these available to patients in the interest of transparency. However, this is a medical document intended as peer to peer communication. It is written in  medical language and may contain abbreviations or verbiage that are unfamiliar. It may appear blunt or direct. Medical documents are intended to carry relevant information, facts as evident, and the clinical opinion of the practitioner who signs the document.    Joanne Bernal, APRN  7/8/2024

## 2024-07-12 ENCOUNTER — SNF VISIT (OUTPATIENT)
Dept: INTERNAL MEDICINE CLINIC | Age: 89
End: 2024-07-12

## 2024-07-12 ENCOUNTER — LAB REQUISITION (OUTPATIENT)
Dept: LAB | Facility: HOSPITAL | Age: 89
End: 2024-07-12
Payer: MEDICARE

## 2024-07-12 VITALS
OXYGEN SATURATION: 95 % | BODY MASS INDEX: 21 KG/M2 | DIASTOLIC BLOOD PRESSURE: 63 MMHG | HEART RATE: 63 BPM | SYSTOLIC BLOOD PRESSURE: 147 MMHG | TEMPERATURE: 97 F | WEIGHT: 121 LBS | RESPIRATION RATE: 18 BRPM

## 2024-07-12 DIAGNOSIS — Z78.9 DECREASED ACTIVITIES OF DAILY LIVING (ADL): ICD-10-CM

## 2024-07-12 DIAGNOSIS — J44.9 CHRONIC OBSTRUCTIVE PULMONARY DISEASE, UNSPECIFIED COPD TYPE (HCC): ICD-10-CM

## 2024-07-12 DIAGNOSIS — R52 PAIN: ICD-10-CM

## 2024-07-12 DIAGNOSIS — J44.9 CHRONIC OBSTRUCTIVE PULMONARY DISEASE, UNSPECIFIED (HCC): ICD-10-CM

## 2024-07-12 DIAGNOSIS — B02.29 POSTHERPETIC NEURALGIA: Primary | ICD-10-CM

## 2024-07-12 DIAGNOSIS — I10 PRIMARY HYPERTENSION: ICD-10-CM

## 2024-07-12 DIAGNOSIS — R53.1 WEAKNESS: ICD-10-CM

## 2024-07-12 DIAGNOSIS — I89.0 LYMPHEDEMA OF LEFT ARM: ICD-10-CM

## 2024-07-12 LAB
ANION GAP SERPL CALC-SCNC: 6 MMOL/L (ref 0–18)
BASOPHILS # BLD AUTO: 0.06 X10(3) UL (ref 0–0.2)
BASOPHILS NFR BLD AUTO: 1.3 %
BUN BLD-MCNC: 20 MG/DL (ref 9–23)
CALCIUM BLD-MCNC: 8.8 MG/DL (ref 8.5–10.1)
CHLORIDE SERPL-SCNC: 109 MMOL/L (ref 98–112)
CO2 SERPL-SCNC: 24 MMOL/L (ref 21–32)
CREAT BLD-MCNC: 0.63 MG/DL
EGFRCR SERPLBLD CKD-EPI 2021: 84 ML/MIN/1.73M2 (ref 60–?)
EOSINOPHIL # BLD AUTO: 0.2 X10(3) UL (ref 0–0.7)
EOSINOPHIL NFR BLD AUTO: 4.4 %
ERYTHROCYTE [DISTWIDTH] IN BLOOD BY AUTOMATED COUNT: 14.2 %
FASTING STATUS PATIENT QL REPORTED: YES
GLUCOSE BLD-MCNC: 82 MG/DL (ref 70–99)
HCT VFR BLD AUTO: 33.7 %
HGB BLD-MCNC: 11.2 G/DL
IMM GRANULOCYTES # BLD AUTO: 0.01 X10(3) UL (ref 0–1)
IMM GRANULOCYTES NFR BLD: 0.2 %
LYMPHOCYTES # BLD AUTO: 1.8 X10(3) UL (ref 1–4)
LYMPHOCYTES NFR BLD AUTO: 39.8 %
MCH RBC QN AUTO: 32.7 PG (ref 26–34)
MCHC RBC AUTO-ENTMCNC: 33.2 G/DL (ref 31–37)
MCV RBC AUTO: 98.5 FL
MONOCYTES # BLD AUTO: 0.74 X10(3) UL (ref 0.1–1)
MONOCYTES NFR BLD AUTO: 16.4 %
NEUTROPHILS # BLD AUTO: 1.71 X10 (3) UL (ref 1.5–7.7)
NEUTROPHILS # BLD AUTO: 1.71 X10(3) UL (ref 1.5–7.7)
NEUTROPHILS NFR BLD AUTO: 37.9 %
OSMOLALITY SERPL CALC.SUM OF ELEC: 290 MOSM/KG (ref 275–295)
PLATELET # BLD AUTO: 199 10(3)UL (ref 150–450)
PLATELETS.RETICULATED NFR BLD AUTO: 1.9 % (ref 0–7)
POTASSIUM SERPL-SCNC: 4.1 MMOL/L (ref 3.5–5.1)
RBC # BLD AUTO: 3.42 X10(6)UL
SODIUM SERPL-SCNC: 139 MMOL/L (ref 136–145)
WBC # BLD AUTO: 4.5 X10(3) UL (ref 4–11)

## 2024-07-12 PROCEDURE — 99309 SBSQ NF CARE MODERATE MDM 30: CPT | Performed by: NURSE PRACTITIONER

## 2024-07-12 PROCEDURE — 85025 COMPLETE CBC W/AUTO DIFF WBC: CPT | Performed by: FAMILY MEDICINE

## 2024-07-12 PROCEDURE — 80048 BASIC METABOLIC PNL TOTAL CA: CPT | Performed by: FAMILY MEDICINE

## 2024-07-12 NOTE — PROGRESS NOTES
Meena Whitman, 6/13/1933, 89 year old, female    Chief Complaint:    Chief Complaint   Patient presents with    Follow - Up     Wheeze, shingles pain, weakness, lymphedema        Subjective:   TODAY:  Meena is seen sitting up in the recliner today.   Her family is present at the bedside.  Her pain is improving now, less shooting pain at her left posterior shoulder from shingles.   Gabapentin to 400 mg TID now and she wishes to change Norco to three times daily now scheduled.   The lymphedema pump was too painful now to use.   Loose BM now will reduce colace to daily with reductions in Norco as well.   DW nursing.   Had episode where staff felt she had wheezing two days ago. Placed on Isolation and tested for COVID, negative. Chest xray was normal. No further wheezing, shortness of breath or sputum. May remove isolation now. Patient and daughter state hx COPD not on any meds.     Denies insomnia, fatigue, fever/chills, cough, SOB, dyspnea, angina, palpitations, n/v, diarrhea, constipation, and urinary sxs.      Objective:  /63   Pulse 63   Temp 97.3 °F (36.3 °C)   Resp 18   Wt 121 lb (54.9 kg)   SpO2 95%   BMI 21.43 kg/m²      PHYSICAL EXAM:  GENERAL HEALTH: well developed, well nourished, sitting up, appears improved  LINES, TUBES, DRAINS:  none  SKIN: dried, scabbed vesicular lesions with surrounding erythema to L chest and L posterior back, no drainage noted   WOUND: refer to wound RN assessment  EYES: sclera anicteric, conjunctiva normal; there is no nystagmus, no drainage from eyes  HENT: normocephalic; normal nose, no nasal drainage, mucous membranes pink, moist.  NECK: supple, non tender, FROM  BREAST: deferred  RESPIRATORY:clear, no crackles, no wheezing, no dyspnea, no cough, room air  CARDIOVASCULAR: RRR, S1 and S2, no murmurs, no edema  ABDOMEN:  normal active BS+, soft, nondistended; no organomegaly, no masses; nontender, no guarding, no rebound tenderness.  :no suprapubic  distension  LYMPHATIC:LUE lymphedema post surgical, worse today she states  MUSCULOSKELETAL: no acute synovitis upper or lower extremity  EXTREMITIES/VASCULAR:no cyanosis, clubbing or edema, radial pulses 2+, and dorsalis pedal pulses 2+  NEUROLOGIC: A&OX3, no focal deficits, follows commands  PSYCHIATRIC: calm, cooperative, mood and affect appropriate to situation       Therapy update:  Transfers: CGA  ADLS:  CGA  Ambulation: 100 ft with RW CGA  [facility]     Medications reviewed: Yes      Current Outpatient Medications:     amLODIPine 5 MG Oral Tab, Take 1 tablet (5 mg total) by mouth daily., Disp: 90 tablet, Rfl: 1    acetaZOLAMIDE 250 MG Oral Tab, TAKE ONE TABLET BY MOUTH TWICE DAILY AT BREAKFAST AND DINNER, Disp: , Rfl:     HYDROcodone-acetaminophen (NORCO)  MG Oral Tab, Take 1 tablet by mouth every 8 (eight) hours., Disp: 30 tablet, Rfl: 0    docusate sodium 100 MG Oral Cap, Take 1 capsule (100 mg total) by mouth 2 (two) times daily. (Patient taking differently: Take 1 capsule (100 mg total) by mouth daily.), Disp: 60 capsule, Rfl: 0    Lidocaine 0.5 % External Gel, Apply 1 Application topically every 6 (six) hours as needed., Disp: 1 each, Rfl: 0    TEMAZEPAM 15 MG Oral Cap, TAKE 1-2 CAPSULES (15-30 MG TOTAL) BY MOUTH NIGHTLY AS NEEDED FOR SLEEP., Disp: 60 capsule, Rfl: 0    neomycin-polymyxin-dexamethasone 3.5-08097-4.1 Ophthalmic Ointment, APPLY A SMALL AMOUNT TO LEFT EYE ONCE DAILY FOR 5 DAYS, Disp: , Rfl:     gabapentin 300 MG Oral Cap, Start with 1 tab(300 mg) at night for the first 3 days, then take 1 tab (300 mg) in the morning and 1 tab (300 mg) at night (Patient taking differently: Take 400 mg by mouth in the morning, at noon, and at bedtime.), Disp: 60 capsule, Rfl: 1    cephalexin 500 MG Oral Cap, Take 1 capsule (500 mg total) by mouth 3 (three) times daily., Disp: 21 capsule, Rfl: 0    Elastic Bandages & Supports (SKINEEZ COMPRESSION ARM SLEEVE) Does not apply  Misc, 2 Units daily as needed., Disp: 2 each, Rfl: 3    labetalol 100 MG Oral Tab, Take 1 tablet (100 mg total) by mouth 2 (two) times daily., Disp: 180 tablet, Rfl: 1    losartan 100 MG Oral Tab, Take 1 tablet (100 mg total) by mouth daily., Disp: 90 tablet, Rfl: 1    estradiol 0.1 MG/GM Vaginal Cream, Place 1 g vaginally daily., Disp: 42.5 g, Rfl: 3    Vitamin D, Cholecalciferol, 25 MCG (1000 UT) Oral Tab, Take 1,000 Units by mouth daily. Take 3 caps po q am, Disp: 270 tablet, Rfl: 3    Multiple Vitamins-Minerals (COMPLETE DAILY/LUTEIN) Oral Tab, Take 1 tablet by mouth 3 (three) times daily., Disp: , Rfl:       Diagnostics reviewed:    Lab Results   Component Value Date    WBC 4.5 07/12/2024    RBC 3.42 (L) 07/12/2024    HGB 11.2 (L) 07/12/2024    HCT 33.7 (L) 07/12/2024    MCV 98.5 07/12/2024    MCH 32.7 07/12/2024    MCHC 33.2 07/12/2024    RDW 14.2 07/12/2024    .0 07/12/2024     Lab Results   Component Value Date    GLU 82 07/12/2024    BUN 20 07/12/2024    BUNCREA 39.0 (H) 04/12/2021    CREATSERUM 0.63 07/12/2024    ANIONGAP 6 07/12/2024    GFR 76 02/27/2017    GFRNAA 62 07/20/2022    GFRAA 71 07/20/2022    CA 8.8 07/12/2024    OSMOCALC 290 07/12/2024    ALKPHO 58 07/08/2024    AST 32 07/08/2024    ALT 67 (H) 07/08/2024    BILT 0.4 07/08/2024    TP 6.1 (L) 07/08/2024    ALB 3.1 (L) 07/08/2024    GLOBULIN 3.0 07/08/2024     07/12/2024    K 4.1 07/12/2024     07/12/2024    CO2 24.0 07/12/2024         Assessment and plan:    Episode of wheezing  Chest xray wnl  Albuterol HFA A6 prn  Hx COPD  No further wheezing, no cough, no sputum, no shortness of breath or hypoxia  COVID testing was negative  Remove isolation now    Shingles  Post herpetic neuralgia   Pain management  - Monitor and assess pain - improved with increased gabapentin  - Now on second round of Valcyclovir per PCP, Completed Valcyclovir 1g TID. EOT 7/7   - Decrease Scheduled Norco 10/325 q8 hours  - Gabapentin 400 mg TID  -  Lidocaine 3% cream TID   - Continue Tylenol 650 q4hrs prn   - Completed cephalexin 500 TID course by her PCP with concern with additional bacterial infection.   - Vesicular shingle lesions crusted and scabbed over now, does not appear infection, can discontinue contact isolation precautions and able to ambulate in halls and eat in dining area.   - Monitor      Elevated LFTs   - ALT 60 today 67, AFT 40 now 32, previously normal in the past   - may be related to OTC Tylenol use at home for pain  - no abdominal pain, no nausea, no vomiting  - monitor   - Repeat CMP and trend      HTN  - vital signs q shift and prn  - continue amlodipine 5 daily   - continue losartan 200 daily  - continue labetalol 10 daily   - BP controlled  - continue Diamox 250 bid      Keratoconjuctivitis sicca  - continue flebogamma QID   - continue heparin PF eye gtts TID     Insomnia  - continue Temazepam 30 mg HS     Weakness and deconditioning   PT/OT eval and treat  DC planning with MDT, SW, therapies  Services on DC: [facility] RN/PT/OT/SW  Equipment on DC: walker     Vitamins/supplements as r/t deficiencies  KAREN RD to follow while in rehab; supplementation/diet as per KAREN RD  May continue home supplements  Vitamin D  Vitamin C      At risk for malnutrition  Dietician consult  Weekly weights  Supplements as indicated  Encourage po intake     Bowel management  Monitor for Bms  Docusate 100 BID change to daily now with loose BM  Miralax daily prn         DVT prophylaxis  none     GI prophylaxis  none     Labs  CBC, CMP weekly and prn        Follow Ups:  PCP within 7 days of DC            Future Appointments   Date Time Provider Department Center   8/23/2024  1:00 PM  MELISSA RM3  [facility]   8/23/2024  1:45 PM  [facility]  [facility]   8/27/2024  1:15 PM Krista Warren MD  [facility]   8/27/2024  1:30 PM  TX RN1  [facility]           *Established patient; follow-up moderately complex visit/ greater than 30        35 minutes spent w/ patient and staff, including but not limited to/ reviewing present status, needs, abilities with disciplines, reviewing medical records, vital signs, labs, completing medication reconciliation and entering orders for continued care in [facility].    Note to patient: The 21st Century Cures Act makes medical notes like these available to patients in the interest of transparency. However, this is a medical document intended as peer to peer communication. It is written in medical language and may contain abbreviations or verbiage that are unfamiliar. It may appear blunt or direct. Medical documents are intended to carry relevant information, facts as evident, and the clinical opinion of the practitioner who signs the document.    Joanne Bernal, APRN  7/12/2024

## 2024-07-15 ENCOUNTER — SNF VISIT (OUTPATIENT)
Dept: INTERNAL MEDICINE CLINIC | Age: 89
End: 2024-07-15

## 2024-07-15 VITALS
OXYGEN SATURATION: 96 % | DIASTOLIC BLOOD PRESSURE: 52 MMHG | SYSTOLIC BLOOD PRESSURE: 132 MMHG | BODY MASS INDEX: 21 KG/M2 | TEMPERATURE: 97 F | RESPIRATION RATE: 20 BRPM | HEART RATE: 71 BPM | WEIGHT: 121 LBS

## 2024-07-15 DIAGNOSIS — I89.0 LYMPHEDEMA OF LEFT ARM: ICD-10-CM

## 2024-07-15 DIAGNOSIS — R52 PAIN: ICD-10-CM

## 2024-07-15 DIAGNOSIS — I10 PRIMARY HYPERTENSION: ICD-10-CM

## 2024-07-15 DIAGNOSIS — B02.29 POSTHERPETIC NEURALGIA: Primary | ICD-10-CM

## 2024-07-15 DIAGNOSIS — R53.1 WEAKNESS: ICD-10-CM

## 2024-07-15 DIAGNOSIS — Z78.9 DECREASED ACTIVITIES OF DAILY LIVING (ADL): ICD-10-CM

## 2024-07-15 DIAGNOSIS — J44.9 CHRONIC OBSTRUCTIVE PULMONARY DISEASE, UNSPECIFIED COPD TYPE (HCC): ICD-10-CM

## 2024-07-15 PROCEDURE — 99309 SBSQ NF CARE MODERATE MDM 30: CPT | Performed by: NURSE PRACTITIONER

## 2024-07-15 RX ORDER — ACETAMINOPHEN 325 MG/1
650 TABLET ORAL EVERY 6 HOURS PRN
COMMUNITY

## 2024-07-15 NOTE — PROGRESS NOTES
Meena Whitman, 6/13/1933, 89 year old, female    Chief Complaint:    Chief Complaint   Patient presents with    Follow - Up     Shingles pain        Subjective:   TODAY:  Meena is seen sitting up in the recliner today.   Her daughter is present at the bedside.  Her pain is improving now, less shooting pain at her left posterior shoulder from shingles.   Her shingles rash is totally healed now  Gabapentin to 400 mg TID now.   She wishes to move from [place] to Tylenol now with improvement in pain and to avoid untoward side effects like confusion and sleepiness.   The lymphedema pump was too painful now to use.   Loose BM still, DC colace now. DW nursing.     Had episode where staff felt she had wheezing two days ago. Placed on Isolation and tested for COVID, negative. Chest xray was normal. No further wheezing, shortness of breath or sputum. May remove isolation now. Patient and daughter state hx COPD not on any meds.   She has albuterol HFA prn now, reviewed with her when to use and call for med.    Denies insomnia, fatigue, fever/chills, cough, SOB, dyspnea, angina, palpitations, n/v, diarrhea, constipation, and urinary sxs.      Objective:  /52   Pulse 71   Temp 97.2 °F (36.2 °C)   Resp 20   Wt 121 lb (54.9 kg)   SpO2 96%   BMI 21.43 kg/m²      PHYSICAL EXAM:  GENERAL HEALTH: well developed, well nourished, sitting up, appears improved  LINES, TUBES, DRAINS:  none  SKIN: dried, healed now, surrounding erythema to L posterior back, no open areas or active lesions  EYES: sclera anicteric, conjunctiva normal; there is no nystagmus, no drainage from eyes  HENT: normocephalic; normal nose, no nasal drainage, mucous membranes pink, moist.  NECK: supple, non tender, FROM  BREAST: deferred  RESPIRATORY:clear, no crackles, no wheezing, no dyspnea, no cough, room air  CARDIOVASCULAR: RRR, S1 and S2, no murmurs, no edema  ABDOMEN:  normal active BS+, soft, nondistended; no organomegaly, no masses; nontender,  no guarding, no rebound tenderness.  :no suprapubic distension  LYMPHATIC:LUE lymphedema post surgical  MUSCULOSKELETAL: no acute synovitis upper or lower extremity  EXTREMITIES/VASCULAR:no cyanosis, clubbing or edema, radial pulses 2+, and dorsalis pedal pulses 2+  NEUROLOGIC: A&OX3, no focal deficits, follows commands  PSYCHIATRIC: calm, cooperative, mood and affect appropriate to situation     Therapy update:  Transfers: CGA  ADLS:  CGA  Ambulation: 100 ft with RW CGA  DC plan: return to [facility]     Medications reviewed: Yes      Current Outpatient Medications:     acetaminophen 325 MG Oral Tab, Take 2 tablets (650 mg total) by mouth every 6 (six) hours as needed for Pain., Disp: , Rfl:     HYDROcodone-acetaminophen (NORCO)  MG Oral Tab, Take 1 tablet by mouth every 8 (eight) hours. (Patient not taking: Reported on 7/15/2024), Disp: 30 tablet, Rfl: 0    docusate sodium 100 MG Oral Cap, Take 1 capsule (100 mg total) by mouth 2 (two) times daily. (Patient not taking: Reported on 7/15/2024), Disp: 60 capsule, Rfl: 0    Lidocaine 0.5 % External Gel, Apply 1 Application topically every 6 (six) hours as needed., Disp: 1 each, Rfl: 0    TEMAZEPAM 15 MG Oral Cap, TAKE 1-2 CAPSULES (15-30 MG TOTAL) BY MOUTH NIGHTLY AS NEEDED FOR SLEEP., Disp: 60 capsule, Rfl: 0    neomycin-polymyxin-dexamethasone 3.5-33305-4.1 Ophthalmic Ointment, APPLY A SMALL AMOUNT TO LEFT EYE ONCE DAILY FOR 5 DAYS, Disp: , Rfl:     gabapentin 300 MG Oral Cap, Start with 1 tab(300 mg) at night for the first 3 days, then take 1 tab (300 mg) in the morning and 1 tab (300 mg) at night (Patient taking differently: Take 400 mg by mouth in the morning, at noon, and at bedtime.), Disp: 60 capsule, Rfl: 1    cephalexin 500 MG Oral Cap, Take 1 capsule (500 mg total) by mouth 3 (three) times daily., Disp: 21 capsule, Rfl: 0    Elastic Bandages & Supports (SKINEEZ COMPRESSION ARM SLEEVE) Does not apply Misc, 2 Units daily as needed., Disp: 2  each, Rfl: 3    amLODIPine 5 MG Oral Tab, Take 1 tablet (5 mg total) by mouth daily., Disp: 90 tablet, Rfl: 1    labetalol 100 MG Oral Tab, Take 1 tablet (100 mg total) by mouth 2 (two) times daily., Disp: 180 tablet, Rfl: 1    losartan 100 MG Oral Tab, Take 1 tablet (100 mg total) by mouth daily., Disp: 90 tablet, Rfl: 1    estradiol 0.1 MG/GM Vaginal Cream, Place 1 g vaginally daily., Disp: 42.5 g, Rfl: 3    Vitamin D, Cholecalciferol, 25 MCG (1000 UT) Oral Tab, Take 1,000 Units by mouth daily. Take 3 caps po q am, Disp: 270 tablet, Rfl: 3    acetaZOLAMIDE 250 MG Oral Tab, TAKE ONE TABLET BY MOUTH TWICE DAILY AT BREAKFAST AND DINNER, Disp: , Rfl:     Multiple Vitamins-Minerals (COMPLETE DAILY/LUTEIN) Oral Tab, Take 1 tablet by mouth 3 (three) times daily., Disp: , Rfl:       Diagnostics reviewed:    Lab Results   Component Value Date    WBC 4.5 07/12/2024    RBC 3.42 (L) 07/12/2024    HGB 11.2 (L) 07/12/2024    HCT 33.7 (L) 07/12/2024    MCV 98.5 07/12/2024    MCH 32.7 07/12/2024    MCHC 33.2 07/12/2024    RDW 14.2 07/12/2024    .0 07/12/2024     Lab Results   Component Value Date    GLU 82 07/12/2024    BUN 20 07/12/2024    BUNCREA 39.0 (H) 04/12/2021    CREATSERUM 0.63 07/12/2024    ANIONGAP 6 07/12/2024    GFR 76 02/27/2017    GFRNAA 62 07/20/2022    GFRAA 71 07/20/2022    CA 8.8 07/12/2024    OSMOCALC 290 07/12/2024    ALKPHO 58 07/08/2024    AST 32 07/08/2024    ALT 67 (H) 07/08/2024    BILT 0.4 07/08/2024    TP 6.1 (L) 07/08/2024    ALB 3.1 (L) 07/08/2024    GLOBULIN 3.0 07/08/2024     07/12/2024    K 4.1 07/12/2024     07/12/2024    CO2 24.0 07/12/2024         Assessment and plan:    Episode of wheezing  Chest xray wnl  Albuterol HFA Q6 prn  Hx COPD  No further wheezing, no cough, no sputum, no shortness of breath or hypoxia  COVID testing was negative, off isolation now    Shingles  Post herpetic neuralgia   Pain management  - Monitor and assess pain - improved with increased  gabapentin  - Now on second round of Valcyclovir per PCP, Completed Valcyclovir 1g TID. EOT 7/7   - Discontinue Scheduled Norco at her request with sleepiness and confusion  - Gabapentin 400 mg TID  - Lidocaine 3% cream TID   - Schedule Tylenol 650 TID now  - Completed cephalexin 500 TID course by her PCP with concern with additional bacterial infection.   - Vesicular shingle lesions healed able to ambulate in halls and eat in dining area.   - Monitor      Elevated LFTs   - ALT 60 today 67, AFT 40 now 32, previously normal in the past   - may be related to OTC Tylenol use at home for pain  - no abdominal pain, no nausea, no vomiting  - monitor   - Repeat CMP and trend      HTN  - vital signs q shift and prn  - continue amlodipine 5 daily   - continue losartan 200 daily  - continue labetalol 10 daily   - BP controlled  - continue Diamox 250 bid      Keratoconjuctivitis sicca  - continue flebogamma QID   - continue heparin PF eye gtts TID     Insomnia  - continue Temazepam 30 mg HS     Weakness and deconditioning   PT/OT eval and treat  DC planning with MDT, SW, therapies  Services on DC: [facility] RN/PT/OT/SW  Equipment on DC: walker  Should be able to return to apartment soon.     Vitamins/supplements as r/t deficiencies  KAREN RD to follow while in rehab; supplementation/diet as per KAREN RD  May continue home supplements  Vitamin D  Vitamin C      At risk for malnutrition  Dietician consult  Weekly weights  Supplements as indicated  Encourage po intake     Bowel management  Monitor for Bms  Docusate DC now  Miralax daily prn      DVT prophylaxis  none     GI prophylaxis  none     Labs  CBC, CMP as indicated     Follow Ups:  PCP within 7 days of DC            Future Appointments   Date Time Provider Department Center   8/23/2024  1:00 PM  MELISSA RM3  [facility]   8/23/2024  1:45 PM  [facility]   8/27/2024  1:15 PM Krista Warren MD  [facility]   8/27/2024  1:30 PM  [place] RN1  [facility]           *Established patient; follow-up moderately complex visit/ greater than 30       35 minutes spent w/ patient and staff, including but not limited to/ reviewing present status, needs, abilities with disciplines, reviewing medical records, vital signs, labs, completing medication reconciliation and entering orders for continued care in [facility].    Note to patient: The 21st Century Cures Act makes medical notes like these available to patients in the interest of transparency. However, this is a medical document intended as peer to peer communication. It is written in medical language and may contain abbreviations or verbiage that are unfamiliar. It may appear blunt or direct. Medical documents are intended to carry relevant information, facts as evident, and the clinical opinion of the practitioner who signs the document.    ILA Trevizo  7/15/2024      Notified by  on 7/16/24 that patient wishes to discharge home on 7/17/24.  Pain managed. Moving well.   TCM order placed. Home health care through [facility] and caregiver services at home.    Joanne THORPE  7/16/24

## 2024-07-17 ENCOUNTER — TELEPHONE (OUTPATIENT)
Dept: INTERNAL MEDICINE CLINIC | Facility: CLINIC | Age: 89
End: 2024-07-17

## 2024-07-17 ENCOUNTER — LAB REQUISITION (OUTPATIENT)
Dept: LAB | Facility: HOSPITAL | Age: 89
End: 2024-07-17
Payer: MEDICARE

## 2024-07-17 ENCOUNTER — EXTERNAL FACILITY (OUTPATIENT)
Dept: FAMILY MEDICINE CLINIC | Facility: CLINIC | Age: 89
End: 2024-07-17

## 2024-07-17 DIAGNOSIS — I10 PRIMARY HYPERTENSION: ICD-10-CM

## 2024-07-17 DIAGNOSIS — M54.50 CHRONIC LEFT-SIDED LOW BACK PAIN WITHOUT SCIATICA: ICD-10-CM

## 2024-07-17 DIAGNOSIS — G89.29 CHRONIC LEFT-SIDED LOW BACK PAIN WITHOUT SCIATICA: ICD-10-CM

## 2024-07-17 DIAGNOSIS — Z91.89 AT RISK FOR MALNUTRITION: ICD-10-CM

## 2024-07-17 DIAGNOSIS — B02.23 POSTHERPETIC POLYNEUROPATHY: ICD-10-CM

## 2024-07-17 DIAGNOSIS — R53.1 GENERALIZED WEAKNESS: ICD-10-CM

## 2024-07-17 DIAGNOSIS — I89.0 LYMPHEDEMA OF LEFT UPPER EXTREMITY: ICD-10-CM

## 2024-07-17 DIAGNOSIS — G47.00 INSOMNIA, UNSPECIFIED TYPE: ICD-10-CM

## 2024-07-17 DIAGNOSIS — M80.00XD AGE-RELATED OSTEOPOROSIS WITH CURRENT PATHOLOGICAL FRACTURE WITH ROUTINE HEALING, SUBSEQUENT ENCOUNTER: ICD-10-CM

## 2024-07-17 DIAGNOSIS — B02.29 HZV (HERPES ZOSTER VIRUS) POST HERPETIC NEURALGIA: Primary | ICD-10-CM

## 2024-07-17 DIAGNOSIS — M16.0 OSTEOARTHRITIS OF BOTH HIPS, UNSPECIFIED OSTEOARTHRITIS TYPE: ICD-10-CM

## 2024-07-17 DIAGNOSIS — Z85.3 HX OF BREAST CANCER: ICD-10-CM

## 2024-07-17 DIAGNOSIS — I47.10 PAROXYSMAL SVT (SUPRAVENTRICULAR TACHYCARDIA) (HCC): ICD-10-CM

## 2024-07-17 DIAGNOSIS — F41.9 ANXIETY: ICD-10-CM

## 2024-07-17 DIAGNOSIS — M51.36 DDD (DEGENERATIVE DISC DISEASE), LUMBAR: ICD-10-CM

## 2024-07-17 DIAGNOSIS — R53.81 PHYSICAL DECONDITIONING: ICD-10-CM

## 2024-07-17 DIAGNOSIS — R26.81 GAIT INSTABILITY: ICD-10-CM

## 2024-07-17 LAB
ANION GAP SERPL CALC-SCNC: 6 MMOL/L (ref 0–18)
BASOPHILS # BLD AUTO: 0.06 X10(3) UL (ref 0–0.2)
BASOPHILS NFR BLD AUTO: 1.9 %
BUN BLD-MCNC: 17 MG/DL (ref 9–23)
CALCIUM BLD-MCNC: 9.1 MG/DL (ref 8.7–10.4)
CHLORIDE SERPL-SCNC: 111 MMOL/L (ref 98–112)
CO2 SERPL-SCNC: 21 MMOL/L (ref 21–32)
CREAT BLD-MCNC: 0.6 MG/DL
EGFRCR SERPLBLD CKD-EPI 2021: 85 ML/MIN/1.73M2 (ref 60–?)
EOSINOPHIL # BLD AUTO: 0.2 X10(3) UL (ref 0–0.7)
EOSINOPHIL NFR BLD AUTO: 6.4 %
ERYTHROCYTE [DISTWIDTH] IN BLOOD BY AUTOMATED COUNT: 13.7 %
FASTING STATUS PATIENT QL REPORTED: YES
GLUCOSE BLD-MCNC: 90 MG/DL (ref 70–99)
HCT VFR BLD AUTO: 34 %
HGB BLD-MCNC: 11.5 G/DL
IMM GRANULOCYTES # BLD AUTO: 0.01 X10(3) UL (ref 0–1)
IMM GRANULOCYTES NFR BLD: 0.3 %
LYMPHOCYTES # BLD AUTO: 1.32 X10(3) UL (ref 1–4)
LYMPHOCYTES NFR BLD AUTO: 42.4 %
MCH RBC QN AUTO: 32.7 PG (ref 26–34)
MCHC RBC AUTO-ENTMCNC: 33.8 G/DL (ref 31–37)
MCV RBC AUTO: 96.6 FL
MONOCYTES # BLD AUTO: 0.54 X10(3) UL (ref 0.1–1)
MONOCYTES NFR BLD AUTO: 17.4 %
NEUTROPHILS # BLD AUTO: 0.98 X10 (3) UL (ref 1.5–7.7)
NEUTROPHILS # BLD AUTO: 0.98 X10(3) UL (ref 1.5–7.7)
NEUTROPHILS NFR BLD AUTO: 31.6 %
OSMOLALITY SERPL CALC.SUM OF ELEC: 287 MOSM/KG (ref 275–295)
PLATELET # BLD AUTO: 199 10(3)UL (ref 150–450)
POTASSIUM SERPL-SCNC: 3.6 MMOL/L (ref 3.5–5.1)
RBC # BLD AUTO: 3.52 X10(6)UL
SODIUM SERPL-SCNC: 138 MMOL/L (ref 136–145)
WBC # BLD AUTO: 3.1 X10(3) UL (ref 4–11)

## 2024-07-17 PROCEDURE — 99315 NF DSCHRG MGMT 30 MIN/LESS: CPT | Performed by: FAMILY MEDICINE

## 2024-07-17 PROCEDURE — 80048 BASIC METABOLIC PNL TOTAL CA: CPT | Performed by: FAMILY MEDICINE

## 2024-07-17 PROCEDURE — 99499 UNLISTED E&M SERVICE: CPT | Performed by: FAMILY MEDICINE

## 2024-07-17 PROCEDURE — 85025 COMPLETE CBC W/AUTO DIFF WBC: CPT | Performed by: FAMILY MEDICINE

## 2024-07-17 NOTE — TELEPHONE ENCOUNTER
Noted, thanks! Looks like SNF records in [facility], let us know if anything else needed prior to appointment.     Future Appointments   Date Time Provider Department Center   7/19/2024 12:30 PM Bonnie Danielson MD [facility] [address] [facility] [address] [place]   8/23/2024  1:00 PM  MELISSA RM3  [facility]   8/23/2024  1:45 PM  [facility]  [facility]   8/27/2024  1:15 PM Krista Warren MD  [facility]   8/27/2024  1:30 PM  TX RN1  [facility]     Thanks!

## 2024-07-17 NOTE — TELEPHONE ENCOUNTER
FYI-    Hospital Follow Up Appointment Scheduled For:  7/19/24    Discharge Date: 7/17/24    Hospital Discharged From:  [facility]

## 2024-07-18 PROBLEM — R26.81 GAIT INSTABILITY: Status: ACTIVE | Noted: 2024-07-18

## 2024-07-18 PROBLEM — R53.1 GENERALIZED WEAKNESS: Status: ACTIVE | Noted: 2024-07-18

## 2024-07-18 PROBLEM — L65.9 HAIR LOSS: Status: RESOLVED | Noted: 2022-12-02 | Resolved: 2024-07-18

## 2024-07-18 PROBLEM — Z91.89 AT RISK FOR MALNUTRITION: Status: ACTIVE | Noted: 2024-07-18

## 2024-07-18 PROBLEM — R53.82 CHRONIC FATIGUE: Status: RESOLVED | Noted: 2022-08-09 | Resolved: 2024-07-18

## 2024-07-18 PROBLEM — I89.0 LYMPHEDEMA OF LEFT UPPER EXTREMITY: Status: ACTIVE | Noted: 2024-07-18

## 2024-07-18 PROBLEM — I89.0 LYMPHEDEMA OF LEFT ARM: Status: RESOLVED | Noted: 2022-03-15 | Resolved: 2024-07-18

## 2024-07-18 PROBLEM — R53.81 PHYSICAL DECONDITIONING: Status: ACTIVE | Noted: 2024-07-18

## 2024-07-18 NOTE — PROGRESS NOTES
Skilled Nursing Facility       Meena Whitman Author: Khari Patel MD     1933 MRN [id number]   [facility]  [facility] 24      [facility] PCP Bonnie Danielson MD   [facility] Dis[facility] 24       Date of Admission: 24    Facility: [facility]      Expected Length of Stay:   2 weeks       HPI:    Meena Whitman is a 89 year old female admitted to SNF for sub-acute rehabilitation.      Chief Complaint:   Chief Complaint   Patient presents with    Follow - Up     Tomi, H&P, late entry         HPI   1-year-old female presenting to subacute rehab after she was in the ER for acute shingles rash has had the symptoms for over 2 weeks and very problematic with left upper chest wall in location with radiation to upper extremity in the area of her anterior chest and upper back with a history of chronic lymphedema in relation to breast cancer of her left upper extremity.  Patient is otherwise healthy with a history of spondylolisthesis of lumbar region, lumbar osteoarthritis, history of left breast cancer status postlumpectomy with lymph node dissection in  with chronic left upper extremity lymphedema, osteoarthritis of both hips,SVT, HTN, adjustment insomnia with generalized physical deconditioning and generalized weakness with pain under better control, but continues to have sensitivity in area of shingles rash.    Patient reports no chest pain or shortness of breath, pain is stable as above.     Allergies:  She is allergic to hydralazine, bactrim [sulfamethoxazole w/trimethoprim], and metronidazole.    Current Meds:  Current Outpatient Medications on File Prior to Visit   Medication Sig    HYDROcodone-acetaminophen (NORCO)  MG Oral Tab Take 1 tablet by mouth every 8 (eight) hours. (Patient not taking: Reported on 7/15/2024)    docusate sodium 100 MG Oral Cap Take 1 capsule (100 mg total) by mouth 2 (two) times daily. (Patient not taking: Reported on  7/15/2024)    Lidocaine 0.5 % External Gel Apply 1 Application topically every 6 (six) hours as needed.    TEMAZEPAM 15 MG Oral Cap TAKE 1-2 CAPSULES (15-30 MG TOTAL) BY MOUTH NIGHTLY AS NEEDED FOR SLEEP.    neomycin-polymyxin-dexamethasone 3.5-77828-7.1 Ophthalmic Ointment APPLY A SMALL AMOUNT TO LEFT EYE ONCE DAILY FOR 5 DAYS    gabapentin 300 MG Oral Cap Start with 1 tab(300 mg) at night for the first 3 days, then take 1 tab (300 mg) in the morning and 1 tab (300 mg) at night (Patient taking differently: Take 400 mg by mouth in the morning, at noon, and at bedtime.)    cephalexin 500 MG Oral Cap Take 1 capsule (500 mg total) by mouth 3 (three) times daily.    Elastic Bandages & Supports (SKINEEZ COMPRESSION ARM SLEEVE) Does not apply Misc 2 Units daily as needed.    amLODIPine 5 MG Oral Tab Take 1 tablet (5 mg total) by mouth daily.    labetalol 100 MG Oral Tab Take 1 tablet (100 mg total) by mouth 2 (two) times daily.    losartan 100 MG Oral Tab Take 1 tablet (100 mg total) by mouth daily.    estradiol 0.1 MG/GM Vaginal Cream Place 1 g vaginally daily.    Vitamin D, Cholecalciferol, 25 MCG (1000 UT) Oral Tab Take 1,000 Units by mouth daily. Take 3 caps po q am    acetaZOLAMIDE 250 MG Oral Tab TAKE ONE TABLET BY MOUTH TWICE DAILY AT BREAKFAST AND DINNER    Multiple Vitamins-Minerals (COMPLETE DAILY/LUTEIN) Oral Tab Take 1 tablet by mouth 3 (three) times daily.     No current facility-administered medications on file prior to visit.         HISTORY:  She  has a past medical history of Abnormal weight loss (06/07/2022), Basal cell carcinoma (BCC) of left side of nose (07/03/2018), Breast CA ([facility]) (1986), Breast cancer ([facility]) (04/2022), Cellulitis, COPD (chronic obstructive pulmonary disease) ([facility]), Ductal carcinoma in situ of breast, Exposure to radiation (1986), High blood pressure, Keratoconjunctivitis sicca of both eyes ([facility]) (10/29/2021), Lobular carcinoma in situ of breast, Lymph edema, Pelvic pressure in  female (08/09/2022), Post covid-19 condition, unspecified (08/09/2022), Post herpetic neuralgia, and Shingles.    She  has a past surgical history that includes mammogram, both breasts (07/22/2013); lumpectomy left (1986); hx breast cancer (Left, 1986); skin surgery (Left); injection, w/wo contrast, dx/therapeutic substance, epidural/subarachnoid; lumbar/sacral (N/A, 03/11/2016); patient withough preoperative order for iv antibiotic surgical site infection prophylaxis. (N/A, 03/11/2016); patient documented not to have experienced any of the following events (N/A, 03/11/2016); injection, w/wo contrast, dx/therapeutic substance, epidural/subarachnoid; lumbar/sacral (N/A, 04/04/2016); patient withough preoperative order for iv antibiotic surgical site infection prophylaxis. (N/A, 04/04/2016); patient documented not to have experienced any of the following events (N/A, 04/04/2016); glaucoma surgery (07/07/2016); Eye surgery; Eye surgery; cataract (Bilateral); tonsillectomy; hernia surgery (10/18/2017); other surgical history (06/01/2021); radiation left (1986); and needle biopsy right (Right, 04/2022).    She family history includes Breast Cancer in her daughter and mother; Breast Cancer (age of onset: 55) in her self; Cancer in her father and mother; DCIS in her self; LCIS in her self; Other in her father.   She  reports that she has never smoked. She has never used smokeless tobacco. She reports that she does not drink alcohol and does not use drugs.     ROS:   A comprehensive 14 point review of systems was completed.     Pertinent positives and negatives noted in the HPI.      PHYSICAL EXAM:   Estimated body mass index is 21.43 kg/m² as calculated from the following:    Height as of 7/4/24: 5' 3\" (1.6 m).    Weight as of 7/15/24: 121 lb (54.9 kg).     Lines, Drains, wound: NA    Vitals are stable and reviewed from PCC and nurse note.   Alert  +weakness  No acute distress  Normocephalic and atraumatic  No nasal  congestion or rhinorrhea.   No acute respiratory distress, no s/s of stridor.   Normal rate and BP is stable.   +weakness + motor deficit  No jaundice, normal sclera.  No acute abdomen distension with no ascites, flat abdomen  +gait instability   No pallor, no new rash.   Arthralgias   Healing shingles rash along left chest wall and upper back along left side.   Medication Reviewed: in [facility]    Labs and Imaging: XR CHEST AP PORTABLE  (CPT=71045)    Result Date: 7/4/2024  PROCEDURE:  XR CHEST AP PORTABLE  (CPT=71045)  TECHNIQUE:  AP chest radiograph was obtained.  COMPARISON:  [facility] , XR, XR CHEST PA + LAT CHEST (CPT=71046), 3/21/2022, 5:32 PM.  INDICATIONS:  SHINGLES to L side of chest, shoulder.  on 2nd course of antivirals.  PATIENT STATED HISTORY: (As transcribed by Technologist)  Patient shares that she has active shingles on her left side of the chest, and left shoulder.   FINDINGS:  The heart is borderline in size.  The lungs are clear of acute-appearing disease process.  The costophrenic angles are sharp.  There is no active disease seen on the basis of portable radiography.  Left axillary clips            CONCLUSION:  Borderline heart size. No active disease seen.   LOCATION:  [facility]      Dictated by (CST): Jose Francisco Thompson MD on 7/04/2024 at 3:16 PM     Finalized by (CST): Jose Francisco Thompson MD on 7/04/2024 at 3:16 PM         Recent Results (from the past 72 hour(s))   Basic Metabolic Panel (8)    Collection Time: 07/17/24  5:15 AM   Result Value Ref Range    Glucose 90 70 - 99 mg/dL    Sodium 138 136 - 145 mmol/L    Potassium 3.6 3.5 - 5.1 mmol/L    Chloride 111 98 - 112 mmol/L    CO2 21.0 21.0 - 32.0 mmol/L    Anion Gap 6 0 - 18 mmol/L    BUN 17 9 - 23 mg/dL    Creatinine 0.60 0.55 - 1.02 mg/dL    Calcium, Total 9.1 8.7 - 10.4 mg/dL    Calculated Osmolality 287 275 - 295 mOsm/kg    eGFR-Cr 85 >=60 mL/min/1.73m2    Patient Fasting for BMP? Yes    CBC W/ DIFFERENTIAL    Collection Time:  07/17/24  5:15 AM   Result Value Ref Range    WBC 3.1 (L) 4.0 - 11.0 x10(3) uL    RBC 3.52 (L) 3.80 - 5.30 x10(6)uL    HGB 11.5 (L) 12.0 - 16.0 g/dL    HCT 34.0 (L) 35.0 - 48.0 %    .0 150.0 - 450.0 10(3)uL    MCV 96.6 80.0 - 100.0 fL    MCH 32.7 26.0 - 34.0 pg    MCHC 33.8 31.0 - 37.0 g/dL    RDW 13.7 %    Neutrophil Absolute Prelim 0.98 (L) 1.50 - 7.70 x10 (3) uL    Neutrophil Absolute 0.98 (L) 1.50 - 7.70 x10(3) uL    Lymphocyte Absolute 1.32 1.00 - 4.00 x10(3) uL    Monocyte Absolute 0.54 0.10 - 1.00 x10(3) uL    Eosinophil Absolute 0.20 0.00 - 0.70 x10(3) uL    Basophil Absolute 0.06 0.00 - 0.20 x10(3) uL    Immature Granulocyte Absolute 0.01 0.00 - 1.00 x10(3) uL    Neutrophil % 31.6 %    Lymphocyte % 42.4 %    Monocyte % 17.4 %    Eosinophil % 6.4 %    Basophil % 1.9 %    Immature Granulocyte % 0.3 %           ASSESSMENT/ PLAN:   89 year old female with poster herpetic neuralgia with high risk for readmission with chronic multiple   Co-morbidities.     1. HZV (herpes zoster virus) post herpetic neuralgia  -completed antiviral, PT/OT and pain control,   -topical cream   -patient's non contagious at this point    2. Anxiety  -stable, CPM    3. Spondylolisthesis of lumbar region  -PT/OT    4. DDD (degenerative disc disease), lumbar  -PT/OT    5. Osteoarthritis of both hips, unspecified osteoarthritis type  -PT/OT    6. Paroxysmal SVT (supraventricular tachycardia) (HCC)  -stable, NSR for now, watching, BP is WNL.     7. Chronic left-sided low back pain without sciatica  -stable, PT/OT    8. Lobular breast cancer, right (HCC)  -s/p lumpectomy with left upper extremity lymphedema    9. Primary hypertension  -stable, CPm    10. Age-related osteoporosis with current pathological fracture with routine healing, subsequent encounter  -PT/OT    11. Insomnia, unspecified type  -restoril prn    12. Generalized weakness  -PT/OT    13. Physical deconditioning  -PT/OT    14. Gait instability  -PT/OT    15. Lymphedema  of left upper extremity  -supportive care, pain control, shingles treatment, has compressions at home.      -reviewed ER visit from 7/4/24      Meena Whitman needs then following services:  Assisted Living  Home Health Care  Nursing Home  Occupational Therapy  Physical Therapy  Respiratory Therapy    I anticipate that she will need 2 weeks of therapy and recooperation before an eventual transition to home and we will work on her discharge needs.     Patient is high risk for readmission to hospital with complicated chronic medical history.   Over 45 minutes spent on reviewing imaging and labs from [facility] and [facility], reviewed consults and recent hospital admission, discharge report, consults, and reviewed plan of care at SNF and discharge back to community.    Reviewed medications from hospital discharge and [facility].       Khari Patel MD

## 2024-07-19 ENCOUNTER — OFFICE VISIT (OUTPATIENT)
Dept: INTERNAL MEDICINE CLINIC | Facility: CLINIC | Age: 89
End: 2024-07-19
Payer: MEDICARE

## 2024-07-19 ENCOUNTER — LAB ENCOUNTER (OUTPATIENT)
Dept: LAB | Age: 89
End: 2024-07-19
Attending: STUDENT IN AN ORGANIZED HEALTH CARE EDUCATION/TRAINING PROGRAM
Payer: MEDICARE

## 2024-07-19 VITALS
SYSTOLIC BLOOD PRESSURE: 146 MMHG | OXYGEN SATURATION: 97 % | TEMPERATURE: 98 F | DIASTOLIC BLOOD PRESSURE: 70 MMHG | WEIGHT: 115 LBS | RESPIRATION RATE: 17 BRPM | HEIGHT: 63 IN | HEART RATE: 70 BPM | BODY MASS INDEX: 20.37 KG/M2

## 2024-07-19 DIAGNOSIS — M16.0 OSTEOARTHRITIS OF BOTH HIPS, UNSPECIFIED OSTEOARTHRITIS TYPE: ICD-10-CM

## 2024-07-19 DIAGNOSIS — B02.29 POSTHERPETIC NEURALGIA: ICD-10-CM

## 2024-07-19 DIAGNOSIS — F41.9 ANXIETY: ICD-10-CM

## 2024-07-19 DIAGNOSIS — M41.85 OTHER FORM OF SCOLIOSIS OF THORACOLUMBAR SPINE: ICD-10-CM

## 2024-07-19 DIAGNOSIS — I89.0 LYMPHEDEMA OF LEFT UPPER EXTREMITY: ICD-10-CM

## 2024-07-19 DIAGNOSIS — C50.911 INVASIVE LOBULAR CARCINOMA OF RIGHT BREAST IN FEMALE (HCC): ICD-10-CM

## 2024-07-19 DIAGNOSIS — R53.81 PHYSICAL DECONDITIONING: ICD-10-CM

## 2024-07-19 DIAGNOSIS — I10 PRIMARY HYPERTENSION: ICD-10-CM

## 2024-07-19 DIAGNOSIS — C50.911 LOBULAR BREAST CANCER, RIGHT (HCC): ICD-10-CM

## 2024-07-19 DIAGNOSIS — Z09 HOSPITAL DISCHARGE FOLLOW-UP: Primary | ICD-10-CM

## 2024-07-19 DIAGNOSIS — I47.10 PAROXYSMAL SVT (SUPRAVENTRICULAR TACHYCARDIA) (HCC): ICD-10-CM

## 2024-07-19 DIAGNOSIS — R74.01 ELEVATED ALT MEASUREMENT: ICD-10-CM

## 2024-07-19 DIAGNOSIS — M51.36 DDD (DEGENERATIVE DISC DISEASE), LUMBAR: ICD-10-CM

## 2024-07-19 DIAGNOSIS — G47.00 INSOMNIA, UNSPECIFIED TYPE: ICD-10-CM

## 2024-07-19 DIAGNOSIS — Z85.3 HX OF BREAST CANCER: ICD-10-CM

## 2024-07-19 DIAGNOSIS — J44.9 CHRONIC OBSTRUCTIVE PULMONARY DISEASE, UNSPECIFIED COPD TYPE (HCC): ICD-10-CM

## 2024-07-19 PROCEDURE — 80076 HEPATIC FUNCTION PANEL: CPT

## 2024-07-19 PROCEDURE — 36415 COLL VENOUS BLD VENIPUNCTURE: CPT

## 2024-07-19 RX ORDER — TEMAZEPAM 15 MG/1
CAPSULE ORAL NIGHTLY PRN
Qty: 60 CAPSULE | Refills: 0 | Status: SHIPPED | OUTPATIENT
Start: 2024-07-19

## 2024-07-20 LAB
ALBUMIN SERPL-MCNC: 4.2 G/DL (ref 3.2–4.8)
ALP LIVER SERPL-CCNC: 64 U/L
ALT SERPL-CCNC: 46 U/L
AST SERPL-CCNC: 48 U/L (ref ?–34)
BILIRUB DIRECT SERPL-MCNC: 0.1 MG/DL (ref ?–0.3)
BILIRUB SERPL-MCNC: 0.5 MG/DL (ref 0.2–0.9)
PROT SERPL-MCNC: 6.8 G/DL (ref 5.7–8.2)

## 2024-07-23 ENCOUNTER — TELEPHONE (OUTPATIENT)
Dept: INTERNAL MEDICINE CLINIC | Facility: CLINIC | Age: 89
End: 2024-07-23

## 2024-07-23 NOTE — TELEPHONE ENCOUNTER
Incoming (mail or fax):  Fax  Received from:  [facility]  Documentation given to:  Triage in basket    Vital Sign Report

## 2024-07-24 NOTE — PROGRESS NOTES
Skilled Nursing Facility       Meena Whitman Author: Khari Patel MD     1933 MRN [id number]   [facility]      [facility] PCP Bonnie Danielson MD   [facility] 24       Date of Admission: 24    Facility: [facility]      Expected Length of Stay:   2 weeks       HPI:    Meena Whitman is a 89 year old female admitted to SNF for sub-acute rehabilitation.      Chief Complaint:   Will discharge, will complete KAREN:    HPI   1-year-old female presenting to subacute rehab after she was in the ER for acute shingles rash has had the symptoms for over 2 weeks and very problematic with left upper chest wall in location with radiation to upper extremity in the area of her anterior chest and upper back with a history of chronic lymphedema in relation to breast cancer of her left upper extremity.  Patient is otherwise healthy with a history of spondylolisthesis of lumbar region, lumbar osteoarthritis, history of left breast cancer status postlumpectomy with lymph node dissection in  with chronic left upper extremity lymphedema, osteoarthritis of both hips,SVT, HTN, adjustment insomnia with generalized physical deconditioning and generalized weakness with pain under better control, but continues to have sensitivity in area of shingles rash.    Patient reports no chest pain or shortness of breath, pain is stable as above.     Allergies:  She is allergic to hydralazine, bactrim [sulfamethoxazole w/trimethoprim], and metronidazole.    Current Meds:  Current Outpatient Medications on File Prior to Visit   Medication Sig    acetaminophen 325 MG Oral Tab Take 2 tablets (650 mg total) by mouth every 6 (six) hours as needed for Pain.    Lidocaine 0.5 % External Gel Apply 1 Application topically every 6 (six) hours as needed.    neomycin-polymyxin-dexamethasone 3.5-87686-6.1 Ophthalmic Ointment APPLY A SMALL AMOUNT TO LEFT EYE ONCE DAILY FOR 5  DAYS    gabapentin 300 MG Oral Cap Start with 1 tab(300 mg) at night for the first 3 days, then take 1 tab (300 mg) in the morning and 1 tab (300 mg) at night (Patient taking differently: Take 400 mg by mouth in the morning, at noon, and at bedtime.)    cephalexin 500 MG Oral Cap Take 1 capsule (500 mg total) by mouth 3 (three) times daily.    Elastic Bandages & Supports (SKINEEZ COMPRESSION ARM SLEEVE) Does not apply Misc 2 Units daily as needed.    amLODIPine 5 MG Oral Tab Take 1 tablet (5 mg total) by mouth daily.    labetalol 100 MG Oral Tab Take 1 tablet (100 mg total) by mouth 2 (two) times daily.    losartan 100 MG Oral Tab Take 1 tablet (100 mg total) by mouth daily.    estradiol 0.1 MG/GM Vaginal Cream Place 1 g vaginally daily.    Vitamin D, Cholecalciferol, 25 MCG (1000 UT) Oral Tab Take 1,000 Units by mouth daily. Take 3 caps po q am    acetaZOLAMIDE 250 MG Oral Tab TAKE ONE TABLET BY MOUTH TWICE DAILY AT BREAKFAST AND DINNER    Multiple Vitamins-Minerals (COMPLETE DAILY/LUTEIN) Oral Tab Take 1 tablet by mouth 3 (three) times daily.     No current facility-administered medications on file prior to visit.         HISTORY:  She  has a past medical history of Abnormal weight loss (06/07/2022), Basal cell carcinoma (BCC) of left side of nose (07/03/2018), Breast CA ([facility]) (1986), Breast cancer ([facility]) (04/2022), Cellulitis, COPD (chronic obstructive pulmonary disease) (HCC), Ductal carcinoma in situ of breast, Exposure to radiation (1986), High blood pressure, Keratoconjunctivitis sicca of both eyes ([facility]) (10/29/2021), Lobular carcinoma in situ of breast, Lymph edema, Pelvic pressure in female (08/09/2022), Post covid-19 condition, unspecified (08/09/2022), Post herpetic neuralgia, and Shingles.    She  has a past surgical history that includes mammogram, both breasts (07/22/2013); lumpectomy left (1986); hx breast cancer (Left, 1986); skin surgery (Left); injection, w/wo contrast, dx/therapeutic substance,  epidural/subarachnoid; lumbar/sacral (N/A, 03/11/2016); patient withough preoperative order for iv antibiotic surgical site infection prophylaxis. (N/A, 03/11/2016); patient documented not to have experienced any of the following events (N/A, 03/11/2016); injection, w/wo contrast, dx/therapeutic substance, epidural/subarachnoid; lumbar/sacral (N/A, 04/04/2016); patient withough preoperative order for iv antibiotic surgical site infection prophylaxis. (N/A, 04/04/2016); patient documented not to have experienced any of the following events (N/A, 04/04/2016); glaucoma surgery (07/07/2016); Eye surgery; Eye surgery; cataract (Bilateral); tonsillectomy; hernia surgery (10/18/2017); other surgical history (06/01/2021); radiation left (1986); and needle biopsy right (Right, 04/2022).    She family history includes Breast Cancer in her daughter and mother; Breast Cancer (age of onset: 55) in her self; Cancer in her father and mother; DCIS in her self; LCIS in her self; Other in her father.   She  reports that she has never smoked. She has never used smokeless tobacco. She reports that she does not drink alcohol and does not use drugs.     ROS:   A comprehensive 14 point review of systems was completed.     Pertinent positives and negatives noted in the HPI.      PHYSICAL EXAM:   Estimated body mass index is 20.37 kg/m² as calculated from the following:    Height as of 7/19/24: 5' 3\" (1.6 m).    Weight as of 7/19/24: 115 lb (52.2 kg).     Lines, Drains, wound: NA    Vitals are stable and reviewed from [facility]CC and nurse note.   Alert  +weakness  No acute distress  Normocephalic and atraumatic  No nasal congestion or rhinorrhea.   No acute respiratory distress, no s/s of stridor.   Normal rate and BP is stable.   +weakness + motor deficit  No jaundice, normal sclera.  No acute abdomen distension with no ascites, flat abdomen  +gait instability   No pallor, no new rash.   Arthralgias   Healing shingles rash along left chest wall  and upper back along left side.   Medication Reviewed: in [facility] and [facility]    Labs and Imaging: XR CHEST AP PORTABLE  (CPT=71045)    Result Date: 7/4/2024  PROCEDURE:  XR CHEST AP PORTABLE  (CPT=71045)  TECHNIQUE:  AP chest radiograph was obtained.  COMPARISON:  [facility] , XR, XR CHEST PA + LAT CHEST (CPT=71046), 3/21/2022, 5:32 PM.  INDICATIONS:  SHINGLES to L side of chest, shoulder.  on 2nd course of antivirals.  PATIENT STATED HISTORY: (As transcribed by Technologist)  Patient shares that she has active shingles on her left side of the chest, and left shoulder.   FINDINGS:  The heart is borderline in size.  The lungs are clear of acute-appearing disease process.  The costophrenic angles are sharp.  There is no active disease seen on the basis of portable radiography.  Left axillary clips            CONCLUSION:  Borderline heart size. No active disease seen.   LOCATION:  [facility]      Dictated by (CST): Jose Francisco Thompson MD on 7/04/2024 at 3:16 PM     Finalized by (CST): Jose Francisco Thompson MD on 7/04/2024 at 3:16 PM         No results found for this or any previous visit (from the past 72 hour(s)).          ASSESSMENT/ PLAN:   89 year old female with poster herpetic neuralgia with high risk for readmission with chronic multiple   Co-morbidities.     1. HZV (herpes zoster virus) post herpetic neuralgia  -completed antiviral, PT/OT and pain control,   -topical cream   -patient's non contagious at this point    2. Anxiety  -stable, CPM    3. Spondylolisthesis of lumbar region  -PT/OT    4. DDD (degenerative disc disease), lumbar  -PT/OT    5. Osteoarthritis of both hips, unspecified osteoarthritis type  -PT/OT    6. Paroxysmal SVT (supraventricular tachycardia) (HCC)  -stable, NSR for now, watching, BP is WNL.     7. Chronic left-sided low back pain without sciatica  -stable, PT/OT    8. Lobular breast cancer, right (HCC)  -s/p lumpectomy with left upper extremity lymphedema    9. Primary hypertension  -stable,  CPm    10. Age-related osteoporosis with current pathological fracture with routine healing, subsequent encounter  -PT/OT    11. Insomnia, unspecified type  -restoril prn    12. Generalized weakness  -PT/OT    13. Physical deconditioning  -PT/OT    14. Gait instability  -PT/OT    15. Lymphedema of left upper extremity  -supportive care, pain control, shingles treatment, has compressions at home.      -reviewed ER visit from 7/4/24    -cleared for discharge back home in care of home health, completed course at [facility] with no complications.       Meena Whitman needs then following services:  Assisted Living  Home Health Care  Nursing Home  Occupational Therapy  Physical Therapy  Respiratory Therapy       Patient is high risk for readmission to hospital with complicated chronic medical history.   Over 25 minutes spent on reviewing imaging and labs from [facility] and [facility], reviewed consults and recent hospital admission, discharge report, consults, and reviewed plan of care at SNF and discharge back to community.    Reviewed medications from hospital discharge and [facility].       Khari Patel MD

## 2024-07-25 NOTE — PROGRESS NOTES
Subjective:   Meena Whitman is a 89 year old female who presents for hospital follow up.   She was discharged from [facility] to Home or Self Care  Admission Date: 7/4/24   Discharge Date:  7/17/24   Hospital Discharged From:  [facility]     During the visit, the following was completed:  Obtained and reviewed continuity of care documents and Notes from rehab facility  Reviewed Labs (CBC, CMP)    HPI:   Meena Whitman is a 89 year old female with PMH of HTN, paroxysmal SVT, multinodular goiter, degenerative disc disease, scoliosis, osteoarthritis, anxiety, osteoporosis, history of breast cancer of bilateral breasts, glaucoma, lymphedema of left arm (s/p lymp node resection and radiation),recent  Shingles of her left chest and her back, complicated by severe pain in her left upper chest and left upper back with radiation to her left arm (repeat patient has chronic lymphedema related to breast cancer).     Patient presented to subacute rehab after the ER visit for shingles pain.  Managing pain, which is most likely due to postherpetic neuralgia was difficult for the patient, she was prescribed narcotics in the emergency department and discharged home.  Per patient's daughter patient became very weak and sleepy with narcotic use and she decided to transfer patient to [facility].  In the rehab patient completed the course of repeated antiviral, was undergoing PT OT and pain was managed with narcotics and topical lidocaine.  Her other medical conditions appear to be stable in the facility and she was cleared for discharge back home for continued care of home health and physical therapy.  She completed the course of narcotics about 4 days ago and states that her mentation is significantly better from that.  She is currently taking gabapentin 400 mg 3 times a day for pain control however it has not been helping much.  She is asking if there is anything else that can be done.    She also  complains about ongoing insomnia as she ran out off temazepam.  She tried trazodone, however this has not helped her to sleep in the past and she states that temazepam is the only medication that helps her sleep.    History/Other:   Current Medications:  Medication Reconciliation:  I am aware of an inpatient discharge within the last 30 days.  The discharge medication list has been reconciled with the patient's current medication list and reviewed by me.  See medication list for additions of new medication, and changes to current doses of medications and discontinued medications.  Outpatient Medications Marked as Taking for the 7/19/24 encounter (Office Visit) with Bonnie Danielson MD   Medication Sig    temazepam 15 MG Oral Cap Take 1-2 capsules (15-30 mg total) by mouth nightly as needed for Sleep.    acetaminophen 325 MG Oral Tab Take 2 tablets (650 mg total) by mouth every 6 (six) hours as needed for Pain.    Lidocaine 0.5 % External Gel Apply 1 Application topically every 6 (six) hours as needed.    gabapentin 300 MG Oral Cap Start with 1 tab(300 mg) at night for the first 3 days, then take 1 tab (300 mg) in the morning and 1 tab (300 mg) at night (Patient taking differently: Take 400 mg by mouth in the morning, at noon, and at bedtime.)    cephalexin 500 MG Oral Cap Take 1 capsule (500 mg total) by mouth 3 (three) times daily.    Elastic Bandages & Supports (SKINEEZ COMPRESSION ARM SLEEVE) Does not apply Misc 2 Units daily as needed.    amLODIPine 5 MG Oral Tab Take 1 tablet (5 mg total) by mouth daily.    labetalol 100 MG Oral Tab Take 1 tablet (100 mg total) by mouth 2 (two) times daily.    losartan 100 MG Oral Tab Take 1 tablet (100 mg total) by mouth daily.    estradiol 0.1 MG/GM Vaginal Cream Place 1 g vaginally daily.    Vitamin D, Cholecalciferol, 25 MCG (1000 UT) Oral Tab Take 1,000 Units by mouth daily. Take 3 caps po q am    acetaZOLAMIDE 250 MG Oral Tab TAKE ONE TABLET BY MOUTH TWICE DAILY AT BREAKFAST  AND DINNER    Multiple Vitamins-Minerals (COMPLETE DAILY/LUTEIN) Oral Tab Take 1 tablet by mouth 3 (three) times daily.       Review of Systems   Constitutional:  Positive for activity change and fatigue. Negative for appetite change, chills, diaphoresis and fever.        Severe pain in the left chest and left upper back with rash in that area.   HENT: Negative.     Eyes: Negative.    Respiratory: Negative.     Cardiovascular: Negative.    Gastrointestinal: Negative.    Skin:  Positive for color change, rash and wound.         Objective:   Physical Exam  Constitutional:       Appearance: Normal appearance.   HENT:      Head: Normocephalic and atraumatic.   Eyes:      Extraocular Movements: Extraocular movements intact.      Conjunctiva/sclera: Conjunctivae normal.      Pupils: Pupils are equal, round, and reactive to light.   Cardiovascular:      Rate and Rhythm: Normal rate and regular rhythm.      Heart sounds: Murmur (systolic murmur) heard.   Pulmonary:      Effort: Pulmonary effort is normal. No respiratory distress.      Breath sounds: Normal breath sounds. No stridor. No wheezing, rhonchi or rales.   Skin:     Findings: Lesion and rash present.      Comments: There is a rash in the C8-T2 distribution on the left upper back, with ruptured vesucles, crusted, no discharge, no redness.  There is a rash on the anterior left chest at T1-T2 distribution , which is now epithelialized and improved compared to prior   Neurological:      Mental Status: She is alert.   Psychiatric:         Mood and Affect: Mood normal.         Behavior: Behavior normal.         Thought Content: Thought content normal.         Judgment: Judgment normal.           /70 (BP Location: Right arm, Patient Position: Sitting, Cuff Size: adult)   Pulse 70   Temp 98.2 °F (36.8 °C)   Resp 17   Ht 5' 3\" (1.6 m)   Wt 115 lb (52.2 kg)   SpO2 97%   BMI 20.37 kg/m²  Estimated body mass index is 20.37 kg/m² as calculated from the following:     Height as of this encounter: 5' 3\" (1.6 m).    Weight as of this encounter: 115 lb (52.2 kg).    Assessment & Plan:   1. Hospital discharge follow-up (Primary)  Patient is here for follow-up after discharge from rehab facility [facility]  She was admitted there due to severe pain, inability to take care of herself and confusion due to narcotic use for postherpetic neuralgia.  She was discharged in good condition with improvement of her pain.  She will need to continue physical therapy at home    2. Postherpetic neuralgia  Patient was treated with narcotics, prescribed in emergency department-hydrocodone-acetaminophen 5-3 25.  She stopped taking them about 4 days ago and is currently on gabapentin 400 mg 3 times a day.  States that she is still has significant pain, however improved.  She does have shooting pain to her left upper extremity at times.  Uses topical lidocaine cream.  - Continue gabapentin 400 mg 3 times a day  - Continue topical lidocaine cream  - Can increase gabapentin to 800 mg with the evening dose if cannot tolerate pain during sleep.    3. Primary hypertension  According to the report from rehab facility, her blood pressures were well-controlled during her stay.  Today in the office her blood pressure is 146/70.  - Will continue her current blood pressure regimen.    4. Insomnia, unspecified type  Patient continues to have insomnia whenever she does not take her regular dose of temazepam.  Tried trazodone in the past and this has not worked for her.  Gabapentin also does not help to sleep.  Will order temazepam as this the only medication that helps patient to sleep so far.  Will address this once she gets better at her next appointment.  -     Temazepam; Take 1-2 capsules (15-30 mg total) by mouth nightly as needed for Sleep.  Dispense: 60 capsule; Refill: 0    5. Anxiety  -     Temazepam; Take 1-2 capsules (15-30 mg total) by mouth nightly as needed for Sleep.  Dispense: 60  capsule; Refill: 0    6. Elevated ALT measurement  Was noted to have elevated liver function tests.  Will order repeat labs-     Hepatic Function Panel (7); Future; Expected date: 07/19/2024    7. Paroxysmal SVT (supraventricular tachycardia) (HCC)  Controlled with current management.  No symptoms    8. Invasive lobular carcinoma of right breast in female (HCC)  9. Lobular breast cancer, right (HCC)  10. Lymphedema of left upper extremity  11. Hx of left breast cancer S/P lumpectomy & LN dissection 1986  Patient reports pain in her left upper extremity when she does her regular lymphedema treatments and massage at home.  Recommend to decrease the time of the treatments from 45 minutes to 15-20 minutes as tolerated.  She follows up with oncology for this.    12. Other form of scoliosis of thoracolumbar spine  13. Osteoarthritis of both hips, unspecified osteoarthritis type  14. Physical deconditioning  15. DDD (degenerative disc disease), lumbar  Continue PT/OT at home.    16. Chronic obstructive pulmonary disease, unspecified COPD type (HCC)  Not currently on treatment.  Stable.  Lungs are clear to auscultation      Return in 4 weeks (on 8/16/2024).

## 2024-07-31 ENCOUNTER — PATIENT MESSAGE (OUTPATIENT)
Dept: INTERNAL MEDICINE CLINIC | Facility: CLINIC | Age: 89
End: 2024-07-31

## 2024-07-31 DIAGNOSIS — B02.29 POSTHERPETIC NEURALGIA: Primary | ICD-10-CM

## 2024-07-31 RX ORDER — PREGABALIN 75 MG/1
75 CAPSULE ORAL 2 TIMES DAILY
Qty: 120 CAPSULE | Refills: 0 | Status: SHIPPED | OUTPATIENT
Start: 2024-07-31

## 2024-07-31 NOTE — TELEPHONE ENCOUNTER
From: Meena Whitman  To: Bonnie Danielson  Sent: 7/31/2024 12:32 PM CDT  Subject: pain management plan not effective    This is Miranda Jeong, Meena's daughter. Since her discharge from [facility] on July 17, we have not been able to establish a shingles-based pain management routine that provides Meena with consistent pain reduction. She currently takes 1000 mg Tylonol 3x/day and 400 mg Gabapentin 3-4x/day. She has not been able to accept physical therapy since July 17 due to the unmanageable pain. Since a visit from the occupational therapist on Monday, about 48 hours ago, which included a lymphatic massage, she has had consistent level 9 pain in L arm and back.     I am out of ideas on how to help manage her pain. What else can we try? Is there a chance an anti-depressant might help? Please call my cell phone [phone].

## 2024-07-31 NOTE — TELEPHONE ENCOUNTER
Please see pt update, she is still having significant pain unfortunately. Do you have any other recommendations? Please advise, thanks!

## 2024-07-31 NOTE — TELEPHONE ENCOUNTER
Spoke with the patient's daughter over the phone.  Will try to switch patient to Pregabalin to titrate up to 150 mg BID.  If this does not help with pain - will need to switch to Antiseizure or SNRIs.  Will send medication to the pharmacy. Patient's daughter informed and in agreement with the plan.

## 2024-08-05 ENCOUNTER — TELEPHONE (OUTPATIENT)
Dept: INTERNAL MEDICINE CLINIC | Facility: CLINIC | Age: 89
End: 2024-08-05

## 2024-08-05 NOTE — TELEPHONE ENCOUNTER
Incoming (mail or fax):  fax   Received from:  [facility]  Documentation given to:  Dr escobedo     Plan of care

## 2024-08-06 NOTE — TELEPHONE ENCOUNTER
I called patient's daughter Miranda today.  She states that the patient transition from gabapentin to Lyrica and started the full dose as of yesterday.  She does report that this medication makes her more drowsy. Pain level at best is 5/10.  They also contacted duly pain management and are planning to get injection into the spine in order to try to control neuropathic pain.  Patient's daughter will send us updates via [employer] and will reach out if has any other questions.

## 2024-08-07 ENCOUNTER — HOME HEALTH CHARGES (OUTPATIENT)
Dept: INTERNAL MEDICINE CLINIC | Facility: CLINIC | Age: 89
End: 2024-08-07

## 2024-08-07 DIAGNOSIS — B02.23 POSTHERPETIC POLYNEUROPATHY: Primary | ICD-10-CM

## 2024-08-08 ENCOUNTER — MED REC SCAN ONLY (OUTPATIENT)
Dept: INTERNAL MEDICINE CLINIC | Facility: CLINIC | Age: 89
End: 2024-08-08

## 2024-08-13 ENCOUNTER — TELEPHONE (OUTPATIENT)
Dept: INTERNAL MEDICINE CLINIC | Facility: CLINIC | Age: 89
End: 2024-08-13

## 2024-08-13 NOTE — TELEPHONE ENCOUNTER
Incoming (mail or fax):  fax  Received from:  [facility]  Documentation given to:  Dr. Danielson    Plan of care

## 2024-08-14 ENCOUNTER — TELEPHONE (OUTPATIENT)
Dept: HEMATOLOGY/ONCOLOGY | Facility: HOSPITAL | Age: 89
End: 2024-08-14

## 2024-08-14 NOTE — TELEPHONE ENCOUNTER
Pt called to reschedule her appts on 8/27. Pt is seeking a date after her mammo which is on 9/26    Please call pt back

## 2024-08-17 ENCOUNTER — TELEPHONE (OUTPATIENT)
Dept: INTERNAL MEDICINE CLINIC | Facility: CLINIC | Age: 89
End: 2024-08-17

## 2024-08-17 NOTE — TELEPHONE ENCOUNTER
Incoming (mail or fax):  fax  Received from:  [facility]  Documentation given to:  Dr. REID

## 2024-08-19 ENCOUNTER — TELEPHONE (OUTPATIENT)
Dept: INTERNAL MEDICINE CLINIC | Facility: CLINIC | Age: 89
End: 2024-08-19

## 2024-08-19 ENCOUNTER — TELEMEDICINE (OUTPATIENT)
Dept: INTERNAL MEDICINE CLINIC | Facility: CLINIC | Age: 89
End: 2024-08-19
Payer: MEDICARE

## 2024-08-19 DIAGNOSIS — B02.23 POSTHERPETIC POLYNEUROPATHY: Primary | ICD-10-CM

## 2024-08-19 PROCEDURE — 99213 OFFICE O/P EST LOW 20 MIN: CPT | Performed by: STUDENT IN AN ORGANIZED HEALTH CARE EDUCATION/TRAINING PROGRAM

## 2024-08-19 RX ORDER — LIDOCAINE 50 MG/G
1 PATCH TOPICAL EVERY 24 HOURS
Qty: 30 EACH | Refills: 0 | Status: SHIPPED | OUTPATIENT
Start: 2024-08-19 | End: 2024-08-20

## 2024-08-19 RX ORDER — LIDOCAINE 50 MG/G
1 OINTMENT TOPICAL AS NEEDED
Qty: 70.88 G | Refills: 0 | Status: SHIPPED | OUTPATIENT
Start: 2024-08-19

## 2024-08-19 NOTE — TELEPHONE ENCOUNTER
Pt called and states the lidocaine 5% patches are $80. I called the pharmacy, they advised they have the 4% OTC lidocaine patches that are $9/box of 5 patches. He advised we can send an order for 10 patches, order pended if okay with you, pt was agreeable to this.    pharmacy never got the lidocaine cream. This is because it requires PA and it is in process, once it is approved or denied it gets released to the pharmacy. Pt was informed of this and verbalized understanding. She has enough cream to last a few days.    She also asked if we could help with getting cotton tipped applicators for applying ointment to hard to reach lesions. I asked the pharmacy, but they don't have these. I let the patient know if the pharmacy doesn't have these I will leave a bag of these up front if her daughter can pick them up.

## 2024-08-19 NOTE — PROGRESS NOTES
TELEHEALTH VIDEO VISIT    I spoke with Meena Whitman by secure Epic  video service on 08/19/24, verified date of birth, patient stated they are in the state of [place], and discussed their concerns as below:        Patient is a 89-year-old female, who was recently diagnosed with shingles and already completed 7-day course of Valtrex and the end of June 2024, who continued to have significant pain in the area of her left upper chest left underarm and left upper back.  She was initially prescribed gabapentin, however it did not help her even at increased dose of 400 mg 3 times a day .  Due to no improvement in pain we decided to switch her treatment from gabapentin to pregabalin with a goal to titrate 250 mg twice daily.  Patient reports fair pain control with this change.  She states that sometimes she takes only 1 tablet a day.    There was a concern of a change in the appearance of her rash, which was raised by the visiting nurse and this is the reason for today's televisit.    Patient has lymphedema massage therapist at her home during this visit and he helped to measure her blood pressure which was 118/62 on the right arm and the heart rate was 62.    Vitals:    08/21/24 1613   BP: 118/62   Pulse: 62   SpO2: 98%     There is no height or weight on file to calculate BMI.  BP Readings from Last 3 Encounters:   08/21/24 118/62   07/19/24 146/70   07/15/24 132/52     The ASCVD Risk score (Elizabeth MATTHEWS, et al., 2019) failed to calculate for the following reasons:    The 2019 ASCVD risk score is only valid for ages 40 to 79  Reviewed Current Medications:  Current Outpatient Medications   Medication Sig Dispense Refill    lidocaine 5 % External Ointment Apply 1 Application topically as needed. 70.88 g 0    lidocaine 4 % External Patch Place 1 patch onto the skin daily as needed. 10 patch 0    Lidocaine 4 % External Cream Apply 1 Application topically 2 (two) times daily. 28 g 1    pregabalin 75 MG Oral  Cap Take 1 capsule (75 mg total) by mouth 2 (two) times daily. Start with 75 mg (1 tab)  two times daily for 3-5 days, then go up to 150 mg (2 tabs) two times daily 120 capsule 0    temazepam 15 MG Oral Cap Take 1-2 capsules (15-30 mg total) by mouth nightly as needed for Sleep. 60 capsule 0    acetaminophen 325 MG Oral Tab Take 2 tablets (650 mg total) by mouth every 6 (six) hours as needed for Pain.      neomycin-polymyxin-dexamethasone [id number] Ophthalmic Ointment APPLY A SMALL AMOUNT TO LEFT EYE ONCE DAILY FOR 5 DAYS      cephalexin 500 MG Oral Cap Take 1 capsule (500 mg total) by mouth 3 (three) times daily. 21 capsule 0    Elastic Bandages & Supports (SKINEEZ COMPRESSION ARM SLEEVE) Does not apply Misc 2 Units daily as needed. 2 each 3    amLODIPine 5 MG Oral Tab Take 1 tablet (5 mg total) by mouth daily. 90 tablet 1    labetalol 100 MG Oral Tab Take 1 tablet (100 mg total) by mouth 2 (two) times daily. 180 tablet 1    losartan 100 MG Oral Tab Take 1 tablet (100 mg total) by mouth daily. 90 tablet 1    estradiol 0.1 MG/GM Vaginal Cream Place 1 g vaginally daily. 42.5 g 3    Vitamin D, Cholecalciferol, 25 MCG (1000 UT) Oral Tab Take 1,000 Units by mouth daily. Take 3 caps po q am 270 tablet 3    acetaZOLAMIDE 250 MG Oral Tab TAKE ONE TABLET BY MOUTH TWICE DAILY AT BREAKFAST AND DINNER      Multiple Vitamins-Minerals (COMPLETE DAILY/LUTEIN) Oral Tab Take 1 tablet by mouth 3 (three) times daily.         Assessment & Plan    1. Postherpetic polyneuropathy (Primary)  Patient with postherpetic neuropathy on the left chest, left underarm and left upper back.  Rash appears to be getting better overall, there is no new lesions.  Lesions appear to be very well epithelialized with some areas of telangiectasia.  Pain getting better with pregabalin 150 mg once daily.  Instructed patient to take the medication at night due to side effects of sleepiness and confusion.  She can take additional pill if needs more pain  control.  She states that lidocaine cream has helped her with symptoms as well and she would like a refill on that.  We will also try a lidocaine patch because of its 24-hour effect, as patient does not always have someone to help her apply lidocaine cream on the lesion on her back.  -     Lidocaine; Apply 1 Application topically as needed.  Dispense: 70.88 g; Refill: 0  -     Lidocaine patch; Place 1 patch onto the skin daily.  Dispense: 30 each; Refill: 0        Follow Up: As needed/if symptoms worsen       Objective  GENERAL: alert and oriented x3. No distress.   EYES:no scleral icterus.   LUNGS: no labored breathing, no dyspnea.       Reviewed:  Patient Active Problem List    Diagnosis    Gait instability    Physical deconditioning    Generalized weakness    Lymphedema of left upper extremity    At risk for malnutrition    Bone disorder    Vaginal dryness, menopausal    Insomnia    Lobular breast cancer, right (HCC)    Fibroids, intramural    Primary hypertension    Age-related osteoporosis with current pathological fracture    Invasive lobular carcinoma of right breast in female (HCC)    Multinodular goiter; bx 2022 benign    Chronic left-sided low back pain without sciatica    Paroxysmal SVT (supraventricular tachycardia) ([facility])    PCO (posterior capsular opacification), left    Osteoarthritis of both hips, unspecified osteoarthritis type    Scoliosis    Primary open-angle glaucoma, bilateral, severe stage    Hx of left breast cancer S/P lumpectomy & LN dissection 1986    DDD (degenerative disc disease), lumbar    Spondylolisthesis of lumbar region    Anxiety    Exophthalmia      Allergies   Allergen Reactions    Hydralazine OTHER (SEE COMMENTS)     Headache    Bactrim [Sulfamethoxazole W/Trimethoprim] RASH     Per patient    Metronidazole ITCHING     Metronidazole gel caused irritation in vaginal area.        Social History     Socioeconomic History    Marital status:     Number of children: 4    Occupational History    Occupation: Retired   Tobacco Use    Smoking status: Never    Smokeless tobacco: Never   Substance and Sexual Activity    Alcohol use: No     Alcohol/week: 0.0 standard drinks of alcohol    Drug use: No   Other Topics Concern    Caffeine Concern No    Exercise Yes     Comment: 3 times per week, [employer]      Review of Systems   Constitutional:  Positive for activity change and fatigue. Negative for appetite change, chills, diaphoresis and fever.        Severe pain in the left chest and left upper back with rash in that area.   HENT: Negative.     Eyes: Negative.    Respiratory: Negative.     Cardiovascular: Negative.    Gastrointestinal: Negative.    Skin:  Positive for color change and rash. Negative for wound.     All other systems negative unless stated in ROS or in HPI.       Bonnie Danielson MD  Internal Medicine       Call office with any questions or seek emergency care if necessary.   Patient understands and agrees to follow directions and advice.    Telehealth Verbal Consent   I conducted a telehealth visit with Meena Whitman today, 08/19/24, which was completed using two-way, real-time interactive audio and video communication. This has been done in good olegario to provide continuity of care in the best interest of the provider-patient relationship. Every conscious effort was taken to allow for sufficient and adequate time to complete the visit.The patient was made aware of the limitations of the telehealth visit, including treatment limitations as no physical exam could be performed.  The patient was advised to call 911 or to go to the ER in case there was an emergency.  The patient was also advised of the potential privacy & security concerns related to the telehealth platform. The patient was made aware of where to find [facility]'s notice of privacy practices, telehealth consent form and other related consent forms and documents.  which are located on the [facility]  website. The patient verbally agreed to telehealth consent form, related consents and the risks discussed.  Lastly, the patient confirmed that they were in [place]. Included in this visit, time may have been spent reviewing labs, medications, radiology tests and decision making. Appropriate medical decision-making and tests are ordered as detailed in the plan of care above.  Coding/billing information is submitted for this visit based on complexity of care and/or time spent for the visit.  ----------------------------------------- PATIENT INSTRUCTIONS-----------------------------------------     There are no Patient Instructions on file for this visit.

## 2024-08-19 NOTE — TELEPHONE ENCOUNTER
Ana, nurse from [facility], is requesting topical cream for irritation from shingles.  Ana does have a picture if needed.  Please advise.  Thank you!

## 2024-08-19 NOTE — TELEPHONE ENCOUNTER
Called  nurse and she wanted to send a picture. There is no way for us to get the picture.     Called pt and pt using lidocaine 3% cream on the rash.   Advised OV would be the best for us to evaluate it. Pt denies the appointment. VV made for pt today. OK with Dr     Future Appointments   Date Time Provider Department Center   8/19/2024 11:00 AM Bonnie Danielson MD [facility] [address] [facility] [address] [place]   8/23/2024  1:45 PM  [facility]  LAB [facility]   9/26/2024  1:20 PM  MELISSA RM1  MAMMO [facility]   10/1/2024  1:15 PM Krista Warren MD  HEM ONC [facility]   10/1/2024  1:30 PM  [place] RN1  CHEMO [facility]

## 2024-08-19 NOTE — TELEPHONE ENCOUNTER
Incoming (mail or fax):  fax  Received from:  [facility]  Documentation given to:  Dr. Danielson    Discharge OT

## 2024-08-20 RX ORDER — LIDOCAINE 40 MG/G
1 CREAM TOPICAL 2 TIMES DAILY
Qty: 28 G | Refills: 1 | Status: SHIPPED | OUTPATIENT
Start: 2024-08-20 | End: 2024-10-28 | Stop reason: ALTCHOICE

## 2024-08-20 RX ORDER — LIDOCAINE 4 G/G
1 PATCH TOPICAL
Qty: 10 PATCH | Refills: 0 | Status: SHIPPED | OUTPATIENT
Start: 2024-08-20 | End: 2024-08-20

## 2024-08-20 RX ORDER — LIDOCAINE 4 G/G
1 PATCH TOPICAL
Qty: 10 PATCH | Refills: 0 | Status: SHIPPED | OUTPATIENT
Start: 2024-08-20 | End: 2024-10-03 | Stop reason: ALTCHOICE

## 2024-08-20 NOTE — TELEPHONE ENCOUNTER
I signed the prescription and  I also sent another prescription for  4% Lidocaine cream. Hopefully this wont require prior authorisation

## 2024-08-21 ENCOUNTER — TELEPHONE (OUTPATIENT)
Dept: INTERNAL MEDICINE CLINIC | Facility: CLINIC | Age: 89
End: 2024-08-21

## 2024-08-21 VITALS — HEART RATE: 62 BPM | DIASTOLIC BLOOD PRESSURE: 62 MMHG | OXYGEN SATURATION: 98 % | SYSTOLIC BLOOD PRESSURE: 118 MMHG

## 2024-08-21 NOTE — TELEPHONE ENCOUNTER
Incoming (mail or fax):  fax  Received from:  [facility]  Documentation given to:  Dr. Danielson      Plan of care evaluation

## 2024-08-22 ENCOUNTER — TELEPHONE (OUTPATIENT)
Dept: INTERNAL MEDICINE CLINIC | Facility: CLINIC | Age: 89
End: 2024-08-22

## 2024-08-22 NOTE — TELEPHONE ENCOUNTER
Incoming (mail or fax):  fax  Received from:  [facility]  Documentation given to:  Dr Danielson    Notice of missed occupational therapy appointment

## 2024-08-27 ENCOUNTER — APPOINTMENT (OUTPATIENT)
Dept: HEMATOLOGY/ONCOLOGY | Facility: HOSPITAL | Age: 89
End: 2024-08-27
Attending: INTERNAL MEDICINE
Payer: MEDICARE

## 2024-08-29 DIAGNOSIS — G47.00 INSOMNIA, UNSPECIFIED TYPE: ICD-10-CM

## 2024-08-29 DIAGNOSIS — F41.9 ANXIETY: ICD-10-CM

## 2024-08-30 RX ORDER — TEMAZEPAM 15 MG/1
CAPSULE ORAL NIGHTLY PRN
Qty: 60 CAPSULE | Refills: 0 | OUTPATIENT
Start: 2024-08-30

## 2024-08-30 NOTE — TELEPHONE ENCOUNTER
Last OV relevant to medication: n/a  Last refill date: 8/26/134 (from Chitra Will)   #/refills: RX was d/reggie   When pt was asked to return for OV: n/a  Upcoming appt/reason:n/a  Was pt informed of any over due labs: n/a

## 2024-09-03 DIAGNOSIS — B02.29 POSTHERPETIC NEURALGIA: ICD-10-CM

## 2024-09-03 DIAGNOSIS — F41.9 ANXIETY: ICD-10-CM

## 2024-09-03 DIAGNOSIS — G47.00 INSOMNIA, UNSPECIFIED TYPE: ICD-10-CM

## 2024-09-03 RX ORDER — TEMAZEPAM 15 MG/1
CAPSULE ORAL NIGHTLY PRN
Qty: 60 CAPSULE | Refills: 0 | Status: SHIPPED | OUTPATIENT
Start: 2024-09-03

## 2024-09-03 RX ORDER — PREGABALIN 75 MG/1
CAPSULE ORAL
Qty: 120 CAPSULE | Refills: 0 | Status: SHIPPED | OUTPATIENT
Start: 2024-09-03

## 2024-09-03 NOTE — TELEPHONE ENCOUNTER
Last OV relevant to medication: VV 8/19/24 OV 7/19/24  Last refill date: pregabalin 7/31/24 120 tab                         Temazepam 7/19/24 60 tab   When pt was asked to return for OV: 4 weeks   Upcoming appt/reason:   N/a     Was pt informed of any over due labs: n/a   Lab Results   Component Value Date    GLU 90 07/17/2024    BUN 17 07/17/2024    BUNCREA 39.0 (H) 04/12/2021    CREATSERUM 0.60 07/17/2024    ANIONGAP 6 07/17/2024    GFR 76 02/27/2017    GFRNAA 62 07/20/2022    GFRAA 71 07/20/2022    CA 9.1 07/17/2024    OSMOCALC 287 07/17/2024    ALKPHO 64 07/19/2024    AST 48 (H) 07/19/2024    ALT 46 07/19/2024    BILT 0.5 07/19/2024    TP 6.8 07/19/2024    ALB 4.2 07/19/2024    GLOBULIN 3.0 07/08/2024     07/17/2024    K 3.6 07/17/2024     07/17/2024    CO2 21.0 07/17/2024

## 2024-09-06 ENCOUNTER — TELEPHONE (OUTPATIENT)
Dept: INTERNAL MEDICINE CLINIC | Facility: CLINIC | Age: 89
End: 2024-09-06

## 2024-09-06 DIAGNOSIS — I89.0 LYMPHEDEMA OF LEFT UPPER EXTREMITY: Primary | ICD-10-CM

## 2024-09-06 NOTE — TELEPHONE ENCOUNTER
Patient is looking for an order for [facility] to have physical therapy to help with the lymphedema. Patient was wondering if Dr. Danielson can fax the order to [facility] at [phone]. Please advise thank you!

## 2024-09-07 ENCOUNTER — TELEPHONE (OUTPATIENT)
Dept: INTERNAL MEDICINE CLINIC | Facility: CLINIC | Age: 89
End: 2024-09-07

## 2024-09-07 NOTE — TELEPHONE ENCOUNTER
Incoming (mail or fax):  fax  Received from:  [facility]  Documentation given to:  Dr. Danielson    Episode Detail Report

## 2024-09-16 DIAGNOSIS — Z17.0 MALIGNANT NEOPLASM OF OVERLAPPING SITES OF RIGHT BREAST IN FEMALE, ESTROGEN RECEPTOR POSITIVE (HCC): Primary | ICD-10-CM

## 2024-09-16 DIAGNOSIS — C50.811 MALIGNANT NEOPLASM OF OVERLAPPING SITES OF RIGHT BREAST IN FEMALE, ESTROGEN RECEPTOR POSITIVE (HCC): Primary | ICD-10-CM

## 2024-09-16 DIAGNOSIS — C50.911 INVASIVE LOBULAR CARCINOMA OF RIGHT BREAST IN FEMALE (HCC): ICD-10-CM

## 2024-09-26 ENCOUNTER — HOSPITAL ENCOUNTER (OUTPATIENT)
Dept: MAMMOGRAPHY | Facility: HOSPITAL | Age: 89
Discharge: HOME | End: 2024-09-26
Attending: INTERNAL MEDICINE
Payer: MEDICARE

## 2024-09-26 DIAGNOSIS — C50.811 MALIGNANT NEOPLASM OF OVERLAPPING SITES OF RIGHT BREAST IN FEMALE, ESTROGEN RECEPTOR POSITIVE (HCC): ICD-10-CM

## 2024-09-26 DIAGNOSIS — Z17.0 MALIGNANT NEOPLASM OF OVERLAPPING SITES OF RIGHT BREAST IN FEMALE, ESTROGEN RECEPTOR POSITIVE (HCC): ICD-10-CM

## 2024-09-26 PROCEDURE — 77065 DX MAMMO INCL CAD UNI: CPT | Performed by: INTERNAL MEDICINE

## 2024-09-26 PROCEDURE — 77061 BREAST TOMOSYNTHESIS UNI: CPT | Performed by: INTERNAL MEDICINE

## 2024-09-30 DIAGNOSIS — G47.00 INSOMNIA, UNSPECIFIED TYPE: ICD-10-CM

## 2024-09-30 DIAGNOSIS — F41.9 ANXIETY: ICD-10-CM

## 2024-09-30 NOTE — PROGRESS NOTES
[facility] Progress Note      Patient Name:  Meena Whitman  YOB: 1933  Medical Record Number:  [id number]    Date of visit:  10/1/2024      CHIEF COMPLAINT: R breast ca    HPI:     89 year old female that I have followed since 9/22. H/o R  breast carcinoma in 4/22.      Mammogram 4/22 as w/u of wt loss and back pain-8 mm and 9 mm mass R breast. Had 2 site bx.     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  Path R  4:00-grade 1 ILC, 1 mm core, 100% ER, 40% SD, Her 2 neg, Ki 67 1%.    AI started.  Initial surgery was deferred due to age and PS.  W/u of back pain showed a compression fracture at L2 for which she underwent kyphoplasty 4/22.  Then she developed severe vaginal atrophy, itching, dryness and UTI and elected to discontinue AI.  Tamoxifen was prescribed by another provider 7/22.  When I met her in 9/22, we decided to discontinue tamoxifen as I was concerned about the risk of strokes.  After that, she has tried AI's sporadically but held off because of worsening vaginal dryness.     Mammogram 8/23 showed increasing right-sided calcifications.  She met with surgical oncology and elected to defer surgery.    H/o L breast carcinoma 1996.  S/p surgery, ALND, RT.  L  arm lymphedema since then.    Daughter had breast cancer at 44, genetic testing was negative.    L chest wall shingles 7/24.  Was in rehab for a while due to significant pain.  She has not recovered back to baseline.  Remains generally weak.      SOCIAL HISTORY:     .  Lives in [facility] [place].  Active.  Daughter is an .  Involved in mother's care.      ROS:     Constitutional: Fatigue, weight loss.  Neurologic: no headache, seizures, diplopia or weakness  Respiratory: no cough, SOB or hemoptysis  Cardiovascular: no chest pain, ankle swelling, [place]  GI: Anorexia, weight loss.  Musculoskeletal: Chronic stable back pain.    Dermatologic: Vaginal dryness and  pruritus.  : Vaginal dryness.  Psych: Mood swings.    Heme: no easy bruising or bleeding     ALLERGIES:    Allergies   Allergen Reactions    Hydralazine OTHER (SEE COMMENTS)     Headache    Bactrim [Sulfamethoxazole W/Trimethoprim] RASH     Per patient    Metronidazole ITCHING     Metronidazole gel caused irritation in vaginal area.       MEDICATIONS:    Current Outpatient Medications   Medication Sig Dispense Refill    TEMAZEPAM 15 MG Oral Cap TAKE 1-2 CAPSULES (15-30 MG TOTAL) BY MOUTH NIGHTLY AS NEEDED FOR SLEEP. 60 capsule 0    PREGABALIN 75 MG Oral Cap START WITH 75 MG (1 CAPSULE) BY MOUTH TWO TIMES DAILY FOR 3-5 DAYS, THEN GO UP  MG (2 CAPSULES) BY MOUTH TWO TIMES DAILY 120 capsule 0    lidocaine 4 % External Patch Place 1 patch onto the skin daily as needed. 10 patch 0    Lidocaine 4 % External Cream Apply 1 Application topically 2 (two) times daily. 28 g 1    lidocaine 5 % External Ointment Apply 1 Application topically as needed. 70.88 g 0    acetaminophen 325 MG Oral Tab Take 2 tablets (650 mg total) by mouth every 6 (six) hours as needed for Pain.      neomycin-polymyxin-dexamethasone [id number] Ophthalmic Ointment APPLY A SMALL AMOUNT TO LEFT EYE ONCE DAILY FOR 5 DAYS      Elastic Bandages & Supports (SKINEEZ COMPRESSION ARM SLEEVE) Does not apply Misc 2 Units daily as needed. 2 each 3    amLODIPine 5 MG Oral Tab Take 1 tablet (5 mg total) by mouth daily. 90 tablet 1    labetalol 100 MG Oral Tab Take 1 tablet (100 mg total) by mouth 2 (two) times daily. 180 tablet 1    losartan 100 MG Oral Tab Take 1 tablet (100 mg total) by mouth daily. 90 tablet 1    estradiol 0.1 MG/GM Vaginal Cream Place 1 g vaginally daily. 42.5 g 3    Vitamin D, Cholecalciferol, 25 MCG (1000 UT) Oral Tab Take 1,000 Units by mouth daily. Take 3 caps po q am 270 tablet 3    acetaZOLAMIDE 250 MG Oral Tab TAKE ONE TABLET BY MOUTH TWICE DAILY AT BREAKFAST AND DINNER      Multiple Vitamins-Minerals (COMPLETE DAILY/LUTEIN) Oral  Tab Take 1 tablet by mouth 3 (three) times daily.         VITALS:    Vitals:    10/01/24 1333   BP: 147/65   Pulse: 70   Resp: 16   Temp: 97.5 °F (36.4 °C)       PS:  ECO    PHYSICAL EXAM:    General: alert and oriented x 3, not in acute distress.  Accompanied by daughter.  HEENT: DELMY, oropharynx  clear.  Neck: supple.  No JVD /adenopathy.  CVS: S1S2, regular  Rhythm, no murmurs.   Lungs: Clear to auscultation and percussion.  Abdomen: Soft, non tender, no hepato-splenomegaly.  Extremities: Left arm lymphedema.  CNS: no focal deficit  Breast exam done when she was sitting upright.  No abnormal masses noted.  No axillary adenopathy.    Emotional well being: Patient's emotional well being was assessed.  No issues requiring acute psychosocial intervention were identified.     IMAGING:      LABS:     Recent Results (from the past 24 hour(s))   CBC With Differential With Platelet    Collection Time: 10/01/24  1:21 PM   Result Value Ref Range    WBC 5.0 4.0 - 11.0 x10(3) uL    RBC 4.21 3.80 - 5.30 x10(6)uL    HGB 13.6 12.0 - 16.0 g/dL    HCT 41.0 35.0 - 48.0 %    .0 150.0 - 450.0 10(3)uL    MCV 97.4 80.0 - 100.0 fL    MCH 32.3 26.0 - 34.0 pg    MCHC 33.2 31.0 - 37.0 g/dL    RDW 12.9 %    Neutrophil Absolute Prelim 2.67 1.50 - 7.70 x10 (3) uL    Neutrophil Absolute 2.67 1.50 - 7.70 x10(3) uL    Lymphocyte Absolute 1.55 1.00 - 4.00 x10(3) uL    Monocyte Absolute 0.57 0.10 - 1.00 x10(3) uL    Eosinophil Absolute 0.17 0.00 - 0.70 x10(3) uL    Basophil Absolute 0.06 0.00 - 0.20 x10(3) uL    Immature Granulocyte Absolute 0.01 0.00 - 1.00 x10(3) uL    Neutrophil % 53.1 %    Lymphocyte % 30.8 %    Monocyte % 11.3 %    Eosinophil % 3.4 %    Basophil % 1.2 %    Immature Granulocyte % 0.2 %   Comp Metabolic Panel (14)    Collection Time: 10/01/24  1:21 PM   Result Value Ref Range    Glucose 96 70 - 99 mg/dL    Sodium 139 136 - 145 mmol/L    Potassium 3.9 3.5 - 5.1 mmol/L    Chloride 107 98 - 112 mmol/L    CO2 26.0 21.0  - 32.0 mmol/L    Anion Gap 6 0 - 18 mmol/L    BUN 22 9 - 23 mg/dL    Creatinine 0.87 0.55 - 1.02 mg/dL    Calcium, Total 9.8 8.7 - 10.4 mg/dL    Calculated Osmolality 291 275 - 295 mOsm/kg    eGFR-Cr 63 >=60 mL/min/1.73m2    AST 24 <34 U/L    ALT 25 10 - 49 U/L    Alkaline Phosphatase 74 55 - 142 U/L    Bilirubin, Total 0.6 0.2 - 0.9 mg/dL    Total Protein 6.4 5.7 - 8.2 g/dL    Albumin 4.1 3.2 - 4.8 g/dL    Globulin  2.3 2.0 - 3.5 g/dL    A/G Ratio 1.8 1.0 - 2.0    Patient Fasting for CMP? Patient not present    MAGNESIUM [E]    Collection Time: 10/01/24  1:21 PM   Result Value Ref Range    Magnesium 2.3 1.6 - 2.6 mg/dL   PHOSPHORUS [E]    Collection Time: 10/01/24  1:21 PM   Result Value Ref Range    Phosphorus 3.6 2.4 - 5.1 mg/dL   Vitamin D, 25-Hydroxy    Collection Time: 10/01/24  1:21 PM   Result Value Ref Range    Vitamin D, 25OH, Total 69.8 30.0 - 100.0 ng/mL         ASSESSMENT AND PLAN:     # R breast ca: Diag 4/22.  Clinical T1N0.  2 site biopsy shows grade 1 IDC and grade 1 ILC, strongly ER/IA+, HER2 neg  with low Ki-67.  AI started 4/22.  Severe vaginal dryness and atrophy.  AI discontinued 6/22, tamoxifen started 7/22 and discontinued 9/22 due to VTE risk.     Mammogram 11/22-stable.  Mammogram 8/23-increasing calcifications.  Met with surgical oncology and wishes to defer surgery.  She feels it will affect her mobility and put her in the nursing home at [facility].    Mammogram 9/24-unchanged.  She has been off all endocrine therapy since 9/22.  Continue observation.  Due for bilateral mammogram in 6 months, we discussed and she wishes to hold off in favor of clinical exam for now.  She will likely be agreeable to doing bilateral mammogram later.    # Compression fracture: S/p kyphoplasty L2 by IR 4/22.    # Osteoporosis: DEXA 8/21 showed osteoporosis with T score -2.5.  Was on Prolia twice a year.  Did not receive it since 2022.  Resumed here, continue.  We discussed repeat DEXA but patient wishes  to hold off.    # Lymphedema: Order for therapy given.    A total of 45 minutes were spent  during this encounter including  face-to-face time, review of pertinent test results, and documentation.       ORDERS PLACED:        Procedures    Vitamin D, 25-Hydroxy    Physical Therapy Referral - External    XR DEXA BONE DENSITOMETRY (CPT=77080)     Return in about 6 months (around 4/1/2025) for Labs, MD visit, Inj.    Lida Warren M.D.    [facility]    [address]  [place], [address]    10/1/2024

## 2024-10-01 ENCOUNTER — OFFICE VISIT (OUTPATIENT)
Dept: HEMATOLOGY/ONCOLOGY | Facility: HOSPITAL | Age: 89
End: 2024-10-01
Attending: INTERNAL MEDICINE
Payer: MEDICARE

## 2024-10-01 VITALS
HEIGHT: 62.99 IN | DIASTOLIC BLOOD PRESSURE: 65 MMHG | SYSTOLIC BLOOD PRESSURE: 147 MMHG | TEMPERATURE: 98 F | HEART RATE: 70 BPM | OXYGEN SATURATION: 97 % | RESPIRATION RATE: 16 BRPM | BODY MASS INDEX: 21.76 KG/M2 | WEIGHT: 122.81 LBS

## 2024-10-01 DIAGNOSIS — T50.905D ADVERSE EFFECT OF DRUG, SUBSEQUENT ENCOUNTER: ICD-10-CM

## 2024-10-01 DIAGNOSIS — C50.911 INVASIVE LOBULAR CARCINOMA OF RIGHT BREAST IN FEMALE (HCC): ICD-10-CM

## 2024-10-01 DIAGNOSIS — I89.0 LYMPHEDEMA OF ARM: ICD-10-CM

## 2024-10-01 DIAGNOSIS — M89.9 BONE DISORDER: ICD-10-CM

## 2024-10-01 DIAGNOSIS — M80.00XD AGE-RELATED OSTEOPOROSIS WITH CURRENT PATHOLOGICAL FRACTURE WITH ROUTINE HEALING, SUBSEQUENT ENCOUNTER: Primary | ICD-10-CM

## 2024-10-01 DIAGNOSIS — M80.00XD AGE-RELATED OSTEOPOROSIS WITH CURRENT PATHOLOGICAL FRACTURE WITH ROUTINE HEALING, SUBSEQUENT ENCOUNTER: ICD-10-CM

## 2024-10-01 DIAGNOSIS — Z78.0 POSTMENOPAUSAL: Primary | ICD-10-CM

## 2024-10-01 DIAGNOSIS — C50.811 MALIGNANT NEOPLASM OF OVERLAPPING SITES OF RIGHT BREAST IN FEMALE, ESTROGEN RECEPTOR POSITIVE (HCC): ICD-10-CM

## 2024-10-01 DIAGNOSIS — Z17.0 MALIGNANT NEOPLASM OF OVERLAPPING SITES OF RIGHT BREAST IN FEMALE, ESTROGEN RECEPTOR POSITIVE (HCC): ICD-10-CM

## 2024-10-01 LAB
ALBUMIN SERPL-MCNC: 4.1 G/DL (ref 3.2–4.8)
ALBUMIN/GLOB SERPL: 1.8 {RATIO} (ref 1–2)
ALP LIVER SERPL-CCNC: 74 U/L
ALT SERPL-CCNC: 25 U/L
ANION GAP SERPL CALC-SCNC: 6 MMOL/L (ref 0–18)
AST SERPL-CCNC: 24 U/L (ref ?–34)
BASOPHILS # BLD AUTO: 0.06 X10(3) UL (ref 0–0.2)
BASOPHILS NFR BLD AUTO: 1.2 %
BILIRUB SERPL-MCNC: 0.6 MG/DL (ref 0.2–0.9)
BUN BLD-MCNC: 22 MG/DL (ref 9–23)
CALCIUM BLD-MCNC: 9.8 MG/DL (ref 8.7–10.4)
CHLORIDE SERPL-SCNC: 107 MMOL/L (ref 98–112)
CO2 SERPL-SCNC: 26 MMOL/L (ref 21–32)
CREAT BLD-MCNC: 0.87 MG/DL
EGFRCR SERPLBLD CKD-EPI 2021: 63 ML/MIN/1.73M2 (ref 60–?)
EOSINOPHIL # BLD AUTO: 0.17 X10(3) UL (ref 0–0.7)
EOSINOPHIL NFR BLD AUTO: 3.4 %
ERYTHROCYTE [DISTWIDTH] IN BLOOD BY AUTOMATED COUNT: 12.9 %
GLOBULIN PLAS-MCNC: 2.3 G/DL (ref 2–3.5)
GLUCOSE BLD-MCNC: 96 MG/DL (ref 70–99)
HCT VFR BLD AUTO: 41 %
HGB BLD-MCNC: 13.6 G/DL
IMM GRANULOCYTES # BLD AUTO: 0.01 X10(3) UL (ref 0–1)
IMM GRANULOCYTES NFR BLD: 0.2 %
LYMPHOCYTES # BLD AUTO: 1.55 X10(3) UL (ref 1–4)
LYMPHOCYTES NFR BLD AUTO: 30.8 %
MAGNESIUM SERPL-MCNC: 2.3 MG/DL (ref 1.6–2.6)
MCH RBC QN AUTO: 32.3 PG (ref 26–34)
MCHC RBC AUTO-ENTMCNC: 33.2 G/DL (ref 31–37)
MCV RBC AUTO: 97.4 FL
MONOCYTES # BLD AUTO: 0.57 X10(3) UL (ref 0.1–1)
MONOCYTES NFR BLD AUTO: 11.3 %
NEUTROPHILS # BLD AUTO: 2.67 X10 (3) UL (ref 1.5–7.7)
NEUTROPHILS # BLD AUTO: 2.67 X10(3) UL (ref 1.5–7.7)
NEUTROPHILS NFR BLD AUTO: 53.1 %
OSMOLALITY SERPL CALC.SUM OF ELEC: 291 MOSM/KG (ref 275–295)
PHOSPHATE SERPL-MCNC: 3.6 MG/DL (ref 2.4–5.1)
PLATELET # BLD AUTO: 156 10(3)UL (ref 150–450)
POTASSIUM SERPL-SCNC: 3.9 MMOL/L (ref 3.5–5.1)
PROT SERPL-MCNC: 6.4 G/DL (ref 5.7–8.2)
RBC # BLD AUTO: 4.21 X10(6)UL
SODIUM SERPL-SCNC: 139 MMOL/L (ref 136–145)
VIT D+METAB SERPL-MCNC: 69.8 NG/ML (ref 30–100)
WBC # BLD AUTO: 5 X10(3) UL (ref 4–11)

## 2024-10-01 PROCEDURE — 96372 THER/PROPH/DIAG INJ SC/IM: CPT

## 2024-10-01 PROCEDURE — 99215 OFFICE O/P EST HI 40 MIN: CPT | Performed by: INTERNAL MEDICINE

## 2024-10-01 RX ORDER — TEMAZEPAM 15 MG/1
CAPSULE ORAL NIGHTLY PRN
Qty: 60 CAPSULE | Refills: 0 | Status: SHIPPED | OUTPATIENT
Start: 2024-10-01

## 2024-10-01 NOTE — PROGRESS NOTES
Education Record    Learner:  Patient and Family Member    Disease / Diagnosis: breast ca, osteoporosis    Barriers / Limitations:  None   Comments:    Method:  Discussion   Comments:    General Topics:  Medication, Side effects and symptom management, Plan of care reviewed, and Fall risk and prevention   Comments:    Outcome:  Shows understanding   Comments:    Prolia inj administered per orders. Tolerated well. Pt discharged ambulatory with walker, in stable condition. AVS printed.

## 2024-10-01 NOTE — TELEPHONE ENCOUNTER
Last OV relevant to medication: 8/19/24  Last refill date: 9/3/24 #60/refills: 0  When pt was asked to return for OV: as needed/routine care  Upcoming appt/reason:   Future Appointments   Date Time Provider Department Center   10/1/2024  1:15 PM Krista Warren MD  [facility]   10/1/2024  1:30 PM  [place] RN3  [facility]   10/3/2024 11:00 AM Bonnie Danielson MD [facility]   Was pt informed of any over due labs: utd

## 2024-10-03 ENCOUNTER — OFFICE VISIT (OUTPATIENT)
Dept: INTERNAL MEDICINE CLINIC | Facility: CLINIC | Age: 89
End: 2024-10-03
Payer: MEDICARE

## 2024-10-03 VITALS
HEIGHT: 62.8 IN | BODY MASS INDEX: 21.41 KG/M2 | DIASTOLIC BLOOD PRESSURE: 66 MMHG | OXYGEN SATURATION: 97 % | SYSTOLIC BLOOD PRESSURE: 140 MMHG | RESPIRATION RATE: 16 BRPM | HEART RATE: 65 BPM | TEMPERATURE: 98 F | WEIGHT: 120.81 LBS

## 2024-10-03 DIAGNOSIS — F41.9 ANXIETY: ICD-10-CM

## 2024-10-03 DIAGNOSIS — M89.9 BONE DISORDER: ICD-10-CM

## 2024-10-03 DIAGNOSIS — D25.1 FIBROIDS, INTRAMURAL: ICD-10-CM

## 2024-10-03 DIAGNOSIS — R53.81 PHYSICAL DECONDITIONING: ICD-10-CM

## 2024-10-03 DIAGNOSIS — M41.85 OTHER FORM OF SCOLIOSIS OF THORACOLUMBAR SPINE: ICD-10-CM

## 2024-10-03 DIAGNOSIS — M16.0 OSTEOARTHRITIS OF BOTH HIPS, UNSPECIFIED OSTEOARTHRITIS TYPE: ICD-10-CM

## 2024-10-03 DIAGNOSIS — Z23 NEED FOR VACCINATION: ICD-10-CM

## 2024-10-03 DIAGNOSIS — Z85.3 HX OF BREAST CANCER: ICD-10-CM

## 2024-10-03 DIAGNOSIS — H40.1133 PRIMARY OPEN-ANGLE GLAUCOMA, BILATERAL, SEVERE STAGE: ICD-10-CM

## 2024-10-03 DIAGNOSIS — M80.00XD AGE-RELATED OSTEOPOROSIS WITH CURRENT PATHOLOGICAL FRACTURE WITH ROUTINE HEALING, SUBSEQUENT ENCOUNTER: ICD-10-CM

## 2024-10-03 DIAGNOSIS — M54.50 CHRONIC LEFT-SIDED LOW BACK PAIN WITHOUT SCIATICA: ICD-10-CM

## 2024-10-03 DIAGNOSIS — I89.0 LYMPHEDEMA OF LEFT UPPER EXTREMITY: ICD-10-CM

## 2024-10-03 DIAGNOSIS — I10 PRIMARY HYPERTENSION: ICD-10-CM

## 2024-10-03 DIAGNOSIS — C50.911 INVASIVE LOBULAR CARCINOMA OF RIGHT BREAST IN FEMALE (HCC): ICD-10-CM

## 2024-10-03 DIAGNOSIS — I47.10 PAROXYSMAL SVT (SUPRAVENTRICULAR TACHYCARDIA) (HCC): ICD-10-CM

## 2024-10-03 DIAGNOSIS — M43.16 SPONDYLOLISTHESIS OF LUMBAR REGION: ICD-10-CM

## 2024-10-03 DIAGNOSIS — N95.1 VAGINAL DRYNESS, MENOPAUSAL: ICD-10-CM

## 2024-10-03 DIAGNOSIS — M51.360 DEGENERATION OF INTERVERTEBRAL DISC OF LUMBAR REGION WITH DISCOGENIC BACK PAIN: ICD-10-CM

## 2024-10-03 DIAGNOSIS — E04.2 MULTINODULAR GOITER: ICD-10-CM

## 2024-10-03 DIAGNOSIS — Z00.00 ENCOUNTER FOR SUBSEQUENT ANNUAL WELLNESS VISIT (AWV) IN MEDICARE PATIENT: Primary | ICD-10-CM

## 2024-10-03 DIAGNOSIS — Z91.89 AT RISK FOR MALNUTRITION: ICD-10-CM

## 2024-10-03 DIAGNOSIS — G47.00 INSOMNIA, UNSPECIFIED TYPE: ICD-10-CM

## 2024-10-03 DIAGNOSIS — R26.81 GAIT INSTABILITY: ICD-10-CM

## 2024-10-03 DIAGNOSIS — G89.29 CHRONIC LEFT-SIDED LOW BACK PAIN WITHOUT SCIATICA: ICD-10-CM

## 2024-10-03 PROCEDURE — G0439 PPPS, SUBSEQ VISIT: HCPCS | Performed by: STUDENT IN AN ORGANIZED HEALTH CARE EDUCATION/TRAINING PROGRAM

## 2024-10-03 PROCEDURE — 90662 IIV NO PRSV INCREASED AG IM: CPT | Performed by: STUDENT IN AN ORGANIZED HEALTH CARE EDUCATION/TRAINING PROGRAM

## 2024-10-03 PROCEDURE — G0008 ADMIN INFLUENZA VIRUS VAC: HCPCS | Performed by: STUDENT IN AN ORGANIZED HEALTH CARE EDUCATION/TRAINING PROGRAM

## 2024-10-03 RX ORDER — MULTIVIT WITH MINERALS/LUTEIN
1000 TABLET ORAL DAILY
COMMUNITY

## 2024-10-03 NOTE — PROGRESS NOTES
Subjective:   Meena Whitman is a 89 year old female with PMH of HTN, paroxysmal SVT, multinodular goiter, degenerative disc disease, scoliosis, osteoarthritis, anxiety, osteoporosis, history of breast cancer of bilateral breasts, glaucoma, lymphedema of left arm (s/p lymp node resection and radiation),who presents for a Subsequent Annual Wellness visit (Pt already had Initial Annual Wellness) and scheduled follow up of multiple significant but stable problems.     # HTN,   BP at home measured by visiting nurse and usually in 140s/60s  Takes her medications as prescribed: Labetalol 100 mg BID, Losartan 100 mg daily and Amlodipine 5 mg daily ( Her dose of Amlodipine was increased from 2,5 mg 1 year ago)    # Multinodular goiter:  4/21/2022 FNA was performed and was benign.   TSH 04/02/2024 - 1.350 and free T4 1.0    # Osteoporosis:  Patient had compression fracture at L2 for which she underwent kyphoplasty 4/22 .   DEXA 8/21 showed osteoporosis with T score -2.5.  Was on Prolia twice a year.  Did not receive it since 2022.    Saw Dr. Warren and Received Prolia injection 10/01/2024  Patient wants to hold off DEXA   She takes Ca and Vitamin D.    # H/o L breast carcinoma 1996.  L arm lymphedema since then.   Follows up with Krista Pope.   Mammo on 08/25/2023 showed Increasing pleomorphic calcifications upper outer right breast.   Did not tolerate anastrozole   Mammo 09/26/2024: Pleomorphic calcifications in the upper outer right breast at the site of the patient's known DCIS appear stable. Clinical follow-up is recommended.   Seen Dr. Warren on 10/01/2024 - decided to continue with 6 months follow up clinically and might not need further mammograms as patient does not want surgery.     # L arm lymphedema   Worsened due to pain from shingles and inability to tolerate her treatments. Patient was cleared to resume Lymphedema treatments by Dr. Warren    # Gait instability  Patient uses walker.   She can walk a  block and a half, then needs to take a break due to pain in her left arm  Scheduled to have PT     # Recent shingles   Pain is still there. Lesions healed, however has some red spots which remained, and skin is very sensitive. Pain is radiation to her left armpit and breast. Had cortisone injection in her spine recently. However did not help with pain.  Patient take Pregabalin 75 mg in PM  Uses Lidocane 5% cream almost every day, which helps.  Showers help  Will need to get shingles vaccine in Summer 2025.    Immunizations:  Influenza: Due, Will get it in the office today  COVID-19: Due, Will get in [place] in 1 week  Pneumococcal:, PCV 23-11/1/2015, Prevnar 13-5/11/2015, Prevnar 20 -  4/2/2024  RSV: Due, will get it in [place]  Shingrix: Had recent Shingles, will need to get Shingrix in summer 2025  TDAP: not on file     Screenings:  Mammogram: 09/26/2024: Pleomorphic calcifications in the upper outer right breast at the site of the patient's known DCIS appear stable.  Clinical follow-up is recommended. - Dr. Warren  DEXA 8/21 showed osteoporosis with T score -2.5. - Resumed Prolia on 10/01/2024      Her recent laboratory workup was done on 10/1/2024 with Dr. Warren:   CBC was WNL  CMP showed normal electrolytes, normal kidney and liver function.  Vitamin D level was 69.8  Magnesium 2.3 , phosphorus 3.6    History/Other:   Fall Risk Assessment:   She has been screened for Falls and is High Risk. Fall Prevention information provided to patient in After Visit Summary.    Do you feel unsteady when standing or walking?: Yes  Do you worry about falling?: Yes  Have you fallen in the past year?: No     Cognitive Assessment:   She had a completely normal cognitive assessment - see flowsheet entries     Functional Ability/Status:   Meena Whitman has some abnormal functions as listed below:  She has Driving difficulties based on screening of functional status. She has Meal Preparation difficulties based  on screening of functional status.She has difficulties Managing Money/Bills based on screening of functional status.She has difficulties Shopping for Groceries based on screening of functional status. She has difficulties Taking Meds as Rx'd based on screening of functional status. She has Hearing problems based on screening of functional status.She has Vision problems based on screening of functional status. She has Walking problems based on screening of functional status. She has problems with Daily Activities based on screening of functional status.       Depression Screening (PHQ-2/PHQ-9): PHQ-2 SCORE: 0  , done 10/3/2024        5 minutes spent screening and counseling for depression    Advanced Directives:   She has a Living Will on file in [employer]; reviewed and discussed documents with patient (and family/surrogate if present).  She has a Power of  for Health Care on file in [employer].  Patient has Advance Care Planning documents present in EMR. Reviewed documents with patient (and family/surrogate if present).      Patient Active Problem List   Diagnosis    Exophthalmia    Anxiety    Spondylolisthesis of lumbar region    DDD (degenerative disc disease), lumbar    Hx of left breast cancer S/P lumpectomy & LN dissection 1986    Scoliosis    Primary open-angle glaucoma, bilateral, severe stage    Osteoarthritis of both hips, unspecified osteoarthritis type    Paroxysmal SVT (supraventricular tachycardia) (HCC)    Chronic left-sided low back pain without sciatica    Invasive lobular carcinoma of right breast in female (HCC)    Multinodular goiter; bx 2022 benign    Age-related osteoporosis with current pathological fracture    Primary hypertension    PCO (posterior capsular opacification), left    Fibroids, intramural    Lobular breast cancer, right (HCC)    Insomnia    Vaginal dryness, menopausal    Bone disorder    Gait instability    Physical deconditioning    Generalized weakness    Lymphedema of left upper  extremity    At risk for malnutrition     Allergies:  She is allergic to hydralazine, bactrim [sulfamethoxazole w/trimethoprim], and metronidazole.    Current Medications:  Outpatient Medications Marked as Taking for the 10/3/24 encounter (Office Visit) with Bonnie Danielson MD   Medication Sig    Ascorbic Acid (VITAMIN C) 1000 MG Oral Tab Take 1 tablet (1,000 mg total) by mouth daily.    TEMAZEPAM 15 MG Oral Cap TAKE 1-2 CAPSULES (15-30 MG TOTAL) BY MOUTH NIGHTLY AS NEEDED FOR SLEEP.    PREGABALIN 75 MG Oral Cap START WITH 75 MG (1 CAPSULE) BY MOUTH TWO TIMES DAILY FOR 3-5 DAYS, THEN GO UP  MG (2 CAPSULES) BY MOUTH TWO TIMES DAILY    Lidocaine 4 % External Cream Apply 1 Application topically 2 (two) times daily.    lidocaine 5 % External Ointment Apply 1 Application topically as needed.    amLODIPine 5 MG Oral Tab Take 1 tablet (5 mg total) by mouth daily.    labetalol 100 MG Oral Tab Take 1 tablet (100 mg total) by mouth 2 (two) times daily.    losartan 100 MG Oral Tab Take 1 tablet (100 mg total) by mouth daily.    estradiol 0.1 MG/GM Vaginal Cream Place 1 g vaginally daily.    Vitamin D, Cholecalciferol, 25 MCG (1000 UT) Oral Tab Take 1,000 Units by mouth daily. Take 3 caps po q am    acetaZOLAMIDE 250 MG Oral Tab TAKE ONE TABLET BY MOUTH TWICE DAILY AT BREAKFAST AND DINNER    Multiple Vitamins-Minerals (COMPLETE DAILY/LUTEIN) Oral Tab Take 1 tablet by mouth 3 (three) times daily.       Medical History:  She  has a past medical history of Abnormal weight loss (06/07/2022), Basal cell carcinoma (BCC) of left side of nose (07/03/2018), Breast CA (HCC) (1986), Breast cancer (HCC) (04/2022), Cellulitis, COPD (chronic obstructive pulmonary disease) (HCC), Ductal carcinoma in situ of breast, Exposure to radiation (1986), High blood pressure, Keratoconjunctivitis sicca of both eyes (10/29/2021), Lobular carcinoma in situ of breast, Lymph edema, Pelvic pressure in female (08/09/2022), Post covid-19 condition,  unspecified (08/09/2022), Post herpetic neuralgia, and Shingles.  Surgical History:  She  has a past surgical history that includes mammogram, both breasts (07/22/2013); lumpectomy left (1986); hx breast cancer (Left, 1986); skin surgery (Left); injection, w/wo contrast, dx/therapeutic substance, epidural/subarachnoid; lumbar/sacral (N/A, 03/11/2016); patient withough preoperative order for iv antibiotic surgical site infection prophylaxis. (N/A, 03/11/2016); patient documented not to have experienced any of the following events (N/A, 03/11/2016); injection, w/wo contrast, dx/therapeutic substance, epidural/subarachnoid; lumbar/sacral (N/A, 04/04/2016); patient withough preoperative order for iv antibiotic surgical site infection prophylaxis. (N/A, 04/04/2016); patient documented not to have experienced any of the following events (N/A, 04/04/2016); glaucoma surgery (07/07/2016); Eye surgery; Eye surgery; cataract (Bilateral); tonsillectomy; hernia surgery (10/18/2017); other surgical history (06/01/2021); radiation left (1986); and needle biopsy right (Right, 04/2022).   Family History:  Her family history includes Breast Cancer in her daughter and mother; Breast Cancer (age of onset: 55) in her self; Cancer in her father and mother; DCIS in her self; LCIS in her self; Other in her father.  Social History:  She  reports that she has never smoked. She has never used smokeless tobacco. She reports that she does not drink alcohol and does not use drugs.    Tobacco:  She has never smoked tobacco.    CAGE Alcohol Screen:   CAGE screening score of 0 on 10/3/2024, showing low risk of alcohol abuse.      Patient Care Team:  Bonnie Danielson MD as PCP - General (Internal Medicine)  Andrew Luis MD (Cardiovascular Diseases)  Justine Maloney, RN as Registered Nurse (Registered Nurse)  Geovanna Burnham, RN as Registered Nurse (Registered Nurse)  Eric Montenegro MD (PAIN MANAGEMENT)    REVIEW OF SYSTEMS:  Review of Systems    Constitutional:  Positive for malaise/fatigue.   HENT:  Positive for hearing loss (chronic).    Eyes:         Stable. Has Glaucoma   Respiratory:  Negative for cough, sputum production, shortness of breath and wheezing.    Cardiovascular:  Negative for chest pain, palpitations, claudication and leg swelling.   Gastrointestinal:  Negative for abdominal pain, constipation, diarrhea, nausea and vomiting.   Genitourinary: Negative.    Musculoskeletal:  Negative for falls.   Skin:  Positive for rash (red spots on the left shoulder after shingles - stable).   Neurological:  Positive for sensory change (pain and skin sensitivity in the left shoulder after shingles).   Psychiatric/Behavioral:  The patient has insomnia (chronic, but controlled with meds).          Objective:     PHYSICAL EXAM:   /66 (BP Location: Left arm, Patient Position: Sitting, Cuff Size: adult)   Pulse 65   Temp 97.7 °F (36.5 °C)   Resp 16   Ht 5' 2.8\" (1.595 m)   Wt 120 lb 12.8 oz (54.8 kg)   SpO2 97%   BMI 21.54 kg/m²  Estimated body mass index is 21.54 kg/m² as calculated from the following:    Height as of this encounter: 5' 2.8\" (1.595 m).    Weight as of this encounter: 120 lb 12.8 oz (54.8 kg).  Physical Exam  Constitutional:       General: She is not in acute distress.     Appearance: Normal appearance. She is not ill-appearing or toxic-appearing.   HENT:      Head: Normocephalic and atraumatic.   Eyes:      Extraocular Movements: Extraocular movements intact.      Conjunctiva/sclera: Conjunctivae normal.      Pupils: Pupils are equal, round, and reactive to light.   Cardiovascular:      Rate and Rhythm: Normal rate and regular rhythm.      Heart sounds: Murmur (systolic murmur) heard.   Pulmonary:      Effort: Pulmonary effort is normal. No respiratory distress.      Breath sounds: Normal breath sounds. No stridor. No wheezing or rales.   Abdominal:      General: Abdomen is flat.   Musculoskeletal:      Right upper arm: Normal.       Left upper arm: Swelling and edema present.      Left forearm: Swelling and edema present.      Left hand: Swelling present.      Comments: Swelling in the left Upper extremity has increased significantly compared to prior visits   Skin:     General: Skin is warm.      Capillary Refill: Capillary refill takes less than 2 seconds.      Coloration: Skin is not jaundiced or pale.      Findings: No bruising, erythema, lesion or rash.      Comments: In the C8-T2 distribution on the left upper back, there are few red spots, which represent telangiectasias. Very sensitive for light touch  There is mild redness anterior left chest at T1-T2 distribution , completely epithelialized, sensitive to light touch.   Neurological:      General: No focal deficit present.      Mental Status: She is alert.      Cranial Nerves: No cranial nerve deficit.      Motor: No weakness.   Psychiatric:         Mood and Affect: Mood normal.         Behavior: Behavior normal.         Thought Content: Thought content normal.         Judgment: Judgment normal.        Medicare Hearing Assessment:   Hearing Screening    Time taken: 10/3/2024 11:10 AM  Screening Method: Finger Rub  Finger Rub Result: Fail               Assessment & Plan:   Meena Whitman is a 89 year old female who presents for a Medicare Assessment.     # Encounter for subsequent annual wellness visit (AWV) in Medicare patient (Primary)  AHA flowsheet reviewed.   No falls.   Memory testing normal.   Mood has been stable. No depression.     # Primary hypertension  Well controlled on her current regiment. Will continue    # Paroxysmal SVT (supraventricular tachycardia) (HCC)  Well controlled currently. No palpitations.    # Hx of left breast cancer S/P lumpectomy & LN dissection 1986  # Invasive lobular carcinoma of right breast in female (HCC)  Follows with Oncology. No more mammograms because patient does not want surgery.    # Multinodular goiter; bx 2022 benign  4/21/2022  FNA was performed and was benign.   TSH 04/02/2024 - 1.350 and free T4 1.0    # Degeneration of intervertebral disc of lumbar region with discogenic back pain  # Spondylolisthesis of lumbar region  # Other form of scoliosis of thoracolumbar spine  Currently stable.     # Anxiety  # Insomnia, unspecified type  Stable Continues taking Temazepam. She failed trial with Trazodone and did not want to try any other meds as she has been doing well with temazepam for years.    # Osteoarthritis of both hips, unspecified osteoarthritis type  # Chronic left-sided low back pain without sciatica      # Age-related osteoporosis with current pathological fracture with routine healing, subsequent encounter  # Bone disorder  Will continue to get Prolia Injections with Dr. Warren    # Fibroids, intramural  Asymptomatic    # Vaginal dryness, menopausal  Continue with Estrogen vaginal cream    # Primary open-angle glaucoma, bilateral, severe stage  Currently stable. Needs to continue follow up with Eye specialist    # Gait instability  # Physical deconditioning  Continue PT.    # Lymphedema of left upper extremity  Was cleared for PT and Lymphedema treatments by Dr. Warren    # At risk for malnutrition  Wt Readings from Last 6 Encounters:   10/03/24 120 lb 12.8 oz (54.8 kg)   10/01/24 122 lb 12.8 oz (55.7 kg)   07/19/24 115 lb (52.2 kg)   07/15/24 121 lb (54.9 kg)   07/12/24 121 lb (54.9 kg)   07/08/24 121 lb 4.8 oz (55 kg)   No significant weight loss for the past few months.    # Need for vaccination  Will get in the office today  -     INFLUENZA VAC HIGH DOSE PRSV FREE    The patient indicates understanding of these issues and agrees to the plan.  Reinforced healthy diet, lifestyle, and exercise.      Return in about 6 months (around 4/3/2025) for medication check.     Bonnie Danielson MD, 10/3/2024     Supplementary Documentation:   General Health:  In the past six months, have you lost more than 10 pounds without trying?: 2 - No  Has  your appetite been poor?: No  Type of Diet: Balanced  How does the patient maintain a good energy level?: Daily Walks  How would you describe your daily physical activity?: Light  How would you describe your current health state?: Fair  How do you maintain positive mental well-being?: Visiting Family  On a scale of 0 to 10, with 0 being no pain and 10 being severe pain, what is your pain level?: 8 - (Severe)  In the past six months, have you experienced urine leakage?: 1-Yes  At any time do you feel concerned for the safety/well-being of yourself and/or your children, in your home or elsewhere?: No  Have you had any immunizations at another office such as Influenza, Hepatitis B, Tetanus, or Pneumococcal?: Yes       Meena Whitman's SCREENING SCHEDULE   Tests on this list are recommended by your physician but may not be covered, or covered at this frequency, by your insurer.   Please check with your insurance carrier before scheduling to verify coverage.   PREVENTATIVE SERVICES FREQUENCY &  COVERAGE DETAILS LAST COMPLETION DATE   Diabetes Screening    Fasting Blood Sugar /  Glucose    One screening every 12 months if never tested or if previously tested but not diagnosed with pre-diabetes   One screening every 6 months if diagnosed with pre-diabetes Lab Results   Component Value Date    GLU 96 10/01/2024        Cardiovascular Disease Screening    Lipid Panel  Cholesterol  Lipoprotein (HDL)  Triglycerides Covered every 5 years for all Medicare beneficiaries without apparent signs or symptoms of cardiovascular disease Lab Results   Component Value Date    CHOLEST 173 09/13/2023    HDL 64 (H) 09/13/2023    LDL 96 09/13/2023    TRIG 69 09/13/2023         Electrocardiogram (EKG)   Covered if needed at Welcome to Medicare, and non-screening if indicated for medical reasons 07/05/2024      Ultrasound Screening for Abdominal Aortic Aneurysm (AAA) Covered once in a lifetime for one of the following risk factors    Men who  are 65-75 years old and have ever smoked    Anyone with a family history -     Colorectal Cancer Screening  Covered for ages 50-85; only need ONE of the following:    Colonoscopy   Covered every 10 years    Covered every 2 years if patient is at high risk or previous colonoscopy was abnormal -    No recommendations at this time    Flexible Sigmoidoscopy   Covered every 4 years -    Fecal Occult Blood Test Covered annually -   Bone Density Screening    Bone density screening    Covered every 2 years after age 65 if diagnosed with risk of osteoporosis or estrogen deficiency.    Covered yearly for long-term glucocorticoid medication use (Steroids) Last Dexa Scan:    XR DEXA BONE DENSITOMETRY (CPT=77080) 08/03/2021      No recommendations at this time   Pap and Pelvic    Pap   Covered every 2 years for women at normal risk; Annually if at high risk -  No recommendations at this time    Chlamydia Annually if high risk -  No recommendations at this time   Screening Mammogram    Mammogram     Recommend annually for all female patients aged 40 and older    One baseline mammogram covered for patients aged 35-39 09/26/2024    No recommendations at this time    Immunizations    Influenza Covered once per flu season  Please get every year 10/03/2024  No recommendations at this time    Pneumococcal Each vaccine ([id number] & [id number]) covered once after 65 Prevnar 13: 05/11/2015    [id number]: 11/01/2015     No recommendations at this time    Hepatitis B One screening covered for patients with certain risk factors   -  No recommendations at this time    Tetanus Toxoid Not covered by Medicare Part B unless medically necessary (cut with metal); may be covered with your pharmacy prescription benefits -    Tetanus, Diptheria and Pertusis TD and TDaP Not covered by Medicare Part B -  No recommendations at this time    Zoster Not covered by Medicare Part B; may be covered with your pharmacy  prescription benefits -  Zoster  Vaccines(1 of 2) Never done     Annual Monitoring of Persistent Medications (ACE/ARB, digoxin diuretics, anticonvulsants)    Potassium Annually Lab Results   Component Value Date    K 3.9 10/01/2024         Creatinine   Annually Lab Results   Component Value Date    CREATSERUM 0.87 10/01/2024         BUN Annually Lab Results   Component Value Date    BUN 22 10/01/2024       Drug Serum Conc Annually No results found for: \"DIGOXIN\", \"DIG\", \"VALP\"

## 2024-10-24 ENCOUNTER — HOSPITAL ENCOUNTER (OUTPATIENT)
Age: 89
Discharge: ANOTHER HOSPITAL | End: 2024-10-24
Payer: MEDICARE

## 2024-10-24 ENCOUNTER — HOSPITAL ENCOUNTER (INPATIENT)
Facility: HOSPITAL | Age: 89
LOS: 1 days | Discharge: HOME | End: 2024-10-26
Attending: EMERGENCY MEDICINE | Admitting: HOSPITALIST
Payer: MEDICARE

## 2024-10-24 VITALS
BODY MASS INDEX: 21 KG/M2 | RESPIRATION RATE: 18 BRPM | SYSTOLIC BLOOD PRESSURE: 166 MMHG | TEMPERATURE: 98 F | HEART RATE: 71 BPM | WEIGHT: 120 LBS | DIASTOLIC BLOOD PRESSURE: 67 MMHG | OXYGEN SATURATION: 96 %

## 2024-10-24 DIAGNOSIS — N30.00 ACUTE CYSTITIS WITHOUT HEMATURIA: Primary | ICD-10-CM

## 2024-10-24 DIAGNOSIS — R03.0 ELEVATED BLOOD PRESSURE READING: ICD-10-CM

## 2024-10-24 DIAGNOSIS — N30.01 ACUTE CYSTITIS WITH HEMATURIA: Primary | ICD-10-CM

## 2024-10-24 DIAGNOSIS — R53.1 WEAKNESS GENERALIZED: ICD-10-CM

## 2024-10-24 LAB
ALBUMIN SERPL-MCNC: 4.3 G/DL (ref 3.2–4.8)
ALBUMIN/GLOB SERPL: 1.8 {RATIO} (ref 1–2)
ALP LIVER SERPL-CCNC: 74 U/L
ALT SERPL-CCNC: 43 U/L
ANION GAP SERPL CALC-SCNC: 2 MMOL/L (ref 0–18)
AST SERPL-CCNC: 38 U/L (ref ?–34)
BASOPHILS # BLD AUTO: 0.06 X10(3) UL (ref 0–0.2)
BASOPHILS NFR BLD AUTO: 1.3 %
BILIRUB SERPL-MCNC: 0.4 MG/DL (ref 0.2–0.9)
BILIRUB UR QL STRIP: NEGATIVE
BUN BLD-MCNC: 28 MG/DL (ref 9–23)
CALCIUM BLD-MCNC: 9.8 MG/DL (ref 8.7–10.4)
CHLORIDE SERPL-SCNC: 106 MMOL/L (ref 98–112)
CLARITY UR: CLEAR
CO2 SERPL-SCNC: 27 MMOL/L (ref 21–32)
COLOR UR: YELLOW
CREAT BLD-MCNC: 0.76 MG/DL
EGFRCR SERPLBLD CKD-EPI 2021: 74 ML/MIN/1.73M2 (ref 60–?)
EOSINOPHIL # BLD AUTO: 0.12 X10(3) UL (ref 0–0.7)
EOSINOPHIL NFR BLD AUTO: 2.6 %
ERYTHROCYTE [DISTWIDTH] IN BLOOD BY AUTOMATED COUNT: 12.4 %
GLOBULIN PLAS-MCNC: 2.4 G/DL (ref 2–3.5)
GLUCOSE BLD-MCNC: 103 MG/DL (ref 70–99)
GLUCOSE UR STRIP-MCNC: NEGATIVE MG/DL
HCT VFR BLD AUTO: 39.5 %
HGB BLD-MCNC: 13.7 G/DL
IMM GRANULOCYTES # BLD AUTO: 0.02 X10(3) UL (ref 0–1)
IMM GRANULOCYTES NFR BLD: 0.4 %
KETONES UR STRIP-MCNC: NEGATIVE MG/DL
LYMPHOCYTES # BLD AUTO: 1.73 X10(3) UL (ref 1–4)
LYMPHOCYTES NFR BLD AUTO: 37 %
MCH RBC QN AUTO: 33.3 PG (ref 26–34)
MCHC RBC AUTO-ENTMCNC: 34.7 G/DL (ref 31–37)
MCV RBC AUTO: 95.9 FL
MONOCYTES # BLD AUTO: 0.65 X10(3) UL (ref 0.1–1)
MONOCYTES NFR BLD AUTO: 13.9 %
NEUTROPHILS # BLD AUTO: 2.1 X10 (3) UL (ref 1.5–7.7)
NEUTROPHILS # BLD AUTO: 2.1 X10(3) UL (ref 1.5–7.7)
NEUTROPHILS NFR BLD AUTO: 44.8 %
NITRITE UR QL STRIP: NEGATIVE
OSMOLALITY SERPL CALC.SUM OF ELEC: 286 MOSM/KG (ref 275–295)
PH UR STRIP: 7.5 [PH]
PLATELET # BLD AUTO: 163 10(3)UL (ref 150–450)
POTASSIUM SERPL-SCNC: 4.6 MMOL/L (ref 3.5–5.1)
PROT SERPL-MCNC: 6.7 G/DL (ref 5.7–8.2)
PROT UR STRIP-MCNC: NEGATIVE MG/DL
RBC # BLD AUTO: 4.12 X10(6)UL
SODIUM SERPL-SCNC: 135 MMOL/L (ref 136–145)
SP GR UR STRIP: 1.01
UROBILINOGEN UR STRIP-ACNC: <2 MG/DL
WBC # BLD AUTO: 4.7 X10(3) UL (ref 4–11)

## 2024-10-24 PROCEDURE — 81002 URINALYSIS NONAUTO W/O SCOPE: CPT | Performed by: NURSE PRACTITIONER

## 2024-10-24 PROCEDURE — 99215 OFFICE O/P EST HI 40 MIN: CPT | Performed by: NURSE PRACTITIONER

## 2024-10-24 RX ORDER — CEPHALEXIN 500 MG/1
500 CAPSULE ORAL 2 TIMES DAILY
Qty: 14 CAPSULE | Refills: 0 | Status: ON HOLD | OUTPATIENT
Start: 2024-10-24 | End: 2024-10-26

## 2024-10-24 NOTE — ED PROVIDER NOTES
Patient Seen in: [facility]      History     Chief Complaint   Patient presents with    Urinary Symptoms     Stated Complaint: UTI    Subjective:   HPI  89-year-old with breast cancer, cellulitis, COPD, hypertension, postherpetic neuralgia, and lymphedema of the left arm presents with daughter for increased urination that started last night as well as dysuria and suprapubic cramping.  She has also had the chills and states that she feels weak.  She denies any fever.  She lives independently.    Objective:     Past Medical History:    Abnormal weight loss    Basal cell carcinoma (BCC) of left side of nose    Breast CA (HCC)    Breast cancer (HCC)    ILC, IDC, LCIS, ALH, DCIS    Cellulitis    COPD (chronic obstructive pulmonary disease) (HCC)    Ductal carcinoma in situ of breast    Exposure to radiation    High blood pressure    Keratoconjunctivitis sicca of both eyes    Lobular carcinoma in situ of breast    Lymph edema    left arm    Pelvic pressure in female    Post covid-19 condition, unspecified    Post herpetic neuralgia    Shingles              Past Surgical History:   Procedure Laterality Date    Cataract Bilateral     Eye surgery      both eyes - trabulectomys 7-8 TIMES    Eye surgery      DETACHED RETINA SURGERY    Glaucoma surgery  07/07/2016    Hernia surgery  10/18/2017    umbilical hernia    Hx breast cancer Left 1986    Injection, w/wo contrast, dx/therapeutic substance, epidural/subarachnoid; lumbar/sacral N/A 03/11/2016    Procedure: LUMBAR EPIDURAL;  Surgeon: Eric Montenegro MD;  Location: [facility] FOR PAIN MANAGEMENT    Injection, w/wo contrast, dx/therapeutic substance, epidural/subarachnoid; lumbar/sacral N/A 04/04/2016    Procedure: LUMBAR EPIDURAL;  Surgeon: Eric Montenegro MD;  Location: [facility] FOR PAIN MANAGEMENT    Lumpectomy left  1986    Mammogram, both breasts  07/22/2013    stable results (done in [place])    Needle biopsy right Right 04/2022    IDC,  DCIS, LCIS    Other surgical history  06/01/2021    Cystoscopy (Dr. Brand)    Patient documented not to have experienced any of the following events N/A 03/11/2016    Procedure: LUMBAR EPIDURAL;  Surgeon: Eric Montenegro MD;  Location: [facility] FOR PAIN MANAGEMENT    Patient documented not to have experienced any of the following events N/A 04/04/2016    Procedure: LUMBAR EPIDURAL;  Surgeon: Eric Montenegro MD;  Location: [facility] [place] FOR PAIN MANAGEMENT    Patient withough preoperative order for iv antibiotic surgical site infection prophylaxis. N/A 03/11/2016    Procedure: LUMBAR EPIDURAL;  Surgeon: Eric Montenegro MD;  Location: [facility] [place] FOR PAIN MANAGEMENT    Patient withough preoperative order for iv antibiotic surgical site infection prophylaxis. N/A 04/04/2016    Procedure: LUMBAR EPIDURAL;  Surgeon: Eric Montenegro MD;  Location: [facility] PAIN MANAGEMENT    Radiation left  1986    Skin surgery Left     Scar to L chin s/p removal of BCC \"years ago.\" Scar C/D/I    Tonsillectomy                  Social History     Socioeconomic History    Marital status:     Number of children: 4   Occupational History    Occupation: Retired   Tobacco Use    Smoking status: Never    Smokeless tobacco: Never   Vaping Use    Vaping status: Never Used   Substance and Sexual Activity    Alcohol use: No     Alcohol/week: 0.0 standard drinks of alcohol    Drug use: No   Other Topics Concern    Caffeine Concern No    Exercise Yes     Comment: 3 times per week, [employer]              Review of Systems   All other systems reviewed and are negative.      Positive for stated complaint: UTI  Other systems are as noted in HPI.  Constitutional and vital signs reviewed.      All other systems reviewed and negative except as noted above.    Physical Exam     ED Triage Vitals [10/24/24 1903]   BP (!) 166/67   Pulse 71   Resp 18   Temp 97.5 °F (36.4 °C)   Temp src Temporal   SpO2 96 %   O2 Device None  (Room air)       Current Vitals:   Vital Signs  BP: (!) 166/67  Pulse: 71  Resp: 18  Temp: 97.5 °F (36.4 °C)  Temp src: Temporal    Oxygen Therapy  SpO2: 96 %  O2 Device: None (Room air)        Physical Exam  Vitals and nursing note reviewed.   Constitutional:       General: She is not in acute distress.     Appearance: She is well-developed. She is not ill-appearing or toxic-appearing.   Cardiovascular:      Rate and Rhythm: Normal rate and regular rhythm.      Heart sounds: Normal heart sounds.   Pulmonary:      Effort: Pulmonary effort is normal.      Breath sounds: Normal breath sounds.   Abdominal:      Tenderness: There is no abdominal tenderness. There is no right CVA tenderness or left CVA tenderness.   Skin:     General: Skin is warm and dry.   Neurological:      Mental Status: She is alert and oriented to person, place, and time.             ED Course     Labs Reviewed   [facility] POCT URINALYSIS DIPSTICK - Abnormal; Notable for the following components:       Result Value    Blood, Urine Trace-Intact (*)     Leukocyte esterase urine Large (*)     All other components within normal limits   URINE CULTURE, ROUTINE                   MDM     Medical Decision Making  89-year-old with breast cancer, cellulitis, COPD, hypertension, postherpetic neuralgia, and lymphedema of the left arm presents with daughter for increased urination that started last night as well as dysuria and suprapubic cramping.  She has also had the chills and states that she feels weak.  She denies any fever.  She lives independently.    I originally discussed with patient going to the emergency room for symptoms since she is feeling extremely weak and lives independently.  She declined so antibiotics were sent to her pharmacy and we needed another urine sample for culture.  Patient then changed her mind and states that she will go to the emergency room.  Daughter will drive her.    Problems Addressed:  Acute cystitis with hematuria: acute  illness or injury  Elevated blood pressure reading: acute illness or injury    Amount and/or Complexity of Data Reviewed  Independent Historian:      Details: Daughter        Disposition and Plan     Clinical Impression:  1. Acute cystitis with hematuria    2. Elevated blood pressure reading         Disposition:  Ic to ed  10/24/2024  7:32 pm    Follow-up:  No follow-up provider specified.        Medications Prescribed:  Current Discharge Medication List        START taking these medications    Details   cephALEXin 500 MG Oral Cap Take 1 capsule (500 mg total) by mouth 2 (two) times daily for 7 days.  Qty: 14 capsule, Refills: 0                 Supplementary Documentation:

## 2024-10-25 PROBLEM — N30.00 ACUTE CYSTITIS WITHOUT HEMATURIA: Status: ACTIVE | Noted: 2024-10-25

## 2024-10-25 PROBLEM — R53.1 WEAKNESS GENERALIZED: Status: ACTIVE | Noted: 2024-10-25

## 2024-10-25 LAB
ATRIAL RATE: 69 BPM
BILIRUB UR QL STRIP.AUTO: NEGATIVE
CLARITY UR REFRACT.AUTO: CLEAR
GLUCOSE BLD-MCNC: 85 MG/DL (ref 70–99)
GLUCOSE BLD-MCNC: 90 MG/DL (ref 70–99)
GLUCOSE UR STRIP.AUTO-MCNC: NORMAL MG/DL
KETONES UR STRIP.AUTO-MCNC: NEGATIVE MG/DL
LEUKOCYTE ESTERASE UR QL STRIP.AUTO: NEGATIVE
NITRITE UR QL STRIP.AUTO: NEGATIVE
P AXIS: 66 DEGREES
P-R INTERVAL: 182 MS
PH UR STRIP.AUTO: 6 [PH] (ref 5–8)
PROT UR STRIP.AUTO-MCNC: NEGATIVE MG/DL
Q-T INTERVAL: 390 MS
QRS DURATION: 70 MS
QTC CALCULATION (BEZET): 417 MS
R AXIS: -8 DEGREES
RBC UR QL AUTO: NEGATIVE
SP GR UR STRIP.AUTO: 1.01 (ref 1–1.03)
T AXIS: 32 DEGREES
UROBILINOGEN UR STRIP.AUTO-MCNC: NORMAL MG/DL
VENTRICULAR RATE: 69 BPM

## 2024-10-25 PROCEDURE — 99223 1ST HOSP IP/OBS HIGH 75: CPT | Performed by: HOSPITALIST

## 2024-10-25 RX ORDER — MELATONIN
3 NIGHTLY PRN
Status: DISCONTINUED | OUTPATIENT
Start: 2024-10-25 | End: 2024-10-26

## 2024-10-25 RX ORDER — ENOXAPARIN SODIUM 100 MG/ML
40 INJECTION SUBCUTANEOUS DAILY
Status: DISCONTINUED | OUTPATIENT
Start: 2024-10-25 | End: 2024-10-26

## 2024-10-25 RX ORDER — TEMAZEPAM 15 MG/1
CAPSULE ORAL NIGHTLY PRN
Status: DISCONTINUED | OUTPATIENT
Start: 2024-10-25 | End: 2024-10-26

## 2024-10-25 RX ORDER — SODIUM CHLORIDE 9 MG/ML
1000 INJECTION, SOLUTION INTRAVENOUS ONCE
Status: DISCONTINUED | OUTPATIENT
Start: 2024-10-25 | End: 2024-10-25

## 2024-10-25 RX ORDER — ACETAZOLAMIDE 250 MG/1
250 TABLET ORAL 2 TIMES DAILY
Status: DISCONTINUED | OUTPATIENT
Start: 2024-10-25 | End: 2024-10-26

## 2024-10-25 RX ORDER — LOSARTAN POTASSIUM 100 MG/1
100 TABLET ORAL DAILY
Status: DISCONTINUED | OUTPATIENT
Start: 2024-10-25 | End: 2024-10-26

## 2024-10-25 RX ORDER — SODIUM PHOSPHATE, DIBASIC AND SODIUM PHOSPHATE, MONOBASIC 7; 19 G/230ML; G/230ML
1 ENEMA RECTAL ONCE AS NEEDED
Status: DISCONTINUED | OUTPATIENT
Start: 2024-10-25 | End: 2024-10-26

## 2024-10-25 RX ORDER — ONDANSETRON 2 MG/ML
4 INJECTION INTRAMUSCULAR; INTRAVENOUS EVERY 6 HOURS PRN
Status: DISCONTINUED | OUTPATIENT
Start: 2024-10-25 | End: 2024-10-26

## 2024-10-25 RX ORDER — ACETAMINOPHEN 500 MG
500 TABLET ORAL EVERY 4 HOURS PRN
Status: DISCONTINUED | OUTPATIENT
Start: 2024-10-25 | End: 2024-10-26

## 2024-10-25 RX ORDER — BISACODYL 10 MG
10 SUPPOSITORY, RECTAL RECTAL
Status: DISCONTINUED | OUTPATIENT
Start: 2024-10-25 | End: 2024-10-26

## 2024-10-25 RX ORDER — AMLODIPINE BESYLATE 5 MG/1
5 TABLET ORAL DAILY
Status: DISCONTINUED | OUTPATIENT
Start: 2024-10-25 | End: 2024-10-26

## 2024-10-25 RX ORDER — SENNOSIDES 8.6 MG
17.2 TABLET ORAL NIGHTLY PRN
Status: DISCONTINUED | OUTPATIENT
Start: 2024-10-25 | End: 2024-10-26

## 2024-10-25 RX ORDER — LABETALOL 100 MG/1
100 TABLET, FILM COATED ORAL 2 TIMES DAILY
Status: DISCONTINUED | OUTPATIENT
Start: 2024-10-25 | End: 2024-10-26

## 2024-10-25 RX ORDER — METOCLOPRAMIDE HYDROCHLORIDE 5 MG/ML
5 INJECTION INTRAMUSCULAR; INTRAVENOUS EVERY 8 HOURS PRN
Status: DISCONTINUED | OUTPATIENT
Start: 2024-10-25 | End: 2024-10-26

## 2024-10-25 RX ORDER — POLYETHYLENE GLYCOL 3350 17 G/17G
17 POWDER, FOR SOLUTION ORAL DAILY PRN
Status: DISCONTINUED | OUTPATIENT
Start: 2024-10-25 | End: 2024-10-26

## 2024-10-25 NOTE — ED PROVIDER NOTES
Patient Seen in: [facility] Emergency Department      History     Chief Complaint   Patient presents with    Urinary Symptoms    Dizziness     Stated Complaint: uti sx    Subjective:   HPI      89-year-old female presents to ED sent from immediate care for waking up this morning feeling weak, suprapubic pain with urinary symptoms.  She went to immediate care earlier and was told she has UTI, large leukocyte esterase on urine dip and Keflex sent to pharmacy, but states they told her to come to ER for further evaluation as she was complaining of feeling weak and dizzy also.  She lives at [facility] in the independent living, daughter states that she was feeling too weak to walk earlier.  Patient states that she felt dizzy when she went to stand up this morning.  Denies fevers, chills, vomiting, diarrhea.  Denies focal weakness, headache, numbness, tingling, speech change.  Daughter states that she usually feels weak but now she is feeling much weaker and daughter concern does not want her to go back to [facility] given her age and likelihood of current urinary tract infection on top of the shingles that was already making her weak, daughter fearful that she will fall there.    Objective:     Past Medical History:    Abnormal weight loss    Basal cell carcinoma (BCC) of left side of nose    Breast CA (HCC)    Breast cancer (HCC)    ILC, IDC, LCIS, ALH, DCIS    Cellulitis    COPD (chronic obstructive pulmonary disease) (HCC)    Ductal carcinoma in situ of breast    Exposure to radiation    High blood pressure    Keratoconjunctivitis sicca of both eyes    Lobular carcinoma in situ of breast    Lymph edema    left arm    Pelvic pressure in female    Post covid-19 condition, unspecified    Post herpetic neuralgia    Shingles              Past Surgical History:   Procedure Laterality Date    Cataract Bilateral     Eye surgery      both eyes - trabulectomys 7-8 TIMES    Eye surgery      DETACHED  RETINA SURGERY    Glaucoma surgery  07/07/2016    Hernia surgery  10/18/2017    umbilical hernia    Hx breast cancer Left 1986    Injection, w/wo contrast, dx/therapeutic substance, epidural/subarachnoid; lumbar/sacral N/A 03/11/2016    Procedure: LUMBAR EPIDURAL;  Surgeon: Eric Montenegro MD;  Location: [facility] FOR PAIN MANAGEMENT    Injection, w/wo contrast, dx/therapeutic substance, epidural/subarachnoid; lumbar/sacral N/A 04/04/2016    Procedure: LUMBAR EPIDURAL;  Surgeon: Eric Montenegro MD;  Location: [facility] FOR PAIN MANAGEMENT    Lumpectomy left  1986    Mammogram, both breasts  07/22/2013    stable results (done in [place])    Needle biopsy right Right 04/2022    IDC, DCIS, LCIS    Other surgical history  06/01/2021    Cystoscopy (Dr. Brand)    Patient documented not to have experienced any of the following events N/A 03/11/2016    Procedure: LUMBAR EPIDURAL;  Surgeon: Eric Montenegro MD;  Location: [facility] FOR PAIN MANAGEMENT    Patient documented not to have experienced any of the following events N/A 04/04/2016    Procedure: LUMBAR EPIDURAL;  Surgeon: Eric Montenegro MD;  Location: [facility] FOR PAIN MANAGEMENT    Patient withough preoperative order for iv antibiotic surgical site infection prophylaxis. N/A 03/11/2016    Procedure: LUMBAR EPIDURAL;  Surgeon: Eric Montenegro MD;  Location: [facility] FOR PAIN MANAGEMENT    Patient withough preoperative order for iv antibiotic surgical site infection prophylaxis. N/A 04/04/2016    Procedure: LUMBAR EPIDURAL;  Surgeon: Eric Montenegro MD;  Location: [facility] FOR PAIN MANAGEMENT    Radiation left  1986    Skin surgery Left     Scar to L chin s/p removal of BCC \"years ago.\" Scar C/D/I    Tonsillectomy                  Social History     Socioeconomic History    Marital status:     Number of children: 4   Occupational History    Occupation: Retired   Tobacco Use    Smoking status: Never    Smokeless tobacco: Never   Vaping Use     Vaping status: Never Used   Substance and Sexual Activity    Alcohol use: No     Alcohol/week: 0.0 standard drinks of alcohol    Drug use: No   Other Topics Concern    Caffeine Concern No    Exercise Yes     Comment: 3 times per week, [employer] [place]     Social Drivers of Health     Food Insecurity: No Food Insecurity (10/25/2024)    Food Insecurity     Food Insecurity: Never true   Transportation Needs: No Transportation Needs (10/25/2024)    Transportation Needs     Lack of Transportation: No   Housing Stability: Low Risk  (10/25/2024)    Housing Stability     Housing Instability: No                  Physical Exam     ED Triage Vitals [10/24/24 2045]   BP (!) 171/68   Pulse 66   Resp 16   Temp 98.2 °F (36.8 °C)   Temp src Oral   SpO2 96 %   O2 Device None (Room air)       Current Vitals:   Vital Signs  BP: 137/59  Pulse: 70  Resp: 20  Temp: 97.4 °F (36.3 °C)  Temp src: Oral  MAP (mmHg): 78    Oxygen Therapy  SpO2: 98 %  O2 Device: None (Room air)        Physical Exam  Vital signs reviewed.  Nursing note reviewed.  Constitutional: Alert, fatigued  Head: Normocephalic, atraumatic  Mouth: Dry  Eyes: Extraocular muscles intact, pupils equal  Cardiovascular: Regular rate and rhythm  Pulmonary: Effort normal, breath sounds normal  Abdomen: Soft, suprapubic tender nondistended  Skin: Warm and dry  Musculoskeletal range of motion grossly normal all extremities  Neuro: Alert, at baseline, no focal neuro deficit.  Moves all extremities against gravity.  Patient was able to walk with her walker for me from the bed to the bathroom.  Psych: Mood normal          ED Course     Labs Reviewed   COMP METABOLIC PANEL (14) - Abnormal; Notable for the following components:       Result Value    Glucose 103 (*)     Sodium 135 (*)     BUN 28 (*)     AST 38 (*)     All other components within normal limits   CBC WITH DIFFERENTIAL WITH PLATELET   URINALYSIS WITH CULTURE REFLEX   RAINBOW DRAW BLUE   URINE CULTURE, ROUTINE      EKG    Rate, intervals and axes as noted on EKG Report.  Rate: 69  Rhythm: Sinus Rhythm  Reading: Nonspecific ST abnormality                MELISSA SHANNEN 2D+3D DIAGNOSTIC MELISSA RIGHT (CPT=77065/24621)    Result Date: 9/26/2024  PROCEDURE:  [facility] SHANNEN 2D+3D DIAGNOSTIC MELISSA RIGHT (CPT=77065/09151)  COMPARISON:  [facility] , US, US BREAST RIGHT LIMITED ([facility] SAME DAY US)(CPT=76642), 2/27/2024, 1:55 PM.  [facility] , MG, MELISSA SHANNEN 2D+3D DIAGNOSTIC MELISSA RIGHT (CPT=77065/81559), 2/27/2024, 1:09 PM.  INDICATIONS:  Z17.0 Malignant neoplasm of overlapping sites of right breast in female, estrogen receptor positive (HCC) C50.811 Malignant neoplasm of overlapping sites of ri*  VIEWS OBTAINED:  Diagnostic views of the right breast were obtained.  Standard 2D and additional multiplane thin sections of the breast were obtained for the purpose of Tomosynthesis evaluation.  The images were reconstructed and reviewed on the dedicated Tomosynthesis workstation.  BREAST COMPOSITION:  Heterogeneously dense, which may obscure small masses.  FINDINGS:  Pleomorphic calcifications in the upper outer right breast at a posterior depth appear stable compared to the prior examination.  No new calcifications are identified.  There are no suspicious masses identified.            CONCLUSION:  Pleomorphic calcifications in the upper outer right breast at the site of the patient's known DCIS appear stable.  Clinical follow-up is recommended.  BI-RADS CATEGORY:  DIAGNOSTIC CATEGORY 6 - KNOWN BIOPSY-PROVEN MALIGNANCY: RIGHT BREAST   RECOMMENDATIONS:  CLINICAL EVALUATION.      A letter explaining the results in lay terms has been sent to the patient.  This exam was evaluated with a computer-aided device.  This patient's information has been entered into a reminder system with a target due date for the next mammogram.   LOCATION:  [facility]       Dictated by (CST): Tanner Haddad MD on 9/26/2024 at 2:44 PM     Finalized by (CST): Tanner Haddad MD on 9/26/2024 at 2:46 PM    [address] [address] [phone]          [facility]      Medical Decision Making:    Differential diagnosis before testing includes UTI, electrolyte abnormality potential life threatening diagnosis which is a medical condition that poses a threat to life/function.     Comorbidities that add complexity to management: See HPI    I reviewed prior external ED notes including reviewed urine dip done at immediate care as noted in HPI    Discussions of management with: Discussed with admitting, Dr. Trevizo    Social determinants of health care: [facility] independent living, patient 89 years old and recent diagnosis shingles that she has been weaker from and now UTI, concern for fall risk and ability to manage antibiotics in her weakened state      Shared decision making:    While I was in the room interviewing patient, she stated that she had the urgency to go urinate, she ambulated as quickly she could with her walker to the bathroom and when she sat down and stated that nothing came out.  She states she is having some burning with urination.  Patient had large leukocyte esterase on the urine dip at immediate care.  She has all the symptoms of UTI, surprisingly her urinalysis was negative here.  This was done before IV antibiotics were given nor did she take oral antibiotics.  High suspicion for UTI given her symptoms, and the fact that she had large leukocyte Estrace at immediate care, will send for culture.  Patient's dizziness earlier likely related to dehydration as well, daughter states that she often gets dizzy from dehydration.  Patient admits that she has barely drink water today.  Given IV fluids also.  PT eval in the morning, possibly patient will go to the [facility] section of [facility] where she lives if they have a bed open tomorrow and is medically able to do so.   Admission disposition: 10/25/2024 12:09 AM           Medical Decision Making      Disposition  and Plan     Clinical Impression:  1. Acute cystitis without hematuria    2. Weakness generalized         Disposition:  Admit  10/25/2024 12:09 am    Follow-up:  No follow-up provider specified.        Medications Prescribed:  Current Discharge Medication List              Supplementary Documentation:         Hospital Problems       Present on Admission  Date Reviewed: 10/8/2024            ICD-10-CM Noted POA    * (Principal) Acute cystitis without hematuria N30.00 10/25/2024 Unknown    Weakness generalized R53.1 10/25/2024 Unknown

## 2024-10-25 NOTE — PLAN OF CARE
Patient is alert and oriented, ambulatory to the bathroom with walker. Pt has a history of shingles in June, still feels pain in the L shoulder mostly. Tylenol given for pain. IV antbx given in ED. No acute events overnight. Safety precautions in place, call light within reach, plan of care ongoing.     Problem: SAFETY ADULT - FALL  Goal: Free from fall injury  Description: INTERVENTIONS:  - Assess pt frequently for physical needs  - Identify cognitive and physical deficits and behaviors that affect risk of falls.  - [place] fall precautions as indicated by assessment.  - Educate pt/family on patient safety including physical limitations  - Instruct pt to call for assistance with activity based on assessment  - Modify environment to reduce risk of injury  - Provide assistive devices as appropriate  - Consider OT/PT consult to assist with strengthening/mobility  - Encourage toileting schedule  Outcome: Progressing     Problem: PAIN - ADULT  Goal: Verbalizes/displays adequate comfort level or patient's stated pain goal  Description: INTERVENTIONS:  - Encourage pt to monitor pain and request assistance  - Assess pain using appropriate pain scale  - Administer analgesics based on type and severity of pain and evaluate response  - Implement non-pharmacological measures as appropriate and evaluate response  - Consider cultural and social influences on pain and pain management  - Manage/alleviate anxiety  - Utilize distraction and/or relaxation techniques  - Monitor for opioid side effects  - Notify MD/LIP if interventions unsuccessful or patient reports new pain  - Anticipate increased pain with activity and pre-medicate as appropriate  Outcome: Not Progressing

## 2024-10-25 NOTE — PHYSICAL THERAPY NOTE
PHYSICAL THERAPY EVALUATION - INPATIENT     Room Number: 426/426-A  Evaluation Date: 10/25/2024  Type of Evaluation: Initial  Physician Order: PT Eval and Treat    Presenting Problem: acute cystitis  Co-Morbidities : h/o Shingles in June, LUE lymphedema  Reason for Therapy: Mobility Dysfunction and Discharge Planning    PHYSICAL THERAPY ASSESSMENT   Patient is a 89 year old female admitted 10/24/2024 for acute cystitis.   Patient is currently functioning at baseline with bed mobility, transfers, and gait. Prior to admission, patient's baseline is ambulatory with rollator.     Patient will benefit from continued skilled PT Services with no additional skilled services but increased support at home.    PLAN  Patient currently does not meet criteria for skilled inpatient physical therapy services, however patient will remain on Inpatient Mobility Team and will continue with encouraged ambulation to maintain current level of mobility.   The rehab aide will perform treatment activities prescribed by this physical therapist. The rehab aide will communicate with overseeing PT regarding any change in functional mobility. RN aware.            GOALS  Patient was able to achieve the following goals ...    Patient was able to transfer Safely and independently   Patient able to ambulate on level surfaces Safely and independently     HOME SITUATION  Type of Home: Independent living facility  Home Layout: One level                     Lives With: Alone              Prior Level of [place]: Pt reports mod ind with rollator.      SUBJECTIVE  \"I've been dealing with this for months!\"    OBJECTIVE     Fall Risk: Standard fall risk    WEIGHT BEARING RESTRICTION     PAIN ASSESSMENT  Rating: Unable to rate  Location: L shoulder  Management Techniques: Body mechanics;Activity promotion;Relaxation;Repositioning    COGNITION  Following Commands:  follows all commands and directions without difficulty    RANGE OF MOTION AND STRENGTH  ASSESSMENT  Upper extremity ROM and strength are within functional limits     Lower extremity ROM is within functional limits     Lower extremity strength is within functional limits     BALANCE  Static Sitting: Good  Dynamic Sitting: Good  Static Standing: Fair -  Dynamic Standing: Fair -    ADDITIONAL TESTS                                    ACTIVITY TOLERANCE                         O2 WALK       NEUROLOGICAL FINDINGS                        AM-PAC '6-Clicks' INPATIENT SHORT FORM - BASIC MOBILITY  How much difficulty does the patient currently have...  Patient Difficulty: Turning over in bed (including adjusting bedclothes, sheets and blankets)?: A Little   Patient Difficulty: Sitting down on and standing up from a chair with arms (e.g., wheelchair, bedside commode, etc.): A Little   Patient Difficulty: Moving from lying on back to sitting on the side of the bed?: A Little   How much help from another person does the patient currently need...   Help from Another: Moving to and from a bed to a chair (including a wheelchair)?: A Little   Help from Another: Need to walk in hospital room?: A Little   Help from Another: Climbing 3-5 steps with a railing?: A Little       AM-PAC Score:  Raw Score: 18   Approx Degree of Impairment: 46.58%   Standardized Score (AM-PAC Scale): 43.63   CMS Modifier (G-Code): CK    FUNCTIONAL ABILITY STATUS  Gait Assessment   Functional Mobility/Gait Assessment  Gait Assistance: Supervision  Distance (ft): 200  Assistive Device:  (own rollator)    Skilled Therapy Provided     Bed Mobility:  Rolling: ind  Supine to sit: NT   Sit to supine: supervision    Transfer Mobility:  Sit to stand: supervision   Stand to sit: supervision  Gait = Ambulation as above.    Therapist's comments:Pt presents with multiple complaints from staff to food while in house.  Pt  completed all mobility without physical assist from writer.  Pt c/o's pain from h/o shingles, does not tolerate anything touching the area.   Rec ongoing ambulation  while in house.    Exercise/Education Provided:  Bed mobility  Body mechanics  Functional activity tolerated  Gait training  Transfer training    Patient End of Session: In bed;Needs met;Call light within reach;RN aware of session/findings;All patient questions and concerns addressed;Hospital anti-slip socks;Discussed recommendations with /    Patient Evaluation Complexity Level:  History Moderate - 1 or 2 personal factors and/or co-morbidities   Examination of body systems Low -  addressing 1-2 elements   Clinical Presentation Low- Stable   Clinical Decision Making Low Complexity       PT Session Time: 30 minutes    Therapeutic Activity: 15 minutes

## 2024-10-25 NOTE — ED QUICK NOTES
Orders for admission, patient is aware of plan and ready to go upstairs. Any questions, please call ED RN Rhoda at extension [phone].     Patient Covid vaccination status: Fully vaccinated     COVID Test Ordered in ED: None    COVID Suspicion at Admission: N/A    Running Infusions:      Mental Status/LOC at time of transport: A+Ox4    Other pertinent information:   CIWA score: N/A   NIH score:  N/A

## 2024-10-25 NOTE — ED INITIAL ASSESSMENT (HPI)
Pt brought to IC by family for weakness, dizziness and \"symptoms of UTI.\" Pt reports sx started yesterday. + urinary frequency. Was recently dx with shingles in June. Denies CP and SOB. A&O x4 in triage.

## 2024-10-25 NOTE — H&P
[facility]     Meena Whitman Patient Status:  Emergency    1933 MRN [id number]   Location [facility] EMERGENCY DEPARTMENT Attending Marcy Mccray DO   Hosp Day # 0 PCP Bonnie Danielson MD     Chief Complaint: Dysuria and dizziness    Subjective:    History of Present Illness:     Meena Whitman is a 89 year old female with history of hypertension fusions, COPD presents the emergency room with     History/Other:    Past Medical History:  Past Medical History:    Abnormal weight loss    Basal cell carcinoma (BCC) of left side of nose    Breast CA (HCC)    Breast cancer (HCC)    ILC, IDC, LCIS, ALH, DCIS    Cellulitis    COPD (chronic obstructive pulmonary disease) (HCC)    Ductal carcinoma in situ of breast    Exposure to radiation    High blood pressure    Keratoconjunctivitis sicca of both eyes    Lobular carcinoma in situ of breast    Lymph edema    left arm    Pelvic pressure in female    Post covid-19 condition, unspecified    Post herpetic neuralgia    Shingles     Past Surgical History:   Past Surgical History:   Procedure Laterality Date    Cataract Bilateral     Eye surgery      both eyes - trabulectomys 7-8 TIMES    Eye surgery      DETACHED RETINA SURGERY    Glaucoma surgery  2016    Hernia surgery  10/18/2017    umbilical hernia    Hx breast cancer Left     Injection, w/wo contrast, dx/therapeutic substance, epidural/subarachnoid; lumbar/sacral N/A 2016    Procedure: LUMBAR EPIDURAL;  Surgeon: Eric Montenegro MD;  Location: [facility]    Injection, w/wo contrast, dx/therapeutic substance, epidural/subarachnoid; lumbar/sacral N/A 2016    Procedure: LUMBAR EPIDURAL;  Surgeon: Eric Montenegro MD;  Location: [facility]    Lumpectomy left      Mammogram, both breasts  2013    stable results (done in [place])    Needle biopsy right Right 2022    IDC, DCIS, LCIS    Other surgical  history  06/01/2021    Cystoscopy (Dr. Brand)    Patient documented not to have experienced any of the following events N/A 03/11/2016    Procedure: LUMBAR EPIDURAL;  Surgeon: Eric Montenegro MD;  Location: [facility]    Patient documented not to have experienced any of the following events N/A 04/04/2016    Procedure: LUMBAR EPIDURAL;  Surgeon: Eric Montenegro MD;  Location: [facility]    Patient withough preoperative order for iv antibiotic surgical site infection prophylaxis. N/A 03/11/2016    Procedure: LUMBAR EPIDURAL;  Surgeon: Erci Montenegro MD;  Location: [facility]    Patient withough preoperative order for iv antibiotic surgical site infection prophylaxis. N/A 04/04/2016    Procedure: LUMBAR EPIDURAL;  Surgeon: Eric Montenegro MD;  Location: [facility] PAIN MANAGEMENT    Radiation left  1986    Skin surgery Left     Scar to L chin s/p removal of BCC \"years ago.\" Scar C/D/I    Tonsillectomy        Family History:   Family History   Problem Relation Age of Onset    DCIS Self     LCIS Self     Breast Cancer Self 55    Cancer Mother     Breast Cancer Mother         dx age 70's    Cancer Father     Other (Other) Father     Breast Cancer Daughter         dx age 40     Social History:    reports that she has never smoked. She has never used smokeless tobacco. She reports that she does not drink alcohol and does not use drugs.     Allergies: Allergies[1]    Medications:  Medications Ordered Prior to Encounter[2]    Review of Systems:   A comprehensive review of systems was completed.    Pertinent positives and negatives noted in the HPI.    Objective:   Physical Exam:    BP (!) 164/76   Pulse 76   Temp 98.2 °F (36.8 °C) (Oral)   Resp 18   Ht 5' 4\" (1.626 m)   Wt 120 lb (54.4 kg)   SpO2 100%   BMI 20.60 kg/m²   General: No acute distress, Alert  Respiratory: No rhonchi, no wheezes  Cardiovascular: S1, S2. Regular rate and rhythm  Abdomen:  Soft, Non-tender, non-distended, positive bowel sounds  Neuro: No new focal deficits  Extremities: No edema      Results:    Labs:      Labs Last 24 Hours:    Recent Labs   Lab 10/24/24  2112   RBC 4.12   HGB 13.7   HCT 39.5   MCV 95.9   MCH 33.3   MCHC 34.7   RDW 12.4   NEPRELIM 2.10   WBC 4.7   .0       Recent Labs   Lab 10/24/24  2112   *   BUN 28*   CREATSERUM 0.76   EGFRCR 74   CA 9.8   ALB 4.3   *   K 4.6      CO2 27.0   ALKPHO 74   AST 38*   ALT 43   BILT 0.4   TP 6.7       Lab Results   Component Value Date    INR 1.1 04/28/2022       No results for input(s): \"TROP\", \"TROPHS\", \"CK\" in the last 168 hours.    No results for input(s): \"TROP\", \"PBNP\" in the last 168 hours.    No results for input(s): \"PCT\" in the last 168 hours.    Imaging: Imaging data reviewed in Epic.    Assessment & Plan:      # Acute cystitis  - Will continue on ceftriaxone  - Urine culture pending    # Physical deconditioning  - Will have PT evaluate  - Family plans to private pay to go to [place] since she is [place] at [employer].    # Hypertension  -Will continue on amlodipine labetalol and losartan.      Plan of care discussed with patient at bedside.    Austin Morales, DO    Supplementary Documentation:     The 21st Century Cures Act makes medical notes like these available to patients in the interest of transparency. Please be advised this is a medical document. Medical documents are intended to carry relevant information, facts as evident, and the clinical opinion of the practitioner. The medical note is intended as peer to peer communication and may appear blunt or direct. It is written in medical language and may contain abbreviations or verbiage that are unfamiliar.                                     [1]   Allergies  Allergen Reactions    Hydralazine OTHER (SEE COMMENTS)     Headache    Bactrim [Sulfamethoxazole W/Trimethoprim] RASH     Per patient    Metronidazole ITCHING     Metronidazole  gel caused irritation in vaginal area.   [2]   Current Facility-Administered Medications on File Prior to Encounter   Medication Dose Route Frequency Provider Last Rate Last Admin    [COMPLETED] denosumab (Prolia) 60 MG/ML SUBQ injection 60 mg  60 mg Subcutaneous Once Krista Warren MD   60 mg at 10/01/24 1443     Current Outpatient Medications on File Prior to Encounter   Medication Sig Dispense Refill    cephALEXin 500 MG Oral Cap Take 1 capsule (500 mg total) by mouth 2 (two) times daily for 7 days. 14 capsule 0    Ascorbic Acid (VITAMIN C) 1000 MG Oral Tab Take 1 tablet (1,000 mg total) by mouth daily.      TEMAZEPAM 15 MG Oral Cap TAKE 1-2 CAPSULES (15-30 MG TOTAL) BY MOUTH NIGHTLY AS NEEDED FOR SLEEP. 60 capsule 0    PREGABALIN 75 MG Oral Cap START WITH 75 MG (1 CAPSULE) BY MOUTH TWO TIMES DAILY FOR 3-5 DAYS, THEN GO UP  MG (2 CAPSULES) BY MOUTH TWO TIMES DAILY 120 capsule 0    Lidocaine 4 % External Cream Apply 1 Application topically 2 (two) times daily. 28 g 1    lidocaine 5 % External Ointment Apply 1 Application topically as needed. 70.88 g 0    acetaminophen 325 MG Oral Tab Take 2 tablets (650 mg total) by mouth every 6 (six) hours as needed for Pain.      neomycin-polymyxin-dexamethasone 3.5-26681-2.1 Ophthalmic Ointment       amLODIPine 5 MG Oral Tab Take 1 tablet (5 mg total) by mouth daily. 90 tablet 1    labetalol 100 MG Oral Tab Take 1 tablet (100 mg total) by mouth 2 (two) times daily. 180 tablet 1    losartan 100 MG Oral Tab Take 1 tablet (100 mg total) by mouth daily. 90 tablet 1    estradiol 0.1 MG/GM Vaginal Cream Place 1 g vaginally daily. 42.5 g 3    Vitamin D, Cholecalciferol, 25 MCG (1000 UT) Oral Tab Take 1,000 Units by mouth daily. Take 3 caps po q am 270 tablet 3    acetaZOLAMIDE 250 MG Oral Tab TAKE ONE TABLET BY MOUTH TWICE DAILY AT BREAKFAST AND DINNER      Multiple Vitamins-Minerals (COMPLETE DAILY/LUTEIN) Oral Tab Take 1 tablet by mouth 3 (three) times daily.      Elastic  Bandages & Supports (SKINEEZ COMPRESSION ARM SLEEVE) Does not apply Misc 2 Units daily as needed. (Patient not taking: Reported on 10/24/2024) 2 each 3

## 2024-10-25 NOTE — ED INITIAL ASSESSMENT (HPI)
Pt states increased urination since last night. Pt states she has fill 2 diapers last night. C/o slight burning, suprapubic cramp. C/o chills. Denies known fever, hematuria, flank pain. C/o weakness.

## 2024-10-25 NOTE — CM/SW NOTE
SW attempted to meet with patient at bedside to discuss DC planning however, patient was receiving bedside care and using restroom.     SW will continue to follow for plan of care changes and remain available for any additional DC needs or concerns.     Annemarie Andre MSW, LSW  Discharge Planner   [id number]

## 2024-10-25 NOTE — PLAN OF CARE
Pt A/O x4. VSS. Afebrile. Pt c/o pain during day, PRN tylenol admin per MAR w/ some relief. Pt refused additional pain medicine. Medications admin per MAR. Pt repositioned in bed, pillow support provided. Pt ambulated safely in room w/ SBA consistently throughout day. Pt up in chair. Pt's daughter at bedside, updated on POC per pt approval. Pt's daughter concerned about eye drops and home medication acetazolamide not being ordered. Concerns relayed to MD-medications ordered. POC/DC discussed w/ hospitalist and SW-SW working on [employer] [facility] [employer] placement. Fall and safety precautions in place. Call light within reach.

## 2024-10-26 VITALS
DIASTOLIC BLOOD PRESSURE: 56 MMHG | OXYGEN SATURATION: 97 % | RESPIRATION RATE: 20 BRPM | HEIGHT: 64 IN | HEART RATE: 69 BPM | BODY MASS INDEX: 21.62 KG/M2 | TEMPERATURE: 98 F | SYSTOLIC BLOOD PRESSURE: 138 MMHG | WEIGHT: 126.63 LBS

## 2024-10-26 LAB
ANION GAP SERPL CALC-SCNC: 5 MMOL/L (ref 0–18)
BASOPHILS # BLD AUTO: 0.04 X10(3) UL (ref 0–0.2)
BASOPHILS NFR BLD AUTO: 1.1 %
BUN BLD-MCNC: 18 MG/DL (ref 9–23)
CALCIUM BLD-MCNC: 9.2 MG/DL (ref 8.7–10.4)
CHLORIDE SERPL-SCNC: 108 MMOL/L (ref 98–112)
CO2 SERPL-SCNC: 23 MMOL/L (ref 21–32)
CREAT BLD-MCNC: 0.68 MG/DL
EGFRCR SERPLBLD CKD-EPI 2021: 82 ML/MIN/1.73M2 (ref 60–?)
EOSINOPHIL # BLD AUTO: 0.1 X10(3) UL (ref 0–0.7)
EOSINOPHIL NFR BLD AUTO: 2.8 %
ERYTHROCYTE [DISTWIDTH] IN BLOOD BY AUTOMATED COUNT: 12.2 %
GLUCOSE BLD-MCNC: 88 MG/DL (ref 70–99)
HCT VFR BLD AUTO: 35.8 %
HGB BLD-MCNC: 12.4 G/DL
IMM GRANULOCYTES # BLD AUTO: 0 X10(3) UL (ref 0–1)
IMM GRANULOCYTES NFR BLD: 0 %
LYMPHOCYTES # BLD AUTO: 1.52 X10(3) UL (ref 1–4)
LYMPHOCYTES NFR BLD AUTO: 42.2 %
MCH RBC QN AUTO: 33 PG (ref 26–34)
MCHC RBC AUTO-ENTMCNC: 34.6 G/DL (ref 31–37)
MCV RBC AUTO: 95.2 FL
MONOCYTES # BLD AUTO: 0.58 X10(3) UL (ref 0.1–1)
MONOCYTES NFR BLD AUTO: 16.1 %
NEUTROPHILS # BLD AUTO: 1.36 X10 (3) UL (ref 1.5–7.7)
NEUTROPHILS # BLD AUTO: 1.36 X10(3) UL (ref 1.5–7.7)
NEUTROPHILS NFR BLD AUTO: 37.8 %
OSMOLALITY SERPL CALC.SUM OF ELEC: 283 MOSM/KG (ref 275–295)
PLATELET # BLD AUTO: 158 10(3)UL (ref 150–450)
POTASSIUM SERPL-SCNC: 3.6 MMOL/L (ref 3.5–5.1)
RBC # BLD AUTO: 3.76 X10(6)UL
SODIUM SERPL-SCNC: 136 MMOL/L (ref 136–145)
WBC # BLD AUTO: 3.6 X10(3) UL (ref 4–11)

## 2024-10-26 PROCEDURE — 99239 HOSP IP/OBS DSCHRG MGMT >30: CPT | Performed by: HOSPITALIST

## 2024-10-26 RX ORDER — CEPHALEXIN 500 MG/1
500 CAPSULE ORAL 2 TIMES DAILY
Status: SHIPPED | COMMUNITY
Start: 2024-10-26

## 2024-10-26 NOTE — PLAN OF CARE
NURSING DISCHARGE NOTE    Discharged Home via Wheelchair.  Accompanied by Support staff  Belongings Taken by patient/family.    Discharge instructions given and explained to patient and family. PIV removed. Eyedrops from home returned to family member prior to discharge. All questions answered.

## 2024-10-26 NOTE — PLAN OF CARE
PT A/O, 92% ON RA, UP TO BATHROOM VOIDING, RASH TO LT UPPER BACK, GIVEN TYLENOL, IV ROCEPHIN,   Problem: PAIN - ADULT  Goal: Verbalizes/displays adequate comfort level or patient's stated pain goal  Description: INTERVENTIONS:  - Encourage pt to monitor pain and request assistance  - Assess pain using appropriate pain scale  - Administer analgesics based on type and severity of pain and evaluate response  - Implement non-pharmacological measures as appropriate and evaluate response  - Consider cultural and social influences on pain and pain management  - Manage/alleviate anxiety  - Utilize distraction and/or relaxation techniques  - Monitor for opioid side effects  - Notify MD/LIP if interventions unsuccessful or patient reports new pain  - Anticipate increased pain with activity and pre-medicate as appropriate  Outcome: Progressing

## 2024-10-28 ENCOUNTER — OFFICE VISIT (OUTPATIENT)
Dept: INTERNAL MEDICINE CLINIC | Facility: CLINIC | Age: 89
End: 2024-10-28
Payer: MEDICARE

## 2024-10-28 ENCOUNTER — PATIENT OUTREACH (OUTPATIENT)
Dept: CASE MANAGEMENT | Age: 89
End: 2024-10-28

## 2024-10-28 ENCOUNTER — TELEPHONE (OUTPATIENT)
Dept: INTERNAL MEDICINE CLINIC | Facility: CLINIC | Age: 89
End: 2024-10-28

## 2024-10-28 VITALS
SYSTOLIC BLOOD PRESSURE: 128 MMHG | WEIGHT: 126 LBS | BODY MASS INDEX: 21.51 KG/M2 | DIASTOLIC BLOOD PRESSURE: 58 MMHG | RESPIRATION RATE: 16 BRPM | OXYGEN SATURATION: 99 % | HEIGHT: 64 IN | TEMPERATURE: 98 F | HEART RATE: 60 BPM

## 2024-10-28 DIAGNOSIS — Z09 HOSPITAL DISCHARGE FOLLOW-UP: Primary | ICD-10-CM

## 2024-10-28 DIAGNOSIS — N30.00 ACUTE CYSTITIS WITHOUT HEMATURIA: ICD-10-CM

## 2024-10-28 DIAGNOSIS — R39.9 UTI SYMPTOMS: ICD-10-CM

## 2024-10-28 DIAGNOSIS — I10 PRIMARY HYPERTENSION: ICD-10-CM

## 2024-10-28 DIAGNOSIS — Z02.9 ENCOUNTERS FOR UNSPECIFIED ADMINISTRATIVE PURPOSE: Primary | ICD-10-CM

## 2024-10-28 LAB
BILIRUB UR QL STRIP.AUTO: NEGATIVE
CLARITY UR REFRACT.AUTO: CLEAR
COLOR UR AUTO: YELLOW
GLUCOSE UR STRIP.AUTO-MCNC: NORMAL MG/DL
KETONES UR STRIP.AUTO-MCNC: NEGATIVE MG/DL
LEUKOCYTE ESTERASE UR QL STRIP.AUTO: NEGATIVE
NITRITE UR QL STRIP.AUTO: NEGATIVE
PH UR STRIP.AUTO: 7 [PH] (ref 5–8)
PROT UR STRIP.AUTO-MCNC: NEGATIVE MG/DL
RBC UR QL AUTO: NEGATIVE
SP GR UR STRIP.AUTO: 1.01 (ref 1–1.03)
UROBILINOGEN UR STRIP.AUTO-MCNC: NORMAL MG/DL

## 2024-10-28 PROCEDURE — 99496 TRANSJ CARE MGMT HIGH F2F 7D: CPT | Performed by: STUDENT IN AN ORGANIZED HEALTH CARE EDUCATION/TRAINING PROGRAM

## 2024-10-28 PROCEDURE — 81003 URINALYSIS AUTO W/O SCOPE: CPT | Performed by: STUDENT IN AN ORGANIZED HEALTH CARE EDUCATION/TRAINING PROGRAM

## 2024-10-28 PROCEDURE — 87086 URINE CULTURE/COLONY COUNT: CPT | Performed by: STUDENT IN AN ORGANIZED HEALTH CARE EDUCATION/TRAINING PROGRAM

## 2024-10-28 NOTE — TELEPHONE ENCOUNTER
Spoke to pt and daughter Miranda on KELLY. Pt was seen in UC for uti symptoms and weakness on Thursday evening. Given her symptoms they advised her to go to ER and she was admitted to [facility]. Repeat ua and culture done in hospital, she was given 2 doses of IV abx and discharged on Saturday. She was told by hospitalist at discharge that ua/culture negative and she did not need PO abx. They did  rx just in case on Saturday as she started to have some pelvic pain but that has resolved. Today, she is urinating well still but she does have weakness and BP was 110/41. She is hydrating well. There is another urine culture that resulted 10/27 AM after her discharge that is positive for klebsiella pneumoniae. They are confused as initial culture was negative and they were told at discharge that this culture was neg as well. Advised to come in for follow up so we can review testing, check BP/meds and advise on treatment. Appointment scheduled for this afternoon. Gage thank you!    Future Appointments   Date Time Provider Department Center   10/28/2024  2:20 PM Bonnie Danielson MD [facility]   4/1/2025  1:15 PM Krista Warren MD  [facility]   4/1/2025  1:30 PM  [place] RN1  [facility]   4/3/2025  1:00 PM Bonnie Danielson MD [facility]

## 2024-10-28 NOTE — PROGRESS NOTES
Subjective:   Meena Whitman is a 89 year old female who presents for hospital follow up. She is here with her daughter Miranda  She was discharged from [facility] to Home or Self Care  Admission Date: 10/24/24   Discharge Date: 10/26/24  Hospital Discharge Diagnosis:              # Acute cystitis       # Deconditioning       # Hypertension    Interactive contact within 2 business days post discharge first initiated on Date: 10/28/2024    Lace+ Score: 72  59-90 High Risk  29-58 Medium Risk  0-28   Low Risk    During the visit, the following was completed:  Obtained and reviewed discharge summary, continuity of care documents, and Hospitalist notes  Reviewed Labs (CBC, CMP) and Labs (Microbiology)    HPI:     Was admitted to the hospital with weakness and dehydration secondary to acute cystitis. Was given IV antibiotics for 2 days in the hospital.   She was discharged home with improving condition after her urine cultures came back negative.  Patient was sent home with prescription of cephalexin to complete at home for additional 7 days. Patient did not take the antibiotics at home as she was told by hospitalist that she does not need to take them because her culture (10/24/2024) results came back negative.    Another culture was done 10/25/2024 and came back showing >100,000 Klebsiella Pneumoniae, sensitive to Cephalexin.    Patient states that since discharge from the hospital she was not feeling well and had some pelvic pain. States she is urinating well currently, however feels increased fatigue and weakness.     No fevers, chill, Sob, Chest pain.    History/Other:   Current Medications:  Medication Reconciliation:  I am aware of an inpatient discharge within the last 30 days.  The discharge medication list has been reconciled with the patient's current medication list and reviewed by me.  See medication list for additions of new medication, and changes to current doses of medications and discontinued  medications.  Outpatient Medications Marked as Taking for the 10/28/24 encounter (Office Visit) with Bonnie Danielson MD   Medication Sig    Ascorbic Acid (VITAMIN C) 1000 MG Oral Tab Take 1 tablet (1,000 mg total) by mouth daily.    TEMAZEPAM 15 MG Oral Cap TAKE 1-2 CAPSULES (15-30 MG TOTAL) BY MOUTH NIGHTLY AS NEEDED FOR SLEEP.    Elastic Bandages & Supports (SKINEEZ COMPRESSION ARM SLEEVE) Does not apply Misc 2 Units daily as needed.    amLODIPine 5 MG Oral Tab Take 1 tablet (5 mg total) by mouth daily.    labetalol 100 MG Oral Tab Take 1 tablet (100 mg total) by mouth 2 (two) times daily.    losartan 100 MG Oral Tab Take 1 tablet (100 mg total) by mouth daily.    estradiol 0.1 MG/GM Vaginal Cream Place 1 g vaginally daily.    Vitamin D, Cholecalciferol, 25 MCG (1000 UT) Oral Tab Take 1,000 Units by mouth daily. Take 3 caps po q am    acetaZOLAMIDE 250 MG Oral Tab     Multiple Vitamins-Minerals (COMPLETE DAILY/LUTEIN) Oral Tab Take 1 tablet by mouth 3 (three) times daily.       Review of Systems   Constitutional:  Positive for activity change and fatigue. Negative for chills and fever.   HENT: Negative.     Eyes: Negative.    Respiratory: Negative.     Cardiovascular: Negative.    Gastrointestinal: Negative.    Genitourinary:  Positive for decreased urine volume and pelvic pain.   Musculoskeletal: Negative.    Skin: Negative.    Neurological: Negative.          Objective:   Physical Exam  Constitutional:       Appearance: Normal appearance.   HENT:      Head: Normocephalic and atraumatic.   Eyes:      Extraocular Movements: Extraocular movements intact.      Conjunctiva/sclera: Conjunctivae normal.      Pupils: Pupils are equal, round, and reactive to light.   Cardiovascular:      Rate and Rhythm: Normal rate and regular rhythm.      Heart sounds: Murmur (systolic murmur) heard.   Pulmonary:      Effort: Pulmonary effort is normal. No respiratory distress.      Breath sounds: Normal breath sounds. No stridor. No  wheezing, rhonchi or rales.   Abdominal:      General: There is no distension.      Palpations: There is no mass.      Tenderness: There is no abdominal tenderness. There is no right CVA tenderness, left CVA tenderness or guarding.      Hernia: No hernia is present.   Neurological:      Mental Status: She is alert.   Psychiatric:         Mood and Affect: Mood normal.         Behavior: Behavior normal.         Thought Content: Thought content normal.         Judgment: Judgment normal.           /58 (BP Location: Right arm, Patient Position: Sitting, Cuff Size: adult)   Pulse 60   Temp 97.5 °F (36.4 °C)   Resp 16   Ht 5' 4\" (1.626 m)   Wt 126 lb (57.2 kg)   SpO2 99%   BMI 21.63 kg/m²  Estimated body mass index is 21.63 kg/m² as calculated from the following:    Height as of this encounter: 5' 4\" (1.626 m).    Weight as of this encounter: 126 lb (57.2 kg).    Assessment & Plan:     1. Hospital discharge follow-up  Patient is here today for hospital discharge follow-up.  Hospital course as above, she was admitted with UTI symptoms for 2 days and treated with IV antibiotic (ceftriaxone) with transition to oral antibiotics.  However patient was told not to take oral antibiotics by hospitalist upon discharge because her first urine culture came back negative.    2. UTI symptoms  3. Acute cystitis without hematuria  Suspect ongoing UTI. Suspect cultures from 10/24/2024 were false negative. Patient should resume treatment with antibiotic for the next 7 days.   Will also get Urine today for UA and Culture.  - Urinalysis with Culture Reflex; Future  - Urinalysis with Culture Reflex  - Urine Culture, Routine [E]; Future  - Urine Culture, Routine [E]  - Recommend to stay well hydrated with electrolyte drinks (one a day)   - If worsening symptoms - needs to go back to the ER. Verbalized understanding.     4. Primary hypertension  Patient's blood pressure in the office today 128/58.   Should continue her current  regiment with amlodipine labetalol and losartan.   Acetazolamide for glaucoma treatment. States she attempted to go down on the dose, but her eye pressures were than higher than they should. She needs to reach out to he Eye specialist.        Return in 3 months (on 1/28/2025).

## 2024-10-28 NOTE — DISCHARGE SUMMARY
[facility]     Meena Whitman Patient Status:  Inpatient    1933 MRN [id number]   Location [facility] 4NW-A Attending No att. providers found   Hosp Day # 1 PCP Bonnie Danielson MD     Date of Admission:  10/24/2024  Date of Discharge:   10/26/2024    Discharge Disposition: Home or Self Care    Discharge Diagnosis:    Acute cystitis  Deconditioning  Hypertension    History of Present Illness:    (See admitting H&P)    Brief Synopsis:    The patient was admitted due to weakness and dehydration secondary to acute cystitis.  She steadily improved with fluids and antibiotics.  She was eventually stable for discharge home on a course of antibiotics.  She is on Diamox which may be contributing to her symptoms and hydration issues.  She has high risk for dehydration and further need of diuretics is to be evaluated with her primary care doctor and optometrist as she is on these medications for her glaucoma.      All diagnosis' and recommendations discussed with patient and/or family in detail.      Lace+ Score: 72  59-90 High Risk  29-58 Medium Risk  0-28   Low Risk       TCM Follow-Up Recommendation:  LACE > 58: High Risk of readmission after discharge from the hospital.  **Certification    Admission date was 10/24/2024.  Inpatient stay was shorter than expected.  Patient's Acute cystitis without hematuria was initially serious enough to expect a more lengthy hospitalization but patient improved faster than expected.                   Discharge Medication List:     Discharge Medications        CONTINUE taking these medications        Instructions Prescription details   acetaminophen 325 MG Tabs  Commonly known as: Tylenol      Take 2 tablets (650 mg total) by mouth every 6 (six) hours as needed for Pain.   Refills: 0     acetaZOLAMIDE 250 MG Tabs  Commonly known as: Diamox       Refills: 0     amLODIPine 5 MG Tabs  Commonly known as: Norvasc      Take 1 tablet (5 mg total) by mouth  daily.   Quantity: 90 tablet  Refills: 1     cephALEXin 500 MG Caps  Commonly known as: Keflex      Take 1 capsule (500 mg total) by mouth 2 (two) times daily.   Refills: 0     Complete Daily/Lutein Tabs      Take 1 tablet by mouth 3 (three) times daily.   Refills: 0     estradiol 0.1 MG/GM Crea  Commonly known as: Estrace      Place 1 g vaginally daily.   Quantity: 42.5 g  Refills: 3     labetalol 100 MG Tabs  Commonly known as: Normodyne      Take 1 tablet (100 mg total) by mouth 2 (two) times daily.   Quantity: 180 tablet  Refills: 1     lidocaine 5 % Oint  Commonly known as: Xylocaine      Apply 1 Application topically as needed.   Quantity: 70.88 g  Refills: 0     Lidocaine 4 % Crea      Apply 1 Application topically 2 (two) times daily.   Quantity: 28 g  Refills: 1     losartan 100 MG Tabs  Commonly known as: Cozaar      Take 1 tablet (100 mg total) by mouth daily.   Quantity: 90 tablet  Refills: 1     neomycin-polymyxin-dexamethasone 3.5-23285-2.1 Oint  Commonly known as: Maxitrol       Refills: 0     pregabalin 75 MG Caps  Commonly known as: Lyrica      START WITH 75 MG (1 CAPSULE) BY MOUTH TWO TIMES DAILY FOR 3-5 DAYS, THEN GO UP  MG (2 CAPSULES) BY MOUTH TWO TIMES DAILY   Quantity: 120 capsule  Refills: 0     Skineez Compression Arm Sleeve Misc      2 Units daily as needed.   Quantity: 2 each  Refills: 3     temazepam 15 MG Caps  Commonly known as: Restoril      TAKE 1-2 CAPSULES (15-30 MG TOTAL) BY MOUTH NIGHTLY AS NEEDED FOR SLEEP.   Quantity: 60 capsule  Refills: 0     vitamin C 1000 MG Tabs      Take 1 tablet (1,000 mg total) by mouth daily.   Refills: 0     Vitamin D (Cholecalciferol) 25 MCG (1000 UT) Tabs      Take 1,000 Units by mouth daily. Take 3 caps po q am   Quantity: 270 tablet  Refills: 3            STOP taking these medications      NON FORMULARY                 [place]    Follow-up appointment:   Bonnie Danielson MD  [address] [facility]  [address] [place] [address]  [id number]  [phone]    Follow up        Vital signs:       Physical Exam:    General: No acute distress   Lungs: clear to auscultation  Cardiovascular: S1, S2  Abdomen: Soft      -----------------------------------------------------------------------------------------------  PATIENT DISCHARGE INSTRUCTIONS: See electronic chart    Augusto Lan MD    Total minutes spent on discharge plannin      The  [employer] Cures Act makes medical notes like these available to patients in the interest of transparency. Please be advised this is a medical document. Medical documents are intended to carry relevant information, facts as evident, and the clinical opinion of the practitioner. The medical note is intended as peer to peer communication and may appear blunt or direct. It is written in medical language and may contain abbreviations or verbiage that are unfamiliar.

## 2024-10-31 DIAGNOSIS — F41.9 ANXIETY: ICD-10-CM

## 2024-10-31 DIAGNOSIS — I1A.0 RESISTANT HYPERTENSION: ICD-10-CM

## 2024-10-31 DIAGNOSIS — G47.00 INSOMNIA, UNSPECIFIED TYPE: ICD-10-CM

## 2024-10-31 DIAGNOSIS — I89.0 LYMPHEDEMA OF LEFT UPPER EXTREMITY: ICD-10-CM

## 2024-10-31 DIAGNOSIS — M79.89 LEG SWELLING: Primary | ICD-10-CM

## 2024-11-04 ENCOUNTER — OFFICE VISIT (OUTPATIENT)
Dept: INTERNAL MEDICINE CLINIC | Facility: CLINIC | Age: 89
End: 2024-11-04
Payer: MEDICARE

## 2024-11-04 VITALS
HEIGHT: 64.02 IN | WEIGHT: 123 LBS | HEART RATE: 72 BPM | BODY MASS INDEX: 21 KG/M2 | DIASTOLIC BLOOD PRESSURE: 68 MMHG | OXYGEN SATURATION: 97 % | SYSTOLIC BLOOD PRESSURE: 122 MMHG | RESPIRATION RATE: 16 BRPM | TEMPERATURE: 97 F

## 2024-11-04 DIAGNOSIS — R06.02 SHORTNESS OF BREATH: ICD-10-CM

## 2024-11-04 DIAGNOSIS — I89.0 LYMPHEDEMA OF LEFT UPPER EXTREMITY: Primary | ICD-10-CM

## 2024-11-04 DIAGNOSIS — F41.9 ANXIETY: ICD-10-CM

## 2024-11-04 DIAGNOSIS — G47.00 INSOMNIA, UNSPECIFIED TYPE: ICD-10-CM

## 2024-11-04 DIAGNOSIS — G89.29 OTHER CHRONIC PAIN: ICD-10-CM

## 2024-11-04 DIAGNOSIS — M79.89 LEG SWELLING: ICD-10-CM

## 2024-11-04 PROCEDURE — G2211 COMPLEX E/M VISIT ADD ON: HCPCS | Performed by: STUDENT IN AN ORGANIZED HEALTH CARE EDUCATION/TRAINING PROGRAM

## 2024-11-04 PROCEDURE — 99214 OFFICE O/P EST MOD 30 MIN: CPT | Performed by: STUDENT IN AN ORGANIZED HEALTH CARE EDUCATION/TRAINING PROGRAM

## 2024-11-04 RX ORDER — FUROSEMIDE 20 MG/1
20 TABLET ORAL DAILY
Qty: 5 TABLET | Refills: 0 | Status: SHIPPED | OUTPATIENT
Start: 2024-11-04 | End: 2024-11-08 | Stop reason: DRUGHIGH

## 2024-11-04 RX ORDER — TEMAZEPAM 15 MG/1
CAPSULE ORAL NIGHTLY PRN
Qty: 60 CAPSULE | Refills: 0 | Status: SHIPPED | OUTPATIENT
Start: 2024-11-04 | End: 2024-12-02

## 2024-11-04 RX ORDER — TEMAZEPAM 15 MG/1
CAPSULE ORAL NIGHTLY PRN
Qty: 60 CAPSULE | Refills: 0 | Status: CANCELLED | OUTPATIENT
Start: 2024-11-04

## 2024-11-04 RX ORDER — LOSARTAN POTASSIUM 100 MG/1
100 TABLET ORAL DAILY
Qty: 90 TABLET | Refills: 2 | Status: SHIPPED | OUTPATIENT
Start: 2024-11-04

## 2024-11-04 RX ORDER — LOSARTAN POTASSIUM 100 MG/1
100 TABLET ORAL DAILY
Qty: 90 TABLET | Refills: 1 | Status: CANCELLED | OUTPATIENT
Start: 2024-11-04

## 2024-11-04 RX ORDER — HYDROCODONE BITARTRATE AND ACETAMINOPHEN 5; 325 MG/1; MG/1
1 TABLET ORAL
Qty: 12 TABLET | Refills: 0 | Status: SHIPPED | OUTPATIENT
Start: 2024-11-04

## 2024-11-04 NOTE — TELEPHONE ENCOUNTER
Pt called to check on the status of this refill. Pt is out of meds   Please send out.   Thank you

## 2024-11-04 NOTE — TELEPHONE ENCOUNTER
Hypertension Medications Protocol [id number]/04/2024 09:40 AM   Protocol Details CMP or BMP in past 12 months    Last BP reading less than 140/90    In person appointment or virtual visit in the past 12 mos or appointment in next 3 mos    EGFRCR or GFRNAA > 50       Last OV relevant to medication: 10/28/24  Last refill date: 10/1/24 60     #/refills: 0  When pt was asked to return for OV: 3 months   Upcoming appt/reason:   Future Appointments   Date Time Provider Department Center   4/1/2025  1:15 PM Krista Warren MD [facility] HEM ONC [facility]   4/1/2025  1:30 PM [facility] TX RN1 [facility] CHEMO [facility]   4/3/2025  1:00 PM Bonnie Danielson MD [facility] [place]       Was pt informed of any over due labs: n/a   Lab Results   Component Value Date    GLU 88 10/26/2024    BUN 18 10/26/2024    BUNCREA 39.0 (H) 04/12/2021    CREATSERUM 0.68 10/26/2024    ANIONGAP 5 10/26/2024    GFR 76 02/27/2017    GFRNAA 62 07/20/2022    GFRAA 71 07/20/2022    CA 9.2 10/26/2024    OSMOCALC 283 10/26/2024    ALKPHO 74 10/24/2024    AST 38 (H) 10/24/2024    ALT 43 10/24/2024    BILT 0.4 10/24/2024    TP 6.7 10/24/2024    ALB 4.3 10/24/2024    GLOBULIN 2.4 10/24/2024     10/26/2024    K 3.6 10/26/2024     10/26/2024    CO2 23.0 10/26/2024

## 2024-11-04 NOTE — PROGRESS NOTES
OFFICE NOTE     Patient ID: Meena Whitman is a 89 year old female.  Today's Date: 11/04/24  Chief Complaint: Arm Pain and Shoulder Pain    Patient is a 89-year-old female with PMH of HTN, fibroids, insomnia, gait instability, paroxysmal SVT, history of lobular breast cancer on the right and chronic lymphedema on the right arm, who presents today with a complaints of increasing pain in her left arm with worsening lymphedema and bilateral lower extremity swelling.    She was recently seen in the office for UTI about a week ago and was recommended to increase her fluid intake in order to help clear the UTI.    Her daughter also mentioned that she feels somewhat short of breath with exertion.    In regards to her left upper extremity pain, she did have shingles several months ago and has residual neuropathic pain in the area.  She takes pregabalin 75 mg nightly.  She states that the pain is not too bad when you do not touch her arm however when she has lymphedema therapist do her lymphedema wraps it is extremely painful and pain is intolerable.  She needs to have lymphedema treatments 3 times a week, which consist of wrapping her arm.  Because of that this pain issue the lymphedema is now getting worse.  Vitals:    11/04/24 1521   BP: 122/68   Pulse: 72   Resp: 16   Temp: 97 °F (36.1 °C)   TempSrc: Temporal   SpO2: 97%   Weight: 123 lb (55.8 kg)   Height: 5' 4.02\" (1.626 m)     body mass index is 21.1 kg/m².  BP Readings from Last 3 Encounters:   11/04/24 122/68   10/28/24 128/58   10/26/24 138/56     The ASCVD Risk score (Elizabeth MATTHEWS, et al., 2019) failed to calculate for the following reasons:    The 2019 ASCVD risk score is only valid for ages 40 to 79      Medications reviewed:  Current Outpatient Medications   Medication Sig Dispense Refill    HYDROcodone-acetaminophen (NORCO) 5-325 MG Oral Tab Take 1 tablet by mouth 3 (three) times a week. Try not to exceed 3000 mg of Tylenol in 24 hours 12  tablet 0    furosemide 20 MG Oral Tab Take 1 tablet (20 mg total) by mouth daily. 5 tablet 0    TEMAZEPAM 15 MG Oral Cap TAKE 1-2 CAPSULES (15-30 MG TOTAL) BY MOUTH NIGHTLY AS NEEDED FOR SLEEP. 60 capsule 0    losartan 100 MG Oral Tab TAKE 1 TABLET (100 MG TOTAL) BY MOUTH DAILY. 90 tablet 2    cephALEXin 500 MG Oral Cap Take 1 capsule (500 mg total) by mouth 2 (two) times daily.      Ascorbic Acid (VITAMIN C) 1000 MG Oral Tab Take 1 tablet (1,000 mg total) by mouth daily.      Elastic Bandages & Supports (SKINEEZ COMPRESSION ARM SLEEVE) Does not apply Misc 2 Units daily as needed. 2 each 3    amLODIPine 5 MG Oral Tab Take 1 tablet (5 mg total) by mouth daily. 90 tablet 1    labetalol 100 MG Oral Tab Take 1 tablet (100 mg total) by mouth 2 (two) times daily. 180 tablet 1    estradiol 0.1 MG/GM Vaginal Cream Place 1 g vaginally daily. 42.5 g 3    Vitamin D, Cholecalciferol, 25 MCG (1000 UT) Oral Tab Take 1,000 Units by mouth daily. Take 3 caps po q am 270 tablet 3    acetaZOLAMIDE 250 MG Oral Tab       Multiple Vitamins-Minerals (COMPLETE DAILY/LUTEIN) Oral Tab Take 1 tablet by mouth 3 (three) times daily.           Assessment & Plan    1. Lymphedema of left upper extremity (Primary)  Patient having severe pain after lymphedema treatments.  Would try low-dose Norco to see if this helps her tolerate the pain better in order to help get through lymphedema treatments and stay compliant.  -     HYDROcodone-Acetaminophen; Take 1 tablet by mouth 3 (three) times a week. Try not to exceed 3000 mg of Tylenol in 24 hours  Dispense: 12 tablet; Refill: 0  -     Furosemide; Take 1 tablet (20 mg total) by mouth daily.  Dispense: 5 tablet; Refill: 0  -     Comp Metabolic Panel (14); Future; Expected date: 11/04/2024  -     Pro Beta Natriuretic Peptide; Future; Expected date: 11/04/2024    2. Leg swelling  Patient presents today with increased shortness of breath with exertion and bilateral lower extremity edema.  Lungs are clear on  physical exam.  Suspect fluid overload and element of possible heart failure due to increased fluid intake over the past week.   Recommend patient to start calculating how much fluids is she taking on daily basis.  Will get proBNP and CMP to evaluate for electrolyte abnormalities and possible heart failure.  Will order 5-day course of furosemide to help diurese.  She might need echocardiogram if proBNP comes back elevated.  -     Furosemide; Take 1 tablet (20 mg total) by mouth daily.  Dispense: 5 tablet; Refill: 0  -     Comp Metabolic Panel (14); Future; Expected date: 11/04/2024  -     Pro Beta Natriuretic Peptide; Future; Expected date: 11/04/2024    3. Shortness of breath  -     Pro Beta Natriuretic Peptide; Future; Expected date: 11/04/2024    4. Other chronic pain  -     HYDROcodone-Acetaminophen; Take 1 tablet by mouth 3 (three) times a week. Try not to exceed 3000 mg of Tylenol in 24 hours  Dispense: 12 tablet; Refill: 0    5. Insomnia, unspecified type  6. Anxiety  Continue temazepam as this has been working well for patient's insomnia for years.  She does not want to come off it.      Follow Up: As needed/if symptoms worsen or Return in about 3 days (around 11/7/2024) for Follow up visit in regard to her pain management and improvement in leg edema..         There is a longitudinal care relationship with me, the care plan reflects the ongoing nature of the continuous relationship of care, and the medical record indicates that there is ongoing treatment of a serious/complex medical condition which I am currently managing.  is Applicable.     Objective/ Results:   Physical Exam  Constitutional:       General: She is not in acute distress.     Appearance: Normal appearance. She is not ill-appearing or toxic-appearing.   HENT:      Head: Normocephalic and atraumatic.   Eyes:      Extraocular Movements: Extraocular movements intact.      Conjunctiva/sclera: Conjunctivae normal.      Pupils: Pupils are  equal, round, and reactive to light.   Cardiovascular:      Rate and Rhythm: Normal rate and regular rhythm.      Heart sounds: Murmur (systolic murmur) heard.      No friction rub.   Pulmonary:      Effort: Pulmonary effort is normal. No respiratory distress.      Breath sounds: Normal breath sounds. No stridor. No wheezing, rhonchi or rales.   Abdominal:      General: There is no distension.      Palpations: There is no mass.      Tenderness: There is no abdominal tenderness. There is no right CVA tenderness, left CVA tenderness or guarding.      Hernia: No hernia is present.   Neurological:      Mental Status: She is alert.   Psychiatric:         Mood and Affect: Mood normal.         Behavior: Behavior normal.         Thought Content: Thought content normal.         Judgment: Judgment normal.        Reviewed:    Patient Active Problem List    Diagnosis    Acute cystitis without hematuria    Weakness generalized    Gait instability    Physical deconditioning    Generalized weakness    Lymphedema of left upper extremity    At risk for malnutrition    Bone disorder    Vaginal dryness, menopausal    Insomnia    Lobular breast cancer, right (HCC)    Fibroids, intramural    Primary hypertension    Age-related osteoporosis with current pathological fracture    Invasive lobular carcinoma of right breast in female (HCC)    Multinodular goiter; bx 2022 benign    Chronic left-sided low back pain without sciatica    Paroxysmal SVT (supraventricular tachycardia) ([facility])    PCO (posterior capsular opacification), left    Osteoarthritis of both hips, unspecified osteoarthritis type    Scoliosis    Primary open-angle glaucoma, bilateral, severe stage    Hx of left breast cancer S/P lumpectomy & LN dissection 1986    DDD (degenerative disc disease), lumbar    Spondylolisthesis of lumbar region    Anxiety    Exophthalmia      Allergies[1]     Social History     Socioeconomic History    Marital status:     Number of children:  4   Occupational History    Occupation: Retired   Tobacco Use    Smoking status: Never    Smokeless tobacco: Never   Vaping Use    Vaping status: Never Used   Substance and Sexual Activity    Alcohol use: No     Alcohol/week: 0.0 standard drinks of alcohol    Drug use: No   Other Topics Concern    Caffeine Concern No    Exercise Yes     Comment: 3 times per week, [employer]     Social Drivers of Health     Food Insecurity: No Food Insecurity (10/25/2024)    Food Insecurity     Food Insecurity: Never true   Transportation Needs: No Transportation Needs (10/25/2024)    Transportation Needs     Lack of Transportation: No   Housing Stability: Low Risk  (10/25/2024)    Housing Stability     Housing Instability: No      Review of Systems   Constitutional:  Negative for activity change and fatigue.   HENT: Negative.     Respiratory:  Positive for shortness of breath (with exertion).    Cardiovascular:  Positive for leg swelling. Negative for chest pain and palpitations.   Gastrointestinal: Negative.    Genitourinary: Negative.    Musculoskeletal:         Severe left arm pain    Neurological: Negative.      All other systems negative unless otherwise stated in ROS or HPI above.       Bonnie Danielson MD  Internal Medicine       Call office with any questions or seek emergency care if necessary.   Patient understands and agrees to follow directions and advice.      ----------------------------------------- PATIENT INSTRUCTIONS-----------------------------------------     There are no Patient Instructions on file for this visit.        The 21st Century Cures Act makes medical notes available to patients in the interest of transparency.  However, please be advised that this is a medical document.  It is intended as a peer to peer communication.  It is written in medical language and may contain abbreviations or verbiage that are technical and unfamiliar.  It may appear blunt or direct.  Medical documents are  intended to carry relevant information, facts as evident, and the clinical opinion of the practitioner.        [1]   Allergies  Allergen Reactions    Hydralazine OTHER (SEE COMMENTS)     Headache    Bactrim [Sulfamethoxazole W/Trimethoprim] RASH     Per patient    Metronidazole ITCHING     Metronidazole gel caused irritation in vaginal area.

## 2024-11-05 ENCOUNTER — LAB ENCOUNTER (OUTPATIENT)
Dept: LAB | Age: 89
End: 2024-11-05
Attending: STUDENT IN AN ORGANIZED HEALTH CARE EDUCATION/TRAINING PROGRAM
Payer: MEDICARE

## 2024-11-05 ENCOUNTER — TELEPHONE (OUTPATIENT)
Dept: INTERNAL MEDICINE CLINIC | Facility: CLINIC | Age: 89
End: 2024-11-05

## 2024-11-05 DIAGNOSIS — R06.02 SHORTNESS OF BREATH: ICD-10-CM

## 2024-11-05 DIAGNOSIS — I89.0 LYMPHEDEMA OF LEFT UPPER EXTREMITY: ICD-10-CM

## 2024-11-05 DIAGNOSIS — Z79.899 HIGH RISK MEDICATION USE: ICD-10-CM

## 2024-11-05 DIAGNOSIS — N94.89 VAGINAL BURNING: Primary | ICD-10-CM

## 2024-11-05 DIAGNOSIS — R39.9 UTI SYMPTOMS: ICD-10-CM

## 2024-11-05 DIAGNOSIS — M79.89 LEG SWELLING: ICD-10-CM

## 2024-11-05 DIAGNOSIS — R39.9 UTI SYMPTOMS: Primary | ICD-10-CM

## 2024-11-05 LAB
ALBUMIN SERPL-MCNC: 4 G/DL (ref 3.2–4.8)
ALBUMIN/GLOB SERPL: 1.4 {RATIO} (ref 1–2)
ALP LIVER SERPL-CCNC: 66 U/L
ALT SERPL-CCNC: 46 U/L
ANION GAP SERPL CALC-SCNC: 0 MMOL/L (ref 0–18)
AST SERPL-CCNC: 38 U/L (ref ?–34)
BILIRUB SERPL-MCNC: 0.5 MG/DL (ref 0.2–0.9)
BUN BLD-MCNC: 21 MG/DL (ref 9–23)
CALCIUM BLD-MCNC: 10.4 MG/DL (ref 8.7–10.4)
CHLORIDE SERPL-SCNC: 108 MMOL/L (ref 98–112)
CLARITY UR REFRACT.AUTO: CLEAR
CO2 SERPL-SCNC: 27 MMOL/L (ref 21–32)
CREAT BLD-MCNC: 0.74 MG/DL
EGFRCR SERPLBLD CKD-EPI 2021: 76 ML/MIN/1.73M2 (ref 60–?)
FASTING STATUS PATIENT QL REPORTED: YES
GLOBULIN PLAS-MCNC: 2.9 G/DL (ref 2–3.5)
GLUCOSE BLD-MCNC: 81 MG/DL (ref 70–99)
GLUCOSE UR STRIP.AUTO-MCNC: NORMAL MG/DL
KETONES UR STRIP.AUTO-MCNC: NEGATIVE MG/DL
LEUKOCYTE ESTERASE UR QL STRIP.AUTO: NEGATIVE
NITRITE UR QL STRIP.AUTO: NEGATIVE
NT-PROBNP SERPL-MCNC: 814 PG/ML (ref ?–450)
OSMOLALITY SERPL CALC.SUM OF ELEC: 282 MOSM/KG (ref 275–295)
PH UR STRIP.AUTO: 7.5 [PH] (ref 5–8)
POTASSIUM SERPL-SCNC: 3.7 MMOL/L (ref 3.5–5.1)
PROT SERPL-MCNC: 6.9 G/DL (ref 5.7–8.2)
PROT UR STRIP.AUTO-MCNC: NEGATIVE MG/DL
RBC UR QL AUTO: NEGATIVE
SODIUM SERPL-SCNC: 135 MMOL/L (ref 136–145)
SP GR UR STRIP.AUTO: 1.01 (ref 1–1.03)
UROBILINOGEN UR STRIP.AUTO-MCNC: NORMAL MG/DL

## 2024-11-05 PROCEDURE — 87086 URINE CULTURE/COLONY COUNT: CPT

## 2024-11-05 PROCEDURE — 81003 URINALYSIS AUTO W/O SCOPE: CPT

## 2024-11-05 PROCEDURE — 36415 COLL VENOUS BLD VENIPUNCTURE: CPT

## 2024-11-05 PROCEDURE — 80053 COMPREHEN METABOLIC PANEL: CPT

## 2024-11-05 PROCEDURE — 83880 ASSAY OF NATRIURETIC PEPTIDE: CPT

## 2024-11-05 RX ORDER — FLUCONAZOLE 150 MG/1
150 TABLET ORAL ONCE
Qty: 1 TABLET | Refills: 0 | Status: SHIPPED | OUTPATIENT
Start: 2024-11-05 | End: 2024-11-05

## 2024-11-05 NOTE — TELEPHONE ENCOUNTER
Has vaginal burning for the past 1 day.   Had recent UTI and completed the 7 day course of antibiotic.  Suspect possible fungal infection.   Will prescribe fluconazole x 1 dose.   Will await for the results of the UA and UC done earlier today.

## 2024-11-05 NOTE — TELEPHONE ENCOUNTER
Talked to Anand Cash for UA and culture. Orders placed.     Future Appointments   Date Time Provider Department Center   11/7/2024 10:00 AM Bonnie Danielson MD [facility] [place]   4/1/2025  1:15 PM Krista Warren MD  HEM ONC [facility]   4/1/2025  1:30 PM  [place] RN1 [facility]   4/3/2025  1:00 PM Bonnie Danielson MD [facility] 29 [facility] [place]     Left message to call back   Need to change the appointment on 11/7 to OV, if pt can do it.  wants to rule out yeast infection.

## 2024-11-05 NOTE — TELEPHONE ENCOUNTER
Pt was seen yesterday. Do not see that these symptoms were discussed . Ok for UA and culture or appointment needed for this?

## 2024-11-05 NOTE — TELEPHONE ENCOUNTER
Patient called stating she's still experiencing a burning sensation in the vaginal area, and frequent urination, she wants to know if she can get an order for another Urine Culture or further treatment. She insists on stopping by the office for confirmation. Please review asap. Thanks guys!

## 2024-11-07 ENCOUNTER — TELEMEDICINE (OUTPATIENT)
Dept: INTERNAL MEDICINE CLINIC | Facility: CLINIC | Age: 89
End: 2024-11-07
Payer: MEDICARE

## 2024-11-07 DIAGNOSIS — R06.02 SHORTNESS OF BREATH: ICD-10-CM

## 2024-11-07 DIAGNOSIS — N89.8 VAGINAL DRYNESS: ICD-10-CM

## 2024-11-07 DIAGNOSIS — M79.89 LEG SWELLING: ICD-10-CM

## 2024-11-07 DIAGNOSIS — I10 PRIMARY HYPERTENSION: Primary | ICD-10-CM

## 2024-11-07 DIAGNOSIS — I89.0 LYMPHEDEMA OF LEFT UPPER EXTREMITY: ICD-10-CM

## 2024-11-07 DIAGNOSIS — R79.89 ELEVATED BRAIN NATRIURETIC PEPTIDE (BNP) LEVEL: ICD-10-CM

## 2024-11-07 PROCEDURE — 99214 OFFICE O/P EST MOD 30 MIN: CPT | Performed by: STUDENT IN AN ORGANIZED HEALTH CARE EDUCATION/TRAINING PROGRAM

## 2024-11-07 RX ORDER — FUROSEMIDE 20 MG/1
20 TABLET ORAL 2 TIMES DAILY
Qty: 3 TABLET | Refills: 0 | Status: SHIPPED | OUTPATIENT
Start: 2024-11-07 | End: 2024-11-08

## 2024-11-07 NOTE — PROGRESS NOTES
TELEHEALTH VIDEO VISIT    I spoke with Meena Whitman by secure Epic  video service on 11/07/24, verified date of birth, patient stated they are in the state of [place], and discussed their concerns as below:      PMH of HTN, fibroids, insomnia, gait instability, paroxysmal SVT, history of lobular breast cancer on the right and chronic lymphedema on the right arm, who calls in today for the follow up on her increased LE edema and Lymphedema.    Her BP at home today is 117/52  She has been taking Lasix 20 mg BID for the past few days. Was ordered the total of 5 doses.     She has not been waking up to go to the bathroom as often at night  Her Vulva also feels better after fluconazole 1 dose.     She tried to calculate how many OZ of fluids she uses a day and it is around 36 OZ a day. She considers this her regular daily fluid intake.    She took Norco 5-325 mg 30 minutes prior to her Lymphedema treatment and States that she was able to tolerate the treatment well. Was feeling groggy after Norco.Feels that treatment helped her and feels that edema in her arm has slightly improved.       There were no vitals filed for this visit.  There is no height or weight on file to calculate BMI.  BP Readings from Last 3 Encounters:   11/04/24 122/68   10/28/24 128/58   10/26/24 138/56     The ASCVD Risk score (Elizabeth DK, et al., 2019) failed to calculate for the following reasons:    The 2019 ASCVD risk score is only valid for ages 40 to 79  Reviewed Current Medications:  Current Outpatient Medications   Medication Sig Dispense Refill    Vaginal Lubricant (VAGISIL LUBRICANT) Vaginal Gel Place 1 Application vaginally daily. 1 each 2    furosemide 20 MG Oral Tab TAKE 1 TABLET (20 MG TOTAL) BY MOUTH 2 (TWO) TIMES DAILY. 60 tablet 0    TEMAZEPAM 15 MG Oral Cap TAKE 1-2 CAPSULES (15-30 MG TOTAL) BY MOUTH NIGHTLY AS NEEDED FOR SLEEP. 60 capsule 0    losartan 100 MG Oral Tab TAKE 1 TABLET (100 MG TOTAL) BY MOUTH DAILY.  90 tablet 2    HYDROcodone-acetaminophen (NORCO) 5-325 MG Oral Tab Take 1 tablet by mouth 3 (three) times a week. Try not to exceed 3000 mg of Tylenol in 24 hours 12 tablet 0    cephALEXin 500 MG Oral Cap Take 1 capsule (500 mg total) by mouth 2 (two) times daily.      Ascorbic Acid (VITAMIN C) 1000 MG Oral Tab Take 1 tablet (1,000 mg total) by mouth daily.      Elastic Bandages & Supports (SKINEEZ COMPRESSION ARM SLEEVE) Does not apply Misc 2 Units daily as needed. 2 each 3    amLODIPine 5 MG Oral Tab Take 1 tablet (5 mg total) by mouth daily. 90 tablet 1    labetalol 100 MG Oral Tab Take 1 tablet (100 mg total) by mouth 2 (two) times daily. 180 tablet 1    estradiol 0.1 MG/GM Vaginal Cream Place 1 g vaginally daily. 42.5 g 3    Vitamin D, Cholecalciferol, 25 MCG (1000 UT) Oral Tab Take 1,000 Units by mouth daily. Take 3 caps po q am 270 tablet 3    acetaZOLAMIDE 250 MG Oral Tab       Multiple Vitamins-Minerals (COMPLETE DAILY/LUTEIN) Oral Tab Take 1 tablet by mouth 3 (three) times daily.         Assessment & Plan    1. Primary hypertension (Primary)  2. Shortness of breath  3. Elevated brain natriuretic peptide (BNP) level  4. Leg swelling  Patient has slightly improved swelling in her b/l LE, however not completely resolved. Will order ECHO to assess cardiac function as I suspect that her symptoms related to possible heart failure as Pro BNP was elevated  She should stay in the fluid range as she is 36- 48 OZ a day.  Will prescribe additional 3 days of Furosemide 20 mg to complete the course of 7 days.  -     CARD ECHO 2D DOPPLER (CPT=93306); Future; Expected date: 11/07/2024  -     Pro Beta Natriuretic Peptide; Future; Expected date: 11/07/2024  -     Comp Metabolic Panel (14); Future; Expected date: 11/07/2024  -     Furosemide; Take 1 tablet (20 mg total) by mouth 2 (two) times daily.  Dispense: 3 tablet; Refill: 0    5. Lymphedema of left upper extremity  Will continue current management with Norco 30 minutes  prior to her treatments ( 3 times a week) to decrease pain and facilitate improvement in the swelling.    6.Vaginal Dryness  She has some residual vaginal discomfort. Can used over the counter Vaginal moisturizer for now.   Can use coconut oil as well  -     Vagisil Lubricant; Place 1 Application vaginally daily.  Dispense: 1 each; Refill: 2        Follow Up: As needed/if symptoms worsen or Return in about 1 week (around 11/14/2024) for lymphedema follow up. labs and imaging..       Objective  GENERAL: alert and oriented x3. No distress.   EYES:no scleral icterus.   LUNGS: no labored breathing, no dyspnea.      Reviewed:  Patient Active Problem List    Diagnosis    Acute cystitis without hematuria    Weakness generalized    Gait instability    Physical deconditioning    Generalized weakness    Lymphedema of left upper extremity    At risk for malnutrition    Bone disorder    Vaginal dryness, menopausal    Insomnia    Lobular breast cancer, right (HCC)    Fibroids, intramural    Primary hypertension    Age-related osteoporosis with current pathological fracture    Invasive lobular carcinoma of right breast in female (HCC)    Multinodular goiter; bx 2022 benign    Chronic left-sided low back pain without sciatica    Paroxysmal SVT (supraventricular tachycardia) ([facility])    PCO (posterior capsular opacification), left    Osteoarthritis of both hips, unspecified osteoarthritis type    Scoliosis    Primary open-angle glaucoma, bilateral, severe stage    Hx of left breast cancer S/P lumpectomy & LN dissection 1986    DDD (degenerative disc disease), lumbar    Spondylolisthesis of lumbar region    Anxiety    Exophthalmia      Allergies[1]     Social History     Socioeconomic History    Marital status:     Number of children: 4   Occupational History    Occupation: Retired   Tobacco Use    Smoking status: Never    Smokeless tobacco: Never   Vaping Use    Vaping status: Never Used   Substance and Sexual Activity     Alcohol use: No     Alcohol/week: 0.0 standard drinks of alcohol    Drug use: No   Other Topics Concern    Caffeine Concern No    Exercise Yes     Comment: 3 times per week, [employer] [place]     Social Drivers of Health     Food Insecurity: No Food Insecurity (10/25/2024)    Food Insecurity     Food Insecurity: Never true   Transportation Needs: No Transportation Needs (10/25/2024)    Transportation Needs     Lack of Transportation: No   Housing Stability: Low Risk  (10/25/2024)    Housing Stability     Housing Instability: No      Review of Systems   Constitutional:  Negative for activity change and fatigue.   HENT: Negative.     Respiratory:  Positive for shortness of breath (with exertion).    Cardiovascular:  Positive for leg swelling. Negative for chest pain and palpitations.   Gastrointestinal: Negative.    Genitourinary:  Positive for vaginal pain (Itching and discomfort).   Musculoskeletal:         Positive for left arm pain , which is slightly better.   Neurological: Negative.      All other systems negative unless stated in ROS or in HPI.       Bonnie Danielson MD  Internal Medicine       Call office with any questions or seek emergency care if necessary.   Patient understands and agrees to follow directions and advice.    Telehealth Verbal Consent   I conducted a telehealth visit with Meena Whitman today, 11/07/24, which was completed using two-way, real-time interactive audio and video communication. This has been done in good olegario to provide continuity of care in the best interest of the provider-patient relationship. Every conscious effort was taken to allow for sufficient and adequate time to complete the visit.The patient was made aware of the limitations of the telehealth visit, including treatment limitations as no physical exam could be performed.  The patient was advised to call 911 or to go to the ER in case there was an emergency.  The patient was also advised of the potential  privacy & security concerns related to the telehealth platform. The patient was made aware of where to find [facility]'s notice of privacy practices, telehealth consent form and other related consent forms and documents.  which are located on the [facility] website. The patient verbally agreed to telehealth consent form, related consents and the risks discussed.  Lastly, the patient confirmed that they were in [place]. Included in this visit, time may have been spent reviewing labs, medications, radiology tests and decision making. Appropriate medical decision-making and tests are ordered as detailed in the plan of care above.  Coding/billing information is submitted for this visit based on complexity of care and/or time spent for the visit.  ----------------------------------------- PATIENT INSTRUCTIONS-----------------------------------------     There are no Patient Instructions on file for this visit.              [1]   Allergies  Allergen Reactions    Hydralazine OTHER (SEE COMMENTS)     Headache    Bactrim [Sulfamethoxazole W/Trimethoprim] RASH     Per patient    Metronidazole ITCHING     Metronidazole gel caused irritation in vaginal area.

## 2024-11-08 RX ORDER — FUROSEMIDE 20 MG/1
20 TABLET ORAL 2 TIMES DAILY
Qty: 60 TABLET | Refills: 0 | Status: SHIPPED | OUTPATIENT
Start: 2024-11-08 | End: 2024-11-11

## 2024-11-08 NOTE — TELEPHONE ENCOUNTER
Pharmacy is requesting updates quantity- only 3 tablets sent, instructions are for BID. Please advise thanks!

## 2024-11-08 NOTE — TELEPHONE ENCOUNTER
Changed from 3 tablets to 60. Held in the basket just in case it was only meant to be increased for a few days

## 2024-11-11 ENCOUNTER — TELEPHONE (OUTPATIENT)
Dept: INTERNAL MEDICINE CLINIC | Facility: CLINIC | Age: 89
End: 2024-11-11

## 2024-11-11 RX ORDER — FUROSEMIDE 20 MG/1
20 TABLET ORAL DAILY
Qty: 3 TABLET | Refills: 0 | Status: SHIPPED | OUTPATIENT
Start: 2024-11-11 | End: 2024-11-14

## 2024-11-11 NOTE — TELEPHONE ENCOUNTER
Pt calling complaining that she only got 3 tablets. Called the pharmacy and they said they received the 60 tab script . Ok for 60 tab?

## 2024-11-11 NOTE — TELEPHONE ENCOUNTER
Incoming (mail or fax):  fax   [facility]  Documentation given to:  triage in

## 2024-11-12 NOTE — TELEPHONE ENCOUNTER
Rec pt eval and pt recert from [facility]. Requires multiple sigs. Placed in provider bin for review and sigs. Thanks!

## 2024-11-14 ENCOUNTER — LAB ENCOUNTER (OUTPATIENT)
Dept: LAB | Facility: HOSPITAL | Age: 89
End: 2024-11-14
Attending: STUDENT IN AN ORGANIZED HEALTH CARE EDUCATION/TRAINING PROGRAM
Payer: MEDICARE

## 2024-11-14 ENCOUNTER — HOSPITAL ENCOUNTER (OUTPATIENT)
Dept: CV DIAGNOSTICS | Facility: HOSPITAL | Age: 89
Discharge: HOME | End: 2024-11-14
Attending: STUDENT IN AN ORGANIZED HEALTH CARE EDUCATION/TRAINING PROGRAM
Payer: MEDICARE

## 2024-11-14 DIAGNOSIS — M79.89 LEG SWELLING: ICD-10-CM

## 2024-11-14 DIAGNOSIS — R79.89 ELEVATED BRAIN NATRIURETIC PEPTIDE (BNP) LEVEL: ICD-10-CM

## 2024-11-14 DIAGNOSIS — I10 PRIMARY HYPERTENSION: ICD-10-CM

## 2024-11-14 DIAGNOSIS — R06.02 SHORTNESS OF BREATH: ICD-10-CM

## 2024-11-14 LAB
ALBUMIN SERPL-MCNC: 4 G/DL (ref 3.2–4.8)
ALBUMIN/GLOB SERPL: 1.7 {RATIO} (ref 1–2)
ALP LIVER SERPL-CCNC: 84 U/L
ALT SERPL-CCNC: 31 U/L
ANION GAP SERPL CALC-SCNC: 5 MMOL/L (ref 0–18)
AST SERPL-CCNC: 25 U/L (ref ?–34)
BILIRUB SERPL-MCNC: 0.4 MG/DL (ref 0.2–0.9)
BUN BLD-MCNC: 33 MG/DL (ref 9–23)
CALCIUM BLD-MCNC: 9.7 MG/DL (ref 8.7–10.4)
CHLORIDE SERPL-SCNC: 105 MMOL/L (ref 98–112)
CO2 SERPL-SCNC: 31 MMOL/L (ref 21–32)
CREAT BLD-MCNC: 0.95 MG/DL
EGFRCR SERPLBLD CKD-EPI 2021: 57 ML/MIN/1.73M2 (ref 60–?)
FASTING STATUS PATIENT QL REPORTED: NO
GLOBULIN PLAS-MCNC: 2.3 G/DL (ref 2–3.5)
GLUCOSE BLD-MCNC: 80 MG/DL (ref 70–99)
NT-PROBNP SERPL-MCNC: 589 PG/ML (ref ?–450)
OSMOLALITY SERPL CALC.SUM OF ELEC: 298 MOSM/KG (ref 275–295)
POTASSIUM SERPL-SCNC: 3.6 MMOL/L (ref 3.5–5.1)
PROT SERPL-MCNC: 6.3 G/DL (ref 5.7–8.2)
SODIUM SERPL-SCNC: 141 MMOL/L (ref 136–145)

## 2024-11-14 PROCEDURE — 93306 TTE W/DOPPLER COMPLETE: CPT | Performed by: STUDENT IN AN ORGANIZED HEALTH CARE EDUCATION/TRAINING PROGRAM

## 2024-11-14 PROCEDURE — 36415 COLL VENOUS BLD VENIPUNCTURE: CPT

## 2024-11-14 PROCEDURE — 83880 ASSAY OF NATRIURETIC PEPTIDE: CPT

## 2024-11-14 PROCEDURE — 80053 COMPREHEN METABOLIC PANEL: CPT

## 2024-11-18 ENCOUNTER — OFFICE VISIT (OUTPATIENT)
Dept: INTERNAL MEDICINE CLINIC | Facility: CLINIC | Age: 89
End: 2024-11-18
Payer: MEDICARE

## 2024-11-18 VITALS
SYSTOLIC BLOOD PRESSURE: 122 MMHG | OXYGEN SATURATION: 97 % | BODY MASS INDEX: 21.68 KG/M2 | RESPIRATION RATE: 18 BRPM | WEIGHT: 127 LBS | HEART RATE: 70 BPM | DIASTOLIC BLOOD PRESSURE: 60 MMHG | HEIGHT: 64 IN

## 2024-11-18 DIAGNOSIS — I50.32 CHRONIC HEART FAILURE WITH PRESERVED EJECTION FRACTION (HCC): Primary | ICD-10-CM

## 2024-11-18 DIAGNOSIS — I89.0 LYMPHEDEMA OF LEFT UPPER EXTREMITY: ICD-10-CM

## 2024-11-18 DIAGNOSIS — R79.89 ELEVATED BRAIN NATRIURETIC PEPTIDE (BNP) LEVEL: ICD-10-CM

## 2024-11-18 DIAGNOSIS — I35.1 NONRHEUMATIC AORTIC VALVE INSUFFICIENCY: ICD-10-CM

## 2024-11-18 PROCEDURE — 99214 OFFICE O/P EST MOD 30 MIN: CPT | Performed by: STUDENT IN AN ORGANIZED HEALTH CARE EDUCATION/TRAINING PROGRAM

## 2024-11-18 PROCEDURE — G2211 COMPLEX E/M VISIT ADD ON: HCPCS | Performed by: STUDENT IN AN ORGANIZED HEALTH CARE EDUCATION/TRAINING PROGRAM

## 2024-11-18 RX ORDER — FUROSEMIDE 20 MG/1
20 TABLET ORAL DAILY PRN
Qty: 30 TABLET | Refills: 0 | Status: SHIPPED | OUTPATIENT
Start: 2024-11-18

## 2024-11-18 RX ORDER — HYDROCODONE BITARTRATE AND ACETAMINOPHEN 5; 325 MG/1; MG/1
1 TABLET ORAL EVERY 4 HOURS PRN
Qty: 60 TABLET | Refills: 0 | Status: SHIPPED | OUTPATIENT
Start: 2024-11-18

## 2024-11-25 ENCOUNTER — TELEMEDICINE (OUTPATIENT)
Dept: INTERNAL MEDICINE CLINIC | Facility: CLINIC | Age: 89
End: 2024-11-25
Payer: MEDICARE

## 2024-11-25 ENCOUNTER — LAB ENCOUNTER (OUTPATIENT)
Dept: LAB | Age: 89
End: 2024-11-25
Attending: STUDENT IN AN ORGANIZED HEALTH CARE EDUCATION/TRAINING PROGRAM
Payer: MEDICARE

## 2024-11-25 ENCOUNTER — TELEPHONE (OUTPATIENT)
Dept: INTERNAL MEDICINE CLINIC | Facility: CLINIC | Age: 89
End: 2024-11-25

## 2024-11-25 DIAGNOSIS — I50.32 CHRONIC HEART FAILURE WITH PRESERVED EJECTION FRACTION (HCC): ICD-10-CM

## 2024-11-25 DIAGNOSIS — M79.89 LEG SWELLING: ICD-10-CM

## 2024-11-25 DIAGNOSIS — M79.89 SWELLING OF BOTH LOWER EXTREMITIES: ICD-10-CM

## 2024-11-25 DIAGNOSIS — I89.0 LYMPHEDEMA OF LEFT UPPER EXTREMITY: Primary | ICD-10-CM

## 2024-11-25 DIAGNOSIS — I89.0 LYMPHEDEMA OF LEFT UPPER EXTREMITY: ICD-10-CM

## 2024-11-25 LAB
ANION GAP SERPL CALC-SCNC: 6 MMOL/L (ref 0–18)
BUN BLD-MCNC: 29 MG/DL (ref 9–23)
CALCIUM BLD-MCNC: 10.2 MG/DL (ref 8.7–10.4)
CHLORIDE SERPL-SCNC: 107 MMOL/L (ref 98–112)
CO2 SERPL-SCNC: 26 MMOL/L (ref 21–32)
CREAT BLD-MCNC: 0.76 MG/DL
D DIMER PPP FEU-MCNC: 0.98 UG/ML FEU (ref ?–0.91)
EGFRCR SERPLBLD CKD-EPI 2021: 74 ML/MIN/1.73M2 (ref 60–?)
FASTING STATUS PATIENT QL REPORTED: YES
GLUCOSE BLD-MCNC: 96 MG/DL (ref 70–99)
NT-PROBNP SERPL-MCNC: 687 PG/ML (ref ?–450)
OSMOLALITY SERPL CALC.SUM OF ELEC: 294 MOSM/KG (ref 275–295)
POTASSIUM SERPL-SCNC: 4.8 MMOL/L (ref 3.5–5.1)
SODIUM SERPL-SCNC: 139 MMOL/L (ref 136–145)

## 2024-11-25 PROCEDURE — 83880 ASSAY OF NATRIURETIC PEPTIDE: CPT

## 2024-11-25 PROCEDURE — G2211 COMPLEX E/M VISIT ADD ON: HCPCS | Performed by: STUDENT IN AN ORGANIZED HEALTH CARE EDUCATION/TRAINING PROGRAM

## 2024-11-25 PROCEDURE — 99214 OFFICE O/P EST MOD 30 MIN: CPT | Performed by: STUDENT IN AN ORGANIZED HEALTH CARE EDUCATION/TRAINING PROGRAM

## 2024-11-25 PROCEDURE — 80048 BASIC METABOLIC PNL TOTAL CA: CPT

## 2024-11-25 PROCEDURE — 36415 COLL VENOUS BLD VENIPUNCTURE: CPT

## 2024-11-25 PROCEDURE — 85379 FIBRIN DEGRADATION QUANT: CPT

## 2024-11-25 NOTE — TELEPHONE ENCOUNTER
Seen last Monday, lymphedema caused by shingles. Saw Derm Tuesday, Dr. Akers, given Capsaison cream given, she is using. She did take a shower once last week and arm was red after. Took sponge bath Sat.and redness gone. [facility] nurse wrapped her arm twice after visit here. Did not take Norco, only Tylenol and that helped. Removed wrapping and arm is still swollen. Woke up at 0300 with burning in arm, but Tylenol helps. Had last wrapping on x 2 days. Sees Dr. Luis for leaky valve on 1/13/25.  She would like to have video visit with you today.

## 2024-11-25 NOTE — PROGRESS NOTES
OFFICE NOTE     Patient ID: Meena Whitman is a 89 year old female.  Today's Date: 11/25/24  Chief Complaint: Arm Pain (Left arm swollen from lymphedema caused by shingles spoke to her daughter she states that the arm has worsen since last office visit. )    Patient recently seen dermatology, who ordered chest patient cream to put on her back 2 times a day.after this patient developed terrible burning and discomfort, especially when touching her area of p prior shingles    Lymphedema : Seems to be getting worse despite efforts to rapid 3 times a week.  Patient has significant pain to stop bite Norco and worsening swelling.  Patient's therapist told that the arm looks more swollen than in the past and she should get it checked as patient might be developing cellulitis.  Other than swelling patient denies increased redness or warmth in the left arm.    Patient also states that she feels lab her lower extremity swelling is coming back and it is very hard for her to keep track of her weight and take furosemide 20 mg on as-needed basis.      There were no vitals filed for this visit.  body mass index is unknown because there is no height or weight on file.  BP Readings from Last 3 Encounters:   11/27/24 (!) 170/66   11/18/24 122/60   11/04/24 122/68     The ASCVD Risk score (Elizabeth DK, et al., 2019) failed to calculate for the following reasons:    The 2019 ASCVD risk score is only valid for ages 40 to 79      Medications reviewed:  Current Outpatient Medications   Medication Sig Dispense Refill    furosemide 20 MG Oral Tab Take 1 tablet (20 mg total) by mouth daily as needed (Take if weight is >120 pounds.). 30 tablet 0    HYDROcodone-acetaminophen (NORCO) 5-325 MG Oral Tab Take 1 tablet by mouth every 4 (four) hours as needed for Pain (no more than 2 a day). 60 tablet 0    Naloxone HCl 4 MG/0.1ML Nasal Liquid 4 mg by Intranasal route as needed (May repeat as needed in other nostril if symptoms  persist). If patient remains unresponsive, repeat dose in other nostril 2-5 minutes after first dose. 1 kit 0    TEMAZEPAM 15 MG Oral Cap TAKE 1-2 CAPSULES (15-30 MG TOTAL) BY MOUTH NIGHTLY AS NEEDED FOR SLEEP. 60 capsule 0    losartan 100 MG Oral Tab TAKE 1 TABLET (100 MG TOTAL) BY MOUTH DAILY. 90 tablet 2    HYDROcodone-acetaminophen (NORCO) 5-325 MG Oral Tab Take 1 tablet by mouth 3 (three) times a week. Try not to exceed 3000 mg of Tylenol in 24 hours 12 tablet 0    cephALEXin 500 MG Oral Cap Take 1 capsule (500 mg total) by mouth 2 (two) times daily.      amLODIPine 5 MG Oral Tab Take 1 tablet (5 mg total) by mouth daily. 90 tablet 1    labetalol 100 MG Oral Tab Take 1 tablet (100 mg total) by mouth 2 (two) times daily. 180 tablet 1    acetaZOLAMIDE 250 MG Oral Tab       Vaginal Lubricant (VAGISIL LUBRICANT) Vaginal Gel Place 1 Application vaginally daily. 1 each 2    Ascorbic Acid (VITAMIN C) 1000 MG Oral Tab Take 1 tablet (1,000 mg total) by mouth daily.      Elastic Bandages & Supports (SKINEEZ COMPRESSION ARM SLEEVE) Does not apply Misc 2 Units daily as needed. 2 each 3    estradiol 0.1 MG/GM Vaginal Cream Place 1 g vaginally daily. 42.5 g 3    Vitamin D, Cholecalciferol, 25 MCG (1000 UT) Oral Tab Take 1,000 Units by mouth daily. Take 3 caps po q am 270 tablet 3    Multiple Vitamins-Minerals (COMPLETE DAILY/LUTEIN) Oral Tab Take 1 tablet by mouth 3 (three) times daily.           Assessment & Plan    1. Lymphedema of left upper extremity (Primary)  Unclear etiology of worsening lymphedema despite the efforts to wrap it as usual.  Will need to rule out possible blood clot in the area causing worsened symptoms and pain.  This also might be due to increased fluid accumulation, so I will check proBNP again.  Will check D-dimer to rule out clot.  If positive will need to do upper extremity ultrasound.  Low suspicion for clot elsewhere as patient does not appear to be dyspneic or tachycardic (however on  labetalol)  -     Lymphedema Clinic Referral:  PT/OT  Evaluate & Treat Lymphedema  -     Basic Metabolic Panel (8); Future; Expected date: 11/25/2024  -     Pro Beta Natriuretic Peptide; Future; Expected date: 11/25/2024  -     D-Dimer; Future; Expected date: 11/25/2024    2. Swelling of both lower extremities  3. Leg swelling  Per patient the lower extremity swelling is worsening again.  Will check proBNP and would start furosemide on a daily basis going forward.  -     Pro Beta Natriuretic Peptide; Future; Expected date: 11/25/2024  -     D-Dimer; Future; Expected date: 11/25/2024    4. Chronic heart failure with preserved ejection fraction (HCC)  Patient's symptoms are most likely related to heart failure with preserved ejection fraction due to worsening aortic insufficiency of unclear duration of time.  Patient has an appointment scheduled with a cardiologist and is planning to follow-up with them.  In the meantime with continue with furosemide 20 mg on daily basis.  Will recheck BMP to make sure electrolytes are normal.  -     Basic Metabolic Panel (8); Future; Expected date: 11/25/2024  -     Pro Beta Natriuretic Peptide; Future; Expected date: 11/25/2024        Follow Up: As needed/if symptoms worsen         There is a longitudinal care relationship with me, the care plan reflects the ongoing nature of the continuous relationship of care, and the medical record indicates that there is ongoing treatment of a serious/complex medical condition which I am currently managing.  is Applicable.     Objective/ Results:   GENERAL: alert and oriented x3. No distress.   EYES:no scleral icterus.   LUNGS: no labored breathing, no dyspnea.   Extremities: Right upper extremity appears to be more swollen, however does not appear red and per patient's daughter who is present during the exam it is not warm to touch.    Reviewed:    Patient Active Problem List    Diagnosis    Nonrheumatic aortic valve insufficiency    Acute  cystitis without hematuria    Weakness generalized    Gait instability    Physical deconditioning    Generalized weakness    Lymphedema of left upper extremity    At risk for malnutrition    Bone disorder    Vaginal dryness, menopausal    Insomnia    Lobular breast cancer, right (HCC)    Fibroids, intramural    Primary hypertension    Age-related osteoporosis with current pathological fracture    Invasive lobular carcinoma of right breast in female (HCC)    Multinodular goiter; bx 2022 benign    Chronic left-sided low back pain without sciatica    Paroxysmal SVT (supraventricular tachycardia) (HCC)    PCO (posterior capsular opacification), left    Osteoarthritis of both hips, unspecified osteoarthritis type    Scoliosis    Primary open-angle glaucoma, bilateral, severe stage    Hx of left breast cancer S/P lumpectomy & LN dissection 1986    DDD (degenerative disc disease), lumbar    Spondylolisthesis of lumbar region    Anxiety    Exophthalmia      Allergies[1]     Social History     Socioeconomic History    Marital status:     Number of children: 4   Occupational History    Occupation: Retired   Tobacco Use    Smoking status: Never    Smokeless tobacco: Never   Vaping Use    Vaping status: Never Used   Substance and Sexual Activity    Alcohol use: No     Alcohol/week: 0.0 standard drinks of alcohol    Drug use: No   Other Topics Concern    Caffeine Concern No    Exercise Yes     Comment: 3 times per week, [facility]     Social Drivers of Health     Food Insecurity: No Food Insecurity (10/25/2024)    Food Insecurity     Food Insecurity: Never true   Transportation Needs: No Transportation Needs (10/25/2024)    Transportation Needs     Lack of Transportation: No   Housing Stability: Low Risk  (10/25/2024)    Housing Stability     Housing Instability: No      Review of Systems   Constitutional:  Negative for activity change and fatigue.   HENT: Negative.     Eyes: Negative.    Respiratory:   Positive for shortness of breath (with exertion as normal).    Cardiovascular:  Positive for leg swelling (worsening). Negative for chest pain and palpitations.   Gastrointestinal: Negative.    Genitourinary: Negative.    Musculoskeletal:         Positive for worsening left arm pain    Skin: Negative.  Negative for color change, rash and wound.   Neurological: Negative.      All other systems negative unless otherwise stated in ROS or HPI above.       Bonnie Danielson MD  Internal Medicine       Call office with any questions or seek emergency care if necessary.   Patient understands and agrees to follow directions and advice.      ----------------------------------------- PATIENT INSTRUCTIONS-----------------------------------------     There are no Patient Instructions on file for this visit.        The 21st Century Cures Act makes medical notes available to patients in the interest of transparency.  However, please be advised that this is a medical document.  It is intended as a peer to peer communication.  It is written in medical language and may contain abbreviations or verbiage that are technical and unfamiliar.  It may appear blunt or direct.  Medical documents are intended to carry relevant information, facts as evident, and the clinical opinion of the practitioner.        [1]   Allergies  Allergen Reactions    Hydralazine OTHER (SEE COMMENTS)     Headache    Bactrim [Sulfamethoxazole W/Trimethoprim] RASH     Per patient    Metronidazole ITCHING     Metronidazole gel caused irritation in vaginal area.

## 2024-11-25 NOTE — TELEPHONE ENCOUNTER
Called the pt and appointment made     Future Appointments   Date Time Provider Department Center   11/25/2024  1:00 PM Bonnie Danielson MD [facility]   4/1/2025  1:15 PM Krista Warren MD  [facility]   4/1/2025  1:30 PM  [place] RN1  [facility]   4/3/2025  1:00 PM Bonnie Danielson MD [facility]

## 2024-11-26 ENCOUNTER — TELEPHONE (OUTPATIENT)
Dept: INTERNAL MEDICINE CLINIC | Facility: CLINIC | Age: 89
End: 2024-11-26

## 2024-11-26 ENCOUNTER — HOSPITAL ENCOUNTER (OUTPATIENT)
Dept: ULTRASOUND IMAGING | Facility: HOSPITAL | Age: 89
Discharge: HOME | End: 2024-11-26
Attending: STUDENT IN AN ORGANIZED HEALTH CARE EDUCATION/TRAINING PROGRAM
Payer: MEDICARE

## 2024-11-26 DIAGNOSIS — M79.89 OTHER SPECIFIED SOFT TISSUE DISORDERS: ICD-10-CM

## 2024-11-26 DIAGNOSIS — R79.89 POSITIVE D DIMER: ICD-10-CM

## 2024-11-26 DIAGNOSIS — R79.89 POSITIVE D DIMER: Primary | ICD-10-CM

## 2024-11-26 PROCEDURE — 93971 EXTREMITY STUDY: CPT | Performed by: STUDENT IN AN ORGANIZED HEALTH CARE EDUCATION/TRAINING PROGRAM

## 2024-11-26 NOTE — TELEPHONE ENCOUNTER
Rec call from radiology.  They advised US Venous Doppler ARM left is negative. Advised okay to release patient. Fyi-thanks! Not viewable in epic, they endorsed they are having some sort of tech issue.

## 2024-11-27 ENCOUNTER — TELEPHONE (OUTPATIENT)
Dept: HEMATOLOGY/ONCOLOGY | Facility: HOSPITAL | Age: 89
End: 2024-11-27

## 2024-11-27 ENCOUNTER — OFFICE VISIT (OUTPATIENT)
Dept: HEMATOLOGY/ONCOLOGY | Facility: HOSPITAL | Age: 89
End: 2024-11-27
Attending: INTERNAL MEDICINE
Payer: MEDICARE

## 2024-11-27 VITALS
HEART RATE: 81 BPM | HEIGHT: 62.99 IN | BODY MASS INDEX: 22 KG/M2 | SYSTOLIC BLOOD PRESSURE: 170 MMHG | TEMPERATURE: 97 F | DIASTOLIC BLOOD PRESSURE: 66 MMHG | OXYGEN SATURATION: 97 % | RESPIRATION RATE: 16 BRPM | WEIGHT: 124.19 LBS

## 2024-11-27 DIAGNOSIS — I89.0 LYMPHEDEMA OF ARM: ICD-10-CM

## 2024-11-27 DIAGNOSIS — C50.911 INVASIVE LOBULAR CARCINOMA OF RIGHT BREAST IN FEMALE (HCC): ICD-10-CM

## 2024-11-27 DIAGNOSIS — C50.811 MALIGNANT NEOPLASM OF OVERLAPPING SITES OF RIGHT BREAST IN FEMALE, ESTROGEN RECEPTOR POSITIVE (HCC): Primary | ICD-10-CM

## 2024-11-27 DIAGNOSIS — Z17.0 MALIGNANT NEOPLASM OF OVERLAPPING SITES OF RIGHT BREAST IN FEMALE, ESTROGEN RECEPTOR POSITIVE (HCC): Primary | ICD-10-CM

## 2024-11-27 PROBLEM — I35.1 NONRHEUMATIC AORTIC VALVE INSUFFICIENCY: Status: ACTIVE | Noted: 2024-11-27

## 2024-11-27 PROCEDURE — 99215 OFFICE O/P EST HI 40 MIN: CPT | Performed by: INTERNAL MEDICINE

## 2024-11-27 PROCEDURE — G2211 COMPLEX E/M VISIT ADD ON: HCPCS | Performed by: INTERNAL MEDICINE

## 2024-11-27 PROCEDURE — G2212 PROLONG OUTPT/OFFICE VIS: HCPCS | Performed by: INTERNAL MEDICINE

## 2024-11-27 NOTE — PROGRESS NOTES
[facility] Progress Note      Patient Name:  Meena Whitman  YOB: 1933  Medical Record Number:  [id number]    Date of visit:  11/27/2024      CHIEF COMPLAINT: R breast ca    HPI:     89 year old female that I have followed since 9/22. H/o R  breast carcinoma in 4/22.      Mammogram 4/22 as w/u of wt loss and back pain-8 mm and 9 mm mass R breast. Had 2 site bx.     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  Path R  4:00-grade 1 ILC, 1 mm core, 100% ER, 40% LA, Her 2 neg, Ki 67 1%.    AI started.  Initial surgery was deferred due to age and PS.  W/u of back pain showed a compression fracture at L2 for which she underwent kyphoplasty 4/22.  Then she developed severe vaginal atrophy, itching, dryness and UTI and elected to discontinue AI.  Tamoxifen was prescribed by another provider 7/22.  When I met her in 9/22, we decided to discontinue tamoxifen as I was concerned about the risk of strokes.  After that, she has tried AI's sporadically but held off because of worsening vaginal dryness.     Mammogram 8/23 showed increasing right-sided calcifications.  She met with surgical oncology and elected to defer surgery.    H/o L breast carcinoma 1986.  S/p surgery, ALND, RT.  L  arm lymphedema since then.    Daughter had breast cancer at 44, genetic testing was negative.    Has been feeling worse for the last few months.  Developed left chest wall shingles in 7/24.  Was admitted, went to rehab, remains in significant pain.  Has not improved back to baseline.  Then started developing left arm edema which has been getting worse.  She has been getting lymphedema therapy which is not helping.  Ultrasound done by PCP yesterday did not show DVT.  Left arm continues to be very heavy. Per daughter she had diff with sleeve and wrapping for last few weeks due to post herpetic neuraligia.    SOCIAL HISTORY:     .  Lives in [facility].  Daughter is an exercise  instructor.  Involved in mother's care.      ROS:     Constitutional: Fatigue, weight loss.  Neurologic: no headache, seizures, diplopia or weakness  Respiratory: no cough, SOB or hemoptysis  Cardiovascular: no chest pain, ankle swelling, [place]  GI: Anorexia, weight loss.  Musculoskeletal: Chronic stable back pain.    Dermatologic: Vaginal dryness and pruritus.  : Vaginal dryness.  Psych: Mood swings.    Heme: no easy bruising or bleeding     ALLERGIES:    Allergies   Allergen Reactions    Hydralazine OTHER (SEE COMMENTS)     Headache    Bactrim [Sulfamethoxazole W/Trimethoprim] RASH     Per patient    Metronidazole ITCHING     Metronidazole gel caused irritation in vaginal area.       MEDICATIONS:    Current Outpatient Medications   Medication Sig Dispense Refill    furosemide 20 MG Oral Tab Take 1 tablet (20 mg total) by mouth daily as needed (Take if weight is >120 pounds.). 30 tablet 0    HYDROcodone-acetaminophen (NORCO) 5-325 MG Oral Tab Take 1 tablet by mouth every 4 (four) hours as needed for Pain (no more than 2 a day). 60 tablet 0    Naloxone HCl 4 MG/0.1ML Nasal Liquid 4 mg by Intranasal route as needed (May repeat as needed in other nostril if symptoms persist). If patient remains unresponsive, repeat dose in other nostril 2-5 minutes after first dose. 1 kit 0    Vaginal Lubricant (VAGISIL LUBRICANT) Vaginal Gel Place 1 Application vaginally daily. 1 each 2    TEMAZEPAM 15 MG Oral Cap TAKE 1-2 CAPSULES (15-30 MG TOTAL) BY MOUTH NIGHTLY AS NEEDED FOR SLEEP. 60 capsule 0    losartan 100 MG Oral Tab TAKE 1 TABLET (100 MG TOTAL) BY MOUTH DAILY. 90 tablet 2    HYDROcodone-acetaminophen (NORCO) 5-325 MG Oral Tab Take 1 tablet by mouth 3 (three) times a week. Try not to exceed 3000 mg of Tylenol in 24 hours 12 tablet 0    cephALEXin 500 MG Oral Cap Take 1 capsule (500 mg total) by mouth 2 (two) times daily.      Ascorbic Acid (VITAMIN C) 1000 MG Oral Tab Take 1 tablet (1,000 mg total) by mouth daily.       Elastic Bandages & Supports (SKINEEZ COMPRESSION ARM SLEEVE) Does not apply Misc 2 Units daily as needed. 2 each 3    amLODIPine 5 MG Oral Tab Take 1 tablet (5 mg total) by mouth daily. 90 tablet 1    labetalol 100 MG Oral Tab Take 1 tablet (100 mg total) by mouth 2 (two) times daily. 180 tablet 1    estradiol 0.1 MG/GM Vaginal Cream Place 1 g vaginally daily. 42.5 g 3    Vitamin D, Cholecalciferol, 25 MCG (1000 UT) Oral Tab Take 1,000 Units by mouth daily. Take 3 caps po q am 270 tablet 3    acetaZOLAMIDE 250 MG Oral Tab       Multiple Vitamins-Minerals (COMPLETE DAILY/LUTEIN) Oral Tab Take 1 tablet by mouth 3 (three) times daily.         VITALS:    Vitals:    24 1241   BP: (!) 170/66   Pulse: 81   Resp: 16   Temp: 97.3 °F (36.3 °C)       PS:  ECO    PHYSICAL EXAM:    General: alert and oriented x 3, not in acute distress.  Accompanied by daughter.  HEENT: DELMY, oropharynx  clear.  Neck: supple.  No JVD /adenopathy.  CVS: S1S2, regular  Rhythm, no murmurs.   Lungs: Clear to auscultation and percussion.  Abdomen: Soft, non tender, no hepato-splenomegaly.  Extremities: Left arm lymphedema.  CNS: no focal deficit  Breast exam done when she was sitting upright.  No abnormal masses noted.  No axillary adenopathy.        Emotional well being: Patient's emotional well being was assessed.  No issues requiring acute psychosocial intervention were identified.     IMAGING:      LABS:     No results found for this or any previous visit (from the past 24 hours).      ASSESSMENT AND PLAN:     # Left arm lymphedema: Her initial left breast cancer was in .  It seems that the recent shingles has triggered worsening edema.  Per daughter she was unable to use sleeve or wrap well due to post herpetic neuralgia and now edema is worse. Also LE edema, poss CHF, seeing cardiology. US left arm yesterday was negative for DVT.  Recommend chest CT to evaluate for lymphadenopathy.  Continue supportive care. Pt requesting  lymphedema therapy here.    # R breast ca: Diag 4/22.  Clinical T1N0.  2 site biopsy shows grade 1 IDC and grade 1 ILC, strongly ER/WA+, HER2 neg  with low Ki-67.  AI started 4/22.  Severe vaginal dryness and atrophy.  AI discontinued 6/22, tamoxifen started 7/22 and discontinued 9/22 due to VTE risk.     Mammogram 11/22-stable.  Mammogram 8/23-increasing calcifications.  Met with surgical oncology and wishes to defer surgery.  She feels it will affect her mobility and put her in the nursing home at [facility].    R Mammogram 9/24-unchanged.   Left side was not done due to severe pain.  Continue observation.  Repeat bilateral mammogram when symptoms have improved.  Off all endocrine therapy since 9/22.      # Compression fracture: S/p kyphoplasty L2 by IR 4/22.    # Osteoporosis: DEXA 8/21 showed osteoporosis with T score -2.5.  Was on Prolia twice a year.  Did not receive it since 2022.  Resumed here, continue.  We discussed repeat DEXA but patient wishes to hold off.    Spoke to daughter via telephone. She is requesting hospital lymphedema therapy referral.     A total of 45 minutes were spent  during this encounter including  face-to-face time, review of pertinent test results, and documentation.       ORDERS PLACED:        Procedures    CT CHEST(CONTRAST ONLY) (CPT=71260)       Lida Warren M.D.    [facility]    [address] [place], [address]    11/27/2024

## 2024-11-27 NOTE — TELEPHONE ENCOUNTER
Dr Warren patient - history of breast cancer with left arm lymph edema.    Left Arm Lymph Edema    Meena reports that she has been having lymph edema treatment on Monday, Wednesday & Fridays weekly. She reports that last Friday after her treatment the therapist wrapped her upper arm down to her wrist. She felt the wrap was too tight and took it off. On Monday the lymph edema therapist could only work on her left lower arm and hand. The left upper arm was \"rock hard.\" Her therapist told her to contact her PCP to make sure she does not have a blood clot. She had an US doppler yesterday and it was negative. Meena reports the upper arm is not red or warm. It is \"very hard & swollen.\" Meena wants to know if she should come to the office to see Dr Warren today?     I asked Meena to please hold. I attempted to reach ILA Douglas and ILA Mina. Both were with patients. I told Meena I will check and call her back.

## 2024-11-27 NOTE — PROGRESS NOTES
OFFICE NOTE     Patient ID: Meena Whitman is a 89 year old female.  Today's Date: 11/27/24  Chief Complaint: Follow - Up (Pt is here to follow up on leg swelling and left arm, pt states she's been wearing compression socks which has helped w the leg swelling )    Patient comes into the office today to discuss her recent laboratory workup and follow-up on echocardiogram.  She continues to complain of increased lymphedema in her left upper extremity, however states that the bilateral lower extremity swelling has resolved.      Her recent echocardiogram done on 11/14/2024:  Showed normal EF, however new moderate aortic regurgitation is now present compared to the study from 2014.    Her recent laboratory workup done on 11/14/2024: Showed improving proBNP from 814 - > 589, patient had been taking furosemide 20 mg daily for 7 days in total.  She has been watching her fluid intake and she stayed 35-48 ounces of fluids a day.  She brings her weight log as well, which ranged 118-120 pounds in the morning prior to breakfast.      In regards to lymphedema.  She has scheduled treatments on Monday, Wednesday and Friday.  She tried taking Norco prior to her session, however after some time after the session she starts to experience pain again and she needs to undo the wrapping to relieve the pain.    Patient's daughter present in the office today and states that patient will have caregiver on a daily basis going forward with the hours as below:  8 AM-10:30 AM and then from 12 PM to 4:30 PM, so patient will stay alone in the apartment while she is taking Norco.      Vitals:    11/18/24 1232   BP: 122/60   Pulse: 70   Resp: 18   SpO2: 97%   Weight: 127 lb (57.6 kg)   Height: 5' 4\" (1.626 m)     body mass index is 21.8 kg/m².  BP Readings from Last 3 Encounters:   11/27/24 (!) 170/66   11/18/24 122/60   11/04/24 122/68     The ASCVD Risk score (Elizabeth MATTHEWS, et al., 2019) failed to calculate for the following  reasons:    The 2019 ASCVD risk score is only valid for ages 40 to 79      Medications reviewed:  Current Outpatient Medications   Medication Sig Dispense Refill    furosemide 20 MG Oral Tab Take 1 tablet (20 mg total) by mouth daily as needed (Take if weight is >120 pounds.). 30 tablet 0    HYDROcodone-acetaminophen (NORCO) 5-325 MG Oral Tab Take 1 tablet by mouth every 4 (four) hours as needed for Pain (no more than 2 a day). 60 tablet 0    Naloxone HCl 4 MG/0.1ML Nasal Liquid 4 mg by Intranasal route as needed (May repeat as needed in other nostril if symptoms persist). If patient remains unresponsive, repeat dose in other nostril 2-5 minutes after first dose. 1 kit 0    Vaginal Lubricant (VAGISIL LUBRICANT) Vaginal Gel Place 1 Application vaginally daily. 1 each 2    TEMAZEPAM 15 MG Oral Cap TAKE 1-2 CAPSULES (15-30 MG TOTAL) BY MOUTH NIGHTLY AS NEEDED FOR SLEEP. 60 capsule 0    losartan 100 MG Oral Tab TAKE 1 TABLET (100 MG TOTAL) BY MOUTH DAILY. 90 tablet 2    HYDROcodone-acetaminophen (NORCO) 5-325 MG Oral Tab Take 1 tablet by mouth 3 (three) times a week. Try not to exceed 3000 mg of Tylenol in 24 hours 12 tablet 0    cephALEXin 500 MG Oral Cap Take 1 capsule (500 mg total) by mouth 2 (two) times daily.      Ascorbic Acid (VITAMIN C) 1000 MG Oral Tab Take 1 tablet (1,000 mg total) by mouth daily.      Elastic Bandages & Supports (SKINEEZ COMPRESSION ARM SLEEVE) Does not apply Misc 2 Units daily as needed. 2 each 3    amLODIPine 5 MG Oral Tab Take 1 tablet (5 mg total) by mouth daily. 90 tablet 1    labetalol 100 MG Oral Tab Take 1 tablet (100 mg total) by mouth 2 (two) times daily. 180 tablet 1    estradiol 0.1 MG/GM Vaginal Cream Place 1 g vaginally daily. 42.5 g 3    Vitamin D, Cholecalciferol, 25 MCG (1000 UT) Oral Tab Take 1,000 Units by mouth daily. Take 3 caps po q am 270 tablet 3    acetaZOLAMIDE 250 MG Oral Tab       Multiple Vitamins-Minerals (COMPLETE DAILY/LUTEIN) Oral Tab Take 1 tablet by mouth 3  (three) times daily.           Assessment & Plan    1. Chronic heart failure with preserved ejection fraction (HCC) (Primary)  2. Nonrheumatic aortic valve insufficiency  3. Elevated brain natriuretic peptide (BNP) level  Patient with somewhat increased shortness of breath and lower extremity swelling over the past months, with new aortic insufficiency on cardiac echo compared to 2014.  Patient had elevated proBNP level which improved with furosemide use.  At this point I would do furosemide 20 mg on as-needed basis if patient's weight will be over 120 pounds and she should continue to measure her weight on daily basis.  She will let me know if she consistently needs to take furosemide more than 3 days in a row.  Will refer to cardiologist for further evaluation.  Recommend to stay within 35-48 ounces of fluids a day as she is currently doing.  -     Cardio Referral - Internal  -     Furosemide; Take 1 tablet (20 mg total) by mouth daily as needed (Take if weight is >120 pounds.).  Dispense: 30 tablet; Refill: 0      4. Lymphedema of left upper extremity  Patient experiencing a lot of pain during her lymphedema treatments with hydrocodone helping somewhat, however the effect of hydrocodone does not last.  Would recommend to take additional Norco later in the day, while the caregiver is present and patient can be observed.  Naloxone prescription provided in case patient becomes obtunded or unresponsive.  -     HYDROcodone-Acetaminophen; Take 1 tablet by mouth every 4 (four) hours as needed for Pain (no more than 2 a day).  Dispense: 60 tablet; Refill: 0  -     Naloxone HCl; 4 mg by Intranasal route as needed (May repeat as needed in other nostril if symptoms persist). If patient remains unresponsive, repeat dose in other nostril 2-5 minutes after first dose.  Dispense: 1 kit; Refill: 0        Follow Up: As needed/if symptoms worsen      There is a longitudinal care relationship with me, the care plan reflects the  ongoing nature of the continuous relationship of care, and the medical record indicates that there is ongoing treatment of a serious/complex medical condition which I am currently managing.  is Applicable.     Objective/ Results:   Physical Exam  Constitutional:       General: She is not in acute distress.     Appearance: Normal appearance. She is not ill-appearing or toxic-appearing.   HENT:      Head: Normocephalic and atraumatic.   Eyes:      Extraocular Movements: Extraocular movements intact.      Conjunctiva/sclera: Conjunctivae normal.      Pupils: Pupils are equal, round, and reactive to light.   Cardiovascular:      Rate and Rhythm: Normal rate and regular rhythm.      Heart sounds: Murmur (systolic murmur) heard.      No friction rub.   Pulmonary:      Effort: Pulmonary effort is normal. No respiratory distress.      Breath sounds: Normal breath sounds. No stridor. No wheezing, rhonchi or rales.   Abdominal:      General: There is no distension.      Palpations: There is no mass.      Tenderness: There is no abdominal tenderness. There is no right CVA tenderness, left CVA tenderness or guarding.      Hernia: No hernia is present.   Musculoskeletal:      Right upper arm: Normal. No swelling, edema, deformity, lacerations, tenderness or bony tenderness.      Left upper arm: Swelling, edema (due to Lymphedema) and tenderness present. No deformity, lacerations or bony tenderness.      Right lower leg: No edema (improvement in LE edema compared to prior).      Left lower leg: No edema (improvement in LE edema compared to prior).   Neurological:      Mental Status: She is alert.   Psychiatric:         Mood and Affect: Mood normal.         Behavior: Behavior normal.         Thought Content: Thought content normal.         Judgment: Judgment normal.        Reviewed:    Patient Active Problem List    Diagnosis    Nonrheumatic aortic valve insufficiency    Acute cystitis without hematuria    Weakness  generalized    Gait instability    Physical deconditioning    Generalized weakness    Lymphedema of left upper extremity    At risk for malnutrition    Bone disorder    Vaginal dryness, menopausal    Insomnia    Lobular breast cancer, right (HCC)    Fibroids, intramural    Primary hypertension    Age-related osteoporosis with current pathological fracture    Invasive lobular carcinoma of right breast in female (HCC)    Multinodular goiter; bx 2022 benign    Chronic left-sided low back pain without sciatica    Paroxysmal SVT (supraventricular tachycardia) (HCC)    PCO (posterior capsular opacification), left    Osteoarthritis of both hips, unspecified osteoarthritis type    Scoliosis    Primary open-angle glaucoma, bilateral, severe stage    Hx of left breast cancer S/P lumpectomy & LN dissection 1986    DDD (degenerative disc disease), lumbar    Spondylolisthesis of lumbar region    Anxiety    Exophthalmia      Allergies[1]     Social History     Socioeconomic History    Marital status:     Number of children: 4   Occupational History    Occupation: Retired   Tobacco Use    Smoking status: Never    Smokeless tobacco: Never   Vaping Use    Vaping status: Never Used   Substance and Sexual Activity    Alcohol use: No     Alcohol/week: 0.0 standard drinks of alcohol    Drug use: No   Other Topics Concern    Caffeine Concern No    Exercise Yes     Comment: 3 times per week, [employer] [place]     Social Drivers of Health     Food Insecurity: No Food Insecurity (10/25/2024)    Food Insecurity     Food Insecurity: Never true   Transportation Needs: No Transportation Needs (10/25/2024)    Transportation Needs     Lack of Transportation: No   Housing Stability: Low Risk  (10/25/2024)    Housing Stability     Housing Instability: No      Review of Systems   HENT: Negative.     Respiratory:  Negative for cough, chest tightness and shortness of breath.    Cardiovascular:  Negative for chest pain, palpitations  and leg swelling.   Gastrointestinal: Negative.    Endocrine: Negative.    Genitourinary: Negative.    Musculoskeletal:         Increased edema in the left upper extremity   Skin: Negative.    Neurological: Negative.      All other systems negative unless otherwise stated in ROS or HPI above.       Bonnie Danielson MD  Internal Medicine       Call office with any questions or seek emergency care if necessary.   Patient understands and agrees to follow directions and advice.      ----------------------------------------- PATIENT INSTRUCTIONS-----------------------------------------     There are no Patient Instructions on file for this visit.        The 21st Century Cures Act makes medical notes available to patients in the interest of transparency.  However, please be advised that this is a medical document.  It is intended as a peer to peer communication.  It is written in medical language and may contain abbreviations or verbiage that are technical and unfamiliar.  It may appear blunt or direct.  Medical documents are intended to carry relevant information, facts as evident, and the clinical opinion of the practitioner.            [1]   Allergies  Allergen Reactions    Hydralazine OTHER (SEE COMMENTS)     Headache    Bactrim [Sulfamethoxazole W/Trimethoprim] RASH     Per patient    Metronidazole ITCHING     Metronidazole gel caused irritation in vaginal area.

## 2024-11-27 NOTE — TELEPHONE ENCOUNTER
I called Meena and offered 12:30 pm or 3 pm with Dr Warren today. She choose 12:30 pm. I booked the appointment. She will call back if there is a problem getting a ride.

## 2024-11-30 ENCOUNTER — TELEPHONE (OUTPATIENT)
Dept: INTERNAL MEDICINE CLINIC | Facility: CLINIC | Age: 89
End: 2024-11-30

## 2024-11-30 DIAGNOSIS — F41.9 ANXIETY: ICD-10-CM

## 2024-11-30 DIAGNOSIS — G47.00 INSOMNIA, UNSPECIFIED TYPE: ICD-10-CM

## 2024-11-30 NOTE — TELEPHONE ENCOUNTER
Paged by pt.   She wants to know if she is a candidate to get an antiviral medication for shingles.   Had shingles in June on the left arm, left breast and left shoulder blade. Was hospitalized at that time and treated with gabapentin and pain meds. Then went to a rehab facility for a couple of weeks. Still has some redness in the area and still with pain. Also with lymphedema in the left arm due to lymph node dissection sec to breast cancer.   Has tried gabapentin and now on lyrica but not working.   Still with pain and not sure if she was ever given an antiviral at the time she was admitted and wondering if she should get that.   She has no active or new rash, this is all since June.   Advised pt no indication for use of antiviral at this time on an emergent basis over the weekend since there is no new rash. If one were to develop she should go to the UC or ER for evaluation, but she does not have any new rash at this time.   Advised pt to call office on Monday regarding her question about her chronic pain and need for antiviral.

## 2024-12-02 ENCOUNTER — TELEPHONE (OUTPATIENT)
Dept: OCCUPATIONAL MEDICINE | Facility: HOSPITAL | Age: 89
End: 2024-12-02

## 2024-12-02 ENCOUNTER — TELEPHONE (OUTPATIENT)
Dept: INTERNAL MEDICINE CLINIC | Facility: CLINIC | Age: 89
End: 2024-12-02

## 2024-12-02 RX ORDER — TEMAZEPAM 15 MG/1
CAPSULE ORAL NIGHTLY PRN
Qty: 60 CAPSULE | Refills: 0 | Status: SHIPPED | OUTPATIENT
Start: 2024-12-02

## 2024-12-02 NOTE — TELEPHONE ENCOUNTER
Last OV relevant to medication: VV 11/25/24  Last refill date: 11/4/24 60     #/refills: 0  When pt was asked to return for OV:   Upcoming appt/reason:   Future Appointments   Date Time Provider Department Center   12/12/2024  5:00 PM  CT MAIN RM4 [facility]   4/1/2025  1:15 PM Krista Warren MD  HEM ONC [facility]   4/1/2025  1:30 PM  TX RN1 [facility] CHEMO [facility]   4/3/2025  1:00 PM Bonnie Danielson MD [facility]       Was pt informed of any over due labs: n/a   Lab Results   Component Value Date    GLU 96 11/25/2024    BUN 29 (H) 11/25/2024    BUNCREA 39.0 (H) 04/12/2021    CREATSERUM 0.76 11/25/2024    ANIONGAP 6 11/25/2024    GFR 76 02/27/2017    GFRNAA 62 07/20/2022    GFRAA 71 07/20/2022    CA 10.2 11/25/2024    OSMOCALC 294 11/25/2024    ALKPHO 84 11/14/2024    AST 25 11/14/2024    ALT 31 11/14/2024    BILT 0.4 11/14/2024    TP 6.3 11/14/2024    ALB 4.0 11/14/2024    GLOBULIN 2.3 11/14/2024     11/25/2024    K 4.8 11/25/2024     11/25/2024    CO2 26.0 11/25/2024

## 2024-12-02 NOTE — TELEPHONE ENCOUNTER
Patient states still has not gotten her Temazepam 15 MG. She says the pharmacy told her they have not received a request. Also she wants to speak to Dr Danielson today because she was suppose to receive a call last week regarding her heart condition.

## 2024-12-02 NOTE — TELEPHONE ENCOUNTER
Please see refill TE from 11/30/24, sent to  for approval. Please advise on message regarding call to pt about hear condition. Thanks!   Problem: MOBILITY - ADULT  Goal: Maintain or return to baseline ADL function  Description: INTERVENTIONS:  -  Assess patient's ability to carry out ADLs; assess patient's baseline for ADL function and identify physical deficits which impact ability to perform ADLs (bathing, care of mouth/teeth, toileting, grooming, dressing, etc )  - Assess/evaluate cause of self-care deficits   - Assess range of motion  - Assess patient's mobility; develop plan if impaired  - Assess patient's need for assistive devices and provide as appropriate  - Encourage maximum independence but intervene and supervise when necessary  - Involve family in performance of ADLs  - Assess for home care needs following discharge   - Consider OT consult to assist with ADL evaluation and planning for discharge  - Provide patient education as appropriate  Outcome: Progressing  Goal: Maintains/Returns to pre admission functional level  Description: INTERVENTIONS:  - Perform BMAT or MOVE assessment daily    - Set and communicate daily mobility goal to care team and patient/family/caregiver  - Collaborate with rehabilitation services on mobility goals if consulted  - Perform Range of Motion 3 times a day  - Reposition patient every 2 hours    - Dangle patient 3 times a day  - Stand patient 3 times a day  - Ambulate patient 3 times a day  - Out of bed to chair 3 times a day   - Out of bed for meals 3 times a day  - Out of bed for toileting  - Record patient progress and toleration of activity level   Outcome: Progressing

## 2024-12-03 ENCOUNTER — TELEPHONE (OUTPATIENT)
Dept: PHYSICAL THERAPY | Facility: HOSPITAL | Age: 89
End: 2024-12-03

## 2024-12-03 ENCOUNTER — OFFICE VISIT (OUTPATIENT)
Dept: OCCUPATIONAL MEDICINE | Facility: HOSPITAL | Age: 89
End: 2024-12-03
Attending: INTERNAL MEDICINE
Payer: MEDICARE

## 2024-12-03 ENCOUNTER — ORDER TRANSCRIPTION (OUTPATIENT)
Dept: PHYSICAL THERAPY | Facility: HOSPITAL | Age: 89
End: 2024-12-03

## 2024-12-03 DIAGNOSIS — I89.0 LYMPHEDEMA OF ARM: Primary | ICD-10-CM

## 2024-12-03 PROCEDURE — 97535 SELF CARE MNGMENT TRAINING: CPT

## 2024-12-03 PROCEDURE — 97166 OT EVAL MOD COMPLEX 45 MIN: CPT

## 2024-12-03 NOTE — PROGRESS NOTES
UE LYMPHEDEMA EVALUATION:     Diagnosis:        Lymphedema of arm (I89.0)  Referring Provider: Krista Warren  Date of Evaluation:    12/3/2024    Precautions:   lymphedema Next MD visit:   none scheduled  Date of Surgery: n/a     PATIENT SUMMARY   Meena Whitman is a 89 year old female who presents to therapy today with a long history of left UE Lymphedema post Lumpectomy and axillary lymph node dissection in 1986. She had Shingles in June 2024 and continues to have significant pain in her posterior shoulder and upper back. She has had Lymphedema treatment in the past at several different locations. She has bandages in the past, and notes that she is Independent with the   Bandaging technique.  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  Developed left chest wall shingles in 7/24.  Was admitted, went to rehab, remains in significant pain.  Has not improved back to baseline.  Then started developing left arm edema which has been getting worse.  She has been getting lymphedema therapy which is not helping.  Ultrasound done by PCP yesterday did not show DVT.  Left arm continues to be very heavy. Per daughter she had diff with sleeve and wrapping for last few weeks due to post herpetic neuraligia.     Pt. Has a pneumatic lymphedema pump for home     SOCIAL HISTORY:      .  Lives in [facility].  Daughter is an .  Involved in mother's care.         Pain level 0/10 , 0/10 at rest,  4/10 at the worst     Current functional limitations  wear clothes comfortably, put a coat on, reaching overhead, washing her hair    Meena describes prior level of function as Independent prior to having the Shingles    Social  History:  likes swimming, piano  Hand Dominance: right handed  Self Reported Weight: 124 lbs  Pt goals include To reduce the swelling in the left arm    Past medical history was reviewed with Meena. Significant findings include      has a past medical history of Abnormal weight loss (06/07/2022), Basal cell carcinoma (BCC) of left side of nose (07/03/2018), Breast CA (HCC) (1986), Breast cancer (HCC) (04/2022), Cellulitis, COPD (chronic obstructive pulmonary disease) (HCC), Ductal carcinoma in situ of breast, Exposure to radiation (1986), High blood pressure, Keratoconjunctivitis sicca of both eyes (10/29/2021), Lobular carcinoma in situ of breast, Lymph edema, Pelvic pressure in female (08/09/2022), Post covid-19 condition, unspecified (08/09/2022), Post herpetic neuralgia, and Shingles.    She has no past medical history of Anesthesia complication, Difficult intubation, Malignant hyperthermia, PONV (postoperative nausea and vomiting), or Pseudocholinesterase deficiency.     Precautions:  Lymphedema     ASSESSMENT   Meena presents to Occupational Therapy evaluation with a significantly high volume in her left UE. She also has tenderness in her posterior shoulder and left upper back from being diagnosed with the Shingles back in June 2024. The heaviness in her left UE and the pain from the Shingles impact her ability to perform the activities that loves, such as piano and swimming. She will benefit from skilled Occupational Therapy to address the below POC.    Reason for lymphedema: Secondary Lymphedema  Stage of lymphedema: 3  Phase of treatment: Phase 1: Reduction Phase    Pt and therapist discussed evaluation findings, pathology, POC and self care/management. Skilled Occupational Therapy is medically necessary for  Complete Decongestive Therapy to reduce swelling and decrease risk of infection, STM, ROM, stretching, strengthening, garment prescription, and education/self management.    OBJECTIVE:      Edema/Tissue Observations:  Right UE;  soft and supple throughout UE    Left upper arm moderately dense, severely boggy. Posterior arm power part of upper arm orange peel texture , elbow kaur densely boggy, lower forearm severe pitting, wrist severe pitting moderate pitting dorsum of hand, mild  pitting in the digits , thumb mild pitting   Stemmer's Sign: negative    Measurements:          UE Circumferential Measurements (cm) Right  Left      Axilla 26.9 29.5   15cm above elbow crease 24 28.5   10cm above elbow crease 21.9 31   5cm above elbow crease 20 34.8   Elbow crease 20.5 30.5          20cm above ulnar styloid 19.5 29.5   15cm above ulnar styloid 18.4 27.4   10cm above ulnar styloid 16.6 22.9   5cm above ulnar styloid 14.5 20.5   Ulnar styloid 13.8 16.6     Mid-palm   17   18.5   Across MCPs 18 18.4   Thumb P1 5.6 5.7   Index P1 5.1 5.4   P2 4.7 4.9   Middle P1 5.2 5.1   P2 4.6 5   Ring P1 4.8 4.9   P2 4 4.6   Little P1 4 4.2   P2 3.6 3.8     TOTALS 272.7 351.7                                                                                                                               deffered  Upper Extremity ROM Strength (scale of 5)    Right Left  right left   Shoulder Flexion 170 160 ( with effort)                    Extension 65 65                    Abduction 170 160 ( with effort)                    External Rotation C7 C7                    Internal Rotation L5 L5     Elbow     Flexion wnl wnl                    Extension wnl wnl          Posture: fair   stands with a slouched position. Pt. Uses a rollator for walking     Sensation:  no reports of numbness or tingling  sensitivity and tightness yes in the proximal arm         Lymphedema Life Impact Scale: Score: 33 percent . (0% is no impairment, 100% total impairment)      Today’s Treatment and Response:   Date 12/3/2024    Visit # 1/20    Manual Therapy (0 minutes)  MLD:    Compression:        There Ex ( 0 minutes):        Self-Care  (15  minutes):   Management/Education: Explained Lymphedema diagnosis; informed patient of lymphedema precautions and risk reduction practices, and importance of skin care. Discussed and demonstrated bandaging and compression options. Discussed the possibility of getting a velcro closure wrap to reduce her left UE volume          Charges: Occupational Therapy Eval: Moderate Complexity, self care x 1       Total Timed Treatment: 15 min     Total Treatment Time:  60  min     Based on clinical rationale and outcome measures, this evaluation involved Moderate Complexity decision making due to 3+ personal factors/comorbidities, 3 body structures involved/activity limitations, and evolving symptoms including pain and swelling     PLAN OF CARE:    Goals:  (to be met in 20 visits)    1 Pt will be independent in decongestive exercises.  2 Pt will be independent in self-manual lymph drainage.  3 Pt will tolerate left  UE short stretch compression bandaging/velcro reduction wrap  for 20-23 hours.  4 Pt/Caregiver will be independent in left  UE short stretch compression bandaging/velcro closure wrap  5 Reduce left  UE lymphedema volume by 25cm to allow pt to wear clothes more comfortably and reach with ease .  6 Reduce left  UE lymphedema density to mildly boggy  to reduce infection risk.  7 Pt will be independent in use of compression garments, self-manual lymph drainage, decongestive exercises and lymphedema precautions for life-long self-management of lymphedema.   8. Improve lymphedema life impact score from     Frequency / Duration: Patient will be seen for 2-3 x/week or a total of 20 visits over a 90 day period.     Treatment will include: Complete Decongestive Therapy: Manual Therapy, Self-Care/Home Management Training and  Therapeutic Exercise,    Education or treatment limitation: None  Rehab Potential:good    Patient/Family/Caregiver was advised of these findings, precautions, and treatment options and has agreed to  actively participate in planning and for this course of care.    Thank you for your referral. Please co-sign or sign and return this letter via fax as soon as possible to [phone]. If you have any questions, please contact me at Dept: [phone]    Sincerely,  Electronically signed by therapist: Alessandra Malone, OT  Physician's certification required: Yes  I certify the need for these services furnished under this plan of treatment and while under my care.    X___________________________________________________ Date____________________    Certification From: 12/3/2024  To:3/3/2025

## 2024-12-05 ENCOUNTER — OFFICE VISIT (OUTPATIENT)
Dept: OCCUPATIONAL MEDICINE | Facility: HOSPITAL | Age: 89
End: 2024-12-05
Attending: INTERNAL MEDICINE
Payer: MEDICARE

## 2024-12-05 PROCEDURE — 97140 MANUAL THERAPY 1/> REGIONS: CPT

## 2024-12-05 NOTE — PROGRESS NOTES
Diagnosis:        Lymphedema of arm (I89.0)  Referring Provider: Krista Warren  Date of Evaluation:    12/3/2024    Precautions:  lymphedema Next MD visit:   none scheduled  Date of Surgery: n/a     OCCUPATIONAL THERAPY DAILY NOTE      PATIENT SUMMARY   Meena Whitman is a 89 year old female who presents to therapy today with a long history of left UE Lymphedema post Lumpectomy and axillary lymph node dissection in 1986. She had Shingles in June 2024 and continues to have significant pain in her posterior shoulder and upper back. She has had Lymphedema treatment in the past at several different locations. She has bandages in the past, and notes that she is Independent with the   Bandaging technique.  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  Developed left chest wall shingles in 7/24.  Was admitted, went to rehab, remains in significant pain.  Has not improved back to baseline.  Then started developing left arm edema which has been getting worse.  She has been getting lymphedema therapy which is not helping.  Ultrasound done by PCP yesterday did not show DVT.  Left arm continues to be very heavy. Per daughter she had diff with sleeve and wrapping for last few weeks due to post herpetic neuraligia.     Pt. Has a pneumatic lymphedema pump for home   Insurance Primary/Secondary: MEDICARE / BCBS OUT OF STATE # Authorized Visits:20              Next MD/Plan Renewal Date: 12/3/2024  To:3/3/2025          Subjective:     Patient reports that she did bring her wrap with her today, but it is old and does not fit properly       Objective:    Today’s Treatment and Response:   Date 12/3/2024 12/5/2024     Visit # 1/20 Visit # 2/20   Manual  Therapy (0 minutes)  MLD:    Compression:     Manual Therapy 55 min    Pt. Instructed in deep diaphragmatic breathing    Performed MLD  to left upper quadrant with pt. On her right side. Pt. Unable to lay in supine due to the irritation of the shingles on her left upper back.    Evaluated velcro closure wrap, that does not fit properly.     Applied mollelast around the hand and fingers, cotton liner, rosidal soft hand to upper arm and 2 short stretch bandages size 8 and 10. Instructed pt. To keep the bandage system on until her next appointment        There Ex ( 0 minutes):        Self-Care  (15 minutes):   Management/Education: Explained Lymphedema diagnosis; informed patient of lymphedema precautions and risk reduction practices, and importance of skin care. Discussed and demonstrated bandaging and compression options. Discussed the possibility of getting a velcro closure wrap to reduce her left UE volume         Observation: Right UE;  soft and supple throughout UE    Left upper arm moderately dense, severely boggy. Posterior arm power part of upper arm orange peel texture , elbow kaur densely boggy, lower forearm severe pitting, wrist severe pitting moderate pitting dorsum of hand, mild  pitting in the digits , thumb mild pitting   Stemmer's Sign: negative     Measurements: none taken today        Assessment:    Patient's velcro closure wrap did not fit properly, nor does her over the shelf glove. She may benefit from a new velcro closure wrap. Currently, she will benefit from a course of bandaging  to reduce the left UE volume    Goals:     1 Pt will be independent in decongestive exercises.  2 Pt will be independent in self-manual lymph drainage.  3 Pt will tolerate left  UE short stretch compression bandaging/velcro reduction wrap  for 20-23 hours.  4 Pt/Caregiver will be independent in left  UE short stretch compression bandaging/velcro closure wrap  5 Reduce left  UE lymphedema volume by 25cm to allow pt to  wear clothes more comfortably and reach with ease .  6 Reduce left  UE lymphedema density to mildly boggy  to reduce infection risk.  7 Pt will be independent in use of compression garments, self-manual lymph drainage, decongestive exercises and lymphedema precautions for life-long self-management of lymphedema.   8. Improve lymphedema life impact score from       Plan:   Patient will be seen for 2-3 x/week or a total of 20 visits over a 90 day period.        Charges: manual therapy x 4    Total Timed Treatment: 55 min  Total Treatment Time: 60 min

## 2024-12-06 ENCOUNTER — OFFICE VISIT (OUTPATIENT)
Dept: OCCUPATIONAL MEDICINE | Facility: HOSPITAL | Age: 89
End: 2024-12-06
Attending: INTERNAL MEDICINE
Payer: MEDICARE

## 2024-12-06 PROCEDURE — 97140 MANUAL THERAPY 1/> REGIONS: CPT

## 2024-12-06 NOTE — PROGRESS NOTES
Diagnosis:        Lymphedema of arm (I89.0)  Referring Provider: Krista Warren  Date of Evaluation:    12/3/2024    Precautions:  lymphedema Next MD visit:   none scheduled  Date of Surgery: n/a     OCCUPATIONAL THERAPY DAILY NOTE      PATIENT SUMMARY   Meena Whitman is a 89 year old female who presents to therapy today with a long history of left UE Lymphedema post Lumpectomy and axillary lymph node dissection in 1986. She had Shingles in June 2024 and continues to have significant pain in her posterior shoulder and upper back. She has had Lymphedema treatment in the past at several different locations. She has bandages in the past, and notes that she is Independent with the   Bandaging technique.  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  Developed left chest wall shingles in 7/24.  Was admitted, went to rehab, remains in significant pain.  Has not improved back to baseline.  Then started developing left arm edema which has been getting worse.  She has been getting lymphedema therapy which is not helping.  Ultrasound done by PCP yesterday did not show DVT.  Left arm continues to be very heavy. Per daughter she had diff with sleeve and wrapping for last few weeks due to post herpetic neuraligia.     Pt. Has a pneumatic lymphedema pump for home   Insurance Primary/Secondary: MEDICARE / BCBS OUT OF STATE # Authorized Visits:20              Next MD/Plan Renewal Date: 12/3/2024  To:3/3/2025          Subjective:     Patient reports that she was able to tolerate the bandaging, but she did have mild pain in the posterior aspect of her upper arm    Objective:    Today’s Treatment and Response:   Date 12/3/2024 12/5/2024    12/6/2024     Visit # 1/20 Visit # 2/20 Visit # 3/20   Manual Therapy (0 minutes)  MLD:    Compression:     Manual Therapy 55 min    Pt. Instructed in deep diaphragmatic breathing    Performed MLD  to left upper quadrant with pt. On her right side. Pt. Unable to lay in supine due to the irritation of the shingles on her left upper back.    Evaluated velcro closure wrap, that does not fit properly.     Applied mollelast around the hand and fingers, cotton liner, rosidal soft hand to upper arm and 2 short stretch bandages size 8 and 10. Instructed pt. To keep the bandage system on until her next appointment      Manual Therapy  55 min    Bandage system removed     Performed MLD  to left upper quadrant with pt. On her right side. Pt. Unable to lay in supine due to the irritation of the shingles on her left upper back.    Re applied bandage system     Applied mollelast around the hand and fingers, cotton liner,   cellona padded over the dorsum of the hand and wrist,rosidal soft hand to upper arm and 2 short stretch bandages ( replaced the 8 with size 6) and size 10. Care taken to wrap less tightly around the proximal upper arm.     Cut and issued pink foam for grasp and lymphatic massage for hand and digits.   Pt. May benefit from replacing the mollelast with a proper fitting compression glove.    There Ex ( 0 minutes):         Self-Care  (15 minutes):   Management/Education: Explained Lymphedema diagnosis; informed patient of lymphedema precautions and risk reduction practices, and importance of skin care. Discussed and demonstrated bandaging and compression options. Discussed the possibility of getting a velcro closure wrap to reduce her left UE volume          Observation: Right UE;  soft and supple throughout UE    Left upper arm moderately dense, severely boggy. Posterior arm power part of upper arm orange peel texture , elbow kaur densely boggy, lower forearm severe pitting, wrist severe pitting moderate pitting  dorsum of hand, mild  pitting in the digits , thumb mild pitting   Stemmer's Sign: negative     Measurements: none taken today        Assessment:     Patient tolerated the bandage system moderately well. She did have mild pain on the posterior aspect of her upper arm at the end of the bandage, but there was no skin irritation. Patient is also tolerateding the MLD fairly well, despite having had Shingles.     Goals:     1 Pt will be independent in decongestive exercises.  2 Pt will be independent in self-manual lymph drainage.  3 Pt will tolerate left  UE short stretch compression bandaging/velcro reduction wrap  for 20-23 hours.  4 Pt/Caregiver will be independent in left  UE short stretch compression bandaging/velcro closure wrap  5 Reduce left  UE lymphedema volume by 25cm to allow pt to wear clothes more comfortably and reach with ease .  6 Reduce left  UE lymphedema density to mildly boggy  to reduce infection risk.  7 Pt will be independent in use of compression garments, self-manual lymph drainage, decongestive exercises and lymphedema precautions for life-long self-management of lymphedema.   8. Improve lymphedema life impact score from       Plan:   Patient will be seen for 2-3 x/week or a total of 20 visits over a 90 day period.        Charges: manual therapy x 4    Total Timed Treatment: 55 min  Total Treatment Time: 60 min

## 2024-12-09 ENCOUNTER — OFFICE VISIT (OUTPATIENT)
Dept: OCCUPATIONAL MEDICINE | Facility: HOSPITAL | Age: 89
End: 2024-12-09
Attending: INTERNAL MEDICINE
Payer: MEDICARE

## 2024-12-09 PROCEDURE — 97140 MANUAL THERAPY 1/> REGIONS: CPT

## 2024-12-10 NOTE — PROGRESS NOTES
Diagnosis:        Lymphedema of arm (I89.0)  Referring Provider: Krista Warren  Date of Evaluation:    12/3/2024    Precautions:  lymphedema Next MD visit:   none scheduled  Date of Surgery: n/a     OCCUPATIONAL THERAPY DAILY NOTE      PATIENT SUMMARY   Meena Whitman is a 89 year old female who presents to therapy today with a long history of left UE Lymphedema post Lumpectomy and axillary lymph node dissection in 1986. She had Shingles in June 2024 and continues to have significant pain in her posterior shoulder and upper back. She has had Lymphedema treatment in the past at several different locations. She has bandages in the past, and notes that she is Independent with the   Bandaging technique.  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  Developed left chest wall shingles in 7/24.  Was admitted, went to rehab, remains in significant pain.  Has not improved back to baseline.  Then started developing left arm edema which has been getting worse.  She has been getting lymphedema therapy which is not helping.  Ultrasound done by PCP yesterday did not show DVT.  Left arm continues to be very heavy. Per daughter she had diff with sleeve and wrapping for last few weeks due to post herpetic neuraligia.     Pt. Has a pneumatic lymphedema pump for home   Insurance Primary/Secondary: MEDICARE / BCBS OUT OF STATE # Authorized Visits:20              Next MD/Plan Renewal Date: 12/3/2024  To:3/3/2025          Subjective:     Patient reports she wore Left UE bandage system applied at last session until last evening. Removed and donned one of her former RTW compression sleeves. At first tried a smaller one which she couldn't get on; then  tried a larger one.   Reports she wore RTW compression sleeve overnight as well as during the day.   Reports attempted to do hand exercises with foam provided at last session, h/e found it difficult to do while in bandage system. Did use foam when she removed bandages.   Is wondering if she still needs to continue with bandaging? Has the \"hard\" area on her arm gone away?   Is scheduled to have CT of Left axilla/chest on 12/12; ws ordered by oncologist.     Objective:    Today’s Treatment and Response:   Date 12/3/2024 12/5/2024   12/6/2024   12/9/2024    Visit # 1/20 Visit # 2/20 Visit # 3/20 Visit # 4/20   Manual Therapy (0 minutes)  MLD:    Compression:     Manual Therapy 55 min    Pt. Instructed in deep diaphragmatic breathing    Performed MLD  to left upper quadrant with pt. On her right side. Pt. Unable to lay in supine due to the irritation of the shingles on her left upper back.    Evaluated velcro closure wrap, that does not fit properly.     Applied mollelast around the hand and fingers, cotton liner, rosidal soft hand to upper arm and 2 short stretch bandages size 8 and 10. Instructed pt. To keep the bandage system on until her next appointment      Manual Therapy  55 min    Bandage system removed     Performed MLD  to left upper quadrant with pt. On her right side. Pt. Unable to lay in supine due to the irritation of the shingles on her left upper back.    Re applied bandage system     Applied mollelast around the hand and fingers, cotton liner,   cellona padded over the dorsum of the hand and wrist,rosidal soft hand to upper arm and 2 short stretch bandages ( replaced the 8 with size 6) and size 10. Care taken to wrap less tightly around the proximal upper arm.     Cut and issued pink foam for grasp and lymphatic massage for hand and digits.   Pt. May benefit from replacing the mollelast with a proper fitting compression glove.  Manual Therapy  30 min    Pt with delayed arrival d/t transportation issues.      Arrived wearing older RTW Juzo 20-30mmHg compression sleeve.     Discussed available time remaining for today's session.   [place] decision made to re-measure pt's limb to confirm reduction of volume.     Volume re-measurement.     Discussed pt's remaining high volume and lymphedema density in limb. Advised of need for further compression bandaging to reduce volume and improve health of tissue. Pt in agreement.      Applied Left UE compression bandage system.     COMPRESSION: Mollelast wrap over hand/fingers; cotton liner; cellona padding over dorsum of hand/wrist; Rosidal soft hand to axilla; 2 short stretch compression bandages: 1, 6cm, 1, 10cm      There Ex ( 0 minutes):          Self-Care  (15 minutes):   Management/Education: Explained Lymphedema diagnosis; informed patient of lymphedema precautions and risk reduction practices, and importance of skin care. Discussed and demonstrated bandaging and compression options. Discussed the possibility of getting a velcro closure wrap to reduce her left UE volume           Observation: Right UE;  soft and supple throughout UE    Left UE:  Proximal 1/3 of upper arm is boggy; distal 2/3 is densely boggy, pitting with fair superficial tissue mobility. Elbow is densely boggy , pitting over medial surface; boggy over lateral surface. Forearm is densely boggy to boggy, pitting. Dorsum of hand is boggy to slightly boggy; fingers slightly boggy.     Stemmer's Sign: negative     Measurements: Left UE circumferential volume decreased by 10.9cm since initial eval. Left UE is 68.1cm larger than dominant Right.       Assessment:     Patient with good tolerance to last session's short stretch compression bandaging. Demonstrating progress towards reduction of Left UE lymphedema volume with some improvement in tissue quality.     Goals:     1 Pt will be independent in decongestive exercises.  2 Pt will be independent in self-manual lymph drainage.  3 Pt will tolerate left  UE short  stretch compression bandaging/velcro reduction wrap  for 20-23 hours. ACHIEVED   4 Pt/Caregiver will be independent in left  UE short stretch compression bandaging/velcro closure wrap  5 Reduce left  UE lymphedema volume by 25cm to allow pt to wear clothes more comfortably and reach with ease .  6 Reduce left  UE lymphedema density to mildly boggy  to reduce infection risk.  7 Pt will be independent in use of compression garments, self-manual lymph drainage, decongestive exercises and lymphedema precautions for life-long self-management of lymphedema.   8. Improve lymphedema life impact score from       Plan:   Patient will be seen for 2-3 x/week or a total of 20 visits over a 90 day period.        Charges: manual therapy x 2  Total Timed Treatment: 30 min  Total Treatment Time: 35 min         UE Circumferential Measurements (cm) 12/3/2024   Right 12/3/2024  Left  12/9/2024   LEFT   Axilla 26.9 29.5 30.9   15cm above elbow crease 24 28.5 28.8   10cm above elbow crease 21.9 31 30.0   5cm above elbow crease 20 34.8 33.0   Elbow crease 20.5 30.5 27.5          20cm above ulnar styloid 19.5 29.5   27.8   15cm above ulnar styloid 18.4 27.4 25.6   10cm above ulnar styloid 16.6 22.9 21.2   5cm above ulnar styloid 14.5 20.5 17.2   Ulnar styloid 13.8 16.6 15.6     Mid-palm   17   18.5   18.8   Across MCPs 18 18.4 18.8   Thumb P1 5.6 5.7 6.0   Index P1 5.1 5.4 5.9   P2 4.7 4.9 4.8   Middle P1 5.2 5.1 5.8   P2 4.6 5 4.8   Ring P1 4.8 4.9 5.4   P2 4 4.6 4.3   Little P1 4 4.2 4.8   P2 3.6 3.8 3.8     TOTALS 272.7 351.7 340.8

## 2024-12-11 ENCOUNTER — OFFICE VISIT (OUTPATIENT)
Dept: OCCUPATIONAL MEDICINE | Facility: HOSPITAL | Age: 89
End: 2024-12-11
Attending: INTERNAL MEDICINE
Payer: MEDICARE

## 2024-12-11 PROCEDURE — 97140 MANUAL THERAPY 1/> REGIONS: CPT

## 2024-12-11 NOTE — PROGRESS NOTES
Diagnosis:        Lymphedema of arm (I89.0)  Referring Provider: Krista Warren  Date of Evaluation:    12/3/2024    Precautions:  lymphedema Next MD visit:   none scheduled  Date of Surgery: n/a     OCCUPATIONAL THERAPY DAILY NOTE      PATIENT SUMMARY   Meena Whitman is a 89 year old female who presents to therapy today with a long history of left UE Lymphedema post Lumpectomy and axillary lymph node dissection in 1986. She had Shingles in June 2024 and continues to have significant pain in her posterior shoulder and upper back. She has had Lymphedema treatment in the past at several different locations. She has bandages in the past, and notes that she is Independent with the   Bandaging technique.  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  Developed left chest wall shingles in 7/24.  Was admitted, went to rehab, remains in significant pain.  Has not improved back to baseline.  Then started developing left arm edema which has been getting worse.  She has been getting lymphedema therapy which is not helping.  Ultrasound done by PCP yesterday did not show DVT.  Left arm continues to be very heavy. Per daughter she had diff with sleeve and wrapping for last few weeks due to post herpetic neuraligia.     Pt. Has a pneumatic lymphedema pump for home   Insurance Primary/Secondary: MEDICARE / BCBS OUT OF STATE # Authorized Visits:20              Next MD/Plan Renewal Date: 12/3/2024  To:3/3/2025          Subjective:     Patient reports she brought her older custom Circaid arm garment to session; is unsure if it would be okay to use for nighttime compression when not in bandages.     Objective:    Today’s Treatment and Response:    Date 12/3/2024 12/5/2024   12/6/2024   12/9/2024  12/11/2024    Visit # 1/20 Visit # 2/20 Visit # 3/20 Visit # 4/20 Visit # 5/20   Manual Therapy (0 minutes)  MLD:    Compression:     Manual Therapy 55 min    Pt. Instructed in deep diaphragmatic breathing    Performed MLD  to left upper quadrant with pt. On her right side. Pt. Unable to lay in supine due to the irritation of the shingles on her left upper back.    Evaluated velcro closure wrap, that does not fit properly.     Applied mollelast around the hand and fingers, cotton liner, rosidal soft hand to upper arm and 2 short stretch bandages size 8 and 10. Instructed pt. To keep the bandage system on until her next appointment      Manual Therapy  55 min    Bandage system removed     Performed MLD  to left upper quadrant with pt. On her right side. Pt. Unable to lay in supine due to the irritation of the shingles on her left upper back.    Re applied bandage system     Applied mollelast around the hand and fingers, cotton liner,   cellona padded over the dorsum of the hand and wrist,rosidal soft hand to upper arm and 2 short stretch bandages ( replaced the 8 with size 6) and size 10. Care taken to wrap less tightly around the proximal upper arm.     Cut and issued pink foam for grasp and lymphatic massage for hand and digits.   Pt. May benefit from replacing the mollelast with a proper fitting compression glove.  Manual Therapy  30 min    Pt with delayed arrival d/t transportation issues.     Arrived wearing older RTW Juzo 20-30mmHg compression sleeve.     Discussed available time remaining for today's session.   [place] decision made to re-measure pt's limb to confirm reduction of volume.     Volume re-measurement.     Discussed pt's remaining high volume and lymphedema density in limb. Advised of need for further compression bandaging to reduce volume and improve health of tissue. Pt in agreement.      Applied Left UE compression bandage system.      COMPRESSION: Mollelast wrap over hand/fingers; cotton liner; cellona padding over dorsum of hand/wrist; Rosidal soft hand to axilla; 2 short stretch compression bandages: 1, 6cm, 1, 10cm    Manual Therapy  75 min    Arrived wearing bandage system applied at last session.     Removed and re-rolled Rosidal /bandages. Completed skin hygiene and care.     Provided new liner for use under bandages; advised in how to launder prior liner. Provided new Mollelast.     Left upper quadrant manual lymph drainage in Right sidelying.     Donned older custom Circaid arm garment. Garment fitting pt's limb girth, h/e straps too long over forearm and Velcro closures worn. Advised pt that it still would be permissible to wear once she removes her bandages/wear over night as needed. With Velcro closures being worn and curled, provided pt with stockinet over sleeve to assist with keeping garment closed. Instruction provided in how to pull over Velcro-closure garment.     Applied Left UE compression bandage system.     COMPRESSION: Mollelast wrap over hand/fingers; cotton liner; cellona padding over dorsum of hand/wrist; Rosidal soft hand to axilla; 2 short stretch compression bandages: 1, 6cm, 1, 10cm     There Ex ( 0 minutes):           Self-Care  (15 minutes):   Management/Education: Explained Lymphedema diagnosis; informed patient of lymphedema precautions and risk reduction practices, and importance of skin care. Discussed and demonstrated bandaging and compression options. Discussed the possibility of getting a velcro closure wrap to reduce her left UE volume            Observation:     12/11/2024 Reducing lymphedema volume and density over Left UE, more distally than proximally. Lymphedema over dorsum of hand resolved.   Left UE:  Proximal 1/3 of upper arm is boggy; distal 2/3 is densely boggy, pitting with fair superficial tissue mobility. Elbow is densely boggy, pitting over medial surface; boggy over lateral surface.  Proximal 1/3 of forearm is densely boggy to boggy; distal 2/3 is boggy to slightly boggy. Dorsum of hand is supple.     Right UE;  soft and supple throughout UE    Stemmer's Sign: negative     Measurements: None taken       Assessment:     Pt continuing to demonstrate progress towards reduction of Left UE lymphedema volume and density. Highly compliant with remaining in bandages. Although pt's current Circaid is older/worn and forearm straps are too long at this time, it is acceptable to wear for nighttime use vs not having any compression on at all.     Goals:     1 Pt will be independent in decongestive exercises.  2 Pt will be independent in self-manual lymph drainage.  3 Pt will tolerate left  UE short stretch compression bandaging/velcro reduction wrap  for 20-23 hours. ACHIEVED   4 Pt/Caregiver will be independent in left  UE short stretch compression bandaging/velcro closure wrap  5 Reduce left  UE lymphedema volume by 25cm to allow pt to wear clothes more comfortably and reach with ease .  6 Reduce left  UE lymphedema density to mildly boggy  to reduce infection risk.  7 Pt will be independent in use of compression garments, self-manual lymph drainage, decongestive exercises and lymphedema precautions for life-long self-management of lymphedema.   8. Improve lymphedema life impact score from       Plan:   Patient will be seen for 2-3 x/week or a total of 20 visits over a 90 day period.        Charges: manual therapy x 5  Total Timed Treatment: 75 min  Total Treatment Time: 80 min         UE Circumferential Measurements (cm) 12/3/2024   Right 12/3/2024  Left  12/9/2024   LEFT   Axilla 26.9 29.5 30.9   15cm above elbow crease 24 28.5 28.8   10cm above elbow crease 21.9 31 30.0   5cm above elbow crease 20 34.8 33.0   Elbow crease 20.5 30.5 27.5          20cm above ulnar styloid 19.5 29.5   27.8   15cm above ulnar styloid 18.4 27.4 25.6   10cm above ulnar styloid 16.6 22.9 21.2   5cm above ulnar styloid 14.5  20.5 17.2   Ulnar styloid 13.8 16.6 15.6     Mid-palm   17   18.5   18.8   Across MCPs 18 18.4 18.8   Thumb P1 5.6 5.7 6.0   Index P1 5.1 5.4 5.9   P2 4.7 4.9 4.8   Middle P1 5.2 5.1 5.8   P2 4.6 5 4.8   Ring P1 4.8 4.9 5.4   P2 4 4.6 4.3   Little P1 4 4.2 4.8   P2 3.6 3.8 3.8     TOTALS 272.7 351.7 340.8

## 2024-12-12 ENCOUNTER — HOSPITAL ENCOUNTER (OUTPATIENT)
Dept: CT IMAGING | Facility: HOSPITAL | Age: 89
Discharge: HOME | End: 2024-12-12
Attending: INTERNAL MEDICINE
Payer: MEDICARE

## 2024-12-12 DIAGNOSIS — Z17.0 MALIGNANT NEOPLASM OF OVERLAPPING SITES OF RIGHT BREAST IN FEMALE, ESTROGEN RECEPTOR POSITIVE (HCC): ICD-10-CM

## 2024-12-12 DIAGNOSIS — C50.811 MALIGNANT NEOPLASM OF OVERLAPPING SITES OF RIGHT BREAST IN FEMALE, ESTROGEN RECEPTOR POSITIVE (HCC): ICD-10-CM

## 2024-12-12 DIAGNOSIS — I89.0 LYMPHEDEMA OF ARM: ICD-10-CM

## 2024-12-12 PROCEDURE — 71260 CT THORAX DX C+: CPT | Performed by: INTERNAL MEDICINE

## 2024-12-13 ENCOUNTER — TELEPHONE (OUTPATIENT)
Dept: INTERNAL MEDICINE CLINIC | Facility: CLINIC | Age: 89
End: 2024-12-13

## 2024-12-13 DIAGNOSIS — I50.32 CHRONIC HEART FAILURE WITH PRESERVED EJECTION FRACTION (HCC): ICD-10-CM

## 2024-12-13 NOTE — TELEPHONE ENCOUNTER
[facility] notes in Care Everywhere. CT results in epic from .    Last OV relevant to medication: 11/25/24  Last refill date: 11/18/24 #30/refills: 0  When pt was asked to return for OV: as needed/routine care  Upcoming appt/reason:   Future Appointments   Date Time Provider Department Center   12/16/2024  2:30 PM Dari Wall, OT [facility]   12/18/2024  2:00 PM Alessandra Malone, OT [facility]   12/20/2024 10:00 AM Alessandra Malone, OT [facility]   12/26/2024  9:25 AM Alessandra Malone, OT [facility]   4/1/2025  1:15 PM Krista Warren MD NP [facility]   4/1/2025  1:30 PM NP [place] RN1 NP [facility]   4/3/2025  1:00 PM Bonnie Danielson MD [facility]   Was pt informed of any over due labs: utd- cards ordered bmp for 2 weeks  Lab Results   Component Value Date    GLU 96 11/25/2024    BUN 29 (H) 11/25/2024    BUNCREA 39.0 (H) 04/12/2021    CREATSERUM 0.76 11/25/2024    ANIONGAP 6 11/25/2024    GFR 76 02/27/2017    GFRNAA 62 07/20/2022    GFRAA 71 07/20/2022    CA 10.2 11/25/2024    OSMOCALC 294 11/25/2024    ALKPHO 84 11/14/2024    AST 25 11/14/2024    ALT 31 11/14/2024    BILT 0.4 11/14/2024    TP 6.3 11/14/2024    ALB 4.0 11/14/2024    GLOBULIN 2.3 11/14/2024     11/25/2024    K 4.8 11/25/2024     11/25/2024    CO2 26.0 11/25/2024

## 2024-12-13 NOTE — TELEPHONE ENCOUNTER
Patient has several questions for Dr Danielson.  She said she saw Dr Hart last week who is the cardiologist Dr Danielson recommended.  Dr Hart said she should continue to take the same dosage of furosemide that Dr Danielson had prescribed.  Would Dr Danielson please refill the prescription?  She also wants to know if Dr Danielson received the office visit notes from that appointment.    Patient wants to know if Dr Aviles sent the results of the CT scan she ordered to Dr Danielson.      Please advise.  Thank you!

## 2024-12-16 ENCOUNTER — OFFICE VISIT (OUTPATIENT)
Dept: OCCUPATIONAL MEDICINE | Facility: HOSPITAL | Age: 89
End: 2024-12-16
Attending: INTERNAL MEDICINE
Payer: MEDICARE

## 2024-12-16 PROCEDURE — 97140 MANUAL THERAPY 1/> REGIONS: CPT

## 2024-12-16 RX ORDER — FUROSEMIDE 20 MG/1
20 TABLET ORAL DAILY PRN
Qty: 90 TABLET | Refills: 1 | Status: SHIPPED | OUTPATIENT
Start: 2024-12-16

## 2024-12-16 NOTE — TELEPHONE ENCOUNTER
Called patient.   All questions answered.  Will need to f/u with hem/onc  Refilled Furosemide 20 mg daily

## 2024-12-17 NOTE — PROGRESS NOTES
Diagnosis:        Lymphedema of arm (I89.0)  Referring Provider: Krista Warren  Date of Evaluation:    12/3/2024    Precautions:  lymphedema Next MD visit:   none scheduled  Date of Surgery: n/a     OCCUPATIONAL THERAPY DAILY NOTE      PATIENT SUMMARY   Meena Whitman is a 89 year old female who presents to therapy today with a long history of left UE Lymphedema post Lumpectomy and axillary lymph node dissection in 1986. She had Shingles in June 2024 and continues to have significant pain in her posterior shoulder and upper back. She has had Lymphedema treatment in the past at several different locations. She has bandages in the past, and notes that she is Independent with the   Bandaging technique.  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  Developed left chest wall shingles in 7/24.  Was admitted, went to rehab, remains in significant pain.  Has not improved back to baseline.  Then started developing left arm edema which has been getting worse.  She has been getting lymphedema therapy which is not helping.  Ultrasound done by PCP yesterday did not show DVT.  Left arm continues to be very heavy. Per daughter she had diff with sleeve and wrapping for last few weeks due to post herpetic neuraligia.     Pt. Has a pneumatic lymphedema pump for home   Insurance Primary/Secondary: MEDICARE / BCBS OUT OF STATE # Authorized Visits:20              Next MD/Plan Renewal Date: 12/3/2024  To:3/3/2025          Subjective:     *Patient reports her overall pain in her area of shingles has increased to 6/10 when any touch is applied to the area. Reports she noticed this after her CT scan.   *Reports change in application of Left bandage  system at last session resulted in pain in area of shingles along with a \"bulge\" at the top of her upper arm (therapist placed bandages only mid-way up upper arm d/t pt's report that she felt full coverage of her upper arm contributed to her shingles pain).  *Reports that after she removed her bandages for her scan she wore her Velcro-closure garment for about 1 1/2 days. Feels she developed more shingles pain, so transitioned into her RTW sleeve. Today, she deferred all compression as her shingles pain was higher.    Objective:    Today’s Treatment and Response:   Date 12/3/2024 12/5/2024   12/6/2024   12/9/2024  12/11/2024  12/16/2024    Visit # 1/20 Visit # 2/20 Visit # 3/20 Visit # 4/20 Visit # 5/20 Visit # 6/20   Manual Therapy (0 minutes)  MLD:    Compression:     Manual Therapy 55 min    Pt. Instructed in deep diaphragmatic breathing    Performed MLD  to left upper quadrant with pt. On her right side. Pt. Unable to lay in supine due to the irritation of the shingles on her left upper back.    Evaluated velcro closure wrap, that does not fit properly.     Applied mollelast around the hand and fingers, cotton liner, rosidal soft hand to upper arm and 2 short stretch bandages size 8 and 10. Instructed pt. To keep the bandage system on until her next appointment      Manual Therapy  55 min    Bandage system removed     Performed MLD  to left upper quadrant with pt. On her right side. Pt. Unable to lay in supine due to the irritation of the shingles on her left upper back.    Re applied bandage system     Applied mollelast around the hand and fingers, cotton liner,   cellona padded over the dorsum of the hand and wrist,rosidal soft hand to upper arm and 2 short stretch bandages ( replaced the 8 with size 6) and size 10. Care taken to wrap less tightly around the proximal upper arm.     Cut and issued pink foam for grasp and lymphatic massage for hand and digits.   Pt. May benefit from replacing the mollelast with  a proper fitting compression glove.  Manual Therapy  30 min    Pt with delayed arrival d/t transportation issues.     Arrived wearing older RTW Juzo 20-30mmHg compression sleeve.     Discussed available time remaining for today's session.   [place] decision made to re-measure pt's limb to confirm reduction of volume.     Volume re-measurement.     Discussed pt's remaining high volume and lymphedema density in limb. Advised of need for further compression bandaging to reduce volume and improve health of tissue. Pt in agreement.      Applied Left UE compression bandage system.     COMPRESSION: Mollelast wrap over hand/fingers; cotton liner; cellona padding over dorsum of hand/wrist; Rosidal soft hand to axilla; 2 short stretch compression bandages: 1, 6cm, 1, 10cm    Manual Therapy  75 min    Arrived wearing bandage system applied at last session.     Removed and re-rolled Rosidal /bandages. Completed skin hygiene and care.     Provided new liner for use under bandages; advised in how to launder prior liner. Provided new Mollelast.     Left upper quadrant manual lymph drainage in Right sidelying.     Donned older custom Circaid arm garment. Garment fitting pt's limb girth, h/e straps too long over forearm and Velcro closures worn. Advised pt that it still would be permissible to wear once she removes her bandages/wear over night as needed. With Velcro closures being worn and curled, provided pt with stockinet over sleeve to assist with keeping garment closed. Instruction provided in how to pull over Velcro-closure garment.     Applied Left UE compression bandage system.     COMPRESSION: Mollelast wrap over hand/fingers; cotton liner; cellona padding over dorsum of hand/wrist; Rosidal soft hand to axilla; 2 short stretch compression bandages: 1, 6cm, 1, 10cm   Manual Therapy  55 min    Completed deep abdominal breathing and clearing of Right axillary lymph node cluster in sitting, then transitioned to Right sidelying.      Left upper quadrant manual lymph drainage in Right sidelying with extended focus over limb    Applied Left UE bandage system extending system over entire upper arm.     COMPRESSION: Mollelast wrap over hand/fingers; cotton liner; cellona padding over dorsum of hand/wrist; Rosidal soft hand to axilla; 2 short stretch compression bandages: 1, 6cm, 1, 10cm                                                       There Ex ( 0 minutes):            Self-Care  (15 minutes):   Management/Education: Explained Lymphedema diagnosis; informed patient of lymphedema precautions and risk reduction practices, and importance of skin care. Discussed and demonstrated bandaging and compression options. Discussed the possibility of getting a velcro closure wrap to reduce her left UE volume             Observation:     12/16/2024: Increased volume over entire Left upper arm/elbow/forearm with less volume increase in hand.   Left UE:  Proximal 1/3 of upper arm is boggy; distal 2/3 is densely boggy, pitting with fair superficial tissue mobility. Elbow is densely boggy, pitting. Proximal 3/4 of forearm is densely boggy to boggy; distal 1/4 is boggy to slightly boggy. Dorsum of hand is supple.     Right UE;  soft and supple throughout UE    Stemmer's Sign: negative     Measurements: None taken       Assessment:     Unfortunately, presenting with a rise in Left UE lymphedema volume and density as compared to last session. Fluctuations in pain caused by shingles impacting pt's tolerance to limb compression.     Goals:     1 Pt will be independent in decongestive exercises.  2 Pt will be independent in self-manual lymph drainage.  3 Pt will tolerate left  UE short stretch compression bandaging/velcro reduction wrap  for 20-23 hours. ACHIEVED   4 Pt/Caregiver will be independent in left  UE short stretch compression bandaging/velcro closure wrap  5 Reduce left  UE lymphedema volume by 25cm to allow pt to wear clothes more comfortably and reach  with ease .  6 Reduce left  UE lymphedema density to mildly boggy  to reduce infection risk.  7 Pt will be independent in use of compression garments, self-manual lymph drainage, decongestive exercises and lymphedema precautions for life-long self-management of lymphedema.   8. Improve lymphedema life impact score from       Plan:   Patient will be seen for 2-3 x/week or a total of 20 visits over a 90 day period.        Charges: manual therapy x 4  Total Timed Treatment: 55 min  Total Treatment Time: 60 min         UE Circumferential Measurements (cm) 12/3/2024   Right 12/3/2024  Left  12/9/2024   LEFT   Axilla 26.9 29.5 30.9   15cm above elbow crease 24 28.5 28.8   10cm above elbow crease 21.9 31 30.0   5cm above elbow crease 20 34.8 33.0   Elbow crease 20.5 30.5 27.5          20cm above ulnar styloid 19.5 29.5   27.8   15cm above ulnar styloid 18.4 27.4 25.6   10cm above ulnar styloid 16.6 22.9 21.2   5cm above ulnar styloid 14.5 20.5 17.2   Ulnar styloid 13.8 16.6 15.6     Mid-palm   17   18.5   18.8   Across MCPs 18 18.4 18.8   Thumb P1 5.6 5.7 6.0   Index P1 5.1 5.4 5.9   P2 4.7 4.9 4.8   Middle P1 5.2 5.1 5.8   P2 4.6 5 4.8   Ring P1 4.8 4.9 5.4   P2 4 4.6 4.3   Little P1 4 4.2 4.8   P2 3.6 3.8 3.8     TOTALS 272.7 351.7 340.8

## 2024-12-18 ENCOUNTER — OFFICE VISIT (OUTPATIENT)
Dept: OCCUPATIONAL MEDICINE | Facility: HOSPITAL | Age: 89
End: 2024-12-18
Attending: STUDENT IN AN ORGANIZED HEALTH CARE EDUCATION/TRAINING PROGRAM
Payer: MEDICARE

## 2024-12-18 ENCOUNTER — APPOINTMENT (OUTPATIENT)
Dept: OCCUPATIONAL MEDICINE | Facility: HOSPITAL | Age: 89
End: 2024-12-18
Attending: INTERNAL MEDICINE
Payer: MEDICARE

## 2024-12-18 PROCEDURE — 97140 MANUAL THERAPY 1/> REGIONS: CPT

## 2024-12-18 NOTE — PROGRESS NOTES
Diagnosis:        Lymphedema of arm (I89.0)  Referring Provider: Krista Warren  Date of Evaluation:    12/3/2024    Precautions:  lymphedema Next MD visit:   none scheduled  Date of Surgery: n/a     OCCUPATIONAL THERAPY DAILY NOTE      PATIENT SUMMARY   Meena Whitman is a 89 year old female who presents to therapy today with a long history of left UE Lymphedema post Lumpectomy and axillary lymph node dissection in 1986. She had Shingles in June 2024 and continues to have significant pain in her posterior shoulder and upper back. She has had Lymphedema treatment in the past at several different locations. She has bandages in the past, and notes that she is Independent with the   Bandaging technique.  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  Developed left chest wall shingles in 7/24.  Was admitted, went to rehab, remains in significant pain.  Has not improved back to baseline.  Then started developing left arm edema which has been getting worse.  She has been getting lymphedema therapy which is not helping.  Ultrasound done by PCP yesterday did not show DVT.  Left arm continues to be very heavy. Per daughter she had diff with sleeve and wrapping for last few weeks due to post herpetic neuraligia.     Pt. Has a pneumatic lymphedema pump for home   Insurance Primary/Secondary: MEDICARE / BCBS OUT OF STATE # Authorized Visits:20              Next MD/Plan Renewal Date: 12/3/2024  To:3/3/2025          Subjective:     *Patient reports that when her bandage system came just past her elbow, it was actually more painful  Pain    6/10 posterior shoulder and sternum   Objective:    Today’s Treatment and Response:   Date 12/3/2024  12/5/2024 12/6/2024 12/9/2024 12/11/2024 12/16/2024 12/18/2024     Visit # 1/20 Visit # 2/20 Visit # 3/20 Visit # 4/20 Visit # 5/20 Visit # 6/20 Visit # 7/20   Manual Therapy (0 minutes)  MLD:    Compression:     Manual Therapy 55 min    Pt. Instructed in deep diaphragmatic breathing    Performed MLD  to left upper quadrant with pt. On her right side. Pt. Unable to lay in supine due to the irritation of the shingles on her left upper back.    Evaluated velcro closure wrap, that does not fit properly.     Applied mollelast around the hand and fingers, cotton liner, rosidal soft hand to upper arm and 2 short stretch bandages size 8 and 10. Instructed pt. To keep the bandage system on until her next appointment      Manual Therapy  55 min    Bandage system removed     Performed MLD  to left upper quadrant with pt. On her right side. Pt. Unable to lay in supine due to the irritation of the shingles on her left upper back.    Re applied bandage system     Applied mollelast around the hand and fingers, cotton liner,   cellona padded over the dorsum of the hand and wrist,rosidal soft hand to upper arm and 2 short stretch bandages ( replaced the 8 with size 6) and size 10. Care taken to wrap less tightly around the proximal upper arm.     Cut and issued pink foam for grasp and lymphatic massage for hand and digits.   Pt. May benefit from replacing the mollelast with a proper fitting compression glove.  Manual Therapy  30 min    Pt with delayed arrival d/t transportation issues.     Arrived wearing older RTW Juzo 20-30mmHg compression sleeve.     Discussed available time remaining for today's session.   [place] decision made to re-measure pt's limb to confirm reduction of volume.     Volume re-measurement.     Discussed pt's remaining high volume and lymphedema density in limb. Advised of need for further compression bandaging to reduce volume and improve health of tissue. Pt in agreement.      Applied Left UE  compression bandage system.     COMPRESSION: Mollelast wrap over hand/fingers; cotton liner; cellona padding over dorsum of hand/wrist; Rosidal soft hand to axilla; 2 short stretch compression bandages: 1, 6cm, 1, 10cm    Manual Therapy  75 min    Arrived wearing bandage system applied at last session.     Removed and re-rolled Rosidal /bandages. Completed skin hygiene and care.     Provided new liner for use under bandages; advised in how to launder prior liner. Provided new Mollelast.     Left upper quadrant manual lymph drainage in Right sidelying.     Donned older custom Circaid arm garment. Garment fitting pt's limb girth, h/e straps too long over forearm and Velcro closures worn. Advised pt that it still would be permissible to wear once she removes her bandages/wear over night as needed. With Velcro closures being worn and curled, provided pt with stockinet over sleeve to assist with keeping garment closed. Instruction provided in how to pull over Velcro-closure garment.     Applied Left UE compression bandage system.     COMPRESSION: Mollelast wrap over hand/fingers; cotton liner; cellona padding over dorsum of hand/wrist; Rosidal soft hand to axilla; 2 short stretch compression bandages: 1, 6cm, 1, 10cm   Manual Therapy  55 min    Completed deep abdominal breathing and clearing of Right axillary lymph node cluster in sitting, then transitioned to Right sidelying.     Left upper quadrant manual lymph drainage in Right sidelying with extended focus over limb    Applied Left UE bandage system extending system over entire upper arm.     COMPRESSION: Mollelast wrap over hand/fingers; cotton liner; cellona padding over dorsum of hand/wrist; Rosidal soft hand to axilla; 2 short stretch compression bandages: 1, 6cm, 1, 10cm                                                     Manual Therapy  55 min    Completed deep abdominal breathing and clearing of Right axillary lymph node cluster in sitting, then transitioned  to Right sidelying.     Left upper quadrant manual lymph drainage in Right sidelying with extended focus over limb    Applied Left UE bandage system extending system over entire upper arm.     COMPRESSION: Mollelast wrap over hand/fingers; cotton liner; cellona padding over dorsum of hand/wrist; Rosidal soft hand to axilla; 2 short stretch compression bandages: 1, 6cm, 1, 10cm   There Ex ( 0 minutes):             Self-Care  (15 minutes):   Management/Education: Explained Lymphedema diagnosis; informed patient of lymphedema precautions and risk reduction practices, and importance of skin care. Discussed and demonstrated bandaging and compression options. Discussed the possibility of getting a velcro closure wrap to reduce her left UE volume              Observation:     12/16/2024: Increased volume over entire Left upper arm/elbow/forearm with less volume increase in hand.   Left UE:  Proximal 1/3 of upper arm is boggy; distal 2/3 is densely boggy, pitting with fair superficial tissue mobility. Elbow is densely boggy, pitting. Proximal 3/4 of forearm is densely boggy to boggy; distal 1/4 is boggy to slightly boggy. Dorsum of hand is supple.     Right UE;  soft and supple throughout UE    Stemmer's Sign: negative     Measurements: None taken       Assessment:     Pt. Presents with a significant decrease in lymphedema in her left hand, wrist and forearm. She continues to have significant lymphedema at her elbow and distal upper arm. She will benefit from continued bandaging.     Goals:     1 Pt will be independent in decongestive exercises.  2 Pt will be independent in self-manual lymph drainage.  3 Pt will tolerate left  UE short stretch compression bandaging/velcro reduction wrap  for 20-23 hours. ACHIEVED   4 Pt/Caregiver will be independent in left  UE short stretch compression bandaging/velcro closure wrap  5 Reduce left  UE lymphedema volume by 25cm to allow pt to wear clothes more comfortably and reach with ease  .  6 Reduce left  UE lymphedema density to mildly boggy  to reduce infection risk.  7 Pt will be independent in use of compression garments, self-manual lymph drainage, decongestive exercises and lymphedema precautions for life-long self-management of lymphedema.   8. Improve lymphedema life impact score from       Plan:   Patient will be seen for 2-3 x/week or a total of 20 visits over a 90 day period.        Charges: manual therapy x 4  Total Timed Treatment: 55 min  Total Treatment Time: 60 min         UE Circumferential Measurements (cm) 12/3/2024   Right 12/3/2024  Left  12/9/2024   LEFT   Axilla 26.9 29.5 30.9   15cm above elbow crease 24 28.5 28.8   10cm above elbow crease 21.9 31 30.0   5cm above elbow crease 20 34.8 33.0   Elbow crease 20.5 30.5 27.5          20cm above ulnar styloid 19.5 29.5   27.8   15cm above ulnar styloid 18.4 27.4 25.6   10cm above ulnar styloid 16.6 22.9 21.2   5cm above ulnar styloid 14.5 20.5 17.2   Ulnar styloid 13.8 16.6 15.6     Mid-palm   17   18.5   18.8   Across MCPs 18 18.4 18.8   Thumb P1 5.6 5.7 6.0   Index P1 5.1 5.4 5.9   P2 4.7 4.9 4.8   Middle P1 5.2 5.1 5.8   P2 4.6 5 4.8   Ring P1 4.8 4.9 5.4   P2 4 4.6 4.3   Little P1 4 4.2 4.8   P2 3.6 3.8 3.8     TOTALS 272.7 351.7 340.8

## 2024-12-20 ENCOUNTER — TELEPHONE (OUTPATIENT)
Age: 89
End: 2024-12-20

## 2024-12-20 ENCOUNTER — TELEPHONE (OUTPATIENT)
Dept: INTERNAL MEDICINE CLINIC | Facility: CLINIC | Age: 89
End: 2024-12-20

## 2024-12-20 ENCOUNTER — OFFICE VISIT (OUTPATIENT)
Dept: OCCUPATIONAL MEDICINE | Facility: HOSPITAL | Age: 89
End: 2024-12-20
Attending: STUDENT IN AN ORGANIZED HEALTH CARE EDUCATION/TRAINING PROGRAM
Payer: MEDICARE

## 2024-12-20 DIAGNOSIS — M54.41 RIGHT-SIDED LOW BACK PAIN WITH RIGHT-SIDED SCIATICA, UNSPECIFIED CHRONICITY: Primary | ICD-10-CM

## 2024-12-20 DIAGNOSIS — M25.551 RIGHT HIP PAIN: ICD-10-CM

## 2024-12-20 PROCEDURE — 97140 MANUAL THERAPY 1/> REGIONS: CPT

## 2024-12-20 NOTE — TELEPHONE ENCOUNTER
Patient wants to know if she can have physical therapy because she has pain from laying on her side. Please call her after 12pm today.

## 2024-12-20 NOTE — TELEPHONE ENCOUNTER
Signed order form and last office note faxed back to [facility] for lymphadema compression garment

## 2024-12-20 NOTE — TELEPHONE ENCOUNTER
I think we may need a little more info- where is she having pain from laying? I can call pt to inquire after 12pm per pt request, is it okay to place a referral once speaking to pt or would you need to see her for eval? Thanks!

## 2024-12-20 NOTE — TELEPHONE ENCOUNTER
LM for daughte.  Lymphedema changes noted but no axillary adenopathy to explain worsening lymphedema.  Pleural left apex nodule that appears healed infection, can continue to follow conservatively.  Also small right renal lesion, subcentimeter, can repeat imaging in 6 months.  They have follow-up with me in 4/25.  Encouraged to call for questions or concerns.  CT results were released with comment in MyChart earlier for patient to read.

## 2024-12-20 NOTE — PROGRESS NOTES
Diagnosis:        Lymphedema of arm (I89.0)  Referring Provider: Krista Warren  Date of Evaluation:    12/3/2024    Precautions:  lymphedema Next MD visit:   none scheduled  Date of Surgery: n/a     OCCUPATIONAL THERAPY DAILY NOTE      PATIENT SUMMARY   Meena Whitman is a 89 year old female who presents to therapy today with a long history of left UE Lymphedema post Lumpectomy and axillary lymph node dissection in 1986. She had Shingles in June 2024 and continues to have significant pain in her posterior shoulder and upper back. She has had Lymphedema treatment in the past at several different locations. She has bandages in the past, and notes that she is Independent with the   Bandaging technique.  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  Developed left chest wall shingles in 7/24.  Was admitted, went to rehab, remains in significant pain.  Has not improved back to baseline.  Then started developing left arm edema which has been getting worse.  She has been getting lymphedema therapy which is not helping.  Ultrasound done by PCP yesterday did not show DVT.  Left arm continues to be very heavy. Per daughter she had diff with sleeve and wrapping for last few weeks due to post herpetic neuraligia.     Pt. Has a pneumatic lymphedema pump for home   Insurance Primary/Secondary: MEDICARE / BCBS OUT OF STATE # Authorized Visits:20              Next MD/Plan Renewal Date: 12/3/2024  To:3/3/2025          Subjective:     *Patient reports that she was able to tolerate her bandage system fairly well. She also reports that she had less pain in her upper arm after the last MLD session  Pain    5/10 posterior shoulder and sternum    Objective:    Today’s Treatment and Response:   Date 12/3/2024 12/5/2024   12/6/2024   12/9/2024  12/11/2024  12/16/2024  12/18/2024   12/20/2024     Visit # 1/20 Visit # 2/20 Visit # 3/20 Visit # 4/20 Visit # 5/20 Visit # 6/20 Visit # 7/20 Visit # 8/20   Manual Therapy (0 minutes)  MLD:    Compression:     Manual Therapy 55 min    Pt. Instructed in deep diaphragmatic breathing    Performed MLD  to left upper quadrant with pt. On her right side. Pt. Unable to lay in supine due to the irritation of the shingles on her left upper back.    Evaluated velcro closure wrap, that does not fit properly.     Applied mollelast around the hand and fingers, cotton liner, rosidal soft hand to upper arm and 2 short stretch bandages size 8 and 10. Instructed pt. To keep the bandage system on until her next appointment      Manual Therapy  55 min    Bandage system removed     Performed MLD  to left upper quadrant with pt. On her right side. Pt. Unable to lay in supine due to the irritation of the shingles on her left upper back.    Re applied bandage system     Applied mollelast around the hand and fingers, cotton liner,   cellona padded over the dorsum of the hand and wrist,rosidal soft hand to upper arm and 2 short stretch bandages ( replaced the 8 with size 6) and size 10. Care taken to wrap less tightly around the proximal upper arm.     Cut and issued pink foam for grasp and lymphatic massage for hand and digits.   Pt. May benefit from replacing the mollelast with a proper fitting compression glove.  Manual Therapy  30 min    Pt with delayed arrival d/t transportation issues.     Arrived wearing older RTW Juzo 20-30mmHg compression sleeve.     Discussed available time remaining for today's session.   [place] decision made to re-measure pt's limb to confirm reduction of volume.     Volume re-measurement.     Discussed pt's remaining high volume and lymphedema density in limb. Advised of need for further compression bandaging to  reduce volume and improve health of tissue. Pt in agreement.      Applied Left UE compression bandage system.     COMPRESSION: Mollelast wrap over hand/fingers; cotton liner; cellona padding over dorsum of hand/wrist; Rosidal soft hand to axilla; 2 short stretch compression bandages: 1, 6cm, 1, 10cm    Manual Therapy  75 min    Arrived wearing bandage system applied at last session.     Removed and re-rolled Rosidal /bandages. Completed skin hygiene and care.     Provided new liner for use under bandages; advised in how to launder prior liner. Provided new Mollelast.     Left upper quadrant manual lymph drainage in Right sidelying.     Donned older custom Circaid arm garment. Garment fitting pt's limb girth, h/e straps too long over forearm and Velcro closures worn. Advised pt that it still would be permissible to wear once she removes her bandages/wear over night as needed. With Velcro closures being worn and curled, provided pt with stockinet over sleeve to assist with keeping garment closed. Instruction provided in how to pull over Velcro-closure garment.     Applied Left UE compression bandage system.     COMPRESSION: Mollelast wrap over hand/fingers; cotton liner; cellona padding over dorsum of hand/wrist; Rosidal soft hand to axilla; 2 short stretch compression bandages: 1, 6cm, 1, 10cm   Manual Therapy  55 min    Completed deep abdominal breathing and clearing of Right axillary lymph node cluster in sitting, then transitioned to Right sidelying.     Left upper quadrant manual lymph drainage in Right sidelying with extended focus over limb    Applied Left UE bandage system extending system over entire upper arm.     COMPRESSION: Mollelast wrap over hand/fingers; cotton liner; cellona padding over dorsum of hand/wrist; Rosidal soft hand to axilla; 2 short stretch compression bandages: 1, 6cm, 1, 10cm                                                     Manual Therapy  55 min    Completed deep abdominal breathing  and clearing of Right axillary lymph node cluster in sitting, then transitioned to Right sidelying.     Left upper quadrant manual lymph drainage in Right sidelying with extended focus over limb    Applied Left UE bandage system extending system over entire upper arm.     COMPRESSION: Mollelast wrap over hand/fingers; cotton liner; cellona padding over dorsum of hand/wrist; Rosidal soft hand to axilla; 2 short stretch compression bandages: 1, 6cm, 1, 10cm Manual Therapy  55 min    Completed deep abdominal breathing and clearing of Right axillary lymph node cluster in sitting, then transitioned to Right sidelying.     Left upper quadrant manual lymph drainage in Right sidelying with extended focus over limb    Applied Left UE bandage system extending system over entire upper arm.     Patient discussed the desire to play the piano, but is concerned that she will not be able to with the mollelast around her digits.    Decided for compression today to try a large isotoner sample glove, with mollelast just around the hand for ease of doffing and cotton liner, rosidal soft from the wrist to the upper arm and 2 short stretch bandages size 8 and 10 from the wrist to the upper arm. Pt. Will remove the glove just to play the piano and then reapply.    There Ex ( 0 minutes):              Self-Care  (15 minutes):   Management/Education: Explained Lymphedema diagnosis; informed patient of lymphedema precautions and risk reduction practices, and importance of skin care. Discussed and demonstrated bandaging and compression options. Discussed the possibility of getting a velcro closure wrap to reduce her left UE volume               Observation:     12/16/2024: Increased volume over entire Left upper arm/elbow/forearm with less volume increase in hand.   Left UE:  Proximal 1/3 of upper arm is boggy; distal 2/3 is densely boggy, pitting with fair superficial tissue mobility. Elbow is densely boggy, pitting. Proximal 3/4 of forearm  is densely boggy to boggy; distal 1/4 is boggy to slightly boggy. Dorsum of hand is supple.     Right UE;  soft and supple throughout UE    Stemmer's Sign: negative     Measurements: None taken       Assessment:     Pt. Presents with an observable decrease in volume in the proximal upper arm. She is gaining increased tolerance to the bandage system. She desires to play the piano again, but will not be able to play with the mollelast wrap on her fingers. She may benefit from an isotoner glove that she can remove just to play the piano, and then reapply.     Goals:     1 Pt will be independent in decongestive exercises.  2 Pt will be independent in self-manual lymph drainage.  3 Pt will tolerate left  UE short stretch compression bandaging/velcro reduction wrap  for 20-23 hours. ACHIEVED   4 Pt/Caregiver will be independent in left  UE short stretch compression bandaging/velcro closure wrap  5 Reduce left  UE lymphedema volume by 25cm to allow pt to wear clothes more comfortably and reach with ease .  6 Reduce left  UE lymphedema density to mildly boggy  to reduce infection risk.  7 Pt will be independent in use of compression garments, self-manual lymph drainage, decongestive exercises and lymphedema precautions for life-long self-management of lymphedema.   8. Improve lymphedema life impact score from       Plan:   Patient will be seen for 2-3 x/week or a total of 20 visits over a 90 day period.        Charges: manual therapy x 4  Total Timed Treatment: 55 min  Total Treatment Time: 60 min         UE Circumferential Measurements (cm) 12/3/2024   Right 12/3/2024  Left  12/9/2024   LEFT   Axilla 26.9 29.5 30.9   15cm above elbow crease 24 28.5 28.8   10cm above elbow crease 21.9 31 30.0   5cm above elbow crease 20 34.8 33.0   Elbow crease 20.5 30.5 27.5          20cm above ulnar styloid 19.5 29.5   27.8   15cm above ulnar styloid 18.4 27.4 25.6   10cm above ulnar styloid 16.6 22.9 21.2   5cm above ulnar styloid 14.5  20.5 17.2   Ulnar styloid 13.8 16.6 15.6     Mid-palm   17   18.5   18.8   Across MCPs 18 18.4 18.8   Thumb P1 5.6 5.7 6.0   Index P1 5.1 5.4 5.9   P2 4.7 4.9 4.8   Middle P1 5.2 5.1 5.8   P2 4.6 5 4.8   Ring P1 4.8 4.9 5.4   P2 4 4.6 4.3   Little P1 4 4.2 4.8   P2 3.6 3.8 3.8     TOTALS 272.7 351.7 340.8

## 2024-12-20 NOTE — TELEPHONE ENCOUNTER
Spoke to pt, since having shingles-which is doing a lot bettert with current therapies- she has been having to sleep on her right side. This increased pressure is causing pain to right lower back/hip with some sciatica symptoms as well. She has PT at [facility] who can treat, they just need referral. Referral placed and pt verbalized understanding. Faxed to PT at [facility] @ [phone].

## 2024-12-22 ENCOUNTER — APPOINTMENT (OUTPATIENT)
Dept: GENERAL RADIOLOGY | Facility: HOSPITAL | Age: 89
End: 2024-12-22
Attending: EMERGENCY MEDICINE
Payer: MEDICARE

## 2024-12-22 ENCOUNTER — HOSPITAL ENCOUNTER (OUTPATIENT)
Facility: HOSPITAL | Age: 89
Setting detail: OBSERVATION
Discharge: NURSING, REHAB OR LONG-TERM CARE | End: 2024-12-26
Attending: EMERGENCY MEDICINE | Admitting: INTERNAL MEDICINE
Payer: MEDICARE

## 2024-12-22 DIAGNOSIS — M43.16 SPONDYLOLISTHESIS OF LUMBAR REGION: ICD-10-CM

## 2024-12-22 DIAGNOSIS — M54.41 ACUTE RIGHT-SIDED LOW BACK PAIN WITH RIGHT-SIDED SCIATICA: Primary | ICD-10-CM

## 2024-12-22 DIAGNOSIS — R26.2 AMBULATORY DYSFUNCTION: ICD-10-CM

## 2024-12-22 LAB
ALBUMIN SERPL-MCNC: 4.2 G/DL (ref 3.2–4.8)
ALBUMIN/GLOB SERPL: 1.8 {RATIO} (ref 1–2)
ALP LIVER SERPL-CCNC: 76 U/L
ALT SERPL-CCNC: 29 U/L
ANION GAP SERPL CALC-SCNC: 8 MMOL/L (ref 0–18)
AST SERPL-CCNC: 32 U/L (ref ?–34)
BASOPHILS # BLD AUTO: 0.06 X10(3) UL (ref 0–0.2)
BASOPHILS NFR BLD AUTO: 1.2 %
BILIRUB SERPL-MCNC: 0.5 MG/DL (ref 0.2–0.9)
BILIRUB UR QL STRIP.AUTO: NEGATIVE
BUN BLD-MCNC: 22 MG/DL (ref 9–23)
CALCIUM BLD-MCNC: 9.9 MG/DL (ref 8.7–10.4)
CHLORIDE SERPL-SCNC: 112 MMOL/L (ref 98–112)
CLARITY UR REFRACT.AUTO: CLEAR
CO2 SERPL-SCNC: 24 MMOL/L (ref 21–32)
COLOR UR AUTO: COLORLESS
CREAT BLD-MCNC: 0.81 MG/DL
EGFRCR SERPLBLD CKD-EPI 2021: 68 ML/MIN/1.73M2 (ref 60–?)
EOSINOPHIL # BLD AUTO: 0.2 X10(3) UL (ref 0–0.7)
EOSINOPHIL NFR BLD AUTO: 4.1 %
ERYTHROCYTE [DISTWIDTH] IN BLOOD BY AUTOMATED COUNT: 13 %
GLOBULIN PLAS-MCNC: 2.4 G/DL (ref 2–3.5)
GLUCOSE BLD-MCNC: 95 MG/DL (ref 70–99)
GLUCOSE UR STRIP.AUTO-MCNC: NORMAL MG/DL
HCT VFR BLD AUTO: 40.8 %
HGB BLD-MCNC: 13.8 G/DL
IMM GRANULOCYTES # BLD AUTO: 0.01 X10(3) UL (ref 0–1)
IMM GRANULOCYTES NFR BLD: 0.2 %
KETONES UR STRIP.AUTO-MCNC: NEGATIVE MG/DL
LEUKOCYTE ESTERASE UR QL STRIP.AUTO: NEGATIVE
LYMPHOCYTES # BLD AUTO: 1.52 X10(3) UL (ref 1–4)
LYMPHOCYTES NFR BLD AUTO: 31.4 %
MCH RBC QN AUTO: 32.5 PG (ref 26–34)
MCHC RBC AUTO-ENTMCNC: 33.8 G/DL (ref 31–37)
MCV RBC AUTO: 96 FL
MONOCYTES # BLD AUTO: 0.39 X10(3) UL (ref 0.1–1)
MONOCYTES NFR BLD AUTO: 8.1 %
NEUTROPHILS # BLD AUTO: 2.66 X10 (3) UL (ref 1.5–7.7)
NEUTROPHILS # BLD AUTO: 2.66 X10(3) UL (ref 1.5–7.7)
NEUTROPHILS NFR BLD AUTO: 55 %
NITRITE UR QL STRIP.AUTO: NEGATIVE
OSMOLALITY SERPL CALC.SUM OF ELEC: 301 MOSM/KG (ref 275–295)
PH UR STRIP.AUTO: 6 [PH] (ref 5–8)
PLATELET # BLD AUTO: 186 10(3)UL (ref 150–450)
POTASSIUM SERPL-SCNC: 4.4 MMOL/L (ref 3.5–5.1)
PROT SERPL-MCNC: 6.6 G/DL (ref 5.7–8.2)
PROT UR STRIP.AUTO-MCNC: NEGATIVE MG/DL
RBC # BLD AUTO: 4.25 X10(6)UL
RBC UR QL AUTO: NEGATIVE
SODIUM SERPL-SCNC: 144 MMOL/L (ref 136–145)
SP GR UR STRIP.AUTO: 1.01 (ref 1–1.03)
UROBILINOGEN UR STRIP.AUTO-MCNC: NORMAL MG/DL
WBC # BLD AUTO: 4.8 X10(3) UL (ref 4–11)

## 2024-12-22 PROCEDURE — 72110 X-RAY EXAM L-2 SPINE 4/>VWS: CPT | Performed by: EMERGENCY MEDICINE

## 2024-12-22 PROCEDURE — 73502 X-RAY EXAM HIP UNI 2-3 VIEWS: CPT | Performed by: EMERGENCY MEDICINE

## 2024-12-22 PROCEDURE — 99223 1ST HOSP IP/OBS HIGH 75: CPT | Performed by: INTERNAL MEDICINE

## 2024-12-22 RX ORDER — HEPARIN SODIUM 5000 [USP'U]/ML
5000 INJECTION, SOLUTION INTRAVENOUS; SUBCUTANEOUS EVERY 8 HOURS SCHEDULED
Status: DISCONTINUED | OUTPATIENT
Start: 2024-12-22 | End: 2024-12-26

## 2024-12-22 RX ORDER — METHOCARBAMOL 500 MG/1
500 TABLET, FILM COATED ORAL 2 TIMES DAILY
Status: DISCONTINUED | OUTPATIENT
Start: 2024-12-22 | End: 2024-12-26

## 2024-12-22 RX ORDER — HYDROCODONE BITARTRATE AND ACETAMINOPHEN 5; 325 MG/1; MG/1
2 TABLET ORAL EVERY 4 HOURS PRN
Status: DISCONTINUED | OUTPATIENT
Start: 2024-12-22 | End: 2024-12-26

## 2024-12-22 RX ORDER — POLYETHYLENE GLYCOL 3350 17 G/17G
17 POWDER, FOR SOLUTION ORAL DAILY PRN
Status: DISCONTINUED | OUTPATIENT
Start: 2024-12-22 | End: 2024-12-26

## 2024-12-22 RX ORDER — ONDANSETRON 2 MG/ML
4 INJECTION INTRAMUSCULAR; INTRAVENOUS EVERY 6 HOURS PRN
Status: DISCONTINUED | OUTPATIENT
Start: 2024-12-22 | End: 2024-12-26

## 2024-12-22 RX ORDER — ACETAMINOPHEN 500 MG
1000 TABLET ORAL 3 TIMES DAILY
Status: DISCONTINUED | OUTPATIENT
Start: 2024-12-22 | End: 2024-12-26

## 2024-12-22 RX ORDER — TEMAZEPAM 15 MG/1
15 CAPSULE ORAL NIGHTLY PRN
Status: DISCONTINUED | OUTPATIENT
Start: 2024-12-22 | End: 2024-12-26

## 2024-12-22 RX ORDER — SENNOSIDES 8.6 MG
17.2 TABLET ORAL NIGHTLY PRN
Status: DISCONTINUED | OUTPATIENT
Start: 2024-12-22 | End: 2024-12-26

## 2024-12-22 RX ORDER — METHOCARBAMOL 500 MG/1
500 TABLET, FILM COATED ORAL 2 TIMES DAILY
Status: DISCONTINUED | OUTPATIENT
Start: 2024-12-22 | End: 2024-12-22

## 2024-12-22 RX ORDER — TRAMADOL HYDROCHLORIDE 50 MG/1
50 TABLET ORAL ONCE
Status: COMPLETED | OUTPATIENT
Start: 2024-12-22 | End: 2024-12-22

## 2024-12-22 RX ORDER — HYDROCODONE BITARTRATE AND ACETAMINOPHEN 5; 325 MG/1; MG/1
1 TABLET ORAL EVERY 4 HOURS PRN
Status: DISCONTINUED | OUTPATIENT
Start: 2024-12-22 | End: 2024-12-26

## 2024-12-22 RX ORDER — BISACODYL 10 MG
10 SUPPOSITORY, RECTAL RECTAL
Status: DISCONTINUED | OUTPATIENT
Start: 2024-12-22 | End: 2024-12-26

## 2024-12-22 RX ORDER — HYDROCODONE BITARTRATE AND ACETAMINOPHEN 5; 325 MG/1; MG/1
1 TABLET ORAL ONCE
Status: COMPLETED | OUTPATIENT
Start: 2024-12-22 | End: 2024-12-22

## 2024-12-22 RX ORDER — SODIUM PHOSPHATE, DIBASIC AND SODIUM PHOSPHATE, MONOBASIC 7; 19 G/230ML; G/230ML
1 ENEMA RECTAL ONCE AS NEEDED
Status: DISCONTINUED | OUTPATIENT
Start: 2024-12-22 | End: 2024-12-26

## 2024-12-22 RX ORDER — ACETAMINOPHEN 325 MG/1
650 TABLET ORAL EVERY 4 HOURS PRN
Status: DISCONTINUED | OUTPATIENT
Start: 2024-12-22 | End: 2024-12-26

## 2024-12-22 RX ORDER — ACETAMINOPHEN 500 MG
500 TABLET ORAL EVERY 4 HOURS PRN
Status: DISCONTINUED | OUTPATIENT
Start: 2024-12-22 | End: 2024-12-26

## 2024-12-22 NOTE — ED PROVIDER NOTES
Patient Seen in: [facility] Emergency Department      History     Chief Complaint   Patient presents with    Back Pain     Stated Complaint: lower back pain    Subjective:   89-year-old female presents with right low back pain.  History of lumbar surgery in the past, history of sciatica in the past.  States in the past couple weeks she has had pain in the right gluteus that radiates down the right lower extremity.  No falls or blunt trauma.  Lives alone, does have Norco but does not take it because it makes her dizzy.  Taking Tylenol with little improvement.  No incontinence or retention.  No midline pain.  No falls.                Objective:     Past Medical History:    Abnormal weight loss    Basal cell carcinoma (BCC) of left side of nose    Breast CA (HCC)    Breast cancer (HCC)    ILC, IDC, LCIS, ALH, DCIS    Cellulitis    COPD (chronic obstructive pulmonary disease) (HCC)    Ductal carcinoma in situ of breast    Exposure to radiation    High blood pressure    Keratoconjunctivitis sicca of both eyes    Lobular carcinoma in situ of breast    Lymph edema    left arm    Pelvic pressure in female    Post covid-19 condition, unspecified    Post herpetic neuralgia    Shingles              Past Surgical History:   Procedure Laterality Date    Cataract Bilateral     Eye surgery      both eyes - trabulectomys 7-8 TIMES    Eye surgery      DETACHED RETINA SURGERY    Glaucoma surgery  07/07/2016    Hernia surgery  10/18/2017    umbilical hernia    Hx breast cancer Left 1986    Injection, w/wo contrast, dx/therapeutic substance, epidural/subarachnoid; lumbar/sacral N/A 03/11/2016    Procedure: LUMBAR EPIDURAL;  Surgeon: Eric Montenegro MD;  Location: [facility] FOR PAIN MANAGEMENT    Injection, w/wo contrast, dx/therapeutic substance, epidural/subarachnoid; lumbar/sacral N/A 04/04/2016    Procedure: LUMBAR EPIDURAL;  Surgeon: Eric Montenegro MD;  Location: [facility] FOR PAIN [facility]NA[facility]    Lumpectomy left  1986     Mammogram, both breasts  07/22/2013    stable results (done in [place])    Needle biopsy right Right 04/2022    IDC, DCIS, LCIS    Other surgical history  06/01/2021    Cystoscopy (Dr. Brand)    Patient documented not to have experienced any of the following events N/A 03/11/2016    Procedure: LUMBAR EPIDURAL;  Surgeon: Eric Montenegro MD;  Location: [facility] FOR PAIN MANAGEMENT    Patient documented not to have experienced any of the following events N/A 04/04/2016    Procedure: LUMBAR EPIDURAL;  Surgeon: Eric Montenegro MD;  Location: [facility] FOR PAIN MANAGEMENT    Patient withough preoperative order for iv antibiotic surgical site infection prophylaxis. N/A 03/11/2016    Procedure: LUMBAR EPIDURAL;  Surgeon: Eric Montenegro MD;  Location: [facility] FOR PAIN [facility]NA[facility]    Patient withough preoperative order for iv antibiotic surgical site infection prophylaxis. N/A 04/04/2016    Procedure: LUMBAR EPIDURAL;  Surgeon: Eric Montenegro MD;  Location: [facility] PAIN MANA[facility]EMENT    Radiation left  1986    Skin surgery Left     Scar to L chin s/p removal of BCC \"years ago.\" Scar C/D/I    Tonsillectomy                  Social History     Socioeconomic History    Marital status:     Number of children: 4   Occupational History    Occupation: Retired   Tobacco Use    Smoking status: Never    Smokeless tobacco: Never   Vaping Use    Vaping status: Never Used   Substance and Sexual Activity    Alcohol use: No     Alcohol/week: 0.0 standard drinks of alcohol    Drug use: No   Other Topics Concern    Caffeine Concern No    Exercise Yes     Comment: 3 times per week, [facility]     Social Drivers of Health     Food Insecurity: No Food Insecurity (10/25/2024)    Food Insecurity     Food Insecurity: Never true   Transportation Needs: No Transportation Needs (10/25/2024)    Transportation Needs     Lack of Transportation: No   Housing Stability: Low Risk  (10/25/2024)    Housing  Stability     Housing Instability: No                  Physical Exam     ED Triage Vitals   BP 12/22/24 0915 127/72   Pulse 12/22/24 0915 64   Resp 12/22/24 0911 16   Temp 12/22/24 0915 98 °F (36.7 °C)   Temp src 12/22/24 0915 Temporal   SpO2 12/22/24 0911 98 %   O2 Device 12/22/24 0911 None (Room air)       Current Vitals:   Vital Signs  BP: 147/59  Pulse: 65  Resp: 18  Temp: 98.1 °F (36.7 °C)  Temp src: Oral  MAP (mmHg): 79    Oxygen Therapy  SpO2: 96 %  O2 Device: None (Room air)  O2 Flow Rate (L/min): 0 L/min  Pulse Oximetry Type: Continuous  Oximetry Probe Site Changed: Yes  Pulse Ox Probe Location: Right hand        Physical Exam  Vitals and nursing note reviewed.   Constitutional:       Appearance: She is not toxic-appearing.   HENT:      Head: Normocephalic.   Cardiovascular:      Rate and Rhythm: Normal rate.      Pulses: Normal pulses.   Pulmonary:      Effort: Pulmonary effort is normal. No respiratory distress.   Abdominal:      General: There is no distension.      Palpations: Abdomen is soft.      Tenderness: There is no abdominal tenderness.   Musculoskeletal:      Cervical back: Neck supple.   Skin:     General: Skin is warm and dry.      Findings: No erythema or rash.   Neurological:      Mental Status: She is alert.      Sensory: No sensory deficit.      Motor: No weakness.      Deep Tendon Reflexes: Reflexes normal.   Psychiatric:         Behavior: Behavior normal.       No midline pain.  Reproducible tenderness in the right piriformis and gluteus region.  Straight leg is positive about 45 degrees on the right.  No midline pain.  No sign anesthesia.  No cancer tension.  No skin changes including rash, cellulitis, crepitus or gangrene, etc.  Reflexes intact.  Dermatomes intact.  Strength intact.    ED Course     Labs Reviewed   COMP METABOLIC PANEL (14) - Abnormal; Notable for the following components:       Result Value    Calculated Osmolality 301 (*)     All other components within normal limits    URINALYSIS WITH CULTURE REFLEX - Abnormal; Notable for the following components:    Urine Color Colorless (*)     All other components within normal limits   CBC WITH DIFFERENTIAL WITH PLATELET                   MDM      XR LUMBAR SPINE (MIN 4 VIEWS) (CPT=72110)    Result Date: 12/22/2024  CONCLUSION:  1. Diffuse osteopenia is noted. 2. Grade 2 anterolisthesis of L4 on L5.   LOCATION:  [facility]   Dictated by (CST): Tanner Haddad MD on 12/22/2024 at 10:40 AM     Finalized by (CST): Tanner Haddad MD on 12/22/2024 at 10:42 AM       XR HIP W OR WO PELVIS 2 OR 3 VIEWS, RIGHT (CPT=73502)    Result Date: 12/22/2024  CONCLUSION:  No acute fracture or dislocation.  LOCATION:  [facility]   Dictated by (CST): Tanner Haddad MD on 12/22/2024 at 10:38 AM     Finalized by (CST): Tanner Haddad MD on 12/22/2024 at 10:39 AM        I independently interpreted the x-ray of the right hip without any obvious signs of acute fracture    Family at bedside helpful to provide information on the history presenting illness    Differential diagnosis includes, but not limited to, fracture, sprain, strain, contusion, sciatica, chronic pain    External chart review demonstrates outpatient hematology oncology visit 2 days ago regarding her worsening lymphedema    89-year-old female with sciatica-like features of the right lower extremity.  Have a hard time getting up and getting to the commode or walking around, in independent living.  Discussed with  Sarita, no way to easily transition her to the [facility] which is more of a skilled nursing facility.  We need to admission to the hospital.  Did not qualify for inpatient.  As she is not a safe dispo home, cannot safely transfer to the commode or walk around.  Family not wwe will have to observe her agreeable to taking her home and caring for her at this time.  Offer better pain control but she gets weak and dizzy and confused therefore this not a good option either.  Discussed with  daughter and son-in-law at bedside.  Would like her admitted.  Discussed Dr. Hernandez, accepts admission, awaiting bed assignment.        Admission disposition: 12/22/2024  2:31 PM           Medical Decision Making      Disposition and Plan     Clinical Impression:  1. Acute right-sided low back pain with right-sided sciatica    2. Ambulatory dysfunction         Disposition:  Admit  12/22/2024  2:31 pm    Follow-up:  No follow-up provider specified.        Medications Prescribed:  Current Discharge Medication List              Supplementary Documentation:         Hospital Problems       Present on Admission  Date Reviewed: 12/16/2024            ICD-10-CM Noted POA    * (Principal) Acute right-sided low back pain with right-sided sciatica M54.41 12/22/2024 Unknown    Ambulatory dysfunction R26.2 12/22/2024 Unknown

## 2024-12-22 NOTE — ED INITIAL ASSESSMENT (HPI)
Pt brought in via EMS complaining of right sided back pain.   Shingles in June of this year and went to nursing home.States she could only lay on right side and about a month ago right side sore and on Friday pain down right leg. States this morning felt like she was going to fall due to pain and took 2 tylenol..states she lives alone. Did not fall and not on blood thinners. A&Ox3 and answering questions approp. At this time.

## 2024-12-22 NOTE — H&P
[facility]     Meena Whitman Patient Status:  Emergency    1933 MRN [id number]   Location [facility] EMERGENCY DEPARTMENT Attending Glynn Webb, DO   Hosp Day # 0 PCP Bonnie Danielson MD     Chief Complaint: acute on chronic low back pain    Subjective:    History of Present Illness:     Meena Whitman is a 89 year old female with pmhx of breast cancer, COPD, HTN, shingles who presents with acute on chronic back pain. She reports she has been limited to sleeping on her R side for the last several months d/t LUE shingle pain and swelling/lymphedema. She has been noting worsening R buttock pain radiating down into her R posterior thigh and calf. Pain worsened over the last few days and she felt unable to continue caring for herself at home. She was afraid to try her norco for pain relief at home as she was concerned it would cause her to be unsteady and at risk for falls. She denies any recent falls, trauma, heavy lifting/straining, fevers/chills, abdominal pain, n/v/d.     History/Other:    Past Medical History:  Past Medical History:    Abnormal weight loss    Basal cell carcinoma (BCC) of left side of nose    Breast CA (HCC)    Breast cancer (HCC)    ILC, IDC, LCIS, ALH, DCIS    Cellulitis    COPD (chronic obstructive pulmonary disease) (HCC)    Ductal carcinoma in situ of breast    Exposure to radiation    High blood pressure    Keratoconjunctivitis sicca of both eyes    Lobular carcinoma in situ of breast    Lymph edema    left arm    Pelvic pressure in female    Post covid-19 condition, unspecified    Post herpetic neuralgia    Shingles     Past Surgical History:   Past Surgical History:   Procedure Laterality Date    Cataract Bilateral     Eye surgery      both eyes - trabulectomys 7-8 TIMES    Eye surgery      DETACHED RETINA SURGERY    Glaucoma surgery  2016    Hernia surgery  10/18/2017    umbilical hernia    Hx breast cancer Left      Injection, w/wo contrast, dx/therapeutic substance, epidural/subarachnoid; lumbar/sacral N/A 03/11/2016    Procedure: LUMBAR EPIDURAL;  Surgeon: Eric Montenegro MD;  Location: [facility] PAIN MANA[facility]    Injection, w/wo contrast, dx/therapeutic substance, epidural/subarachnoid; lumbar/sacral N/A 04/04/2016    Procedure: LUMBAR EPIDURAL;  Surgeon: Eric Montenegro MD;  Location: [facility]IN [facility]    Lumpectomy left  1986    Mammogram, both breasts  07/22/2013    stable results (done in [place])    Needle biopsy right Right 04/2022    IDC, DCIS, LCIS    Other surgical history  06/01/2021    Cystoscopy (Dr. Brand)    Patient documented not to have experienced any of the following events N/A 03/11/2016    Procedure: LUMBAR EPIDURAL;  Surgeon: Eric Montenegro MD;  Location: [facility]IN [facility]    Patient documented not to have experienced any of the following events N/A 04/04/2016    Procedure: LUMBAR EPIDURAL;  Surgeon: Eric Montenegro MD;  Location: [facility] PAIN [facility]    Patient withough preoperative order for iv antibiotic surgical site infection prophylaxis. N/A 03/11/2016    Procedure: LUMBAR EPIDURAL;  Surgeon: Eric Montenegro MD;  Location: [facility] PAIN [facility]    Patient withough preoperative order for iv antibiotic surgical site infection prophylaxis. N/A 04/04/2016    Procedure: LUMBAR EPIDURAL;  Surgeon: Eric Montenegro MD;  Location: [facility] FOR PAIN [facility]    Radiation left  1986    Skin surgery Left     Scar to L chin s/p removal of BCC \"years ago.\" Scar C/D/I    Tonsillectomy        Family History:   Family History   Problem Relation Age of Onset    DCIS Self     LCIS Self     Breast Cancer Self 55    Cancer Mother     Breast Cancer Mother         dx age 70's    Cancer Father     Other (Other) Father     Breast Cancer Daughter         dx age 40     Social History:    reports that she has never smoked. She has never used smokeless tobacco.  She reports that she does not drink alcohol and does not use drugs.     Allergies: Allergies[1]    Medications:  Medications Ordered Prior to Encounter[2]    Review of Systems:   A comprehensive review of systems was completed.    Pertinent positives and negatives noted in the HPI.    Objective:   Physical Exam:    /74   Pulse 68   Temp 98 °F (36.7 °C) (Temporal)   Resp 16   Ht 5' 4\" (1.626 m)   Wt 115 lb (52.2 kg)   SpO2 98%   BMI 19.74 kg/m²   General: No acute distress, Alert  Respiratory: No rhonchi, no wheezes  Cardiovascular: S1, S2. Regular rate and rhythm  Abdomen: Soft, Non-tender, non-distended, positive bowel sounds  Neuro: No new focal deficits; decreased RLE strength (reports as limited by pain)  Extremities: No edema    Results:    Labs:      Labs Last 24 Hours:    Recent Labs   Lab 12/22/24  1322   RBC 4.25   HGB 13.8   HCT 40.8   MCV 96.0   MCH 32.5   MCHC 33.8   RDW 13.0   NEPRELIM 2.66   WBC 4.8   .0       Recent Labs   Lab 12/22/24  1322   GLU 95   BUN 22   CREATSERUM 0.81   EGFRCR 68   CA 9.9   ALB 4.2      K 4.4      CO2 24.0   ALKPHO 76   AST 32   ALT 29   BILT 0.5   TP 6.6       Lab Results   Component Value Date    INR 1.1 04/28/2022       No results for input(s): \"TROP\", \"TROPHS\", \"CK\" in the last 168 hours.    No results for input(s): \"TROP\", \"PBNP\" in the last 168 hours.    No results for input(s): \"PCT\" in the last 168 hours.    Imaging: Imaging data reviewed in Epic.    Assessment & Plan:      #Acute on chronic back pain  #Physical deconditioning   - lumbar XR with anterolisthesis L4, L5  - pain control with scheduled tylenol, norco PRN, low dose robaxin  - PT/OT  - family willing to pay out of pocket for KAREN if recommended  - social work to assist with placement    #HTN  - PTA losartan, amlodipine, labetalol    #Chronic LUE lymphedema d/t shingles    #Hx of breast cancer         Plan of care discussed with pt, pt's family, ED     Mini Hernandez,  DO    Supplementary Documentation:     The 21st Century Cures Act makes medical notes like these available to patients in the interest of transparency. Please be advised this is a medical document. Medical documents are intended to carry relevant information, facts as evident, and the clinical opinion of the practitioner. The medical note is intended as peer to peer communication and may appear blunt or direct. It is written in medical language and may contain abbreviations or verbiage that are unfamiliar.                                       [1]   Allergies  Allergen Reactions    Hydralazine OTHER (SEE COMMENTS)     Headache    Bactrim [Sulfamethoxazole W/Trimethoprim] RASH     Per patient    Metronidazole ITCHING     Metronidazole gel caused irritation in vaginal area.   [2]   Current Facility-Administered Medications on File Prior to Encounter   Medication Dose Route Frequency Provider Last Rate Last Admin    [COMPLETED] iopamidol 76% (ISOVUE-370) injection for power injector  75 mL Intravenous ONCE PRN Krista Warren MD   75 mL at 12/12/24 1902    [COMPLETED] cefTRIAXone (Rocephin) 1 g in sodium chloride 0.9% 100 mL IVPB-ADDV  1 g Intravenous Once Marcy Mccray  mL/hr at 10/24/24 2359 1 g at 10/24/24 2359    [COMPLETED] denosumab (Prolia) 60 MG/ML SUBQ injection 60 mg  60 mg Subcutaneous Once Krista Warren MD   60 mg at 10/01/24 1443     Current Outpatient Medications on File Prior to Encounter   Medication Sig Dispense Refill    furosemide 20 MG Oral Tab Take 1 tablet (20 mg total) by mouth daily as needed (Take if weight is >120 pounds.). 90 tablet 1    TEMAZEPAM 15 MG Oral Cap TAKE 1-2 CAPSULES (15-30 MG TOTAL) BY MOUTH NIGHTLY AS NEEDED FOR SLEEP. 60 capsule 0    HYDROcodone-acetaminophen (NORCO) 5-325 MG Oral Tab Take 1 tablet by mouth every 4 (four) hours as needed for Pain (no more than 2 a day). 60 tablet 0    Naloxone HCl 4 MG/0.1ML Nasal Liquid 4 mg by Intranasal route as needed (May  repeat as needed in other nostril if symptoms persist). If patient remains unresponsive, repeat dose in other nostril 2-5 minutes after first dose. 1 kit 0    [] furosemide 20 MG Oral Tab Take 1 tablet (20 mg total) by mouth daily for 3 days. 3 tablet 0    Vaginal Lubricant (VAGISIL LUBRICANT) Vaginal Gel Place 1 Application vaginally daily. 1 each 2    [] fluconazole 150 MG Oral Tab Take 1 tablet (150 mg total) by mouth once for 1 dose. 1 tablet 0    losartan 100 MG Oral Tab TAKE 1 TABLET (100 MG TOTAL) BY MOUTH DAILY. 90 tablet 2    HYDROcodone-acetaminophen (NORCO) 5-325 MG Oral Tab Take 1 tablet by mouth 3 (three) times a week. Try not to exceed 3000 mg of Tylenol in 24 hours 12 tablet 0    cephALEXin 500 MG Oral Cap Take 1 capsule (500 mg total) by mouth 2 (two) times daily.      Ascorbic Acid (VITAMIN C) 1000 MG Oral Tab Take 1 tablet (1,000 mg total) by mouth daily.      Elastic Bandages & Supports (SKINEEZ COMPRESSION ARM SLEEVE) Does not apply Misc 2 Units daily as needed. 2 each 3    amLODIPine 5 MG Oral Tab Take 1 tablet (5 mg total) by mouth daily. 90 tablet 1    labetalol 100 MG Oral Tab Take 1 tablet (100 mg total) by mouth 2 (two) times daily. 180 tablet 1    estradiol 0.1 MG/GM Vaginal Cream Place 1 g vaginally daily. 42.5 g 3    Vitamin D, Cholecalciferol, 25 MCG (1000 UT) Oral Tab Take 1,000 Units by mouth daily. Take 3 caps po q am 270 tablet 3    acetaZOLAMIDE 250 MG Oral Tab       Multiple Vitamins-Minerals (COMPLETE DAILY/LUTEIN) Oral Tab Take 1 tablet by mouth 3 (three) times daily.

## 2024-12-22 NOTE — ED QUICK NOTES
Orders for admission, patient is aware of plan and ready to go upstairs. Any questions, please call ED RN Diaz at extension [phone].     Patient Covid vaccination status: Fully vaccinated     COVID Test Ordered in ED: None    COVID Suspicion at Admission: N/A    Running Infusions:  None    Mental Status/LOC at time of transport: A&Ox3    Other pertinent information:   CIWA score: N/A   NIH score:  N/A

## 2024-12-22 NOTE — ED QUICK NOTES
Patient on bedpan, this writer with [facility] Марина cleaned patient, changed bedding and placed brief back on. Patient requested pain medication, GINO bruner notified. Patient requested pure wick for comfort. Unfortunately patient's room is a dual [facility] room where wall suction and oxygen is locked in a cabinet, this writer attempted keys and used multiple keys to unlock with no success. Ticket placed. Patient and family understood situation.

## 2024-12-22 NOTE — PLAN OF CARE
Alert and oriented x 4. Vitals stable on room air. Pain managed on PO medications. Denies numbness/tingling. Skin check with JM Connors, see flowsheets. DTV at 10 pm. Last BM 12/21. Tolerating regular diet. Safety precautions in place. PT/OT pending, pain control. Plan of care discussed with patient.

## 2024-12-23 ENCOUNTER — APPOINTMENT (OUTPATIENT)
Dept: OCCUPATIONAL MEDICINE | Facility: HOSPITAL | Age: 89
End: 2024-12-23
Attending: INTERNAL MEDICINE
Payer: MEDICARE

## 2024-12-23 PROCEDURE — 99232 SBSQ HOSP IP/OBS MODERATE 35: CPT | Performed by: INTERNAL MEDICINE

## 2024-12-23 RX ORDER — OXYCODONE HCL 10 MG/1
10 TABLET, FILM COATED, EXTENDED RELEASE ORAL EVERY 12 HOURS
Status: DISCONTINUED | OUTPATIENT
Start: 2024-12-23 | End: 2024-12-26

## 2024-12-23 RX ORDER — LABETALOL 100 MG/1
100 TABLET, FILM COATED ORAL 2 TIMES DAILY
Status: DISCONTINUED | OUTPATIENT
Start: 2024-12-23 | End: 2024-12-26

## 2024-12-23 RX ORDER — PREGABALIN 75 MG/1
75 CAPSULE ORAL 2 TIMES DAILY
Status: ON HOLD | COMMUNITY
End: 2024-12-23

## 2024-12-23 RX ORDER — PREGABALIN 75 MG/1
75 CAPSULE ORAL NIGHTLY
Status: DISCONTINUED | OUTPATIENT
Start: 2024-12-23 | End: 2024-12-24

## 2024-12-23 RX ORDER — LOSARTAN POTASSIUM 100 MG/1
100 TABLET ORAL DAILY
Status: DISCONTINUED | OUTPATIENT
Start: 2024-12-23 | End: 2024-12-26

## 2024-12-23 RX ORDER — KETOROLAC TROMETHAMINE 15 MG/ML
15 INJECTION, SOLUTION INTRAMUSCULAR; INTRAVENOUS ONCE
Status: COMPLETED | OUTPATIENT
Start: 2024-12-23 | End: 2024-12-23

## 2024-12-23 RX ORDER — PREGABALIN 75 MG/1
75 CAPSULE ORAL NIGHTLY
COMMUNITY
End: 2024-12-26

## 2024-12-23 RX ORDER — ACETAZOLAMIDE 250 MG/1
250 TABLET ORAL 2 TIMES DAILY
Status: DISCONTINUED | OUTPATIENT
Start: 2024-12-23 | End: 2024-12-26

## 2024-12-23 RX ORDER — AMLODIPINE BESYLATE 5 MG/1
5 TABLET ORAL DAILY
Status: DISCONTINUED | OUTPATIENT
Start: 2024-12-23 | End: 2024-12-26

## 2024-12-23 NOTE — PLAN OF CARE
Patient A&O X4 on RA. VSS, /IS. SCDs, subQ Heparin. Voiding without difficulty via [facility], [facility] 12/21. Admitted with right sided lower back pain. Denies n/t. Pain controlled with PO medication. PT/OT to eval. Up stand-pivot w/ walker. Reminded to use call light. Plan for dc planning to [facility].

## 2024-12-23 NOTE — PROGRESS NOTES
[facility]   part of [facility]     Hospitalist Progress Note     Meena Whitman Patient Status:  Observation    1933 MRN [id number]   Location [facility] 3SW-A Attending Mini Hernandez,    Hosp Day # 0 PCP Bonnie Danielson MD     Chief Complaint: low back pain    Subjective:     Patient lying in bed, pain not well controlled, but she has not yet had robaxin this morning. Waiting to work with PT. Would like to try spinal injections if possible    Objective:    Review of Systems:   A comprehensive review of systems was completed; pertinent positive and negatives stated in subjective.    Vital signs:  Temp:  [97.3 °F (36.3 °C)-98.2 °F (36.8 °C)] 97.4 °F (36.3 °C)  Pulse:  [64-68] 64  Resp:  [16-18] 18  BP: (121-178)/(59-74) 166/73  SpO2:  [95 %-98 %] 95 %    Physical Exam:    General: No acute distress  Respiratory: No wheezes, no rhonchi  Cardiovascular: S1, S2, regular rate and rhythm  Abdomen: Soft, Non-tender, non-distended, positive bowel sounds  Neuro: No new focal deficits.   Extremities: No edema    Diagnostic Data:    Labs:  Recent Labs   Lab 24  1322   WBC 4.8   HGB 13.8   MCV 96.0   .0       Recent Labs   Lab 24  1322   GLU 95   BUN 22   CREATSERUM 0.81   CA 9.9   ALB 4.2      K 4.4      CO2 24.0   ALKPHO 76   AST 32   ALT 29   BILT 0.5   TP 6.6       Estimated Glomerular Filtration Rate: 68.6 mL/min/1.73m2 (by CKD-EPI based on SCr of 0.81 mg/dL).    No results for input(s): \"TROP\", \"TROPHS\", \"CK\" in the last 168 hours.    No results for input(s): \"PTP\", \"INR\" in the last 168 hours.               Microbiology    No results found for this visit on 24.      Imaging: Reviewed in Epic.    Medications:    amLODIPine  5 mg Oral Daily    labetalol  100 mg Oral BID    losartan  100 mg Oral Daily    acetaZOLAMIDE  250 mg Oral BID    Custom Medication   Both Eyes TID    Custom Medication   Both Eyes QID    Custom Medication   Both Eyes QID     lidocaine-menthol  1 patch Transdermal Daily    lidocaine-menthol  1 patch Transdermal Daily    heparin  5,000 Units Subcutaneous Q8H MICHELA    acetaminophen  1,000 mg Oral TID    methocarbamol  500 mg Oral BID       Assessment & Plan:      #Acute on chronic back pain  #Physical deconditioning   - lumbar XR with anterolisthesis L4, L5  - pain control with scheduled tylenol, norco PRN, low dose robaxin  - PT/OT  - family willing to pay out of pocket for KAREN if recommended  - social work to assist with placement    #HTN  - PTA losartan, amlodipine, labetalol, diagmox     #Chronic LUE lymphedema d/t shingles     #Hx of breast cancer       Mini Hernandez DO    Supplementary Documentation:     Quality:  DVT Mechanical Prophylaxis:        DVT Pharmacologic Prophylaxis   Medication    heparin (Porcine) 5000 UNIT/ML injection 5,000 Units                Code Status: Prior  Colon: External urinary catheter in place  Colon Duration (in days):   Central line:    ALYSSA:     Discharge is dependent on: placement  At this point Ms. Whitman is expected to be discharge to: TBD    The 21st Century Cures Act makes medical notes like these available to patients in the interest of transparency. Please be advised this is a medical document. Medical documents are intended to carry relevant information, facts as evident, and the clinical opinion of the practitioner. The medical note is intended as peer to peer communication and may appear blunt or direct. It is written in medical language and may contain abbreviations or verbiage that are unfamiliar.

## 2024-12-23 NOTE — OCCUPATIONAL THERAPY NOTE
OCCUPATIONAL THERAPY EVALUATION - INPATIENT     Room Number: 372/372-A  Evaluation Date: 12/23/2024  Type of Evaluation: Initial  Presenting Problem: acute on chronic low back pain    Physician Order: IP Consult to Occupational Therapy  Reason for Therapy: ADL/IADL Dysfunction and Discharge Planning    OCCUPATIONAL THERAPY ASSESSMENT   Patient is currently functioning below baseline with toileting, lower body dressing, bed mobility, transfers, dynamic sitting balance, static standing balance, dynamic standing balance, and functional standing tolerance. Prior to admission, patient's baseline is independent for BADLs and transfers.  Patient is requiring grossly mod assist as a result of the following impairments: decreased functional strength, decreased functional reach, decreased endurance, pain, impaired standing balance, and decreased muscular endurance. Occupational Therapy will continue to follow for duration of hospitalization.    Patient will benefit from continued skilled OT Services to promote return to prior level of function and safety with continuous assistance and gradual rehabilitative therapy    History Related to Current Admission: Patient is a 89 year old female admitted on 12/22/2024 with Presenting Problem: acute on chronic low back pain. Co-Morbidities : anxiety, chronic back pain, osteoporosis, R breast cancer, COPD, shingles in June with LUE lymphedema    WEIGHT BEARING RESTRICTION       Recommendations for nursing staff:   Transfers: 1-person  Toileting location: commode    EVALUATION SESSION:  Patient Start of Session: supine    FUNCTIONAL TRANSFER ASSESSMENT  Sit to Stand: Edge of Bed; Chair  Edge of Bed: Minimal Assist (heavy stabilization of RW required)  Chair: Moderate Assist    BED MOBILITY  Supine to Sit : Supervision (via logroll with cues)  Sit to Supine (OT): Supervision (via logroll with cues)    BALANCE ASSESSMENT     FUNCTIONAL ADL ASSESSMENT     ACTIVITY TOLERANCE: limited by  pain                         O2 SATURATIONS       COGNITION  Overall Cognitive Status:  WFL - within functional limits    Upper Extremity   ROM: within functional limits   Strength: within functional limits   Reports LUE hypersensitive to touch since shingles    EDUCATION PROVIDED  Patient Education : Role of Occupational Therapy; Functional Transfer Techniques; Fall Prevention; Posture/Positioning; Proper Body Mechanics  Patient's Response to Education: Verbalized Understanding; Requires Further Education    Equipment used: RW  Demonstrates functional use, Would benefit from additional trial      Therapist comments: Patient received in supine, agreeable to therapy with encouragement; educated on spine precautions for comfort, including logroll for bed mobility; supine to sit supervision with cueing for logroll; sitting EOB several minutes in preparation for seated ADLs with close SBA, patient reporting increased pain so difficulty sitting still; standing from EOB in preparation for standing ADLs via RW and min assist with max assist to stabilize RW; several shuffle-steps to bedside chair in preparation for commode transfers with RW, min assist, increased time and encouragement; patient unable to tolerate sitting in chair despite multiple attempts at repositioning, requesting return to supine; standing from chair to simulate commode transfer via mod assist, steps to bedside chair via RW and min assist; supine via supervision. Will continue to follow.     Patient End of Session: Needs met;Call light within reach;RN aware of session/findings;In bed;All patient questions and concerns addressed;Hospital anti-slip socks    OCCUPATIONAL PROFILE    HOME SITUATION  Type of Home: Independent living facility ([facility])  Home Layout: One level  Lives With: Alone;Caregiver part-time    Toilet and Equipment: Comfort height toilet;Grab bar  Shower/Tub and Equipment: Walk-in shower;Grab bar;Shower chair  Other Equipment:  Reacher;Sock aid    Occupation/Status: retired     Drives: Yes       Prior Level of Function: Patient reports independent in all BADLs and functional mobility via rollator prior to admission, also showers independently. States she has a caregiver through the state 4hours a day in the mornings MT[place] to assist with IADLs, daughter comes on Thursdays, caregivers come for 2 hours on the weekends. Patient reports daughter recently hired an additional caregiver for afternoons as needed to assist with driving to appointments and evening IADLs such as dinner prep. Patient notes things have been more difficult since having shingles in June, but states she has still been managing.     SUBJECTIVE   \"This is not working. I need to lay back down.\" [Unable to tolerate sitting in chair due to pain despite numerous attempts at repositioning for comfort]    PAIN ASSESSMENT  Rating: 10  Location: 2 at rest, 10 with activity in low back, RLE  Management Techniques: Activity promotion;Relaxation;Body mechanics;Repositioning;Ice;Nurse notified    OBJECTIVE  Precautions: Limb alert - left;Bed/chair alarm;Spine (spine precautions for comfort)  Fall Risk: High fall risk    ASSESSMENTS    AM-PAC ‘6-Clicks’ Inpatient Daily Activity Short Form  -   Putting on and taking off regular lower body clothing?: A Lot  -   Bathing (including washing, rinsing, drying)?: A Lot  -   Toileting, which includes using toilet, bedpan or urinal? : A Lot  -   Putting on and taking off regular upper body clothing?: A Little  -   Taking care of personal grooming such as brushing teeth?: A Little  -   Eating meals?: None    AM-PAC Score:  Score: 16  Approx Degree of Impairment: 53.32%  Standardized Score (AM-PAC Scale): 35.96    ADDITIONAL TESTS     NEUROLOGICAL FINDINGS      COGNITION ASSESSMENTS     PLAN  OT Device Recommendations: TBD  OT Treatment Plan: Balance activities;ADL training;Functional transfer training;Endurance training;Patient/Family  education;Patient/Family training;Compensatory technique education  Rehab Potential : Good  Frequency: 3x/week  Number of Visits to Meet Established Goals: 4    ADL GOALS   Patient will perform Lower Body Dressing with supervision  Patient will perform Toileting with supervision    FUNCTIONAL TRANSFER GOALS   Patient will transfer Supine to Sit via logroll with supervision  Patient will transfer Sit to Supine via logroll with supervision  Patient will transfer to Toilet with supervision    ADDITIONAL GOALS  Patient will recall all spinal precautions (for comfort) and incorporate into ADLs    Patient Evaluation Complexity Level:   Occupational Profile/Medical History LOW - Brief history including review of medical or therapy records    Specific performance deficits impacting engagement in ADL/IADL LOW  1 - 3 performance deficits    Client Assessment/Performance Deficits LOW - No comorbidities nor modifications of tasks    Clinical Decision Making LOW - Analysis of occupational profile, problem-focused assessments, limited treatment options    Overall Complexity LOW     OT Session Time: 23 minutes  Therapeutic Activity: 8 minutes

## 2024-12-23 NOTE — PROGRESS NOTES
Alert and oriented x 4.  Pain level 12/10 when got up to bedside commode this morning.  Verbalize having numbness and tingling sensation to LLE.    Pt/OT to see patient for eval.

## 2024-12-23 NOTE — PHYSICAL THERAPY NOTE
PHYSICAL THERAPY EVALUATION - INPATIENT     Room Number: 372/372-A  Evaluation Date: 12/23/2024  Type of Evaluation: Initial  Physician Order: PT Eval and Treat    Presenting Problem: R side back pain with radiating pain down RLE  Co-Morbidities : breast cancer, COPD, HTN, shingles, chronic LUE lymphedema, anxiety  Reason for Therapy: Mobility Dysfunction and Discharge Planning    PHYSICAL THERAPY ASSESSMENT   Patient is a 89 year old female admitted 12/22/2024 for R side back pain with radiating pain down RLE.  Prior to admission, patient's baseline is Radha with rollator.  Patient is currently functioning below baseline with bed mobility, transfers, gait, maintaining seated position, and standing prolonged periods.  Patient is requiring minimal assist and moderate assist as a result of the following impairments: decreased endurance/aerobic capacity, pain, impaired static and dynamic sitting/standing balance, impaired motor planning, and decreased muscular endurance.  Physical Therapy will continue to follow for duration of hospitalization.    Patient will benefit from continued skilled PT Services to promote return to prior level of function and safety with continuous assistance and gradual rehabilitative therapy .    PLAN DURING HOSPITALIZATION  Nursing Mobility Recommendation : 1 Assist  PT Device Recommendation: Rolling walker  PT Treatment Plan: Bed mobility;Body mechanics;Coordination;Endurance;Energy conservation;Patient education;Family education;Gait training;Neuromuscular re-educate;Range of motion;Strengthening;Stoop training;Stair training;Transfer training;Balance training  Rehab Potential : Fair  Frequency (Obs): 3-5x/week     CURRENT GOALS  Goal #1 Patient is able to demonstrate supine - sit EOB @ level: supervision     Goal #2 Patient is able to demonstrate transfers Sit to/from Stand at assistance level: supervision     Goal #3 Patient is able to ambulate 50 feet with assist device: walker - rolling  at assistance level: supervision     Goal #4    Goal #5    Goal #6    Goal Comments: Goals established on 12/23/2024    PHYSICAL THERAPY MEDICAL/SOCIAL HISTORY  History related to current admission: Patient is a 89 year old female admitted on 12/22/2024 from home for intractable back pain.  Pt diagnosed with acute R sided low back pain with R side sciatica.    HOME SITUATION  Type of Home: Independent living facility ([facility])  Home Layout: One level                     Lives With: Alone;Caregiver part-time    Drives: No   Patient Regularly Uses: Rollator      Prior Level of [place]:   Per pt- she resides at [facility]. Ambulates with rollator at baseline. Has caregiver for 4-6 hours (Mon, Tues, Weds, Fri) and 2 hours on weekend. Daughter assists on Thursdays as needed. Caregivers assist with household tasks and driving pt to appts, etc. - caregivers do not provide any physical assistance to pt. Has had increased issues since having shingles in June - now has LUE lymphedema for which she attends OP OT for treatments.     SUBJECTIVE  \"Well aren't you going to give me a massage.\"    OBJECTIVE  Precautions: Limb alert - left;Bed/chair alarm;Spine (spine precautions for comfort)  Fall Risk: High fall risk    WEIGHT BEARING RESTRICTION     PAIN ASSESSMENT  Rating: 10  Location: right low back, RLE  Management Techniques: Activity promotion;Body mechanics;Repositioning    COGNITION  Overall Cognitive Status:  WFL - within functional limits    RANGE OF MOTION AND STRENGTH ASSESSMENT  Upper extremity ROM and strength are within functional limits   Lower extremity ROM is within functional limits   Lower extremity strength is within functional limits     BALANCE  Static Sitting: Fair +  Dynamic Sitting: Fair  Static Standing: Poor +  Dynamic Standing: Poor    ADDITIONAL TESTS                                    ACTIVITY TOLERANCE                         O2 WALK       NEUROLOGICAL FINDINGS                         AM-PAC '6-Clicks' INPATIENT SHORT FORM - BASIC MOBILITY  How much difficulty does the patient currently have...  Patient Difficulty: Turning over in bed (including adjusting bedclothes, sheets and blankets)?: A Little   Patient Difficulty: Sitting down on and standing up from a chair with arms (e.g., wheelchair, bedside commode, etc.): A Lot   Patient Difficulty: Moving from lying on back to sitting on the side of the bed?: A Little   How much help from another person does the patient currently need...   Help from Another: Moving to and from a bed to a chair (including a wheelchair)?: A Little   Help from Another: Need to walk in hospital room?: A Little   Help from Another: Climbing 3-5 steps with a railing?: A Lot     AM-PAC Score:  Raw Score: 16   Approx Degree of Impairment: 54.16%   Standardized Score (AM-PAC Scale): 40.78   CMS Modifier (G-Code): CK    FUNCTIONAL ABILITY STATUS  Gait Assessment   Functional Mobility/Gait Assessment  Gait Assistance: Not tested    Skilled Therapy Provided   Educated on spine precautions (for comfort): no bending, lifting, twisting  Educated on log roll technique for bed mobility  Educated on importance of hourly change in positions/ambulation with assistance from nursing staff and use of RW    Bed mobility via log roll> sidelying to sitting EOB> once sitting EOB, pt reports incr pain in R buttocks (Sciatic pain down to knee level)> sit to stand to RW> took a few lateral steps to bedside chair with RW> once sitting up in bedside chair, unable to tolerate sitting in chair due to incr back pain> transferred back supine in bed at end of session    Today's session limited due to back and sciatic pain down RLE. Unable to assess further mobility due to limited tolerance to pain.     Bed Mobility:  Rolling: educated on log roll technique- required cues for sequencing   Supine to sit: supervision   Sit to supine: supervision     Transfer Mobility:  Sit to stand: Lore from EOB to  RW- vc for hand placement           modA from bedside chair to RW- due to incr pain   Stand to sit: Lore  Lateral steps to bedside chair with RW: Lore with RW  Gait = NT    Therapist's Comments:   RN gave clearance to see patient. Discussed role and goal of physical therapy in hospital setting. Pt in agreement to session.     Exercise/Education Provided:  Bed mobility  Body mechanics  Energy conservation  Functional activity tolerated  Posture  Transfer training    Patient End of Session: Needs met;Call light within reach;RN aware of session/findings;All patient questions and concerns addressed;Hospital anti-slip socks;Alarm set;SCDs in place;Discussed recommendations with /;In bed;Ice applied    Patient Evaluation Complexity Level:  History Moderate - 1 or 2 personal factors and/or co-morbidities   Examination of body systems Low -  addressing 1-2 elements   Clinical Presentation Low- Stable   Clinical Decision Making Low Complexity     PT Session Time: 25 minutes  Therapeutic Activity: 10 minutes

## 2024-12-23 NOTE — CM/SW NOTE
12/23/24 1100   CM/SW Referral Data   Referral Source Social Work (self-referral)   Reason for Referral Discharge planning   Informant Patient;EMR;Clinical Staff Member   Patient Info   Patient's Current Mental Status at Time of Assessment Alert;Oriented   Patient's Home Environment Independent Living  ([facility])   Patient lives with Alone   Patient Status Prior to Admission   Independent with ADLs and Mobility Yes   Services in place prior to admission retirement Home Care;DME/Supplies at home   Type of DME/Supplies Wheeled Walker   retirement Home Care Provider Private Duty  ([employer])   retirement Care hours per day M,Tu.W.F 4 hrs/day, S/S 2 hrs/day   Discharge Needs   Anticipated D/C needs Subacute rehab;Transportation services   Services Requested   PASRR Level 1 Submitted Yes   Choice of Post-Acute Provider   Informed patient of right to choose their preferred provider Yes   List of appropriate post-acute services provided to patient/family with quality data No - Declined list   Patient/family choice [facility]   Information given to Patient       Patient is a 89 y/o woman admitted under observation status with back pain and sciatica.  Met with pt at bedside to discuss DC planning.  Pt stated she has lived at [facility] for 12 years.  She had shingles this past year which led to lymphedema in her LUE.  Pt has been sleeping on her right side and has developed severe R sided pain.  She has started getting caregiver assistance from [employer] 6 days/week and her daughter helps her on Thursdays.  Pt's caregiver takes her to/from the outpatient lymphedema clinic.  Pt was referred to PT, but was admitted prior to starting services.    At this time, pt anticipates that she will need to go to the [facility] for rehab admission.  She has been there twice in the past.  Pt is observation status and so will not qualify for Medicare KAREN coverage.  Pt/family are willing to private pay for admission.    Informed  by therapy that pt was very limited in mobility due to pain.  Referral sent to [facility] for MARKEL GARCIA completed and added to referral.  / to remain available for support and/or discharge planning.     Nataly Mancia, [facility]  Discharge Planner  [phone]

## 2024-12-23 NOTE — CM/SW NOTE
[facility] accepted for admission.  Message sent to [facility] for review.  Await medical clearance for discharge.  / to remain available for support and/or discharge planning.     [facility]  [phone]    Nataly Mancia [facility]  Discharge Planner  [phone]

## 2024-12-24 ENCOUNTER — APPOINTMENT (OUTPATIENT)
Dept: MRI IMAGING | Facility: HOSPITAL | Age: 89
End: 2024-12-24
Attending: INTERNAL MEDICINE
Payer: MEDICARE

## 2024-12-24 PROCEDURE — 99232 SBSQ HOSP IP/OBS MODERATE 35: CPT | Performed by: INTERNAL MEDICINE

## 2024-12-24 PROCEDURE — 72148 MRI LUMBAR SPINE W/O DYE: CPT | Performed by: INTERNAL MEDICINE

## 2024-12-24 RX ORDER — OXYCODONE HCL 10 MG/1
10 TABLET, FILM COATED, EXTENDED RELEASE ORAL EVERY 12 HOURS
Qty: 2 TABLET | Refills: 0 | Status: SHIPPED | OUTPATIENT
Start: 2024-12-24

## 2024-12-24 RX ORDER — PREGABALIN 75 MG/1
75 CAPSULE ORAL 3 TIMES DAILY
Qty: 2 CAPSULE | Refills: 0 | Status: SHIPPED | OUTPATIENT
Start: 2024-12-24 | End: 2024-12-26

## 2024-12-24 RX ORDER — METHOCARBAMOL 500 MG/1
500 TABLET, FILM COATED ORAL 2 TIMES DAILY
Qty: 2 TABLET | Refills: 0 | Status: SHIPPED | OUTPATIENT
Start: 2024-12-24

## 2024-12-24 RX ORDER — ACETAMINOPHEN 500 MG
1000 TABLET ORAL 3 TIMES DAILY
Qty: 30 TABLET | Refills: 0 | Status: SHIPPED | OUTPATIENT
Start: 2024-12-24

## 2024-12-24 RX ORDER — PREGABALIN 75 MG/1
75 CAPSULE ORAL 3 TIMES DAILY
Status: DISCONTINUED | OUTPATIENT
Start: 2024-12-24 | End: 2024-12-26

## 2024-12-24 NOTE — PHYSICAL THERAPY NOTE
PHYSICAL THERAPY TREATMENT NOTE - INPATIENT    Room Number: 372/372-A     Session: 1     Number of Visits to Meet Established Goals: 5    Presenting Problem: R side back pain with radiating pain down RLE  Co-Morbidities : breast cancer, COPD, HTN, shingles, chronic LUE lymphedema, anxiety    PHYSICAL THERAPY ASSESSMENT   Patient demonstrates limited progress this session, goals  remain in progress.      Patient is requiring minimal assist as a result of the following impairments: decreased functional strength, pain, and decreased muscular endurance.     Patient continues to function below baseline with bed mobility, transfers, gait, and maintaining seated position.  Next session anticipate patient to progress transfers and gait.  Physical Therapy will continue to follow patient for duration of hospitalization.    Patient continues to benefit from continued skilled PT services: to promote return to prior level of function and safety with continuous assistance and gradual rehabilitative therapy .    PLAN DURING HOSPITALIZATION  Nursing Mobility Recommendation : 1 Assist  PT Device Recommendation: Rolling walker  PT Treatment Plan: Bed mobility;Body mechanics;Coordination;Endurance;Energy conservation;Patient education;Family education;Gait training;Neuromuscular re-educate;Range of motion;Strengthening;Stoop training;Stair training;Transfer training;Balance training  Frequency (Obs): 3-5x/week     CURRENT GOALS       Goal #1 Patient is able to demonstrate supine - sit EOB @ level: supervision      Goal #2 Patient is able to demonstrate transfers Sit to/from Stand at assistance level: supervision      Goal #3 Patient is able to ambulate 50 feet with assist device: walker - rolling at assistance level: supervision      Goal #4     Goal #5     Goal #6     Goal Comments: Goals established on 12/23/2024 12/24/2024 all goals ongoing    SUBJECTIVE  \"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\"      OBJECTIVE  Precautions:  Limb alert - left;Bed/chair alarm;Spine (spine precautions for comfort)    WEIGHT BEARING RESTRICTION     PAIN ASSESSMENT   [place]atin  Location: RLE, R low back  Management Techniques: Body mechanics;Breathing techniques;Relaxation;Repositioning    BALANCE                                                                                                                       Static Sitting: Fair +  Dynamic Sitting: Fair           Static Standing: Poor +  Dynamic Standing: Poor +    ACTIVITY TOLERANCE                         O2 WALK       AM-PAC '6-Clicks' INPATIENT SHORT FORM - BASIC MOBILITY  How much difficulty does the patient currently have...  Patient Difficulty: Turning over in bed (including adjusting bedclothes, sheets and blankets)?: A Little   Patient Difficulty: Sitting down on and standing up from a chair with arms (e.g., wheelchair, bedside commode, etc.): A Little   Patient Difficulty: Moving from lying on back to sitting on the side of the bed?: A Little   How much help from another person does the patient currently need...   Help from Another: Moving to and from a bed to a chair (including a wheelchair)?: A Lot   Help from Another: Need to walk in hospital room?: A Lot   Help from Another: Climbing 3-5 steps with a railing?: A Lot     AM-PAC Score:  Raw Score: 15   Approx Degree of Impairment: 57.7%   Standardized Score (AM-PAC Scale): 39.45   CMS Modifier (G-Code): CK    FUNCTIONAL ABILITY STATUS  Gait Assessment   Functional Mobility/Gait Assessment  Gait Assistance: Not tested  Distance (ft): 0    Skilled Therapy Provided  Pt presents in semi sup  Therapist educated pt on spine precautions, body mechanics, breathing techniques and sequencing  Pt reports increase in pain upon sitting EOB  Therapist encouraged pt to stand , pt able to achieve stance, however, unable to tolerate or bear weight on RLE  Pt encouraged to side step, however, pt returned to sit d/t pain  Min A to return to sup via log  roll  Therapist educated pt on positioning in bed, and therex for BLE ROM    Bed Mobility:  Rolling: supervision to min A    Supine<>Sit  supervision   Sit<>Supine: min A     Transfer Mobility:  Sit<>Stand: mod A    Stand<>Sit: CGA   Gait: nt     Therapist's Comments: pt encouraged to attempt chair position, RN updated on above       THERAPEUTIC EXERCISES  Lower Extremity Ankle pumps     Upper Extremity      Position Supine     Repetitions      Sets        Patient End of Session: In bed;Needs met;Call light within reach;RN aware of session/findings;All patient questions and concerns addressed;Hospital anti-slip socks;Alarm set    PT Session Time: 25 minutes  Gait Training:  minutes  Therapeutic Activity: 25 minutes  Therapeutic Exercise:  minutes   Neuromuscular Re-education:  minutes

## 2024-12-24 NOTE — PLAN OF CARE
Alert and Oriented x4. On RA. VSS. Moderate Pain controlled by PRN medications, see MAR for actions. Denies N/T. Voiding freely. Tolerating diet. Denies N/V. Call light within reach at this time.    Plan: Back to [facility] when cleared, pain management

## 2024-12-24 NOTE — PROGRESS NOTES
[facility]   part of [facility]     Hospitalist Progress Note     Meena Whitman Patient Status:  Observation    1933 MRN [id number]   Location [facility] 3SW-A Attending Mini Hernandez,    Hosp Day # 0 PCP Bonnie Danielson MD     Chief Complaint: low back pain    Subjective:     Feeling better today. Had some worsening LUE shingles pain overnight, but back pain is better overall     Objective:    Review of Systems:   A comprehensive review of systems was completed; pertinent positive and negatives stated in subjective.    Vital signs:  Temp:  [97.6 °F (36.4 °C)-98.6 °F (37 °C)] 97.6 °F (36.4 °C)  Pulse:  [64-72] 66  Resp:  [16-20] 16  BP: (141-145)/(59-63) 141/59  SpO2:  [94 %-96 %] 95 %    Physical Exam:    General: No acute distress  Respiratory: No wheezes, no rhonchi  Cardiovascular: S1, S2, regular rate and rhythm  Abdomen: Soft, Non-tender, non-distended, positive bowel sounds  Neuro: No new focal deficits.   Extremities: No edema    Diagnostic Data:    Labs:  Recent Labs   Lab 24  1322   WBC 4.8   HGB 13.8   MCV 96.0   .0       Recent Labs   Lab 24  1322   GLU 95   BUN 22   CREATSERUM 0.81   CA 9.9   ALB 4.2      K 4.4      CO2 24.0   ALKPHO 76   AST 32   ALT 29   BILT 0.5   TP 6.6       Estimated Glomerular Filtration Rate: 68.6 mL/min/1.73m2 (by CKD-EPI based on SCr of 0.81 mg/dL).    No results for input(s): \"TROP\", \"TROPHS\", \"CK\" in the last 168 hours.    No results for input(s): \"PTP\", \"INR\" in the last 168 hours.               Microbiology    No results found for this visit on 24.      Imaging: Reviewed in Epic.    Medications:    pregabalin  75 mg Oral TID    amLODIPine  5 mg Oral Daily    labetalol  100 mg Oral BID    losartan  100 mg Oral Daily    acetaZOLAMIDE  250 mg Oral BID    Custom Medication   Both Eyes TID    Custom Medication   Both Eyes QID    Custom Medication   Both Eyes QID    oxyCODONE ER  10 mg Oral 2 times per day     lidocaine-menthol  1 patch Transdermal Daily    lidocaine-menthol  1 patch Transdermal Daily    heparin  5,000 Units Subcutaneous Q8H MICHELA    acetaminophen  1,000 mg Oral TID    methocarbamol  500 mg Oral BID       Assessment & Plan:      #Acute on chronic back pain  #Physical deconditioning   - lumbar XR with anterolisthesis L4, L5  - pain control with scheduled tylenol, norco PRN, low dose robaxin > added oxycodone and lyrica with improvement in symptoms  - PT/OT  - family willing to pay out of pocket for KAREN   - social work to assist with placement  - discharge today if pain controlled on above regimen    #HTN  - PTA losartan, amlodipine, labetalol, diagmox     #Chronic LUE lymphedema d/t shingles     #Hx of breast cancer       Mini Hernandez DO    Supplementary Documentation:     Quality:  DVT Mechanical Prophylaxis:        DVT Pharmacologic Prophylaxis   Medication    heparin (Porcine) 5000 UNIT/ML injection 5,000 Units                Code Status: Prior  Colon: External urinary catheter in place  Colon Duration (in days):   Central line:    ALYSSA: 12/24/2024    Discharge is dependent on: placement  At this point Ms. Whitman is expected to be discharge to: TBD    The 21st Century Cures Act makes medical notes like these available to patients in the interest of transparency. Please be advised this is a medical document. Medical documents are intended to carry relevant information, facts as evident, and the clinical opinion of the practitioner. The medical note is intended as peer to peer communication and may appear blunt or direct. It is written in medical language and may contain abbreviations or verbiage that are unfamiliar.

## 2024-12-24 NOTE — CM/SW NOTE
Await medical clearance for discharge.  Ambulance transport placed on will-call.  PCS form completed and available for RN to print.  / to remain available for support and/or discharge planning.     [facility]  [phone]    [facility]  [phone]    Nataly Mancia LCSW  Discharge Planner  [phone]

## 2024-12-24 NOTE — PROGRESS NOTES
Alert and oriented x 4.  Verbalized right lower leg ok when in bed, pain 2/10 while lying down in bed, up to 6/10 sitting up and 8/10 standing up. PT/OT saw patient. Able to sit with min assistance at the edge of the bed but unable to bear weight to RLE due to shooting pain. Lyrica increased to TID as ordered.

## 2024-12-24 NOTE — OCCUPATIONAL THERAPY NOTE
OCCUPATIONAL THERAPY TREATMENT NOTE - INPATIENT     Room Number: 372/372-A  Session: 1   Number of Visits to Meet Established Goals: 3    Presenting Problem: acute on chronic low back pain    ASSESSMENT   Patient demonstrates fair progress this session, goals remain in progress.    Patient continues to function below baseline with toileting, bathing, upper body dressing, lower body dressing, transfers, dynamic sitting balance, static standing balance, dynamic standing balance, maintaining seated position, functional standing tolerance, and energy conservation strategies.   Contributing factors to remaining limitations include decreased functional strength, decreased endurance, pain, impaired standing balance, and decreased muscular endurance.  Next session anticipate patient to progress upper body dressing, lower body dressing, transfers, static sitting balance, dynamic sitting balance, static standing balance, and dynamic standing balance.  Occupational Therapy will continue to follow patient for duration of hospitalization.    Patient continues to benefit from continued skilled OT services: to promote return to prior level of function and safety with continuous assistance and gradual rehabilitative therapy.     History: Patient is a 89 year old female admitted on 12/22/2024 with Presenting Problem: acute on chronic low back pain. Co-Morbidities : anxiety, chronic back pain, osteoporosis, R breast cancer, COPD, shingles in June with LUE lymphedema     WEIGHT BEARING RESTRICTION       Recommendations for nursing staff:   Transfers: 2 person assist/Brittany-Javidy  Toileting location: commode/bedpan    TREATMENT SESSION:  Patient Start of Session: in supine     FUNCTIONAL TRANSFER ASSESSMENT  Sit to Stand: Edge of Bed  Edge of Bed: Minimal Assist  Chair: Moderate Assist    BED MOBILITY  Supine to Sit : Supervision (cues for logroll technique)  Sit to Supine (OT): Minimal Assist    BALANCE ASSESSMENT     FUNCTIONAL ADL  ASSESSMENT  UB Dressing Seated: Modified Independent  LB Dressing Seated: Maximum Assist    ACTIVITY TOLERANCE: Pt received in supine, reporting improved pain this session. Pt is agreeable to work with therapy and eager to see how much she is able to do. Supine>Sitting EOB via logroll technique with SBA. Sitting EOB for approx 3 minutes with initial SBA, reporting greatly increased pain in R-side back, exhibiting retropulsive posture, requiring min A for safety. Sit<>Stand from EOB surface with use of RW and min A, pt unable to bear weight on RLE, requiring min A for steadying. Pt reporting need to return to seated position, returned to seated with min A for safety. Sit>supine with min A to life LE's 2/2 increased pain. Requiring max A to luann socks on B feet due to limited distal reach and pain.                          O2 SATURATIONS       EDUCATION PROVIDED  Patient Education : Role of Occupational Therapy; Plan of Care; Discharge Recommendations; Functional Transfer Techniques; DME Recommendations; Posture/Positioning; Energy Conservation; Proper Body Mechanics  Patient's Response to Education: Verbalized Understanding    Equipment used: RW  Demonstrates functional use, Would benefit from additional trial        UPPER EXTREMITY  ROM: within functional limits   Strength: is within functional limits   Coordination  Gross motor: WFL  Fine motor: WFL  Sensation: WFL  Tone: WFL    Patient End of Session: In bed;Needs met;Call light within reach;RN aware of session/findings;All patient questions and concerns addressed;SCDs in place    SUBJECTIVE  Only laying down flat helped just now.     PAIN ASSESSMENT  Rating: 10  Location: 2 at rest, 10 with activity in low back, RLE  Management Techniques: Activity promotion;Relaxation;Body mechanics;Repositioning;Ice;Nurse notified     OBJECTIVE  Precautions: Limb alert - left;Bed/chair alarm;Spine (spine precautions for comfort)    AM-PAC ‘6-Clicks’ Inpatient Daily Activity  Short Form  -   Putting on and taking off regular lower body clothing?: A Lot  -   Bathing (including washing, rinsing, drying)?: A Lot  -   Toileting, which includes using toilet, bedpan or urinal? : A Lot  -   Putting on and taking off regular upper body clothing?: A Little  -   Taking care of personal grooming such as brushing teeth?: A Little  -   Eating meals?: A Little    AM-PAC Score:  Score: 15  Approx Degree of Impairment: 56.46%  Standardized Score (AM-PAC Scale): 34.69    PLAN  OT Device Recommendations: TBD  OT Treatment Plan: Balance activities;Energy conservation/work simplification techniques;ADL training;Functional transfer training;Endurance training;Patient/Family education;Patient/Family training;Neuromuscluar reeducation;Compensatory technique education  Rehab Potential : Good  Frequency: 3x/week    OT Goals: in progress    ADL GOALS   Patient will perform Lower Body Dressing with supervision  Patient will perform Toileting with supervision     FUNCTIONAL TRANSFER GOALS   Patient will transfer Supine to Sit via logroll with supervision - Met   Patient will transfer Sit to Supine via logroll with supervision  Patient will transfer to Toilet with supervision     ADDITIONAL GOALS  Patient will recall all spinal precautions (for comfort) and incorporate into ADLs    OT Session Time: 26 minutes  Self-Care Home Management: 5 minutes  Therapeutic Activity: 10 minutes  Neuromuscular Re-education: 0 minutes  Therapeutic Exercise: 0 minutes  Cognitive Skills: 0 minutes  Sensory Integrative: 0 minutes  Orthotic Management and Trainin minutes  Can add/delete any of these

## 2024-12-25 PROCEDURE — 99232 SBSQ HOSP IP/OBS MODERATE 35: CPT | Performed by: HOSPITALIST

## 2024-12-25 RX ORDER — PREDNISONE 20 MG/1
40 TABLET ORAL
Status: DISCONTINUED | OUTPATIENT
Start: 2024-12-26 | End: 2024-12-26

## 2024-12-25 RX ORDER — BISACODYL 10 MG
10 SUPPOSITORY, RECTAL RECTAL ONCE
Status: COMPLETED | OUTPATIENT
Start: 2024-12-25 | End: 2024-12-25

## 2024-12-25 NOTE — PROGRESS NOTES
[facility]   part of [facility]     Hospitalist Progress Note     Meena Whitman Patient Status:  Observation    1933 MRN [id number]   Location [facility] 3SW-A Attending Mini Hernandez,    Hosp Day # 0 PCP Bonnie Danielson MD     Chief Complaint: low back pain    Subjective:     Patient seen and examined. Reports ongoing back pain worse with bed upright.     Objective:    Review of Systems:   A comprehensive review of systems was completed; pertinent positive and negatives stated in subjective.    Vital signs:  Temp:  [97.4 °F (36.3 °C)-98.1 °F (36.7 °C)] 97.4 °F (36.3 °C)  Pulse:  [65-66] 65  Resp:  [16-18] 16  BP: (114-129)/(57-58) 129/57  SpO2:  [92 %-94 %] 94 %    Physical Exam:    General: No acute distress  Respiratory: No wheezes, no rhonchi  Cardiovascular: S1, S2, regular rate and rhythm  Abdomen: Soft, Non-tender, non-distended, positive bowel sounds  Neuro: No new focal deficits.   Extremities: No edema    Diagnostic Data:    Labs:  Recent Labs   Lab 24  1322   WBC 4.8   HGB 13.8   MCV 96.0   .0       Recent Labs   Lab 24  1322   GLU 95   BUN 22   CREATSERUM 0.81   CA 9.9   ALB 4.2      K 4.4      CO2 24.0   ALKPHO 76   AST 32   ALT 29   BILT 0.5   TP 6.6       Estimated Glomerular Filtration Rate: 68.6 mL/min/1.73m2 (by CKD-EPI based on SCr of 0.81 mg/dL).    No results for input(s): \"TROP\", \"TROPHS\", \"CK\" in the last 168 hours.    No results for input(s): \"PTP\", \"INR\" in the last 168 hours.               Microbiology    No results found for this visit on 24.      Imaging: Reviewed in Epic.    Medications:    [START ON 2024] predniSONE  40 mg Oral Daily with breakfast    pregabalin  75 mg Oral TID    amLODIPine  5 mg Oral Daily    labetalol  100 mg Oral BID    losartan  100 mg Oral Daily    acetaZOLAMIDE  250 mg Oral BID    Custom Medication   Both Eyes TID    Custom Medication   Both Eyes QID    Custom Medication   Both Eyes QID     oxyCODONE ER  10 mg Oral 2 times per day    lidocaine-menthol  1 patch Transdermal Daily    lidocaine-menthol  1 patch Transdermal Daily    heparin  5,000 Units Subcutaneous Q8H MICHELA    acetaminophen  1,000 mg Oral TID    methocarbamol  500 mg Oral BID       Assessment & Plan:      #Acute on chronic back pain  #Physical deconditioning   - MRI brain reviewed - findings seem to be more chronic - will ask spine to evaluate  - Can d/w Dr. Kovacs tomorrow to see if patient would be candidate for HALLEY  - pain control with scheduled tylenol, norco PRN, low dose robaxin, oxycodone and lyrica with improvement in symptoms  - PT/OT  - family willing to pay out of pocket for KAREN   - social work to assist with placement    #HTN  - PTA losartan, amlodipine, labetalol, diagmox     #Chronic LUE lymphedema d/t shingles     #Hx of breast cancer     Adalid Gorman DO     Supplementary Documentation:     Quality:  DVT Mechanical Prophylaxis:        DVT Pharmacologic Prophylaxis   Medication    heparin (Porcine) 5000 UNIT/ML injection 5,000 Units                Code Status: Prior  Colon: External urinary catheter in place  Colon Duration (in days):   Central line:    ALYSSA: 12/25/2024    Discharge is dependent on: placement  At this point Ms. Whitman is expected to be discharge to: TBD    The 21st Century Cures Act makes medical notes like these available to patients in the interest of transparency. Please be advised this is a medical document. Medical documents are intended to carry relevant information, facts as evident, and the clinical opinion of the practitioner. The medical note is intended as peer to peer communication and may appear blunt or direct. It is written in medical language and may contain abbreviations or verbiage that are unfamiliar.

## 2024-12-25 NOTE — CM/SW NOTE
Spoke with Deedee in admissions @ [facility]--she will call this writer back if able to accept patient today--updated nurse    Deedee from [facility] called back--requesting patient's daughter to call her (phone ).  Informed by daughter patient not discharging.  Daughter mentioned now her 'mother will have additional days for inpatient--clarified that medicare does not go 'backwards with inpt days'--still in observation as previously discussed with  on 12-

## 2024-12-25 NOTE — PLAN OF CARE
Patient A&Ox4. RA. VSS. Pain managed with PO pain medication. Up to bedside commode, voiding without difficulty. Fall precautions in place. Call light in reach.

## 2024-12-25 NOTE — PLAN OF CARE
Patient is alert and oriented x 4. Vitals stable on room air. Pain managed by prn meds. Voiding without difficulty up mod assist. Plan for discharge back to [facility]. Plan of care discussed with patient.

## 2024-12-26 ENCOUNTER — SNF VISIT (OUTPATIENT)
Dept: INTERNAL MEDICINE CLINIC | Age: 89
End: 2024-12-26

## 2024-12-26 VITALS
DIASTOLIC BLOOD PRESSURE: 51 MMHG | SYSTOLIC BLOOD PRESSURE: 121 MMHG | RESPIRATION RATE: 20 BRPM | HEART RATE: 66 BPM | BODY MASS INDEX: 19.63 KG/M2 | WEIGHT: 115 LBS | HEIGHT: 64 IN | TEMPERATURE: 99 F | OXYGEN SATURATION: 97 %

## 2024-12-26 VITALS
WEIGHT: 115 LBS | BODY MASS INDEX: 20 KG/M2 | RESPIRATION RATE: 19 BRPM | OXYGEN SATURATION: 92 % | HEART RATE: 71 BPM | SYSTOLIC BLOOD PRESSURE: 119 MMHG | TEMPERATURE: 97 F | DIASTOLIC BLOOD PRESSURE: 44 MMHG

## 2024-12-26 DIAGNOSIS — G47.00 INSOMNIA, UNSPECIFIED TYPE: ICD-10-CM

## 2024-12-26 DIAGNOSIS — Z78.9 DECREASED ACTIVITIES OF DAILY LIVING (ADL): ICD-10-CM

## 2024-12-26 DIAGNOSIS — I89.0 LYMPHEDEMA OF LEFT UPPER EXTREMITY: ICD-10-CM

## 2024-12-26 DIAGNOSIS — R53.1 WEAKNESS: ICD-10-CM

## 2024-12-26 DIAGNOSIS — I35.1 NONRHEUMATIC AORTIC VALVE INSUFFICIENCY: ICD-10-CM

## 2024-12-26 DIAGNOSIS — R52 PAIN MANAGEMENT: ICD-10-CM

## 2024-12-26 DIAGNOSIS — I50.32 CHRONIC HEART FAILURE WITH PRESERVED EJECTION FRACTION (HCC): ICD-10-CM

## 2024-12-26 DIAGNOSIS — I10 PRIMARY HYPERTENSION: ICD-10-CM

## 2024-12-26 DIAGNOSIS — M54.9 ACUTE ON CHRONIC BACK PAIN: Primary | ICD-10-CM

## 2024-12-26 DIAGNOSIS — M54.16 LUMBAR RADICULOPATHY: ICD-10-CM

## 2024-12-26 DIAGNOSIS — G89.29 ACUTE ON CHRONIC BACK PAIN: Primary | ICD-10-CM

## 2024-12-26 PROCEDURE — 99223 1ST HOSP IP/OBS HIGH 75: CPT | Performed by: STUDENT IN AN ORGANIZED HEALTH CARE EDUCATION/TRAINING PROGRAM

## 2024-12-26 PROCEDURE — 99310 SBSQ NF CARE HIGH MDM 45: CPT

## 2024-12-26 PROCEDURE — 1123F ACP DISCUSS/DSCN MKR DOCD: CPT

## 2024-12-26 PROCEDURE — 99239 HOSP IP/OBS DSCHRG MGMT >30: CPT | Performed by: HOSPITALIST

## 2024-12-26 RX ORDER — PREDNISONE 20 MG/1
40 TABLET ORAL
Qty: 8 TABLET | Refills: 0 | Status: SHIPPED | OUTPATIENT
Start: 2024-12-27 | End: 2024-12-31

## 2024-12-26 RX ORDER — PREGABALIN 75 MG/1
75 CAPSULE ORAL 3 TIMES DAILY
Qty: 2 CAPSULE | Refills: 0 | Status: SHIPPED | OUTPATIENT
Start: 2024-12-26

## 2024-12-26 NOTE — PROGRESS NOTES
Meena Whitman, 6/13/1933, 89 year old, female is admitted to [facility] for rehabilitation and strengthening.     *** Admission:     Chief Complaint   Patient presents with    Follow - Up     RLE edema, acute hypoxemic hypercapnic respiratory failure, AECOPD, R hip fracture s/p hemiarthroplasty, anemia, Afib with RVR         HPI:  ***    SUBJECTIVE:  Patient seen today for aprn follow up visit. ***    Denies lightheadedness, fever, body aches/chills, CP, SOB, cough, abdominal pain, nausea, vomiting, edema, urinary or bowel complaints.    OBJECTIVE: BP 97/47   Pulse 83   Temp 97 °F (36.1 °C)   Resp 16   Wt 110 lb (49.9 kg)   SpO2 92%   BMI 18.88 kg/m²     PHYSICAL EXAM:  GENERAL HEALTH: {[facility]_PE_GENERAL_NL:4091::\"well developed, well nourished, in no apparent distress\"}  LINES, TUBES, DRAINS:  {[facility]_PE_LINES_TUBES_DRAINS:4105}  SKIN: {[facility]_SKIN_NL:4195}  WOUND: ***  EYES:  sclera anicteric, conjunctiva normal; there is no nystagmus, no drainage from eyes  HENT:  normocephalic; normal nose, no nasal drainage, mucous membranes pink, moist.  NECK:  supple, non tender, FROM  BREAST:  deferred  RESPIRATORY: {PE PULM:72157}  CARDIOVASCULAR: {PE CARDIO:25521}  ABDOMEN:  normal active BS+, soft, nondistended; no organomegaly, no masses; nontender, no guarding, no rebound tenderness.  :{[facility]_GU_NL:6247}  LYMPHATIC:{[facility]_LYMPHATIC_NL:4140::\"no lymphedema\"}  MUSCULOSKELETAL: {[facility]_MUSCULOSKELETAL_NL:4167::\"no acute synovitis upper or lower extremity\"}  EXTREMITIES/VASCULAR:{[facility]_EXTREMITIES_NL:4176}  NEUROLOGIC: A&OX3, no focal deficits, follows commands  PSYCHIATRIC: calm, cooperative, mood and affect appropriate to situation    Therapy update:  Transfers: ***  ADLS:  ***  Ambulation:  ***  DC plan:    Medications reviewed: Yes      Current Outpatient Medications:     pregabalin 75 MG Oral Cap, Take 1 capsule (75 mg total) by mouth 3 (three) times daily., Disp: 2 capsule, Rfl: 0     [START ON 12/27/2024] predniSONE 20 MG Oral Tab, Take 2 tablets (40 mg total) by mouth daily with breakfast for 4 days., Disp: 8 tablet, Rfl: 0    acetaminophen 500 MG Oral Tab, Take 2 tablets (1,000 mg total) by mouth in the morning, at noon, and at bedtime., Disp: 30 tablet, Rfl: 0    methocarbamol 500 MG Oral Tab, Take 1 tablet (500 mg total) by mouth in the morning and 1 tablet (500 mg total) before bedtime., Disp: 2 tablet, Rfl: 0    oxyCODONE ER 10 MG Oral Tablet Extended Release 12 hour Abuse-Deterrent, Take 1 tablet (10 mg total) by mouth Q12H., Disp: 2 tablet, Rfl: 0    CUSTOM MEDICATION, Place into both eyes 3 (three) times daily. heparin pf 100 unit/ml ophth solution 1 drop both eyes Indications: Keratoconjunctivitis Sicca, Disp: , Rfl:     CUSTOM MEDICATION, Place into both eyes 4 (four) times daily. flebogamma 10 mg/mL ophthalmic solution 1 drop both eyes qid Indications: Redness or discharge of eye, Keratoconjunctivitis sicca of both eyes not specified as Sjogren's, Discomfort of both eyes, Disp: , Rfl:     CUSTOM MEDICATION, Place into both eyes 4 (four) times daily. 40% autologous serum eye drops 1 drop both eyes qid, Disp: , Rfl:     furosemide 20 MG Oral Tab, Take 1 tablet (20 mg total) by mouth daily as needed (Take if weight is >120 pounds.)., Disp: 90 tablet, Rfl: 1    TEMAZEPAM 15 MG Oral Cap, TAKE 1-2 CAPSULES (15-30 MG TOTAL) BY MOUTH NIGHTLY AS NEEDED FOR SLEEP., Disp: 60 capsule, Rfl: 0    Naloxone HCl 4 MG/0.1ML Nasal Liquid, 4 mg by Intranasal route as needed (May repeat as needed in other nostril if symptoms persist). If patient remains unresponsive, repeat dose in other nostril 2-5 minutes after first dose., Disp: 1 kit, Rfl: 0    Vaginal Lubricant (VAGISIL LUBRICANT) Vaginal Gel, Place 1 Application vaginally daily., Disp: 1 each, Rfl: 2    losartan 100 MG Oral Tab, TAKE 1 TABLET (100 MG TOTAL) BY MOUTH DAILY., Disp: 90 tablet, Rfl: 2    Ascorbic Acid (VITAMIN C) 1000 MG Oral Tab, Take  1 tablet (1,000 mg total) by mouth daily., Disp: , Rfl:     Elastic Bandages & Supports (SKINEEZ COMPRESSION ARM SLEEVE) Does not apply Misc, 2 Units daily as needed., Disp: 2 each, Rfl: 3    amLODIPine 5 MG Oral Tab, Take 1 tablet (5 mg total) by mouth daily., Disp: 90 tablet, Rfl: 1    labetalol 100 MG Oral Tab, Take 1 tablet (100 mg total) by mouth 2 (two) times daily., Disp: 180 tablet, Rfl: 1    estradiol 0.1 MG/GM Vaginal Cream, Place 1 g vaginally daily., Disp: 42.5 g, Rfl: 3    Vitamin D, Cholecalciferol, 25 MCG (1000 UT) Oral Tab, Take 1,000 Units by mouth daily. Take 3 caps po q am, Disp: 270 tablet, Rfl: 3    acetaZOLAMIDE 250 MG Oral Tab, Take 1 tablet (250 mg total) by mouth 2 (two) times daily., Disp: , Rfl:     Multiple Vitamins-Minerals (COMPLETE DAILY/LUTEIN) Oral Tab, Take 1 tablet by mouth 3 (three) times daily., Disp: , Rfl:       Diagnostics reviewed:    Lab Results   Component Value Date    WBC 4.8 12/22/2024    RBC 4.25 12/22/2024    HGB 13.8 12/22/2024    HCT 40.8 12/22/2024    MCV 96.0 12/22/2024    MCH 32.5 12/22/2024    MCHC 33.8 12/22/2024    RDW 13.0 12/22/2024    .0 12/22/2024     Lab Results   Component Value Date    GLU 95 12/22/2024    BUN 22 12/22/2024    BUNCREA 39.0 (H) 04/12/2021    CREATSERUM 0.81 12/22/2024    ANIONGAP 8 12/22/2024    GFR 76 02/27/2017    GFRNAA 62 07/20/2022    GFRAA 71 07/20/2022    CA 9.9 12/22/2024    OSMOCALC 301 (H) 12/22/2024    ALKPHO 76 12/22/2024    AST 32 12/22/2024    ALT 29 12/22/2024    BILT 0.5 12/22/2024    TP 6.6 12/22/2024    ALB 4.2 12/22/2024    GLOBULIN 2.4 12/22/2024     12/22/2024    K 4.4 12/22/2024     12/22/2024    CO2 24.0 12/22/2024     ***      ASSESSMENT AND PLAN :    ***  *Established patient; follow-up routine visit/ greater than 10   *Established patient; follow-up mildly complex visit/ greater than 20  *Established patient; follow-up moderately complex visit/ greater than 30   *Established patient; follow-up  complex visit/ greater than 40    *** minutes spent w/ patient and staff, including but not limited to/ reviewing present status, needs, abilities with disciplines, reviewing medical records, vital signs, labs, completing medication reconciliation and entering orders for continued care in [facility].    Note to patient: The 21st Century Cures Act makes medical notes like these available to patients in the interest of transparency. However, this is a medical document intended as peer to peer communication. It is written in medical language and may contain abbreviations or verbiage that are unfamiliar. It may appear blunt or direct. Medical documents are intended to carry relevant information, facts as evident, and the clinical opinion of the practitioner who signs the document.    May REID, APRN  12/26/2024

## 2024-12-26 NOTE — CONSULTS
[facility] - ORTHOPEDICS  [address], [address], [place] [address]  [address] [place] [id number]  [phone]     ORTHOPEDIC SPINE CONSULTATION    Name: Meena Whitman   MRN: [id number]  Date: 12/22/2024     CC: back and leg pain    HPI: Meena Whitman is a 89 year old female with pmhx of breast cancer, COPD, HTN, shingles who presents with acute on chronic back pain.  Patient was admitted 4 days ago for her symptoms.  Pain radiates across her back into the right lower extremity.  Patient has history of chronic pain managed with injections in the past.  Patient has been working with physical therapy and receiving multimodal pain regimen.  Patient has no numbness or weakness, patient denies bowel or urinary incontinence.    PMH:   Past Medical History:    Abnormal weight loss    Basal cell carcinoma (BCC) of left side of nose    Breast CA (HCC)    Breast cancer (HCC)    ILC, IDC, LCIS, ALH, DCIS    Cellulitis    COPD (chronic obstructive pulmonary disease) (HCC)    Ductal carcinoma in situ of breast    Exposure to radiation    High blood pressure    Keratoconjunctivitis sicca of both eyes    Lobular carcinoma in situ of breast    Lymph edema    left arm    Pelvic pressure in female    Post covid-19 condition, unspecified    Post herpetic neuralgia    Shingles       PAST SURGICAL HX:  Past Surgical History:   Procedure Laterality Date    Cataract Bilateral     Eye surgery      both eyes - trabulectomys 7-8 TIMES    Eye surgery      DETACHED RETINA SURGERY    Glaucoma surgery  07/07/2016    Hernia surgery  10/18/2017    umbilical hernia    Hx breast cancer Left 1986    Injection, w/wo contrast, dx/therapeutic substance, epidural/subarachnoid; lumbar/sacral N/A 03/11/2016    Procedure: LUMBAR EPIDURAL;  Surgeon: Eric Montenegro MD;  [facility]    Injection, w/wo contrast, dx/therapeutic substance, epidural/subarachnoid; lumbar/sacral  N/A 04/04/2016    Procedure: LUMBAR EPIDURAL;  Surgeon: Eric Montenegro MD;  Location: [facility]    Lumpectomy left  1986    Mammogram, both breasts  07/22/2013    stable results (done in [place])    Needle biopsy right Right 04/2022    IDC, DCIS, LCIS    Other surgical history  06/01/2021    Cystoscopy (Dr. Brand)    Patient documented not to have experienced any of the following events N/A 03/11/2016    Procedure: LUMBAR EPIDURAL;  Surgeon: Eric Montenegro MD;  Location: [facility]    Patient documented not to have experienced any of the following events N/A 04/04/2016    Procedure: LUMBAR EPIDURAL;  Surgeon: Eric Montenegro MD;  Location: [facility]    Patient withough preoperative order for iv antibiotic surgical site infection prophylaxis. N/A 03/11/2016    Procedure: LUMBAR EPIDURAL;  Surgeon: Eric Montenegro MD;  Location: [facility]    Patient withough preoperative order for iv antibiotic surgical site infection prophylaxis. N/A 04/04/2016    Procedure: LUMBAR EPIDURAL;  Surgeon: Eric Montenegro MD;  Location: [facility]    Radiation left  1986    Skin surgery Left     Scar to L chin s/p removal of BCC \"years ago.\" Scar C/D/I    Tonsillectomy         FAMILY HX:  Family History   Problem Relation Age of Onset    DCIS Self     LCIS Self     Breast Cancer Self 55    Cancer Mother     Breast Cancer Mother         dx age 70's    Cancer Father     Other (Other) Father     Breast Cancer Daughter         dx age 40       ALLERGIES:  Hydralazine, Bactrim [sulfamethoxazole w/trimethoprim], and Metronidazole    MEDICATIONS:   Current Outpatient Medications   Medication Sig Dispense Refill    acetaminophen 500 MG Oral Tab Take 2 tablets (1,000 mg total) by mouth in the morning, at noon, and at bedtime. 30 tablet 0    methocarbamol 500 MG Oral Tab Take 1 tablet (500 mg total) by mouth in the morning and 1 tablet  (500 mg total) before bedtime. 2 tablet 0    oxyCODONE ER 10 MG Oral Tablet Extended Release 12 hour Abuse-Deterrent Take 1 tablet (10 mg total) by mouth Q12H. 2 tablet 0    pregabalin 75 MG Oral Cap Take 1 capsule (75 mg total) by mouth 3 (three) times daily. 2 capsule 0       ROS: A comprehensive 14 point review of systems was performed and was negative aside from the aforementioned per history of present illness.    SOCIAL HX:  Social History     Tobacco Use    Smoking status: Never    Smokeless tobacco: Never   Substance Use Topics    Alcohol use: No     Alcohol/week: 0.0 standard drinks of alcohol         PE:   Vitals:    12/25/24 0500 12/25/24 0819 12/25/24 1919 12/26/24 0330   BP: 114/57 129/57 148/57 102/46   BP Location: Right arm Right leg Right leg Right leg   Pulse: 65 65 68 66   Resp: 18 16 20 18   Temp: 97.9 °F (36.6 °C) 97.4 °F (36.3 °C) 98.7 °F (37.1 °C) 98.6 °F (37 °C)   TempSrc: Oral Oral Oral Oral   SpO2: 93% 94% 95% 92%   Weight:       Height:         Estimated body mass index is 19.74 kg/m² as calculated from the following:    Height as of this encounter: 5' 4\" (1.626 m).    Weight as of this encounter: 115 lb (52.2 kg).    Physical Exam  Constitutional:       Appearance: Normal appearance.   HENT:      Head: Normocephalic and atraumatic.   Eyes:      Extraocular Movements: Extraocular movements intact.   Cardiovascular:      Pulses: Normal pulses. Skin warm and well perfused.  Pulmonary:      Effort: Pulmonary effort is normal. No respiratory distress.   Skin:     General: Skin is warm.   Psychiatric:         Mood and Affect: Mood normal.     Neuro Exam:    LE Strength: 5/5 IP Quad TA EHL GSC  LE Sensation: normal in L2-S1 distribution  LE reflexes: normal    Radiographic Examination/Diagnostics:  XR and MRI personally viewed, independently interpreted and radiology report was reviewed.  X-ray of the lumbar spine demonstrates severe degenerative changes there is history of kyphoplasty at L2 and  severe spondylolisthesis at L4-5  MRI of the lumbar spine demonstrates spondylolisthesis at L3-4 and L4-5 associated with moderate to severe foraminal stenosis on the right.    IMPRESSION: Meena Whitman is a 89 year old female with low back pain radiating down right lower extremity, likely caused by her spinal stenosis.    PLAN:   We reviewed the patients history, symptoms, exam findings, and imaging today.  We had a detailed discussion outlining the etiology, anatomy, pathophysiology, and natural history of spinal stenosis. The typical management of this condition may include lifestyle modification, NSAIDs, physical therapy, oral steroids, epidural injections, neuromodulatory medications, and sometimes pain medications.    - Patient is neurologically intact, not a surgical candidate  - Based on our discussion today we would like to have the patient initiate our recommendations for continued conservative therapy in the treatment of their condition noted in the assessment section.     - Recommend PT/OT, multimodal pain regimen  - Follow up with pain clinic for HALLEY    Benitez Duarte MD  Orthopedic Spine Surgeon  [facility]   [address] [place] [address], [email or web address]  [email or web address]  t: [phone]   f: [phone]        This note was dictated using Dragon software.  While it was briefly proofread prior to completion, some grammatical, spelling, and word choice errors due to dictation may still occur.

## 2024-12-26 NOTE — DISCHARGE SUMMARY
[facility]  DISCHARGE SUMMARY     Meena Whitman Patient Status:  Observation    1933 MRN [id number]   Location [facility] 3SW-A Attending Adalid Gorman,    Hosp Day # 0 PCP Bonnie Danielson MD     Date of Admission: 2024  Date of Discharge:   2024    Discharge Disposition: Home or Self Care    Discharge Diagnosis:  Acute on chronic back pain  Lumbar radiculopathy secondary to L4-L5 neuro- foraminal stenosis   Essential HTN  Chronic LUE lymphedema  Breast cancer history     History of Present Illness: Meena Whitman is a 89 year old female with pmhx of breast cancer, COPD, HTN, shingles who presents with acute on chronic back pain. She reports she has been limited to sleeping on her R side for the last several months d/t LUE shingle pain and swelling/lymphedema. She has been noting worsening R buttock pain radiating down into her R posterior thigh and calf. Pain worsened over the last few days and she felt unable to continue caring for herself at home. She was afraid to try her norco for pain relief at home as she was concerned it would cause her to be unsteady and at risk for falls. She denies any recent falls, trauma, heavy lifting/straining, fevers/chills, abdominal pain, n/v/d.     Brief Synopsis:     #Acute on chronic back pain  #Lumbar radiculopathy   #Physical deconditioning   - MRI lumbar spine reviewed - findings seem to be more chronic - seen by spine - no acute surgical intervention recommended  -Would benefit from HALLEY with primary pain physician as OP as Dr. Kovacs not available to perform in house at this time   - pain control with scheduled tylenol, norco PRN, low dose robaxin, oxycodone and lyrica with improvement in symptoms  - PT/OT recommending [facility]    #HTN  - PTA losartan, amlodipine, labetalol, diagmox     #Chronic LUE lymphedema d/t shingles     #Hx of breast cancer     #Disposition  -Stable for DC to [facility]    Lace+ Score: 81  59-90 High Risk  29-58 Medium  Risk  0-28   Low Risk       TCM Follow-Up Recommendation:  LACE > 58: High Risk of readmission after discharge from the hospital.      Procedures during hospitalization:   None    Incidental or significant findings and recommendations (brief descriptions):  None    Lab/Test results pending at Discharge:   None    Consultants:  Spine surgery    Discharge Medication List:     Discharge Medications        START taking these medications        Instructions Prescription details   acetaminophen 500 MG Tabs  Commonly known as: Tylenol Extra Strength      Take 2 tablets (1,000 mg total) by mouth in the morning, at noon, and at bedtime.   Quantity: 30 tablet  Refills: 0     methocarbamol 500 MG Tabs  Commonly known as: Robaxin      Take 1 tablet (500 mg total) by mouth in the morning and 1 tablet (500 mg total) before bedtime.   Quantity: 2 tablet  Refills: 0     oxyCODONE ER 10 MG T12a  Commonly known as: OxyCONTIN ER      Take 1 tablet (10 mg total) by mouth Q12H.   Quantity: 2 tablet  Refills: 0     predniSONE 20 MG Tabs  Commonly known as: Deltasone  Start taking on: December 27, 2024      Take 2 tablets (40 mg total) by mouth daily with breakfast for 4 days.   Stop taking on: December 31, 2024  Quantity: 8 tablet  Refills: 0            CHANGE how you take these medications        Instructions Prescription details   pregabalin 75 MG Caps  Commonly known as: Lyrica  What changed: when to take this      Take 1 capsule (75 mg total) by mouth 3 (three) times daily.   Quantity: 2 capsule  Refills: 0            CONTINUE taking these medications        Instructions Prescription details   acetaZOLAMIDE 250 MG Tabs  Commonly known as: Diamox      Take 1 tablet (250 mg total) by mouth 2 (two) times daily.   Refills: 0     amLODIPine 5 MG Tabs  Commonly known as: Norvasc      Take 1 tablet (5 mg total) by mouth daily.   Quantity: 90 tablet  Refills: 1     Complete Daily/Lutein Tabs      Take 1 tablet by mouth 3 (three) times  daily.   Refills: 0     CUSTOM MEDICATION      Place into both eyes 3 (three) times daily. heparin pf 100 unit/ml ophth solution  1 drop both eyes  Indications: Keratoconjunctivitis Sicca   Refills: 0     CUSTOM MEDICATION      Place into both eyes 4 (four) times daily. flebogamma 10 mg/mL ophthalmic solution  1 drop both eyes qid  Indications: Redness or discharge of eye, Keratoconjunctivitis sicca of both eyes not specified as Sjogren's, Discomfort of both eyes   Refills: 0     CUSTOM MEDICATION      Place into both eyes 4 (four) times daily. 40% autologous serum eye drops  1 drop both eyes qid   Refills: 0     estradiol 0.1 MG/GM Crea  Commonly known as: Estrace      Place 1 g vaginally daily.   Quantity: 42.5 g  Refills: 3     furosemide 20 MG Tabs  Commonly known as: Lasix      Take 1 tablet (20 mg total) by mouth daily as needed (Take if weight is >120 pounds.).   Quantity: 90 tablet  Refills: 1     labetalol 100 MG Tabs  Commonly known as: Normodyne      Take 1 tablet (100 mg total) by mouth 2 (two) times daily.   Quantity: 180 tablet  Refills: 1     losartan 100 MG Tabs  Commonly known as: Cozaar      TAKE 1 TABLET (100 MG TOTAL) BY MOUTH DAILY.   Quantity: 90 tablet  Refills: 2     Naloxone HCl 4 MG/0.1ML Liqd      4 mg by Intranasal route as needed (May repeat as needed in other nostril if symptoms persist). If patient remains unresponsive, repeat dose in other nostril 2-5 minutes after first dose.   Quantity: 1 kit  Refills: 0     Skineez Compression Arm Sleeve Misc      2 Units daily as needed.   Quantity: 2 each  Refills: 3     temazepam 15 MG Caps  Commonly known as: Restoril      TAKE 1-2 CAPSULES (15-30 MG TOTAL) BY MOUTH NIGHTLY AS NEEDED FOR SLEEP.   Quantity: 60 capsule  Refills: 0     Vagisil Lubricant Gel      Place 1 Application vaginally daily.   Quantity: 1 each  Refills: 2     vitamin C 1000 MG Tabs      Take 1 tablet (1,000 mg total) by mouth daily.   Refills: 0     Vitamin D  (Cholecalciferol) 25 MCG (1000 UT) Tabs      Take 1,000 Units by mouth daily. Take 3 caps po q am   Quantity: 270 tablet  Refills: 3            STOP taking these medications      HYDROcodone-acetaminophen 5-325 MG Tabs  Commonly known as: Norco                  Where to Get Your Medications        These medications were sent to [facility] [place] [address], [address] [phone], [phone]  [address], [address], [facility] [address]      Phone: [phone]   acetaminophen 500 MG Tabs       Please  your prescriptions at the location directed by your doctor or nurse    Bring a paper prescription for each of these medications  methocarbamol 500 MG Tabs  oxyCODONE ER 10 MG T12a  predniSONE 20 MG Tabs  pregabalin 75 MG Caps         [place]    Follow-up appointment:   Kevin Kovacs MD  [address] [facility]  [address]  [place] [address]  [phone]    Schedule an appointment as soon as possible for a visit in 1 week(s)      Bonnie Danielson MD  [address] [place] [address]  [address]  [place] [address]  [phone]    Schedule an appointment as soon as possible for a visit in 1 week(s)      Appointments for Next 30 Days 12/26/2024 - 1/25/2025        Date Arrival Time Visit Type Length Department Provider     12/31/2024  9:30 AM  [facility] OT LYMPH [place] [[id number]] 60 min [facility] Occupational Therapy Alessandra Malone OT    Patient Instructions:         Location Instructions:     Your appointment is scheduled on the [facility]. The address is [address] [place] [address], [address], [place] [address].  Please arrive 10 minutes prior to your scheduled appointment time.  Masks are optional for all patients and visitors, unless otherwise indicated.               1/2/2025  9:30 AM  [facility] OT LYMPH TX [[id number]] 60 min [facility] Occupational Therapy Alessandra Malone OT    Patient Instructions:         Location Instructions:     Your appointment is scheduled on the  [facility]. The address is [address] [place] [address].  Please arrive 10 minutes prior to your scheduled appointment time.  Masks are optional for all patients and visitors, unless otherwise indicated.               1/7/2025  9:30 AM  [facility] OT LYMPH TX [[id number]] 60 min [facility] Occupational Therapy Alessandra Malone, OT    Patient Instructions:         Location Instructions:     Your appointment is scheduled on the [facility]. The address is [address] [place] [address].  Please arrive 10 minutes prior to your scheduled appointment time.  Masks are optional for all patients and visitors, unless otherwise indicated.               1/9/2025  9:15 AM  [facility] OT LYMPH TX [[id number]] 60 min [facility] Occupational Therapy Alessandra Malone, OT    Patient Instructions:         Location Instructions:     Your appointment is scheduled on the [facility]. The address is [address] [place] [address].  Please arrive 10 minutes prior to your scheduled appointment time.  Masks are optional for all patients and visitors, unless otherwise indicated.               1/14/2025  9:30 AM  [facility] OT LYMPH TX [[id number]] 60 min [facility] Occupational Therapy Alessandra Malone, OT    Patient Instructions:         Location Instructions:     Your appointment is scheduled on the [facility]. The address is [address] [place] [address].  Please arrive 10 minutes prior to your scheduled appointment time.  Masks are optional for all patients and visitors, unless otherwise indicated.                      Vital signs:  Temp:  [98.6 °F (37 °C)-98.7 °F (37.1 °C)] 98.6 °F (37 °C)  Pulse:  [66-68] 66  Resp:  [18-20] 20  BP: (102-148)/(46-57) 121/51  SpO2:  [92 %-97 %] 97 %    Physical Exam:    General: No acute distress   Lungs: clear to auscultation  Cardiovascular: S1, S2  Abdomen:  Soft    -----------------------------------------------------------------------------------------------  PATIENT DISCHARGE INSTRUCTIONS: See electronic chart    Adalid Gorman,     Total time spent on discharge plannin minutes     The  [employer] Cures Act makes medical notes like these available to patients in the interest of transparency. Please be advised this is a medical document. Medical documents are intended to carry relevant information, facts as evident, and the clinical opinion of the practitioner. The medical note is intended as peer to peer communication and may appear blunt or direct. It is written in medical language and may contain abbreviations or verbiage that are unfamiliar.

## 2024-12-26 NOTE — PLAN OF CARE
Alert and oriented x 4. Vitals stable on room air. Pain managed on PO medications. Voiding without difficulty. Last BM 12/25. Tolerating regular diet. SCD's on bilaterally. Safety precautions in place. Plan for discharge to [facility] at 1pm by ambulance. Plan of care discussed with patient. Report called to GINO Medina.

## 2024-12-26 NOTE — PLAN OF CARE
Assumed care @ 2200. AxO4. VSS on RA. On heparin. Denies nausea during shift, LBM 12/25. Voids w/o issue. Pain controlled w/ sched oxy ER, PRN meds. Up mod assist. Dc to [facility] when cleared. Updated on POC. Safety measures placed. Care ongoing.

## 2024-12-26 NOTE — CM/SW NOTE
Informed by RN that pt can be discharged today.  Contacted [facility] and confirmed that pt can be accepted to semi-private room today.  Ambulance transport scheduled for 1pm.  Updated PCS form.  Met with pt at bedside to update her.  Pt asked that her dtr be contacted.  Spoke with pt's dtr, Miranda to update her.  Informed both pt and dtr that pt will be going to shared room at [facility].  No other needs at this time.  / to remain available for support and/or discharge planning.     [facility] [place]  [phone]    [facility]  [phone]    [facility]  Discharge Planner  [phone]

## 2024-12-26 NOTE — OCCUPATIONAL THERAPY NOTE
IP OT attempted. RN made aware of attempt. Pt declining OOB activity at this time. Pt requesting to sleep more. Will follow-up later today as schedule permits.

## 2024-12-27 ENCOUNTER — TELEPHONE (OUTPATIENT)
Dept: OCCUPATIONAL MEDICINE | Facility: HOSPITAL | Age: 89
End: 2024-12-27

## 2024-12-27 ENCOUNTER — LAB REQUISITION (OUTPATIENT)
Dept: LAB | Facility: HOSPITAL | Age: 89
End: 2024-12-27
Payer: MEDICARE

## 2024-12-27 DIAGNOSIS — M54.59 OTHER LOW BACK PAIN: ICD-10-CM

## 2024-12-27 LAB
ALBUMIN SERPL-MCNC: 3.5 G/DL (ref 3.2–4.8)
ALBUMIN/GLOB SERPL: 1.7 {RATIO} (ref 1–2)
ALP LIVER SERPL-CCNC: 78 U/L
ALT SERPL-CCNC: 103 U/L
ANION GAP SERPL CALC-SCNC: 9 MMOL/L (ref 0–18)
AST SERPL-CCNC: 83 U/L (ref ?–34)
BASOPHILS # BLD AUTO: 0.01 X10(3) UL (ref 0–0.2)
BASOPHILS NFR BLD AUTO: 0.2 %
BILIRUB SERPL-MCNC: 0.3 MG/DL (ref 0.2–0.9)
BUN BLD-MCNC: 33 MG/DL (ref 9–23)
CALCIUM BLD-MCNC: 9.3 MG/DL (ref 8.7–10.4)
CHLORIDE SERPL-SCNC: 110 MMOL/L (ref 98–112)
CO2 SERPL-SCNC: 22 MMOL/L (ref 21–32)
CREAT BLD-MCNC: 0.81 MG/DL
EGFRCR SERPLBLD CKD-EPI 2021: 68 ML/MIN/1.73M2 (ref 60–?)
EOSINOPHIL # BLD AUTO: 0.04 X10(3) UL (ref 0–0.7)
EOSINOPHIL NFR BLD AUTO: 0.7 %
ERYTHROCYTE [DISTWIDTH] IN BLOOD BY AUTOMATED COUNT: 13.1 %
FASTING STATUS PATIENT QL REPORTED: YES
GLOBULIN PLAS-MCNC: 2.1 G/DL (ref 2–3.5)
GLUCOSE BLD-MCNC: 93 MG/DL (ref 70–99)
HCT VFR BLD AUTO: 34.5 %
HGB BLD-MCNC: 11.5 G/DL
IMM GRANULOCYTES # BLD AUTO: 0.03 X10(3) UL (ref 0–1)
IMM GRANULOCYTES NFR BLD: 0.5 %
LYMPHOCYTES # BLD AUTO: 1.69 X10(3) UL (ref 1–4)
LYMPHOCYTES NFR BLD AUTO: 28.3 %
MCH RBC QN AUTO: 32.1 PG (ref 26–34)
MCHC RBC AUTO-ENTMCNC: 33.3 G/DL (ref 31–37)
MCV RBC AUTO: 96.4 FL
MONOCYTES # BLD AUTO: 0.71 X10(3) UL (ref 0.1–1)
MONOCYTES NFR BLD AUTO: 11.9 %
NEUTROPHILS # BLD AUTO: 3.5 X10 (3) UL (ref 1.5–7.7)
NEUTROPHILS # BLD AUTO: 3.5 X10(3) UL (ref 1.5–7.7)
NEUTROPHILS NFR BLD AUTO: 58.4 %
OSMOLALITY SERPL CALC.SUM OF ELEC: 299 MOSM/KG (ref 275–295)
PLATELET # BLD AUTO: 177 10(3)UL (ref 150–450)
POTASSIUM SERPL-SCNC: 4 MMOL/L (ref 3.5–5.1)
PROT SERPL-MCNC: 5.6 G/DL (ref 5.7–8.2)
RBC # BLD AUTO: 3.58 X10(6)UL
SODIUM SERPL-SCNC: 141 MMOL/L (ref 136–145)
WBC # BLD AUTO: 6 X10(3) UL (ref 4–11)

## 2024-12-27 PROCEDURE — 80053 COMPREHEN METABOLIC PANEL: CPT | Performed by: FAMILY MEDICINE

## 2024-12-27 PROCEDURE — 85025 COMPLETE CBC W/AUTO DIFF WBC: CPT | Performed by: FAMILY MEDICINE

## 2024-12-28 NOTE — PROGRESS NOTES
Meena Whitman  : 1933  Age 89 year old  female patient is admitted to [facility] for rehabilitation and strengthening.     [facility] Admission: 24 - 24     Chief Complaint   Patient presents with    Follow - Up     Acute on chronic back pain, lumbar radiculopathy, HTN, chronic LUE lymphedema        Discharge Diagnoses:  Acute on chronic back pain  Lumbar radiculopathy secondary to L4-L5 neuro-foraminal stenosis  Essential HTN  Chronic LUE lymphedema  Hx breast CA     HPI  Meena Whitman is a 89 year old female with a past medical history significant for HTN, HFpEF, aortic valve insufficiency, breast CA, COPD, chronic LUE lymphedema, shingles. Presented to [facility] with c/o acute on chronic back pain with worsening R buttock pain radiating down her R posterior thigh and calf. MRI lumbar spine revealed spondylolisthesis at L3-4 and L4-5 a/w moderate to severe foraminal stenosis. Spine surgery Dr Benitez Duarte was consulted. She was deemed not a surgical candidate with plan for conservative therapy and to follow up outpatient at the pain clinic for HALLEY. She was started on a short course of oral Prednisone and her pain medications were adjusted. She was started on scheduled Tylenol Extra Strength, Methocarbamol, and Oxycodone with improvement in her pain symptoms. Now discharged to [facility] for rehabilitation and strengthening.     Patient seen today for initial aprn visit. She was seen laying in bed. Doing well this afternoon. Reports some moderate back pain but is improving as she did receive pain medication prior to arriving to rehab. Sleep is OK, appetite is good. Last BM yesterday, does reports some constipation. LUE lymphedema re wrapped by wound care earlier.     Denies fever, body aches/chills, CP, SOB, cough, abdominal pain, nausea, vomiting, urinary complaints.     Past Medical History:    Abnormal weight loss    Basal cell carcinoma (BCC) of left side of nose     Breast CA (HCC)    Breast cancer (HCC)    ILC, IDC, LCIS, ALH, DCIS    Cellulitis    COPD (chronic obstructive pulmonary disease) (HCC)    Ductal carcinoma in situ of breast    Exposure to radiation    High blood pressure    Keratoconjunctivitis sicca of both eyes    Lobular carcinoma in situ of breast    Lymph edema    left arm    Pelvic pressure in female    Post covid-19 condition, unspecified    Post herpetic neuralgia    Shingles     Past Surgical History:   Procedure Laterality Date    Cataract Bilateral     Eye surgery      both eyes - trabulectomys 7-8 TIMES    Eye surgery      DETACHED RETINA SURGERY    Glaucoma surgery  07/07/2016    Hernia surgery  10/18/2017    umbilical hernia    Hx breast cancer Left 1986    Injection, w/wo contrast, dx/therapeutic substance, epidural/subarachnoid; lumbar/sacral N/A 03/11/2016    Procedure: LUMBAR EPIDURAL;  Surgeon: Eric Montenegro MD;  Location: [facility]IN [facility]    Injection, w/wo contrast, dx/therapeutic substance, epidural/subarachnoid; lumbar/sacral N/A 04/04/2016    Procedure: LUMBAR EPIDURAL;  Surgeon: Eric Montenegro MD;  Location: [facility]IN [facility]    Lumpectomy left  1986    Mammogram, both breasts  07/22/2013    stable results (done in [place])    Needle biopsy right Right 04/2022    IDC, DCIS, LCIS    Other surgical history  06/01/2021    Cystoscopy (Dr. Brand)    Patient documented not to have experienced any of the following events N/A 03/11/2016    Procedure: LUMBAR EPIDURAL;  Surgeon: Eric Montenegro MD;  Location: [facility]NA[facility]    Patient documented not to have experienced any of the following events N/A 04/04/2016    Procedure: LUMBAR EPIDURAL;  Surgeon: Eric Montenegro MD;  Location: [facility] FOR PAIN MANA[facility]    Patient withough preoperative order for iv antibiotic surgical site infection prophylaxis. N/A 03/11/2016    Procedure: LUMBAR EPIDURAL;  Surgeon: Eric Montenegro MD;  [facility]    Patient withough preoperative order for iv antibiotic surgical site infection prophylaxis. N/A 04/04/2016    Procedure: LUMBAR EPIDURAL;  Surgeon: Eric Montenegro MD;  Location: [facility]IN [facility]    Radiation left  1986    Skin surgery Left     Scar to L chin s/p removal of BCC \"years ago.\" Scar C/D/I    Tonsillectomy       Family History   Problem Relation Age of Onset    DCIS Self     LCIS Self     Breast Cancer Self 55    Cancer Mother     Breast Cancer Mother         dx age 70's    Cancer Father     Other (Other) Father     Breast Cancer Daughter         dx age 40     Social History     Socioeconomic History    Marital status:     Number of children: 4   Occupational History    Occupation: Retired   Tobacco Use    Smoking status: Never    Smokeless tobacco: Never   Vaping Use    Vaping status: Never Used   Substance and Sexual Activity    Alcohol use: No     Alcohol/week: 0.0 standard drinks of alcohol    Drug use: No   Other Topics Concern    Caffeine Concern No    Exercise Yes     Comment: 3 times per week, [employer]     Social Drivers of Health     Food Insecurity: No Food Insecurity (12/22/2024)    Food Insecurity     Food Insecurity: Never true   Transportation Needs: No Transportation Needs (12/22/2024)    Transportation Needs     Lack of Transportation: No   Housing Stability: Low Risk  (12/22/2024)    Housing Stability     Housing Instability: No       ALLERGIES:  Allergies[1]    RADHAA Miranda Jeong    CODE STATUS:  Full Code    ADVANCED CARE PLANNING TEAM: None. Will need family care plan.       CURRENT MEDICATIONS   Current Outpatient Medications   Medication Sig Dispense Refill    pregabalin 75 MG Oral Cap Take 1 capsule (75 mg total) by mouth 3 (three) times daily. 2 capsule 0    predniSONE 20 MG Oral Tab Take 2 tablets (40 mg total) by mouth daily with breakfast for 4 days. 8 tablet 0    acetaminophen 500 MG Oral Tab Take 2 tablets  (1,000 mg total) by mouth in the morning, at noon, and at bedtime. 30 tablet 0    methocarbamol 500 MG Oral Tab Take 1 tablet (500 mg total) by mouth in the morning and 1 tablet (500 mg total) before bedtime. 2 tablet 0    oxyCODONE ER 10 MG Oral Tablet Extended Release 12 hour Abuse-Deterrent Take 1 tablet (10 mg total) by mouth Q12H. 2 tablet 0    CUSTOM MEDICATION Place into both eyes 3 (three) times daily. heparin pf 100 unit/ml ophth solution  1 drop both eyes  Indications: Keratoconjunctivitis Sicca      CUSTOM MEDICATION Place into both eyes 4 (four) times daily. flebogamma 10 mg/mL ophthalmic solution  1 drop both eyes qid  Indications: Redness or discharge of eye, Keratoconjunctivitis sicca of both eyes not specified as Sjogren's, Discomfort of both eyes      CUSTOM MEDICATION Place into both eyes 4 (four) times daily. 40% autologous serum eye drops  1 drop both eyes qid      furosemide 20 MG Oral Tab Take 1 tablet (20 mg total) by mouth daily as needed (Take if weight is >120 pounds.). 90 tablet 1    TEMAZEPAM 15 MG Oral Cap TAKE 1-2 CAPSULES (15-30 MG TOTAL) BY MOUTH NIGHTLY AS NEEDED FOR SLEEP. 60 capsule 0    Naloxone HCl 4 MG/0.1ML Nasal Liquid 4 mg by Intranasal route as needed (May repeat as needed in other nostril if symptoms persist). If patient remains unresponsive, repeat dose in other nostril 2-5 minutes after first dose. 1 kit 0    Vaginal Lubricant (VAGISIL LUBRICANT) Vaginal Gel Place 1 Application vaginally daily. 1 each 2    losartan 100 MG Oral Tab TAKE 1 TABLET (100 MG TOTAL) BY MOUTH DAILY. 90 tablet 2    Ascorbic Acid (VITAMIN C) 1000 MG Oral Tab Take 1 tablet (1,000 mg total) by mouth daily.      Elastic Bandages & Supports (SKINEEZ COMPRESSION ARM SLEEVE) Does not apply Misc 2 Units daily as needed. 2 each 3    amLODIPine 5 MG Oral Tab Take 1 tablet (5 mg total) by mouth daily. 90 tablet 1    labetalol 100 MG Oral Tab Take 1 tablet (100 mg total) by mouth 2 (two) times daily. 180  tablet 1    estradiol 0.1 MG/GM Vaginal Cream Place 1 g vaginally daily. 42.5 g 3    Vitamin D, Cholecalciferol, 25 MCG (1000 UT) Oral Tab Take 1,000 Units by mouth daily. Take 3 caps po q am 270 tablet 3    acetaZOLAMIDE 250 MG Oral Tab Take 1 tablet (250 mg total) by mouth 2 (two) times daily.      Multiple Vitamins-Minerals (COMPLETE DAILY/LUTEIN) Oral Tab Take 1 tablet by mouth 3 (three) times daily.         VITALS:  /44   Pulse 71   Temp 97 °F (36.1 °C)   Resp 19   Wt 115 lb (52.2 kg)   SpO2 92%   BMI 19.74 kg/m²      REVIEW OF SYSTEMS:  GENERAL HEALTH:feels well otherwise  SKIN: denies any unusual skin lesions or rashes  WOUNDS: none   EYES:no visual complaints or deficits  HENT: denies nasal congestion, sinus pain or sore throat;  RESPIRATORY: denies shortness of breath, wheezing or cough   CARDIOVASCULAR:denies chest pain, no palpitations   GI: +constipation; denies nausea, vomiting, diarrhea; no rectal bleeding; no heartburn   Gu: No urinary frequency, urgency or dysuria  MUSCULOSKELETAL:+chronic back pain  NEURO:no sensory or motor complaint  PSYCHE: no symptoms of depression or anxiety  HEMATOLOGY:denies bruising, denies excessive bleeding  ENDOCRINE: denies excessive thirst or urination; denies unexpected wt gain or wt loss  ALLERGY/IMM.: denies food or seasonal allergies    PHYSICAL EXAM:  GENERAL HEALTH: well developed, well nourished, in no apparent distress  LINES, TUBES, DRAINS:  none  SKIN: no rashes, no suspicious lesions, warm, dry  WOUND: none  EYES: sclera anicteric, conjunctiva normal; there is no nystagmus, no drainage from eyes  HENT: normocephalic; normal nose, no nasal drainage, mucous membranes pink, moist.  NECK: supple, non tender, FROM  BREAST: deferred  RESPIRATORY: clear to percussion and auscultation  CARDIOVASCULAR: S1, S2 normal, RRR; no S3, no S4  ABDOMEN:  normal active BS+, soft, nondistended; no organomegaly, no masses; nontender, no guarding, no rebound  tenderness.  :no suprapubic distension  LYMPHATIC:lymphedema LUE with compression wrap  MUSCULOSKELETAL: limited mobility d/t pain, generalized weakness, no acute synovitis upper or lower extremities   EXTREMITIES/VASCULAR:radial pulses 2+ and dorsalis pedal pulses 2+  NEUROLOGIC: A&OX3, no focal deficits, follows commands  PSYCHIATRIC: calm, cooperative, mood and affect appropriate to situation    DIAGNOSTICS REVIEWED AT THIS VISIT:  Lab Results   Component Value Date    WBC 6.0 12/27/2024    RBC 3.58 (L) 12/27/2024    HGB 11.5 (L) 12/27/2024    HCT 34.5 (L) 12/27/2024    MCV 96.4 12/27/2024    MCH 32.1 12/27/2024    MCHC 33.3 12/27/2024    RDW 13.1 12/27/2024    .0 12/27/2024     Lab Results   Component Value Date    GLU 93 12/27/2024    BUN 33 (H) 12/27/2024    BUNCREA 39.0 (H) 04/12/2021    CREATSERUM 0.81 12/27/2024    ANIONGAP 9 12/27/2024    GFR 76 02/27/2017    GFRNAA 62 07/20/2022    GFRAA 71 07/20/2022    CA 9.3 12/27/2024    OSMOCALC 299 (H) 12/27/2024    ALKPHO 78 12/27/2024    AST 83 (H) 12/27/2024     (H) 12/27/2024    BILT 0.3 12/27/2024    TP 5.6 (L) 12/27/2024    ALB 3.5 12/27/2024    GLOBULIN 2.1 12/27/2024     12/27/2024    K 4.0 12/27/2024     12/27/2024    CO2 22.0 12/27/2024     [facility] medical records reviewed.  Medication reconciliation completed.        SEE PLAN BELOW    Acute on chronic back pain  Lumbar radiculopathy  - MRI lumbar spine with spondylolisthesis at L3-4 and L4-5 a/w moderate to severe foraminal stenosis  - Spine surgery Dr Benitez Duarte was consulted  - conservative treatment, no acute surgical intervention  - continue Prednisone 40mg daily x 4 days EOT 12/31/24    - pain management as below   - PT/OT   - Follow up at Pain clinic with Dr Kevin Kovacs outpatient for HALLEY in 1 week    Pain management  - Monitor and assess pain  - Offer to pre-medicate 30-60 min prior to therapy  - Tylenol Extra Strength 1000mg TID   - Methocarbamol 500mg BID   -  Oxycodone ER 10mg Q12hrs   - Pregabalin 75mg TID   - Physiatry evaluation with management appreciated     HTN, HFpEF, nonrheumatic aortic valve insufficiency   - 2D echo (11/14/24) = EF 60-65%   - Daily weights; Call if weight gain of 2# overnight or 4# in 5 days  - 2 gram sodium diet  - 1.8 liter/day fluid restriction  - VS q shift and prn  - continue Losartan 100mg daily   - continue Amlodipine 5mg daily   - continue Labetalol 100mg BID   - continue Diamox 250mg BID   - continue Lasix 20 daily prn if weight > 120 lbs   - Monitoring for edema, weight gain, shortness of breath, fatigue  - CMP weekly and prn  - Follow up with Cardiology [facility] outpatient     Chronic LUE lymphedema d/t shingles   - continue compression arm sleeve   - Follow up lymphedema treatment outpatient as scheduled     Keratoconjunctivitis Sicca   - continue Heparin pf ophthalmic solution  1 drop both eyes TID   - continue Flebogamma ophthalmic solution  1 drop both eyes QID   - continue 40% Autologous serum eye drops  1 drop both eyes QID    Hx L breast CA  - s/p lumpectomy & LN dissection (1986)   - Follow up with Oncology outpatient as directed     Insomnia  - continue Temazepam 1-2 capsules (15-30 mg total) HS prn     Physical Deconditioning/Impaired mobility and ADLs/At risk for falling  - Fall precautions  - PT/OT eval and treat    Vitamins/supplements as r/t deficiencies  - KAREN RD to follow while in rehab; supplementation/diet as per KAREN RD  - May continue home supplements  Vitamin C 1000mg daily   Vitamin D 3000U daily     At risk for malnutrition  - Dietician consult  - Weekly weights  - Supplements as indicated  - Encourage po intake    Bowel management  Constipation  - Monitor for Bms  - add SennaPlus 2 tabs BID   - add Miralax daily prn   - add Dulcolax suppository daily prn    DVT prophylaxis  - Encourage exercise and participate with therapy as much as able     GI prophylaxis  - none    Labs  - CBC, CMP weekly and prn    Discharge  planning  - ELOS 14 days  - DC planning with MDT, SW, therapies  - [facility] team to establish care plan meeting with patient and POA/family as appropriate  - [facility] team/ & discharge planner to assist with establishing safe discharge plan for next level of care  - Anticipate DC on or before TBD.  - DC Plan: from [place]     Follow Ups:  - PCP within 7 days of DC  - Pain clinic with Dr Kevin Kovacs outpatient for HALLEY in 1 week  - Cardiology [facility] outpatient as indicated  - Oncology outpatient as indicated     Future Appointments   Date Time Provider Department Center   1/7/2025  9:30 AM Alessandra Malone, OT [facility]   1/9/2025  9:15 AM Alessandra Malone, OT [facility]   1/14/2025  9:30 AM Alessandra Malone, OT [facility]   4/1/2025  1:15 PM Krista Warren MD NP [facility]   4/1/2025  1:30 PM NP [place] RN1 NP SW [facility]   4/3/2025  1:00 PM Bonnie Danielson MD [facility] [address] [facility] [address] [place]       Please note, voice recognition software ([email or web address]) was used to create this document. An attempt at proofreading has been made to minimize errors. Errors may still exist.       75 minutes spent w/ patient and reviewing medical records, labs, completing medication reconciliation and entering orders to establish plan of care in [facility]. Patient is a Full Code.       Note to patient: The 21st Century Cures Act makes medical notes like these available to patients in the interest of transparency. However, this is a medical document intended as peer to peer communication. It is written in medical language and may contain abbreviations or verbiage that are unfamiliar. It may appear blunt or direct. Medical documents are intended to carry relevant information, facts as evident, and the clinical opinion of the practitioner who signs the document.    May REID, APRN  12/26/24         [1]   Allergies  Allergen Reactions    Hydralazine OTHER (SEE COMMENTS)     Headache    Bactrim  [Sulfamethoxazole W/Trimethoprim] RASH     Per patient    Metronidazole ITCHING     Metronidazole gel caused irritation in vaginal area.

## 2024-12-31 ENCOUNTER — APPOINTMENT (OUTPATIENT)
Dept: OCCUPATIONAL MEDICINE | Facility: HOSPITAL | Age: 89
End: 2024-12-31
Attending: INTERNAL MEDICINE
Payer: MEDICARE

## 2024-12-31 ENCOUNTER — SNF VISIT (OUTPATIENT)
Dept: INTERNAL MEDICINE CLINIC | Age: 89
End: 2024-12-31

## 2024-12-31 VITALS
TEMPERATURE: 98 F | RESPIRATION RATE: 17 BRPM | OXYGEN SATURATION: 96 % | HEART RATE: 59 BPM | SYSTOLIC BLOOD PRESSURE: 155 MMHG | BODY MASS INDEX: 21 KG/M2 | WEIGHT: 123.19 LBS | DIASTOLIC BLOOD PRESSURE: 67 MMHG

## 2024-12-31 DIAGNOSIS — G89.29 ACUTE ON CHRONIC BACK PAIN: Primary | ICD-10-CM

## 2024-12-31 DIAGNOSIS — M54.16 LUMBAR RADICULOPATHY: ICD-10-CM

## 2024-12-31 DIAGNOSIS — I50.32 CHRONIC HEART FAILURE WITH PRESERVED EJECTION FRACTION (HCC): ICD-10-CM

## 2024-12-31 DIAGNOSIS — G47.00 INSOMNIA, UNSPECIFIED TYPE: ICD-10-CM

## 2024-12-31 DIAGNOSIS — M54.9 ACUTE ON CHRONIC BACK PAIN: Primary | ICD-10-CM

## 2024-12-31 DIAGNOSIS — R52 PAIN MANAGEMENT: ICD-10-CM

## 2024-12-31 DIAGNOSIS — B02.29 POST HERPETIC NEURALGIA: ICD-10-CM

## 2024-12-31 DIAGNOSIS — I89.0 LYMPHEDEMA OF LEFT UPPER EXTREMITY: ICD-10-CM

## 2024-12-31 DIAGNOSIS — R53.1 WEAKNESS: ICD-10-CM

## 2024-12-31 DIAGNOSIS — I10 PRIMARY HYPERTENSION: ICD-10-CM

## 2024-12-31 DIAGNOSIS — Z78.9 DECREASED ACTIVITIES OF DAILY LIVING (ADL): ICD-10-CM

## 2024-12-31 PROCEDURE — 99308 SBSQ NF CARE LOW MDM 20: CPT

## 2024-12-31 NOTE — PROGRESS NOTES
Meena Whitman, 6/13/1933, 89 year old, female is admitted to [facility] [place] [facility] for rehabilitation and strengthening.     [facility] Admission: 12/22/24 - 12/26/24     Chief Complaint   Patient presents with    Follow - Up     Pain management, lumbar radiculopathy secondary to L4-L5 neuro foraminal stenosis, postherpetic neuralgia          HPI:  Meena Whitman is a 89 year old female with a past medical history significant for HTN, HFpEF, aortic valve insufficiency, breast CA, COPD, chronic LUE lymphedema, shingles. Presented to [facility] with c/o acute on chronic back pain with worsening R buttock pain radiating down her R posterior thigh and calf. MRI lumbar spine revealed spondylolisthesis at L3-4 and L4-5 a/w moderate to severe foraminal stenosis. Spine surgery Dr Benitez Duarte was consulted. She was deemed not a surgical candidate with plan for conservative therapy and to follow up outpatient at the pain clinic for HALLEY. She was started on a short course of oral Prednisone and her pain medications were adjusted. She was started on scheduled Tylenol Extra Strength, Methocarbamol, and Oxycodone with improvement in her pain symptoms. Now discharged to [facility] for rehabilitation and strengthening.     SUBJECTIVE:  Patient seen today for aprn follow up visit. Nursing reports patient's pain medications were adjusted by physiatry yesterday. She was seen sitting up in her bed eating breakfast this morning. Reports pain better managed today. Slept well last night and appetite is good. Moving bowels normally.     Denies fever, body aches/chills, CP, SOB, cough, abdominal pain, nausea, vomiting, edema, urinary or bowel complaints.    OBJECTIVE: /67   Pulse 59   Temp 97.8 °F (36.6 °C)   Resp 17   Wt 123 lb 3.2 oz (55.9 kg)   SpO2 96%   BMI 21.15 kg/m²     PHYSICAL EXAM:  GENERAL HEALTH: well developed, well nourished, in no apparent distress  LINES, TUBES, DRAINS:  none]  SKIN: warm, dry, L  upper back scattered erythematous plaques  WOUND: none  EYES:  sclera anicteric, conjunctiva normal; there is no nystagmus, no drainage from eyes  HENT:  normocephalic; normal nose, no nasal drainage, mucous membranes pink, moist.  NECK:  supple, non tender, FROM  BREAST:  deferred  RESPIRATORY: clear to percussion and auscultation  CARDIOVASCULAR: S1, S2 normal, RRR; no S3, no S4 and no murmur  ABDOMEN:  normal active BS+, soft, nondistended; no organomegaly, no masses; nontender, no guarding, no rebound tenderness.  :no suprapubic distension  LYMPHATIC: LUE lymphedema with compression wrap   MUSCULOSKELETAL: limited mobility d/t back pain, no acute synovitis upper or lower extremities   EXTREMITIES/VASCULAR:radial pulses 2+ and dorsalis pedal pulses 2+  NEUROLOGIC: A&OX3, no focal deficits, follows commands  PSYCHIATRIC: calm, cooperative, mood and affect appropriate to situation    Therapy update:  Transfers: max A  ADLS:  mod A  Ambulation:  5 ft rw Max A   DC plan: from [facility][note 3]     Medications reviewed: Yes      Current Outpatient Medications:     pregabalin 75 MG Oral Cap, Take 1 capsule (75 mg total) by mouth 3 (three) times daily., Disp: 2 capsule, Rfl: 0    predniSONE 20 MG Oral Tab, Take 2 tablets (40 mg total) by mouth daily with breakfast for 4 days., Disp: 8 tablet, Rfl: 0    acetaminophen 500 MG Oral Tab, Take 2 tablets (1,000 mg total) by mouth in the morning, at noon, and at bedtime., Disp: 30 tablet, Rfl: 0    methocarbamol 500 MG Oral Tab, Take 1 tablet (500 mg total) by mouth in the morning and 1 tablet (500 mg total) before bedtime., Disp: 2 tablet, Rfl: 0    oxyCODONE ER 10 MG Oral Tablet Extended Release 12 hour Abuse-Deterrent, Take 1 tablet (10 mg total) by mouth Q12H., Disp: 2 tablet, Rfl: 0    CUSTOM MEDICATION, Place into both eyes 3 (three) times daily. heparin pf 100 unit/ml ophth solution 1 drop both eyes Indications: Keratoconjunctivitis Sicca, Disp: , Rfl:     CUSTOM  MEDICATION, Place into both eyes 4 (four) times daily. flebogamma 10 mg/mL ophthalmic solution 1 drop both eyes qid Indications: Redness or discharge of eye, Keratoconjunctivitis sicca of both eyes not specified as Sjogren's, Discomfort of both eyes, Disp: , Rfl:     CUSTOM MEDICATION, Place into both eyes 4 (four) times daily. 40% autologous serum eye drops 1 drop both eyes qid, Disp: , Rfl:     furosemide 20 MG Oral Tab, Take 1 tablet (20 mg total) by mouth daily as needed (Take if weight is >120 pounds.)., Disp: 90 tablet, Rfl: 1    TEMAZEPAM 15 MG Oral Cap, TAKE 1-2 CAPSULES (15-30 MG TOTAL) BY MOUTH NIGHTLY AS NEEDED FOR SLEEP., Disp: 60 capsule, Rfl: 0    Naloxone HCl 4 MG/0.1ML Nasal Liquid, 4 mg by Intranasal route as needed (May repeat as needed in other nostril if symptoms persist). If patient remains unresponsive, repeat dose in other nostril 2-5 minutes after first dose., Disp: 1 kit, Rfl: 0    Vaginal Lubricant (VAGISIL LUBRICANT) Vaginal Gel, Place 1 Application vaginally daily., Disp: 1 each, Rfl: 2    losartan 100 MG Oral Tab, TAKE 1 TABLET (100 MG TOTAL) BY MOUTH DAILY., Disp: 90 tablet, Rfl: 2    Ascorbic Acid (VITAMIN C) 1000 MG Oral Tab, Take 1 tablet (1,000 mg total) by mouth daily., Disp: , Rfl:     Elastic Bandages & Supports (SKINEEZ COMPRESSION ARM SLEEVE) Does not apply Misc, 2 Units daily as needed., Disp: 2 each, Rfl: 3    amLODIPine 5 MG Oral Tab, Take 1 tablet (5 mg total) by mouth daily., Disp: 90 tablet, Rfl: 1    labetalol 100 MG Oral Tab, Take 1 tablet (100 mg total) by mouth 2 (two) times daily., Disp: 180 tablet, Rfl: 1    estradiol 0.1 MG/GM Vaginal Cream, Place 1 g vaginally daily., Disp: 42.5 g, Rfl: 3    Vitamin D, Cholecalciferol, 25 MCG (1000 UT) Oral Tab, Take 1,000 Units by mouth daily. Take 3 caps po q am, Disp: 270 tablet, Rfl: 3    acetaZOLAMIDE 250 MG Oral Tab, Take 1 tablet (250 mg total) by mouth 2 (two) times daily., Disp: , Rfl:     Multiple Vitamins-Minerals  (COMPLETE DAILY/LUTEIN) Oral Tab, Take 1 tablet by mouth 3 (three) times daily., Disp: , Rfl:       Diagnostics reviewed:    Lab Results   Component Value Date    WBC 6.0 12/27/2024    RBC 3.58 (L) 12/27/2024    HGB 11.5 (L) 12/27/2024    HCT 34.5 (L) 12/27/2024    MCV 96.4 12/27/2024    MCH 32.1 12/27/2024    MCHC 33.3 12/27/2024    RDW 13.1 12/27/2024    .0 12/27/2024     Lab Results   Component Value Date    GLU 93 12/27/2024    BUN 33 (H) 12/27/2024    BUNCREA 39.0 (H) 04/12/2021    CREATSERUM 0.81 12/27/2024    ANIONGAP 9 12/27/2024    GFR 76 02/27/2017    GFRNAA 62 07/20/2022    GFRAA 71 07/20/2022    CA 9.3 12/27/2024    OSMOCALC 299 (H) 12/27/2024    ALKPHO 78 12/27/2024    AST 83 (H) 12/27/2024     (H) 12/27/2024    BILT 0.3 12/27/2024    TP 5.6 (L) 12/27/2024    ALB 3.5 12/27/2024    GLOBULIN 2.1 12/27/2024     12/27/2024    K 4.0 12/27/2024     12/27/2024    CO2 22.0 12/27/2024     ASSESSMENT AND PLAN :    Acute on chronic back pain  Lumbar radiculopathy  - MRI lumbar spine with spondylolisthesis at L3-4 and L4-5 a/w moderate to severe foraminal stenosis  - Spine surgery Dr Benitez Duarte was consulted  - conservative treatment, no acute surgical intervention  - continue Prednisone 40mg daily x 4 days EOT 12/31/24    - pain management as below   - PT/OT   - Follow up at Pain clinic with Dr Kevin Kovacs outpatient for HALLEY in 1 week    Postherpetic neuralgia, hx Shingles June 2024   - post residual L upper back erythematous plaques   - continue Lyrica 75mg TID   - add Lidocaine 5% cream to affected area   - pain management as below      Pain management  - Monitor and assess pain  - Offer to pre-medicate 30-60 min prior to therapy  - Tylenol Extra Strength 1000mg TID   - Methocarbamol 500mg BID   - Oxycodone ER 10mg ER BID (9a/9p)  - Oxycodone 10mg BID (8a/12p) and q8hrs prn   - Pregabalin 75mg TID   - add Lidocaine 5% cream to affected area   - Physiatry evaluation with management  appreciated      HTN, HFpEF, nonrheumatic aortic valve insufficiency   - 2D echo (11/14/24) = EF 60-65%   - Daily weights; Call if weight gain of 2# overnight or 4# in 5 days  - 2 gram sodium diet  - 1.8 liter/day fluid restriction  - VS q shift and prn  - continue Losartan 100mg daily   - continue Amlodipine 5mg daily   - continue Labetalol 100mg BID   - continue Diamox 250mg BID   - continue Lasix 20 daily prn if weight > 120 lbs   - Monitoring for edema, weight gain, shortness of breath, fatigue  - CMP weekly and prn  - Follow up with Cardiology [facility] outpatient      Chronic LUE lymphedema d/t shingles   - continue compression arm sleeve   - Follow up lymphedema treatment outpatient after DC     Keratoconjunctivitis Sicca   - continue Heparin pf ophthalmic solution  1 drop both eyes TID   - continue Flebogamma ophthalmic solution  1 drop both eyes QID   - continue 40% Autologous serum eye drops  1 drop both eyes QID     Hx L breast CA  - s/p lumpectomy & LN dissection (1986)   - Follow up with Oncology outpatient as directed      Insomnia  - continue Temazepam 1-2 capsules (15-30 mg total) HS prn      Physical Deconditioning/Impaired mobility and ADLs/At risk for falling  - Fall precautions  - PT/OT eval and treat     Vitamins/supplements as r/t deficiencies  - KAREN RD to follow while in rehab; supplementation/diet as per KAREN RD  - May continue home supplements  Vitamin C 1000mg daily   Vitamin D 3000U daily      At risk for malnutrition  - Dietician consult  - Weekly weights  - Supplements as indicated  - Encourage po intake     Bowel management  Constipation  - Monitor for Bms  - add SennaPlus 2 tabs BID   - add Miralax daily prn   - add Dulcolax suppository daily prn     DVT prophylaxis  - Encourage exercise and participate with therapy as much as able      GI prophylaxis  - none     Labs  - CBC, CMP weekly and prn. Next labs 1/2/25      Discharge planning  - ELOS 14 days  - DC planning with MDT, SW, therapies  -  KAREN team to establish care plan meeting with patient and POA/family as appropriate  - KAREN team/ & discharge planner to assist with establishing safe discharge plan for next level of care  - Anticipate DC on or before TBD.  - [facility]      Follow Ups:  - PCP within 7 days of DC  - Pain clinic with Dr Kevin Kovacs outpatient for HALLEY in 1 week  - Cardiology [facility] outpatient as indicated  - Oncology outpatient as indicated       *Established patient; follow-up mildly complex visit/ greater than 20    25 minutes spent w/ patient and staff, including but not limited to/ reviewing present status, needs, abilities with disciplines, reviewing medical records, vital signs, labs, completing medication reconciliation and entering orders for continued care in [facility].    Note to patient: The 21st Century Cures Act makes medical notes like these available to patients in the interest of transparency. However, this is a medical document intended as peer to peer communication. It is written in medical language and may contain abbreviations or verbiage that are unfamiliar. It may appear blunt or direct. Medical documents are intended to carry relevant information, facts as evident, and the clinical opinion of the practitioner who signs the document.    May REID, APRN  12/31/2024

## 2025-01-02 ENCOUNTER — LAB REQUISITION (OUTPATIENT)
Dept: LAB | Facility: HOSPITAL | Age: OVER 89
End: 2025-01-02
Payer: MEDICARE

## 2025-01-02 ENCOUNTER — EXTERNAL FACILITY (OUTPATIENT)
Dept: FAMILY MEDICINE CLINIC | Facility: CLINIC | Age: OVER 89
End: 2025-01-02

## 2025-01-02 ENCOUNTER — APPOINTMENT (OUTPATIENT)
Dept: OCCUPATIONAL MEDICINE | Facility: HOSPITAL | Age: OVER 89
End: 2025-01-02
Attending: INTERNAL MEDICINE
Payer: MEDICARE

## 2025-01-02 ENCOUNTER — TELEPHONE (OUTPATIENT)
Dept: PHYSICAL THERAPY | Facility: HOSPITAL | Age: OVER 89
End: 2025-01-02

## 2025-01-02 DIAGNOSIS — R26.81 GAIT INSTABILITY: ICD-10-CM

## 2025-01-02 DIAGNOSIS — G89.29 CHRONIC LEFT-SIDED LOW BACK PAIN WITHOUT SCIATICA: ICD-10-CM

## 2025-01-02 DIAGNOSIS — M80.00XD AGE-RELATED OSTEOPOROSIS WITH CURRENT PATHOLOGICAL FRACTURE WITH ROUTINE HEALING, SUBSEQUENT ENCOUNTER: ICD-10-CM

## 2025-01-02 DIAGNOSIS — R26.2 AMBULATORY DYSFUNCTION: ICD-10-CM

## 2025-01-02 DIAGNOSIS — M54.41 ACUTE RIGHT-SIDED LOW BACK PAIN WITH RIGHT-SIDED SCIATICA: ICD-10-CM

## 2025-01-02 DIAGNOSIS — F41.9 ANXIETY: ICD-10-CM

## 2025-01-02 DIAGNOSIS — Z91.89 AT RISK FOR MALNUTRITION: ICD-10-CM

## 2025-01-02 DIAGNOSIS — R53.1 WEAKNESS GENERALIZED: ICD-10-CM

## 2025-01-02 DIAGNOSIS — M54.59 OTHER LOW BACK PAIN: ICD-10-CM

## 2025-01-02 DIAGNOSIS — M41.85 OTHER FORM OF SCOLIOSIS OF THORACOLUMBAR SPINE: ICD-10-CM

## 2025-01-02 DIAGNOSIS — I35.1 NONRHEUMATIC AORTIC VALVE INSUFFICIENCY: ICD-10-CM

## 2025-01-02 DIAGNOSIS — R53.1 GENERALIZED WEAKNESS: ICD-10-CM

## 2025-01-02 DIAGNOSIS — I47.10 PAROXYSMAL SVT (SUPRAVENTRICULAR TACHYCARDIA) (HCC): ICD-10-CM

## 2025-01-02 DIAGNOSIS — R53.81 PHYSICAL DECONDITIONING: ICD-10-CM

## 2025-01-02 DIAGNOSIS — M51.360 DEGENERATION OF INTERVERTEBRAL DISC OF LUMBAR REGION WITH DISCOGENIC BACK PAIN: ICD-10-CM

## 2025-01-02 DIAGNOSIS — M16.0 OSTEOARTHRITIS OF BOTH HIPS, UNSPECIFIED OSTEOARTHRITIS TYPE: ICD-10-CM

## 2025-01-02 DIAGNOSIS — M54.50 CHRONIC LEFT-SIDED LOW BACK PAIN WITHOUT SCIATICA: ICD-10-CM

## 2025-01-02 DIAGNOSIS — I10 PRIMARY HYPERTENSION: ICD-10-CM

## 2025-01-02 DIAGNOSIS — M43.16 SPONDYLOLISTHESIS OF LUMBAR REGION: Primary | ICD-10-CM

## 2025-01-02 DIAGNOSIS — I89.0 LYMPHEDEMA OF LEFT UPPER EXTREMITY: ICD-10-CM

## 2025-01-02 LAB
ALBUMIN SERPL-MCNC: 3.4 G/DL (ref 3.2–4.8)
ALBUMIN/GLOB SERPL: 1.5 {RATIO} (ref 1–2)
ALP LIVER SERPL-CCNC: 76 U/L
ALT SERPL-CCNC: 38 U/L
ANION GAP SERPL CALC-SCNC: 3 MMOL/L (ref 0–18)
AST SERPL-CCNC: 16 U/L (ref ?–34)
BASOPHILS # BLD AUTO: 0.04 X10(3) UL (ref 0–0.2)
BASOPHILS NFR BLD AUTO: 0.6 %
BILIRUB SERPL-MCNC: 0.5 MG/DL (ref 0.2–0.9)
BUN BLD-MCNC: 35 MG/DL (ref 9–23)
CALCIUM BLD-MCNC: 8.9 MG/DL (ref 8.7–10.4)
CHLORIDE SERPL-SCNC: 107 MMOL/L (ref 98–112)
CO2 SERPL-SCNC: 29 MMOL/L (ref 21–32)
CREAT BLD-MCNC: 0.77 MG/DL
EGFRCR SERPLBLD CKD-EPI 2021: 73 ML/MIN/1.73M2 (ref 60–?)
EOSINOPHIL # BLD AUTO: 0.32 X10(3) UL (ref 0–0.7)
EOSINOPHIL NFR BLD AUTO: 5.1 %
ERYTHROCYTE [DISTWIDTH] IN BLOOD BY AUTOMATED COUNT: 13.7 %
FASTING STATUS PATIENT QL REPORTED: YES
GLOBULIN PLAS-MCNC: 2.2 G/DL (ref 2–3.5)
GLUCOSE BLD-MCNC: 77 MG/DL (ref 70–99)
HCT VFR BLD AUTO: 40.4 %
HGB BLD-MCNC: 13 G/DL
IMM GRANULOCYTES # BLD AUTO: 0.05 X10(3) UL (ref 0–1)
IMM GRANULOCYTES NFR BLD: 0.8 %
LYMPHOCYTES # BLD AUTO: 1.34 X10(3) UL (ref 1–4)
LYMPHOCYTES NFR BLD AUTO: 21.4 %
MCH RBC QN AUTO: 32.1 PG (ref 26–34)
MCHC RBC AUTO-ENTMCNC: 32.2 G/DL (ref 31–37)
MCV RBC AUTO: 99.8 FL
MONOCYTES # BLD AUTO: 0.89 X10(3) UL (ref 0.1–1)
MONOCYTES NFR BLD AUTO: 14.2 %
NEUTROPHILS # BLD AUTO: 3.61 X10 (3) UL (ref 1.5–7.7)
NEUTROPHILS # BLD AUTO: 3.61 X10(3) UL (ref 1.5–7.7)
NEUTROPHILS NFR BLD AUTO: 57.9 %
OSMOLALITY SERPL CALC.SUM OF ELEC: 295 MOSM/KG (ref 275–295)
PLATELET # BLD AUTO: 193 10(3)UL (ref 150–450)
POTASSIUM SERPL-SCNC: 4.1 MMOL/L (ref 3.5–5.1)
PROT SERPL-MCNC: 5.6 G/DL (ref 5.7–8.2)
RBC # BLD AUTO: 4.05 X10(6)UL
SODIUM SERPL-SCNC: 139 MMOL/L (ref 136–145)
WBC # BLD AUTO: 6.3 X10(3) UL (ref 4–11)

## 2025-01-02 PROCEDURE — 85025 COMPLETE CBC W/AUTO DIFF WBC: CPT

## 2025-01-02 PROCEDURE — 80053 COMPREHEN METABOLIC PANEL: CPT

## 2025-01-03 ENCOUNTER — SNF VISIT (OUTPATIENT)
Dept: INTERNAL MEDICINE CLINIC | Age: OVER 89
End: 2025-01-03

## 2025-01-03 VITALS
OXYGEN SATURATION: 96 % | RESPIRATION RATE: 17 BRPM | DIASTOLIC BLOOD PRESSURE: 54 MMHG | SYSTOLIC BLOOD PRESSURE: 140 MMHG | TEMPERATURE: 97 F | WEIGHT: 128 LBS | BODY MASS INDEX: 22 KG/M2 | HEART RATE: 54 BPM

## 2025-01-03 DIAGNOSIS — R52 PAIN MANAGEMENT: ICD-10-CM

## 2025-01-03 DIAGNOSIS — M54.16 LUMBAR RADICULOPATHY: ICD-10-CM

## 2025-01-03 DIAGNOSIS — K64.9 HEMORRHOIDS, UNSPECIFIED HEMORRHOID TYPE: ICD-10-CM

## 2025-01-03 DIAGNOSIS — I10 PRIMARY HYPERTENSION: ICD-10-CM

## 2025-01-03 DIAGNOSIS — M54.9 ACUTE ON CHRONIC BACK PAIN: Primary | ICD-10-CM

## 2025-01-03 DIAGNOSIS — Z78.9 DECREASED ACTIVITIES OF DAILY LIVING (ADL): ICD-10-CM

## 2025-01-03 DIAGNOSIS — K59.00 CONSTIPATION, UNSPECIFIED CONSTIPATION TYPE: ICD-10-CM

## 2025-01-03 DIAGNOSIS — R53.1 WEAKNESS: ICD-10-CM

## 2025-01-03 DIAGNOSIS — B02.29 POST HERPETIC NEURALGIA: ICD-10-CM

## 2025-01-03 DIAGNOSIS — G89.29 ACUTE ON CHRONIC BACK PAIN: Primary | ICD-10-CM

## 2025-01-03 PROCEDURE — 99309 SBSQ NF CARE MODERATE MDM 30: CPT | Performed by: NURSE PRACTITIONER

## 2025-01-03 NOTE — PROGRESS NOTES
Meena Whitman, 6/13/1933, 89 year old, female is admitted to [facility] [place] [facility] for rehabilitation and strengthening.     [facility] Admission: 12/22/24 - 12/26/24     Chief Complaint   Patient presents with    Follow - Up     Constipation, hemorrhoids  Weakness, pain        HPI:  Meena Whitman is a 89 year old female with a past medical history significant for HTN, HFpEF, aortic valve insufficiency, breast CA, COPD, chronic LUE lymphedema, shingles. Presented to [place] ED with c/o acute on chronic back pain with worsening R buttock pain radiating down her R posterior thigh and calf. MRI lumbar spine revealed spondylolisthesis at L3-4 and L4-5 a/w moderate to severe foraminal stenosis. Spine surgery Dr Benitez Duarte was consulted. She was deemed not a surgical candidate with plan for conservative therapy and to follow up outpatient at the pain clinic for HALLEY. She was started on a short course of oral Prednisone and her pain medications were adjusted. She was started on scheduled Tylenol Extra Strength, Methocarbamol, and Oxycodone with improvement in her pain symptoms. Now discharged to [facility] for rehabilitation and strengthening.     SUBJECTIVE:  Patient seen today for aprn follow up visit.   Nursing reports she is requesting an enema. She had a supp on Wed and has been having daily BM but wants to \" be cleaned out\" states she had a very hard and difficult to pass stool this am. Her hemorrhoids are bothering her.   She is not wanting to eat lunch only have juice until she can have a larger BM today.   She reports pain better managed today. Slept well last night. Denies abdominal pain, nausea or vomiting.     Denies fever, body aches/chills, CP, SOB, cough, abdominal pain, nausea, vomiting, edema, urinary or bowel complaints.    OBJECTIVE: /54   Pulse 54   Temp 97.3 °F (36.3 °C)   Resp 17   Wt 128 lb (58.1 kg)   SpO2 96%   BMI 21.97 kg/m²     PHYSICAL EXAM:  GENERAL HEALTH: well  developed, well nourished, in no apparent distress  LINES, TUBES, DRAINS:  none]  SKIN: warm, dry, L upper back scattered erythematous plaques  WOUND: none  EYES:  sclera anicteric, conjunctiva normal; there is no nystagmus, no drainage from eyes  HENT:  normocephalic; normal nose, no nasal drainage, mucous membranes pink, moist.  NECK:  supple, non tender, FROM  BREAST:  deferred  RESPIRATORY: clear to percussion and auscultation  CARDIOVASCULAR: S1, S2 normal, RRR; no S3, no S4 and no murmur  ABDOMEN:  normal active BS+, soft, nondistended; no organomegaly, no masses; nontender, no guarding, no rebound tenderness.  :no suprapubic distension  LYMPHATIC: LUE lymphedema with compression wrap   MUSCULOSKELETAL: limited mobility d/t back pain, no acute synovitis upper or lower extremities   EXTREMITIES/VASCULAR:radial pulses 2+ and dorsalis pedal pulses 2+  NEUROLOGIC: A&OX3, no focal deficits, follows commands  PSYCHIATRIC: calm, cooperative, mood and affect appropriate to situation    Therapy update:  Transfers: max A  ADLS:  mod A  Ambulation:  5 ft rw Max A   DC plan: from [facility]     Medications reviewed: Yes      Current Outpatient Medications:     pregabalin 75 MG Oral Cap, Take 1 capsule (75 mg total) by mouth 3 (three) times daily., Disp: 2 capsule, Rfl: 0    acetaminophen 500 MG Oral Tab, Take 2 tablets (1,000 mg total) by mouth in the morning, at noon, and at bedtime., Disp: 30 tablet, Rfl: 0    methocarbamol 500 MG Oral Tab, Take 1 tablet (500 mg total) by mouth in the morning and 1 tablet (500 mg total) before bedtime., Disp: 2 tablet, Rfl: 0    oxyCODONE ER 10 MG Oral Tablet Extended Release 12 hour Abuse-Deterrent, Take 1 tablet (10 mg total) by mouth Q12H., Disp: 2 tablet, Rfl: 0    CUSTOM MEDICATION, Place into both eyes 3 (three) times daily. heparin pf 100 unit/ml ophth solution 1 drop both eyes Indications: Keratoconjunctivitis Sicca, Disp: , Rfl:     CUSTOM MEDICATION, Place into both  eyes 4 (four) times daily. flebogamma 10 mg/mL ophthalmic solution 1 drop both eyes qid Indications: Redness or discharge of eye, Keratoconjunctivitis sicca of both eyes not specified as Sjogren's, Discomfort of both eyes, Disp: , Rfl:     CUSTOM MEDICATION, Place into both eyes 4 (four) times daily. 40% autologous serum eye drops 1 drop both eyes qid, Disp: , Rfl:     furosemide 20 MG Oral Tab, Take 1 tablet (20 mg total) by mouth daily as needed (Take if weight is >120 pounds.)., Disp: 90 tablet, Rfl: 1    TEMAZEPAM 15 MG Oral Cap, TAKE 1-2 CAPSULES (15-30 MG TOTAL) BY MOUTH NIGHTLY AS NEEDED FOR SLEEP., Disp: 60 capsule, Rfl: 0    Naloxone HCl 4 MG/0.1ML Nasal Liquid, 4 mg by Intranasal route as needed (May repeat as needed in other nostril if symptoms persist). If patient remains unresponsive, repeat dose in other nostril 2-5 minutes after first dose., Disp: 1 kit, Rfl: 0    Vaginal Lubricant (VAGISIL LUBRICANT) Vaginal Gel, Place 1 Application vaginally daily., Disp: 1 each, Rfl: 2    losartan 100 MG Oral Tab, TAKE 1 TABLET (100 MG TOTAL) BY MOUTH DAILY., Disp: 90 tablet, Rfl: 2    Ascorbic Acid (VITAMIN C) 1000 MG Oral Tab, Take 1 tablet (1,000 mg total) by mouth daily., Disp: , Rfl:     Elastic Bandages & Supports (SKINEEZ COMPRESSION ARM SLEEVE) Does not apply Misc, 2 Units daily as needed., Disp: 2 each, Rfl: 3    amLODIPine 5 MG Oral Tab, Take 1 tablet (5 mg total) by mouth daily., Disp: 90 tablet, Rfl: 1    labetalol 100 MG Oral Tab, Take 1 tablet (100 mg total) by mouth 2 (two) times daily., Disp: 180 tablet, Rfl: 1    estradiol 0.1 MG/GM Vaginal Cream, Place 1 g vaginally daily., Disp: 42.5 g, Rfl: 3    Vitamin D, Cholecalciferol, 25 MCG (1000 UT) Oral Tab, Take 1,000 Units by mouth daily. Take 3 caps po q am, Disp: 270 tablet, Rfl: 3    acetaZOLAMIDE 250 MG Oral Tab, Take 1 tablet (250 mg total) by mouth 2 (two) times daily., Disp: , Rfl:     Multiple Vitamins-Minerals (COMPLETE DAILY/LUTEIN) Oral Tab,  Take 1 tablet by mouth 3 (three) times daily., Disp: , Rfl:       Diagnostics reviewed:    Lab Results   Component Value Date    WBC 6.3 01/02/2025    RBC 4.05 01/02/2025    HGB 13.0 01/02/2025    HCT 40.4 01/02/2025    MCV 99.8 01/02/2025    MCH 32.1 01/02/2025    MCHC 32.2 01/02/2025    RDW 13.7 01/02/2025    .0 01/02/2025     Lab Results   Component Value Date    GLU 77 01/02/2025    BUN 35 (H) 01/02/2025    BUNCREA 39.0 (H) 04/12/2021    CREATSERUM 0.77 01/02/2025    ANIONGAP 3 01/02/2025    GFR 76 02/27/2017    GFRNAA 62 07/20/2022    GFRAA 71 07/20/2022    CA 8.9 01/02/2025    OSMOCALC 295 01/02/2025    ALKPHO 76 01/02/2025    AST 16 01/02/2025    ALT 38 01/02/2025    BILT 0.5 01/02/2025    TP 5.6 (L) 01/02/2025    ALB 3.4 01/02/2025    GLOBULIN 2.2 01/02/2025     01/02/2025    K 4.1 01/02/2025     01/02/2025    CO2 29.0 01/02/2025     ASSESSMENT AND PLAN :    Acute on chronic back pain  Lumbar radiculopathy  - MRI lumbar spine with spondylolisthesis at L3-4 and L4-5 a/w moderate to severe foraminal stenosis  - Spine surgery Dr Benitez Duarte was consulted  - conservative treatment, no acute surgical intervention  - continue Prednisone 40mg daily x 4 days EOT 12/31/24    - pain management as below   - PT/OT   - Follow up at Pain clinic with Dr Kevin Kovacs outpatient for HALLEY in 1 week    Postherpetic neuralgia, hx Shingles June 2024   - post residual L upper back erythematous plaques   - continue Lyrica 75mg TID   - Lidocaine 5% cream to affected area   - pain management as below      Pain management  - Monitor and assess pain  - Offer to pre-medicate 30-60 min prior to therapy  - Tylenol Extra Strength 1000mg TID   - Methocarbamol 500mg BID   - Oxycodone ER 10mg ER BID (9a/9p)  - Oxycodone 10mg BID (8a/12p) and q8hrs prn   - Pregabalin 75mg TID   - Lidocaine 5% cream to affected area   - Physiatry evaluation with management appreciated      HTN, HFpEF, nonrheumatic aortic valve insufficiency   -  2D echo (11/14/24) = EF 60-65%   - Daily weights; Call if weight gain of 2# overnight or 4# in 5 days  - 2 gram sodium diet  - 1.8 liter/day fluid restriction  - VS q shift and prn  - continue Losartan 100mg daily   - continue Amlodipine 5mg daily   - continue Labetalol 100mg BID   - continue Diamox 250mg BID   - continue Lasix 20 daily prn if weight > 120 lbs   - Monitoring for edema, weight gain, shortness of breath, fatigue  - CMP weekly and prn  - Follow up with Cardiology [facility] outpatient      Chronic LUE lymphedema d/t shingles   - continue compression arm sleeve   - Follow up lymphedema treatment outpatient after DC  - wrapped now      Keratoconjunctivitis Sicca   - continue Heparin pf ophthalmic solution  1 drop both eyes TID   - continue Flebogamma ophthalmic solution  1 drop both eyes QID   - continue 40% Autologous serum eye drops  1 drop both eyes QID     Hx L breast CA  - s/p lumpectomy & LN dissection (1986)   - Follow up with Oncology outpatient as directed      Insomnia  - continue Temazepam 1-2 capsules (15-30 mg total) HS prn      Physical Deconditioning/Impaired mobility and ADLs/At risk for falling  - Fall precautions  - PT/OT eval and treat     Vitamins/supplements as r/t deficiencies  - KAREN RD to follow while in rehab; supplementation/diet as per KARNE RD  - May continue home supplements  Vitamin C 1000mg daily   Vitamin D 3000U daily      At risk for malnutrition  - Dietician consult  - Weekly weights  - Supplements as indicated  - Encourage po intake     Bowel management  Constipation  - Monitor for Bms  - SennaPlus 2 tabs BID   - Miralax daily prn   - Dulcolax suppository daily prn  - add one time enema today  - preparation H prn for hemorrhoids      DVT prophylaxis  - Encourage exercise and participate with therapy as much as able      GI prophylaxis  - none     Labs  - CBC, CMP weekly and prn. Next labs 1/9/25      Discharge planning  - ELOS 14 days  - DC planning with MDT, SW, therapies  -  KAREN team to establish care plan meeting with patient and POA/family as appropriate  - KAREN team/ & discharge planner to assist with establishing safe discharge plan for next level of care  - Anticipate DC on or before TBD.  - [facility]      Follow Ups:  - PCP within 7 days of DC  - Pain clinic with Dr Kevin Kovacs outpatient for HALLEY in 1 week  - Cardiology [facility] outpatient as indicated  - Oncology outpatient as indicated     *Established patient; follow-up moderately complex visit/ greater than 30     35 minutes spent w/ patient and staff, including but not limited to/ reviewing present status, needs, abilities with disciplines, reviewing medical records, vital signs, labs, completing medication reconciliation and entering orders for continued care in [facility].    Note to patient: The 21st Century Cures Act makes medical notes like these available to patients in the interest of transparency. However, this is a medical document intended as peer to peer communication. It is written in medical language and may contain abbreviations or verbiage that are unfamiliar. It may appear blunt or direct. Medical documents are intended to carry relevant information, facts as evident, and the clinical opinion of the practitioner who signs the document.    Joanne Bernal APRN  1/3/25

## 2025-01-06 PROBLEM — N30.00 ACUTE CYSTITIS WITHOUT HEMATURIA: Status: RESOLVED | Noted: 2024-10-25 | Resolved: 2025-01-06

## 2025-01-07 ENCOUNTER — APPOINTMENT (OUTPATIENT)
Dept: OCCUPATIONAL MEDICINE | Facility: HOSPITAL | Age: OVER 89
End: 2025-01-07
Attending: INTERNAL MEDICINE
Payer: MEDICARE

## 2025-01-07 NOTE — PROGRESS NOTES
Skilled Nursing Facility       Meena Whitman Author: Khari Patel MD     1933 MRN [id number]   [facility]  [facility]      [facility] PCP Bonnie Danielson MD   [facility]       Date of Admission: 24    Facility: [facility]      Expected Length of Stay:   3 weeks       HPI:    Meena Whitman is a 89 year old female admitted to SNF for sub-acute rehabilitation.      Chief Complaint:   Chief Complaint   Patient presents with    Follow - Up     Ramer, H&P    Date of Admission: 2024  Date of Discharge:   2024     Discharge Disposition: Home or Self Care     Discharge Diagnosis:  Acute on chronic back pain  Lumbar radiculopathy secondary to L4-L5 neuro- foraminal stenosis   Essential HTN  Chronic LUE lymphedema  Breast cancer history     HPI   89 yo female presenting to [facility] with recent hospitalization with acute back pain with history of HTN, LUE lymphedema, with history of breast cancer with physical deconditioning, high risk for malnutrition, with pain not under control.   Seeing [facility] pain management and due for OV.   Patient was having constipation with better bowel movements with suppository.   Patient will possibly have HALLEY via Pain management via [facility] or [facility].   Pain's not under control with bedside comode in place.   Patient denies chest pain, no shortness of breath, has increased fatigue due to lumbar pain.   Patient denies abdomen pain or urinary complaints.   Would like LUE evaluated by wound care because of prolonged use of compressions.     Allergies:  She is allergic to hydralazine, bactrim [sulfamethoxazole w/trimethoprim], and metronidazole.    Current Meds:  Current Outpatient Medications on File Prior to Visit   Medication Sig    pregabalin 75 MG Oral Cap Take 1 capsule (75 mg total) by mouth 3 (three) times daily.    acetaminophen 500 MG Oral Tab Take 2 tablets (1,000 mg total) by mouth in the  morning, at noon, and at bedtime.    methocarbamol 500 MG Oral Tab Take 1 tablet (500 mg total) by mouth in the morning and 1 tablet (500 mg total) before bedtime.    oxyCODONE ER 10 MG Oral Tablet Extended Release 12 hour Abuse-Deterrent Take 1 tablet (10 mg total) by mouth Q12H.    CUSTOM MEDICATION Place into both eyes 3 (three) times daily. heparin pf 100 unit/ml ophth solution  1 drop both eyes  Indications: Keratoconjunctivitis Sicca    CUSTOM MEDICATION Place into both eyes 4 (four) times daily. flebogamma 10 mg/mL ophthalmic solution  1 drop both eyes qid  Indications: Redness or discharge of eye, Keratoconjunctivitis sicca of both eyes not specified as Sjogren's, Discomfort of both eyes    CUSTOM MEDICATION Place into both eyes 4 (four) times daily. 40% autologous serum eye drops  1 drop both eyes qid    furosemide 20 MG Oral Tab Take 1 tablet (20 mg total) by mouth daily as needed (Take if weight is >120 pounds.).    TEMAZEPAM 15 MG Oral Cap TAKE 1-2 CAPSULES (15-30 MG TOTAL) BY MOUTH NIGHTLY AS NEEDED FOR SLEEP.    Naloxone HCl 4 MG/0.1ML Nasal Liquid 4 mg by Intranasal route as needed (May repeat as needed in other nostril if symptoms persist). If patient remains unresponsive, repeat dose in other nostril 2-5 minutes after first dose.    Vaginal Lubricant (VAGISIL LUBRICANT) Vaginal Gel Place 1 Application vaginally daily.    losartan 100 MG Oral Tab TAKE 1 TABLET (100 MG TOTAL) BY MOUTH DAILY.    Ascorbic Acid (VITAMIN C) 1000 MG Oral Tab Take 1 tablet (1,000 mg total) by mouth daily.    Elastic Bandages & Supports (SKINEEZ COMPRESSION ARM SLEEVE) Does not apply Misc 2 Units daily as needed.    amLODIPine 5 MG Oral Tab Take 1 tablet (5 mg total) by mouth daily.    labetalol 100 MG Oral Tab Take 1 tablet (100 mg total) by mouth 2 (two) times daily.    estradiol 0.1 MG/GM Vaginal Cream Place 1 g vaginally daily.    Vitamin D, Cholecalciferol, 25 MCG (1000 UT) Oral Tab Take 1,000 Units by mouth daily. Take  3 caps po q am    acetaZOLAMIDE 250 MG Oral Tab Take 1 tablet (250 mg total) by mouth 2 (two) times daily.    Multiple Vitamins-Minerals (COMPLETE DAILY/LUTEIN) Oral Tab Take 1 tablet by mouth 3 (three) times daily.     No current facility-administered medications on file prior to visit.         HISTORY:  She  has a past medical history of Abnormal weight loss (06/07/2022), Basal cell carcinoma (BCC) of left side of nose (07/03/2018), Breast CA (HCC) (1986), Breast cancer (HCC) (04/2022), Cellulitis, COPD (chronic obstructive pulmonary disease) (HCC), Ductal carcinoma in situ of breast, Exposure to radiation (1986), High blood pressure, Keratoconjunctivitis sicca of both eyes (10/29/2021), Lobular carcinoma in situ of breast, Lymph edema, Pelvic pressure in female (08/09/2022), Post covid-19 condition, unspecified (08/09/2022), Post herpetic neuralgia, and Shingles.    She  has a past surgical history that includes mammogram, both breasts (07/22/2013); lumpectomy left (1986); hx breast cancer (Left, 1986); skin surgery (Left); injection, w/wo contrast, dx/therapeutic substance, epidural/subarachnoid; lumbar/sacral (N/A, 03/11/2016); patient withough preoperative order for iv antibiotic surgical site infection prophylaxis. (N/A, 03/11/2016); patient documented not to have experienced any of the following events (N/A, 03/11/2016); injection, w/wo contrast, dx/therapeutic substance, epidural/subarachnoid; lumbar/sacral (N/A, 04/04/2016); patient withough preoperative order for iv antibiotic surgical site infection prophylaxis. (N/A, 04/04/2016); patient documented not to have experienced any of the following events (N/A, 04/04/2016); glaucoma surgery (07/07/2016); Eye surgery; Eye surgery; cataract (Bilateral); tonsillectomy; hernia surgery (10/18/2017); other surgical history (06/01/2021); radiation left (1986); and needle biopsy right (Right, 04/2022).    She family history includes Breast Cancer in her daughter and  mother; Breast Cancer (age of onset: 55) in her self; Cancer in her father and mother; DCIS in her self; LCIS in her self; Other in her father.   She  reports that she has never smoked. She has never used smokeless tobacco. She reports that she does not drink alcohol and does not use drugs.     ROS:   A comprehensive 14 point review of systems was completed.     Pertinent positives and negatives noted in the HPI.      PHYSICAL EXAM:   Estimated body mass index is 21.97 kg/m² as calculated from the following:    Height as of 12/24/24: 5' 4\" (1.626 m).    Weight as of 1/3/25: 128 lb (58.1 kg).     Lines, Drains, wound: LUE compression stockings in place.     Vitals are stable and reviewed from [facility]CC and nurse note.   Alert  +weakness  No acute distress  Normocephalic and atraumatic  No nasal congestion or rhinorrhea.   No acute respiratory distress, no s/s of stridor.   Normal rate and BP is stable.   +weakness + motor deficit  No jaundice, normal sclera.  No acute abdomen distension with no ascites, flat abdomen  +gait instability   No pallor, no new rash.   Arthralgias     Medication Reviewed: in Epic and [facility]    Labs and Imaging: MRI SPINE LUMBAR (CPT=72148)    Result Date: 12/24/2024  PROCEDURE:  MRI SPINE LUMBAR (CPT=72148)  COMPARISON:  [facility] , CT, CT CHEST(CONTRAST ONLY) (CPT=71260), 12/12/2024, 6:27 PM.  [facility] , XR, XR LUMBAR SPINE (MIN 4 VIEWS) (CPT=72110), 12/22/2024, 9:58 AM.  INDICATIONS:  Chronic low back pain, radiculopathy.  History of compression fractures of the spine.  History of kyphoplasty.  Z17.0 Malignant neoplasm of overlapping sites of right breast in female, estrogen receptor positive.  TECHNIQUE:  Multiplanar T1 and T2 weighted images including fat suppression sequences.  Images acquired in sagittal and axial planes.   PATIENT STATED HISTORY: (As transcribed by Technologist)  Radiculopathy, Chronic lower back pain.    FINDINGS:   Chronic compression deformity involving T11, with  moderate loss of height and wedging from the upper endplate, but normal bone marrow signal indicating chronic nature of this deformity.  There is retropulsion of bone and disc bulging along the T10-11 disc margin at this level, along with facet arthropathy, narrowing the central spinal canal in the AP dimension, from the anterior and posterior, for example series 5, image 10 and series 7, image 10 such that the AP dimension of the canal measures approximately 7.8 mm.  The cord does not appear to be overtly compressed on the available sagittal images showing this region.  T11-12 shows mild disc degeneration, no stenosis.  T12-L1 shows mild loss of disc signal from disc degeneration, but no disc bulging, protrusion, or stenosis.  L1-L2 shows mild-moderate disc bulging, mild-moderate facet degeneration and thickening ligamentum flavum.  No central canal stenosis.  Mild bilateral foraminal stenosis.  Kyphoplasty has been performed involving L2 with hypointense signals from the cement material present.  Loss of height of the upper endplate stable since the most recent imaging, lumbar spine x-ray exam from just 2 days ago.  L2-L3 shows moderate disc bulging and facet arthropathy.  No central canal stenosis.  Moderate right and mild-moderate left foraminal stenosis.  L3-L4 shows moderate disc bulging and advanced facet arthropathy, thickening ligamentum flavum and grade 1 anterior subluxation L3 upon L4.  Mild central spinal canal stenosis.  Severe right and moderate left foraminal stenosis.   L4-L5 shows Moderate disc bulging and moderate facet arthropathy.  Grade 1 anterior subluxation L4.  Central canal patent, with moderate bilateral foraminal stenosis.  L5-S1 shows patent central canal and patent foramina.  Sacral nerve root ectasia seen.  No sign of acute compression fracture.  The lower visible thoracic spinal cord shows no signal abnormalities.  Scoliosis present concave to the right.            CONCLUSION:    Multilevel degenerative disc and facet disease, including areas of significant foraminal stenosis, and at the L4-L5 level mild central canal stenosis.  The findings include disc degeneration, degenerative subluxations, and facet arthropathy.  2. Nonacute compression deformities involving the lower thoracic spine, and also involving L2, which is undergone kyphoplasty.   LOCATION:  [id number]   Dictated by ([facility]): Jose Francisco Thompson MD on 12/24/2024 at 3:05 PM     Finalized by (CST): Jose Francisco Thompson MD on 12/24/2024 at 3:27 PM       XR LUMBAR SPINE (MIN 4 VIEWS) (CPT=72110)    Result Date: 12/22/2024  PROCEDURE:  XR LUMBAR SPINE (MIN 4 VIEWS) (CPT=72110)  TECHNIQUE:  AP, lateral, oblique, and coned down L5-S1 views were obtained.  COMPARISON:  None.  INDICATIONS:  lower back pain  PATIENT STATED HISTORY: (As transcribed by Technologist)  Patient states chronic right sided low back pain. States no history of injury.    FINDINGS:    Post kyphoplasty changes are noted in the L2 vertebral body.  Grade 2 anterolisthesis of L4 on L5 is noted.  No acute fractures identified.  Extensive facet joint arthropathy is noted.  The bones appear demineralized.            CONCLUSION:  1. Diffuse osteopenia is noted. 2. Grade 2 anterolisthesis of L4 on L5.   LOCATION:  [facility]   Dictated by ([facility]): Tanner Haddad MD on 12/22/2024 at 10:40 AM     Finalized by ([facility]): Tanner Haddad MD on 12/22/2024 at 10:42 AM       XR HIP W OR WO PELVIS 2 OR 3 VIEWS, RIGHT (CPT=73502)    Result Date: 12/22/2024  PROCEDURE:  XR HIP W OR WO PELVIS 2 OR 3 VIEWS, RIGHT ( CPT=73502)  TECHNIQUE:  Unilateral 2 to 3 views of the hip and pelvis if performed.  COMPARISON:  None.  INDICATIONS:  lower back pain  PATIENT STATED HISTORY: (As transcribed by Technologist)  Patient states chronic right sided low back pain. States no history of injury.    FINDINGS:  No acute fracture or dislocation.  Degenerative changes are noted.  The bones appear osteopenic.  Soft tissues are  within normal limits.            CONCLUSION:  No acute fracture or dislocation.  LOCATION:  [facility]   Dictated by (CST): Tanner Haddad MD on 12/22/2024 at 10:38 AM     Finalized by (CST): Tanner Haddad MD on 12/22/2024 at 10:39 AM       CT CHEST(CONTRAST ONLY) (CPT=71260)    Result Date: 12/13/2024  PROCEDURE:  CT CHEST(CONTRAST ONLY) (CPT=71260)  COMPARISON:  [facility] , XR, XR CHEST PA + LAT CHEST (CPT=71046), 3/21/2022, 5:32 PM.  [facility] , XS, IR KYPHOPLASTY, 4/28/2022, 10:29 AM.  [facility] , XR, XR CHEST AP PORTABLE  (CPT=71045), 7/04/2024, 2:41 PM.  [facility] , CT, CTA GATED CORONARY ARTERIES W CALCIUM  SCORING (CPT=75574), 11/25/2014, 11:21 AM.  INDICATIONS:  I89.0 Lymphedema of arm Z17.0 Malignant neoplasm of overlapping sites of right breast in female, estrogen receptor positive (HCC) C50.811 Malignant neoplasm of*  TECHNIQUE:  CT images were obtained with non-ionic intravenous contrast material. Dose reduction techniques were used. Dose information is transmitted to the ACR (American College of Radiology) NRDR (National Radiology Data Registry) which includes the Dose Index Registry.  PATIENT STATED HISTORY:(As transcribed by Technologist)  Surveillance scan. Patient c/o lymphedema of the left arm. Patient has hx of malignant neoplasm of overlapping sites of right breast, estrogen receptor positive.   CONTRAST USED:  75cc of Isovue 370  FINDINGS:  LUNGS:  There is a 1.8 x 1.8 x 2.1 cm pleural base soft tissue density opacity containing macro calcification located in the left lung apex.  There is some associated focal bronchiectasis seen.  There is a 4 mm right middle lobe lung nodule seen on image  98 of series 302. There is a cluster of small 1-2 mm nodules identified in the anterior aspect of the right lower lobe seen on images 78 through 80. The trachea and major bronchi are patent.  Lung volumes are large.  VASCULATURE:  No gross visible pulmonary arterial thrombus or attenuation.  OWEN:  No mass or adenopathy.   MEDIASTINUM:  No mass or adenopathy.  Pericardial recess fluid noted in the subcarinal mediastinum and AP window. CARDIAC:  Mitral annular calcifications are seen.  Mild coronary artery calcifications are noted. PLEURA:  No pleural effusion or pneumothorax. THORACIC AORTA:  Calcified atheromatous plaque noted in the transverse and descending thoracic aorta.  No aortic aneurysm or dissection. CHEST WALL:  Bilateral breast calcifications are seen.  There is skin thickening in the left breast correlate clinically.  Differential considerations would include lymphedema or inflammatory breast cancer.  There is infiltration in the subcutaneous fat in the left axilla which also could reflect lymphedema or cellulitis.  There is no lymphadenopathy identified in either axilla.  There is a 2.2 x 2.9 cm right lobe thyroid mass. LIMITED ABDOMEN:  There is limb thickening of the left adrenal gland.  Hypoattenuating cortical lesions are seen in the upper pole of the right left kidney.  There is a higher density upper pole right cortical lesion which may enhance and measures 7 by 6  mm.  Further workup for this lesion is recommended to exclude a renal carcinoma.  Punctate calcifications in the liver consistent with old granulomatous disease. BONES:  Osseous structures appear demineralized.  There is a compression fracture deformity of T11 with approximately 50% loss of vertebral body height and retropulsion of the superior endplate by 7 mm into the canal narrowing the canal and mildly effacing the ventral surface of the spinal cord.  The fracture was present on a chest radiographs from 7/4/2024 and 3/21/2022.  There has been previous cement augmentation of L2.            CONCLUSION:  1. There is a pleural based nodular soft tissue mass containing a large macrocalcification in the left lung apex.  The appearance favors a benign remote infectious or inflammatory process.  Scar carcinoma is difficult to exclude with certainty.   Consider  PET-CT for further evaluation. 2. There is skin thickening in the left breast.  Differential considerations include lymphedema, cellulitis and inflammatory breast cancer.  Correlate clinically.  If indicated a punch biopsy may aid in further evaluation. 3. Subcutaneous fat infiltration in the left axilla may also reflect changes related to lymphedema or cellulitis. 4. Cluster of nodularity in the right lower lobe measuring between 1 and 2 mm and 4 mm right middle lobe lung nodule.  Follow-up as per Fleischner criteria for these lung nodules. 5. Large lung volumes suggesting underlying obstructive pulmonary disease. 6. There is a 7 x 6 mm right upper pole cortical lesion which does not have the appearance of a cyst and raises suspicion for possible small renal cell carcinoma.  Dedicated CT using renal mass protocol recommended.  7. Compression fracture deformity of T11 with retropulsion of superior endplate into the spinal canal narrowing the canal and effacing the ventral surface of the cord.  The fracture was present on previous exams. 8. There is a 2.2 x 2.9 cm right lobe thyroid nodule.  There has been previous fine needle aspiration biopsy of right lobe thyroid nodules on 4/21/2022.    LOCATION:  [facility]   Dictated by (CST): Faustino Jauregui MD on 12/13/2024 at 6:52 AM     Finalized by (CST): Faustino Jauregui MD on 12/13/2024 at 7:13 AM         No results found for this or any previous visit (from the past 72 hours).        ASSESSMENT/ PLAN:   89 year old female with recent hospitalization with acute on chronic back pain with lumbar radiculopathy along L4-L5 with foraminal stenosis.      1. Spondylolisthesis of lumbar region  -continue with PT/OT and pain management, will see Pain management for possible HALLEY    2. Weakness generalized  -stable, CPM    3. Other form of scoliosis of thoracolumbar spine  -pain rx. CPM    4. Acute right-sided low back pain with right-sided sciatica  -stable, CPM    5.  Age-related osteoporosis with current pathological fracture with routine healing, subsequent encounter  -stable, CPM    6. Ambulatory dysfunction  -stable, CPM    7. Anxiety  -on the rise because of pain. Restoril prn    8. At risk for malnutrition  -dietary consult    9. Chronic left-sided low back pain without sciatica  -as above. CPM    10. Degeneration of intervertebral disc of lumbar region with discogenic back pain  -stable, as above. CPm    11. Generalized weakness  -PTOT    12. Gait instability  PT/OT    13. Lymphedema of left upper extremity  -compression stockings and wound care to LUE    14. Osteoarthritis of both hips, unspecified osteoarthritis type  -PT/OT    15. Nonrheumatic aortic valve insufficiency  -as above. CPm    16. Paroxysmal SVT (supraventricular tachycardia) (HCC)  -stable, CPM  -on amlodipine, losartan, rate controlled, CPM  17. Physical deconditioning  -PT/OT    18. Primary hypertension  - as above. CPM    Meena Whitman needs then following services:  Home Health Care  Nursing Home  Occupational Therapy  Physical Therapy  Respiratory Therapy  Wound care to LUE  Pain management as OP possible for possible HALLEY vs. RFA.       I anticipate that she will need 3 weeks of therapy and recooperation before an eventual transition to home and we will work on her discharge needs.     Patient is high risk for readmission to hospital with complicated chronic medical history.   Over 40 minutes spent on reviewing imaging and labs from PCC and [facility], reviewed consults and recent hospital admission, discharge report, consults, and reviewed plan of care at SNF and discharge back to community.    Reviewed medications from hospital discharge and PCC.       Khari Patel MD

## 2025-01-09 ENCOUNTER — SNF VISIT (OUTPATIENT)
Dept: INTERNAL MEDICINE CLINIC | Age: OVER 89
End: 2025-01-09

## 2025-01-09 ENCOUNTER — APPOINTMENT (OUTPATIENT)
Dept: OCCUPATIONAL MEDICINE | Facility: HOSPITAL | Age: OVER 89
End: 2025-01-09
Attending: INTERNAL MEDICINE
Payer: MEDICARE

## 2025-01-09 ENCOUNTER — LAB REQUISITION (OUTPATIENT)
Dept: LAB | Facility: HOSPITAL | Age: OVER 89
End: 2025-01-09
Payer: MEDICARE

## 2025-01-09 VITALS
BODY MASS INDEX: 21 KG/M2 | DIASTOLIC BLOOD PRESSURE: 83 MMHG | RESPIRATION RATE: 20 BRPM | WEIGHT: 120.5 LBS | OXYGEN SATURATION: 95 % | TEMPERATURE: 97 F | SYSTOLIC BLOOD PRESSURE: 143 MMHG | HEART RATE: 63 BPM

## 2025-01-09 DIAGNOSIS — Z78.9 DECREASED ACTIVITIES OF DAILY LIVING (ADL): ICD-10-CM

## 2025-01-09 DIAGNOSIS — I10 PRIMARY HYPERTENSION: ICD-10-CM

## 2025-01-09 DIAGNOSIS — G89.29 ACUTE ON CHRONIC BACK PAIN: Primary | ICD-10-CM

## 2025-01-09 DIAGNOSIS — M54.16 LUMBAR RADICULOPATHY: ICD-10-CM

## 2025-01-09 DIAGNOSIS — B02.29 POST HERPETIC NEURALGIA: ICD-10-CM

## 2025-01-09 DIAGNOSIS — R53.1 WEAKNESS: ICD-10-CM

## 2025-01-09 DIAGNOSIS — M54.59 OTHER LOW BACK PAIN: ICD-10-CM

## 2025-01-09 DIAGNOSIS — K59.00 CONSTIPATION, UNSPECIFIED CONSTIPATION TYPE: ICD-10-CM

## 2025-01-09 DIAGNOSIS — M54.9 ACUTE ON CHRONIC BACK PAIN: Primary | ICD-10-CM

## 2025-01-09 LAB
ALBUMIN SERPL-MCNC: 3.1 G/DL (ref 3.2–4.8)
ALBUMIN/GLOB SERPL: 1.2 {RATIO} (ref 1–2)
ALP LIVER SERPL-CCNC: 77 U/L
ALT SERPL-CCNC: 97 U/L
ANION GAP SERPL CALC-SCNC: 5 MMOL/L (ref 0–18)
AST SERPL-CCNC: 57 U/L (ref ?–34)
BASOPHILS # BLD AUTO: 0.05 X10(3) UL (ref 0–0.2)
BASOPHILS NFR BLD AUTO: 1.4 %
BILIRUB SERPL-MCNC: 0.3 MG/DL (ref 0.2–0.9)
BUN BLD-MCNC: 27 MG/DL (ref 9–23)
CALCIUM BLD-MCNC: 8.9 MG/DL (ref 8.7–10.4)
CHLORIDE SERPL-SCNC: 111 MMOL/L (ref 98–112)
CO2 SERPL-SCNC: 26 MMOL/L (ref 21–32)
CREAT BLD-MCNC: 0.62 MG/DL
EGFRCR SERPLBLD CKD-EPI 2021: 84 ML/MIN/1.73M2 (ref 60–?)
EOSINOPHIL # BLD AUTO: 0.26 X10(3) UL (ref 0–0.7)
EOSINOPHIL NFR BLD AUTO: 7.3 %
ERYTHROCYTE [DISTWIDTH] IN BLOOD BY AUTOMATED COUNT: 13.6 %
FASTING STATUS PATIENT QL REPORTED: YES
GLOBULIN PLAS-MCNC: 2.5 G/DL (ref 2–3.5)
GLUCOSE BLD-MCNC: 72 MG/DL (ref 70–99)
HCT VFR BLD AUTO: 37 %
HGB BLD-MCNC: 11.9 G/DL
IMM GRANULOCYTES # BLD AUTO: 0.02 X10(3) UL (ref 0–1)
IMM GRANULOCYTES NFR BLD: 0.6 %
LYMPHOCYTES # BLD AUTO: 1.54 X10(3) UL (ref 1–4)
LYMPHOCYTES NFR BLD AUTO: 43.1 %
MCH RBC QN AUTO: 31.8 PG (ref 26–34)
MCHC RBC AUTO-ENTMCNC: 32.2 G/DL (ref 31–37)
MCV RBC AUTO: 98.9 FL
MONOCYTES # BLD AUTO: 0.76 X10(3) UL (ref 0.1–1)
MONOCYTES NFR BLD AUTO: 21.3 %
NEUTROPHILS # BLD AUTO: 0.94 X10 (3) UL (ref 1.5–7.7)
NEUTROPHILS # BLD AUTO: 0.94 X10(3) UL (ref 1.5–7.7)
NEUTROPHILS NFR BLD AUTO: 26.3 %
OSMOLALITY SERPL CALC.SUM OF ELEC: 298 MOSM/KG (ref 275–295)
PLATELET # BLD AUTO: 132 10(3)UL (ref 150–450)
POTASSIUM SERPL-SCNC: 3.9 MMOL/L (ref 3.5–5.1)
PROT SERPL-MCNC: 5.6 G/DL (ref 5.7–8.2)
RBC # BLD AUTO: 3.74 X10(6)UL
SODIUM SERPL-SCNC: 142 MMOL/L (ref 136–145)
WBC # BLD AUTO: 3.6 X10(3) UL (ref 4–11)

## 2025-01-09 PROCEDURE — 99308 SBSQ NF CARE LOW MDM 20: CPT | Performed by: NURSE PRACTITIONER

## 2025-01-09 PROCEDURE — 80053 COMPREHEN METABOLIC PANEL: CPT

## 2025-01-09 PROCEDURE — 85025 COMPLETE CBC W/AUTO DIFF WBC: CPT

## 2025-01-09 NOTE — PROGRESS NOTES
Meena Whitman, 6/13/1933, 89 year old, female is admitted to [facility] for rehabilitation and strengthening.     [facility] Admission: 12/22/24 - 12/26/24     Chief Complaint   Patient presents with    Follow - Up     Constipation, weakness, pain        HPI:  Meena Whitman is a 89 year old female with a past medical history significant for HTN, HFpEF, aortic valve insufficiency, breast CA, COPD, chronic LUE lymphedema, shingles. Presented to [facility] with c/o acute on chronic back pain with worsening R buttock pain radiating down her R posterior thigh and calf. MRI lumbar spine revealed spondylolisthesis at L3-4 and L4-5 a/w moderate to severe foraminal stenosis. Spine surgery Dr Benitez Duarte was consulted. She was deemed not a surgical candidate with plan for conservative therapy and to follow up outpatient at the pain clinic for HALLEY. She was started on a short course of oral Prednisone and her pain medications were adjusted. She was started on scheduled Tylenol Extra Strength, Methocarbamol, and Oxycodone with improvement in her pain symptoms. Now discharged to [facility] for rehabilitation and strengthening.     SUBJECTIVE:  Patient seen today for aprn follow up visit.   She is seen sitting up in her wheelchair in the room today. She is eating well and sleeping well. Improving with therapies per notes. She reports pain better managed but still present at right flank.   BM are improved but she still doesn't feel they are sufficient. Will ask for suppository if she needs it today after lunch.      Denies fever, body aches/chills, CP, SOB, cough, abdominal pain, nausea, vomiting, edema, urinary or bowel complaints.    OBJECTIVE: /83   Pulse 63   Temp 97 °F (36.1 °C)   Resp 20   Wt 120 lb 8 oz (54.7 kg)   SpO2 95%   BMI 20.68 kg/m²     PHYSICAL EXAM:  GENERAL HEALTH: well developed, well nourished, in no apparent distress  LINES, TUBES, DRAINS:  none]  SKIN: warm, dry, L upper  back scattered erythematous plaques  WOUND: none  EYES:  sclera anicteric, conjunctiva normal; there is no nystagmus, no drainage from eyes  HENT:  normocephalic; normal nose, no nasal drainage, mucous membranes pink, moist.  NECK:  supple, non tender, FROM  BREAST:  deferred  RESPIRATORY:clear, no crackles, no wheezing, no cough, no dyspnea RA  CARDIOVASCULAR:RRR, normal S1 and S2, no murmurs no edema   ABDOMEN:  normal active BS+, soft, nondistended; no organomegaly, no masses; nontender, no guarding, no rebound tenderness.  :no suprapubic distension  LYMPHATIC: LUE lymphedema with compression wrap   MUSCULOSKELETAL: limited mobility d/t back pain, no acute synovitis upper or lower extremities   EXTREMITIES/VASCULAR:radial pulses 2+ and dorsalis pedal pulses 2+  NEUROLOGIC: A&OX3, no focal deficits, follows commands  PSYCHIATRIC: calm, cooperative, mood and affect appropriate to situation    Therapy update:  Transfers: Mod assist  ADLS: min - CGA   Ambulation: 50-75 ft Min assist   DC plan: from [facility]     Medications reviewed: Yes      Current Outpatient Medications:     pregabalin 75 MG Oral Cap, Take 1 capsule (75 mg total) by mouth 3 (three) times daily., Disp: 2 capsule, Rfl: 0    acetaminophen 500 MG Oral Tab, Take 2 tablets (1,000 mg total) by mouth in the morning, at noon, and at bedtime., Disp: 30 tablet, Rfl: 0    methocarbamol 500 MG Oral Tab, Take 1 tablet (500 mg total) by mouth in the morning and 1 tablet (500 mg total) before bedtime., Disp: 2 tablet, Rfl: 0    oxyCODONE ER 10 MG Oral Tablet Extended Release 12 hour Abuse-Deterrent, Take 1 tablet (10 mg total) by mouth Q12H., Disp: 2 tablet, Rfl: 0    CUSTOM MEDICATION, Place into both eyes 3 (three) times daily. heparin pf 100 unit/ml ophth solution 1 drop both eyes Indications: Keratoconjunctivitis Sicca, Disp: , Rfl:     CUSTOM MEDICATION, Place into both eyes 4 (four) times daily. flebogamma 10 mg/mL ophthalmic solution 1 drop both  eyes qid Indications: Redness or discharge of eye, Keratoconjunctivitis sicca of both eyes not specified as Sjogren's, Discomfort of both eyes, Disp: , Rfl:     CUSTOM MEDICATION, Place into both eyes 4 (four) times daily. 40% autologous serum eye drops 1 drop both eyes qid, Disp: , Rfl:     furosemide 20 MG Oral Tab, Take 1 tablet (20 mg total) by mouth daily as needed (Take if weight is >120 pounds.)., Disp: 90 tablet, Rfl: 1    TEMAZEPAM 15 MG Oral Cap, TAKE 1-2 CAPSULES (15-30 MG TOTAL) BY MOUTH NIGHTLY AS NEEDED FOR SLEEP., Disp: 60 capsule, Rfl: 0    Naloxone HCl 4 MG/0.1ML Nasal Liquid, 4 mg by Intranasal route as needed (May repeat as needed in other nostril if symptoms persist). If patient remains unresponsive, repeat dose in other nostril 2-5 minutes after first dose., Disp: 1 kit, Rfl: 0    Vaginal Lubricant (VAGISIL LUBRICANT) Vaginal Gel, Place 1 Application vaginally daily., Disp: 1 each, Rfl: 2    losartan 100 MG Oral Tab, TAKE 1 TABLET (100 MG TOTAL) BY MOUTH DAILY., Disp: 90 tablet, Rfl: 2    Ascorbic Acid (VITAMIN C) 1000 MG Oral Tab, Take 1 tablet (1,000 mg total) by mouth daily., Disp: , Rfl:     Elastic Bandages & Supports (SKINEEZ COMPRESSION ARM SLEEVE) Does not apply Misc, 2 Units daily as needed., Disp: 2 each, Rfl: 3    amLODIPine 5 MG Oral Tab, Take 1 tablet (5 mg total) by mouth daily., Disp: 90 tablet, Rfl: 1    labetalol 100 MG Oral Tab, Take 1 tablet (100 mg total) by mouth 2 (two) times daily., Disp: 180 tablet, Rfl: 1    estradiol 0.1 MG/GM Vaginal Cream, Place 1 g vaginally daily., Disp: 42.5 g, Rfl: 3    Vitamin D, Cholecalciferol, 25 MCG (1000 UT) Oral Tab, Take 1,000 Units by mouth daily. Take 3 caps po q am, Disp: 270 tablet, Rfl: 3    acetaZOLAMIDE 250 MG Oral Tab, Take 1 tablet (250 mg total) by mouth 2 (two) times daily., Disp: , Rfl:     Multiple Vitamins-Minerals (COMPLETE DAILY/LUTEIN) Oral Tab, Take 1 tablet by mouth 3 (three) times daily., Disp: , Rfl:       Diagnostics  reviewed:    Lab Results   Component Value Date    WBC 3.6 (L) 01/09/2025    RBC 3.74 (L) 01/09/2025    HGB 11.9 (L) 01/09/2025    HCT 37.0 01/09/2025    MCV 98.9 01/09/2025    MCH 31.8 01/09/2025    MCHC 32.2 01/09/2025    RDW 13.6 01/09/2025    .0 (L) 01/09/2025     Lab Results   Component Value Date    GLU 72 01/09/2025    BUN 27 (H) 01/09/2025    BUNCREA 39.0 (H) 04/12/2021    CREATSERUM 0.62 01/09/2025    ANIONGAP 5 01/09/2025    GFR 76 02/27/2017    GFRNAA 62 07/20/2022    GFRAA 71 07/20/2022    CA 8.9 01/09/2025    OSMOCALC 298 (H) 01/09/2025    ALKPHO 77 01/09/2025    AST 57 (H) 01/09/2025    ALT 97 (H) 01/09/2025    BILT 0.3 01/09/2025    TP 5.6 (L) 01/09/2025    ALB 3.1 (L) 01/09/2025    GLOBULIN 2.5 01/09/2025     01/09/2025    K 3.9 01/09/2025     01/09/2025    CO2 26.0 01/09/2025       ASSESSMENT AND PLAN :    Acute on chronic back pain  Lumbar radiculopathy  - MRI lumbar spine with spondylolisthesis at L3-4 and L4-5 a/w moderate to severe foraminal stenosis  - Spine surgery Dr Benitez Duarte was consulted  - conservative treatment, no acute surgical intervention  - Prednisone 40mg daily x 4 days EOT 12/31/24    - pain management as below   - PT/OT   - Follow up at Pain clinic with Dr Kevin Kovacs outpatient for HALLEY scheduled 1/28/25    Postherpetic neuralgia, hx Shingles June 2024   - post residual L upper back erythematous plaques   - continue Lyrica 75mg TID   - Lidocaine 5% cream to affected area   - pain management as below      Pain management  - Monitor and assess pain  - Offer to pre-medicate 30-60 min prior to therapy  - Tylenol Extra Strength 1000mg TID   - Methocarbamol 500mg BID   - Oxycodone ER 10mg ER BID (9a/9p)  - Oxycodone 10mg BID (8a/12p) and q8hrs prn   - Pregabalin 75mg TID   - Lidocaine 5% cream to affected area   - Physiatry evaluation with management appreciated      HTN, HFpEF, nonrheumatic aortic valve insufficiency   - 2D echo (11/14/24) = EF 60-65%   - Daily  weights; Call if weight gain of 2# overnight or 4# in 5 days  - 2 gram sodium diet  - 1.8 liter/day fluid restriction  - VS q shift and prn  - continue Losartan 100mg daily   - continue Amlodipine 5mg daily   - continue Labetalol 100mg BID   - continue Diamox 250mg BID   - continue Lasix 20 daily prn if weight > 120 lbs   - Monitoring for edema, weight gain, shortness of breath, fatigue  - CMP weekly and prn  - Follow up with Cardiology [facility] outpatient      Chronic LUE lymphedema d/t shingles   - continue compression arm sleeve   - Follow up lymphedema treatment outpatient after DC  - wrapped now      Keratoconjunctivitis Sicca   - continue Heparin pf ophthalmic solution  1 drop both eyes TID   - continue Flebogamma ophthalmic solution  1 drop both eyes QID   - continue 40% Autologous serum eye drops  1 drop both eyes QID     Hx L breast CA  - s/p lumpectomy & LN dissection (1986)   - Follow up with Oncology outpatient as directed      Insomnia  - continue Temazepam 1-2 capsules (15-30 mg total) HS prn      Physical Deconditioning/Impaired mobility and ADLs/At risk for falling  - Fall precautions  - PT/OT eval and treat     Vitamins/supplements as r/t deficiencies  - KAREN RD to follow while in rehab; supplementation/diet as per KAREN RD  - May continue home supplements  Vitamin C 1000mg daily   Vitamin D 3000U daily      At risk for malnutrition  - Dietician consult  - Weekly weights  - Supplements as indicated  - Encourage po intake     Bowel management  Constipation  - Monitor for Bms  - SennaPlus 2 tabs BID   - Miralax daily prn   - Dulcolax suppository daily prn  - preparation H prn for hemorrhoids      DVT prophylaxis  - Encourage exercise and participate with therapy as much as able      GI prophylaxis  - none     Labs  - CBC, CMP weekly and prn. Next labs 1/16/25     Discharge planning  - ELOS 14 days  - DC planning with MDT, SW, therapies  - [facility] team to establish care plan meeting with patient and POA/family as  appropriate  - [facility] team/ & discharge planner to assist with establishing safe discharge plan for next level of care  - Anticipate DC on or before TBD.  - [facility]      Follow Ups:  - PCP within 7 days of DC  - Pain clinic with Dr Kevin Kovacs outpatient for HALLEY 1/28/25  - Cardiology [facility] outpatient as indicated  - Oncology outpatient as indicated       *Established patient; follow-up mildly complex visit/ greater than 20    25 minutes spent w/ patient and staff, including but not limited to/ reviewing present status, needs, abilities with disciplines, reviewing medical records, vital signs, labs, completing medication reconciliation and entering orders for continued care in [facility].      Note to patient: The 21st Century Cures Act makes medical notes like these available to patients in the interest of transparency. However, this is a medical document intended as peer to peer communication. It is written in medical language and may contain abbreviations or verbiage that are unfamiliar. It may appear blunt or direct. Medical documents are intended to carry relevant information, facts as evident, and the clinical opinion of the practitioner who signs the document.    Joanne Bernal APRN  1/9/25

## 2025-01-14 ENCOUNTER — APPOINTMENT (OUTPATIENT)
Dept: OCCUPATIONAL MEDICINE | Facility: HOSPITAL | Age: OVER 89
End: 2025-01-14
Attending: STUDENT IN AN ORGANIZED HEALTH CARE EDUCATION/TRAINING PROGRAM
Payer: MEDICARE

## 2025-01-15 ENCOUNTER — SNF DISCHARGE (OUTPATIENT)
Dept: INTERNAL MEDICINE CLINIC | Age: OVER 89
End: 2025-01-15

## 2025-01-15 VITALS
HEART RATE: 72 BPM | RESPIRATION RATE: 18 BRPM | TEMPERATURE: 98 F | OXYGEN SATURATION: 92 % | DIASTOLIC BLOOD PRESSURE: 57 MMHG | BODY MASS INDEX: 21 KG/M2 | SYSTOLIC BLOOD PRESSURE: 135 MMHG | WEIGHT: 120.81 LBS

## 2025-01-15 DIAGNOSIS — I89.0 LYMPHEDEMA OF LEFT UPPER EXTREMITY: ICD-10-CM

## 2025-01-15 DIAGNOSIS — M54.16 LUMBAR RADICULOPATHY: ICD-10-CM

## 2025-01-15 DIAGNOSIS — R52 PAIN MANAGEMENT: ICD-10-CM

## 2025-01-15 DIAGNOSIS — B02.29 POST HERPETIC NEURALGIA: ICD-10-CM

## 2025-01-15 DIAGNOSIS — Z78.9 DECREASED ACTIVITIES OF DAILY LIVING (ADL): ICD-10-CM

## 2025-01-15 DIAGNOSIS — K59.00 CONSTIPATION, UNSPECIFIED CONSTIPATION TYPE: ICD-10-CM

## 2025-01-15 DIAGNOSIS — I10 PRIMARY HYPERTENSION: ICD-10-CM

## 2025-01-15 DIAGNOSIS — M54.9 ACUTE ON CHRONIC BACK PAIN: Primary | ICD-10-CM

## 2025-01-15 DIAGNOSIS — R53.1 WEAKNESS: ICD-10-CM

## 2025-01-15 DIAGNOSIS — G89.29 ACUTE ON CHRONIC BACK PAIN: Primary | ICD-10-CM

## 2025-01-15 PROCEDURE — 99316 NF DSCHRG MGMT 30 MIN+: CPT | Performed by: NURSE PRACTITIONER

## 2025-01-15 NOTE — PROGRESS NOTES
Meena Whitman, 6/13/1933, 89 year old, female is being discharged from Facility: [facility]      DISCHARGE SUMMARY    Date of Admission: 12/26/24    Date of Discharge: 1/15/25 to long term care                                 Admitting Diagnoses:  Acute on chronic back pain  Lumbar radiculopathy secondary to L4-L5 neuro-foraminal stenosis  Essential HTN  Chronic LUE lymphedema  Hx breast CA       Subjective:   Patient seen today for aprn follow up visit.   She is seen sitting up in her wheelchair in the room today.   She is eating well and sleeping well. Improving with therapies per notes.   She reports pain better managed but still present at right flank.   BM are improved but she still doesn't feel they are sufficient. Will ask for suppository if she needs it.Moving to long term bed today. Sent bridge script for temazepam for her, psych will follow up next week       Vital signs:  /57   Pulse 72   Temp 97.7 °F (36.5 °C)   Resp 18   Wt 120 lb 12.8 oz (54.8 kg)   SpO2 92%   BMI 20.74 kg/m²     ROS and Physical Exam:      REVIEW OF SYSTEMS:  GENERAL HEALTH:feels well otherwise  SKIN: denies any unusual skin lesions or rashes  WOUNDS: none   EYES:no visual complaints or deficits  HENT: denies nasal congestion, sinus pain or sore throat;  RESPIRATORY: denies shortness of breath, wheezing or cough   CARDIOVASCULAR:denies chest pain, no palpitations   GI: +constipation; denies nausea, vomiting, diarrhea; no rectal bleeding; no heartburn   Gu: No urinary frequency, urgency or dysuria  MUSCULOSKELETAL:+chronic back pain  NEURO:no sensory or motor complaint  PSYCHE: no symptoms of depression or anxiety  HEMATOLOGY:denies bruising, denies excessive bleeding  ENDOCRINE: denies excessive thirst or urination; denies unexpected wt gain or wt loss  ALLERGY/IMM.: denies food or seasonal allergies    PHYSICAL EXAM:  GENERAL HEALTH: well developed, well nourished, in no apparent distress  LINES,  TUBES, DRAINS:  none  SKIN: warm, dry, L upper back scattered erythematous plaques  WOUND: none  EYES:  sclera anicteric, conjunctiva normal; there is no nystagmus, no drainage from eyes  HENT:  normocephalic; normal nose, no nasal drainage, mucous membranes pink, moist.  NECK:  supple, non tender, FROM  BREAST:  deferred  RESPIRATORY:clear, no crackles, no wheezing, no cough, no dyspnea RA  CARDIOVASCULAR:RRR, normal S1 and S2, no murmurs no edema   ABDOMEN:  normal active BS+, soft, nondistended; no organomegaly, no masses; nontender, no guarding, no rebound tenderness.  :no suprapubic distension  LYMPHATIC: LUE lymphedema with compression wrap   MUSCULOSKELETAL: limited mobility d/t back pain, no acute synovitis upper or lower extremities   EXTREMITIES/VASCULAR:radial pulses 2+ and dorsalis pedal pulses 2+  NEUROLOGIC: A&OX3, no focal deficits, follows commands  PSYCHIATRIC: calm, cooperative, mood and affect appropriate to situation     Therapy update:  Transfers: Mod assist  ADLS: min - CGA   Ambulation: 50-75 ft Min assist   DC plan: from [facility] Course:    Meena has progressed well with PT/OT.  Medically cleared for anticipated discharge to long term care at the [place] and will transition to long term medical team there.     Most recent lab results:  Lab Results   Component Value Date    WBC 3.6 (L) 01/09/2025    RBC 3.74 (L) 01/09/2025    HGB 11.9 (L) 01/09/2025    HCT 37.0 01/09/2025    MCV 98.9 01/09/2025    MCH 31.8 01/09/2025    MCHC 32.2 01/09/2025    RDW 13.6 01/09/2025    .0 (L) 01/09/2025     Lab Results   Component Value Date    GLU 72 01/09/2025    BUN 27 (H) 01/09/2025    BUNCREA 39.0 (H) 04/12/2021    CREATSERUM 0.62 01/09/2025    ANIONGAP 5 01/09/2025    GFR 76 02/27/2017    GFRNAA 62 07/20/2022    GFRAA 71 07/20/2022    CA 8.9 01/09/2025    OSMOCALC 298 (H) 01/09/2025    ALKPHO 77 01/09/2025    AST 57 (H) 01/09/2025    ALT 97 (H) 01/09/2025     BILT 0.3 01/09/2025    TP 5.6 (L) 01/09/2025    ALB 3.1 (L) 01/09/2025    GLOBULIN 2.5 01/09/2025     01/09/2025    K 3.9 01/09/2025     01/09/2025    CO2 26.0 01/09/2025         Discharge Diagnoses w/ current management:     Acute on chronic back pain  Lumbar radiculopathy  - MRI lumbar spine with spondylolisthesis at L3-4 and L4-5 a/w moderate to severe foraminal stenosis  - Spine surgery Dr Benitez Duarte was consulted  - conservative treatment, no acute surgical intervention  - Prednisone 40mg daily x 4 days EOT 12/31/24    - pain management as below   - PT/OT   - Follow up at Pain clinic with Dr Kevin Kovacs outpatient for HALLEY scheduled 1/28/25     Postherpetic neuralgia, hx Shingles June 2024   - post residual L upper back erythematous plaques   - continue Lyrica 75mg TID   - Lidocaine 5% cream to affected area   - pain management as below      Pain management  - Monitor and assess pain  - Offer to pre-medicate 30-60 min prior to therapy  - Tylenol Extra Strength 1000mg TID   - Methocarbamol 500mg BID   - Oxycodone ER 10mg ER BID (9a/9p)  - Oxycodone 10mg BID (8a/12p) and q8hrs prn   - Pregabalin 75mg TID   - Lidocaine 5% cream to affected area   - Physiatry evaluation with management appreciated      HTN, HFpEF, nonrheumatic aortic valve insufficiency   - 2D echo (11/14/24) = EF 60-65%   - Daily weights; Call if weight gain of 2# overnight or 4# in 5 days  - 2 gram sodium diet  - 1.8 liter/day fluid restriction  - VS q shift and prn  - continue Losartan 100mg daily   - continue Amlodipine 5mg daily   - continue Labetalol 100mg BID   - continue Diamox 250mg BID   - continue Lasix 20 daily prn if weight > 120 lbs   - Monitoring for edema, weight gain, shortness of breath, fatigue  - CMP weekly and prn  - Follow up with Cardiology [facility] outpatient      Chronic LUE lymphedema d/t shingles   - continue compression arm sleeve   - Follow up lymphedema treatment outpatient after DC  - wrapped now       Keratoconjunctivitis Sicca   - continue Heparin pf ophthalmic solution  1 drop both eyes TID   - continue Flebogamma ophthalmic solution  1 drop both eyes QID   - continue 40% Autologous serum eye drops  1 drop both eyes QID     Hx L breast CA  - s/p lumpectomy & LN dissection (1986)   - Follow up with Oncology outpatient as directed      Insomnia  - continue Temazepam 1-2 capsules (15-30 mg total) HS prn   Per consult to psych, continue one tab scheduled now, DC prn and he will follow up next week.      Physical Deconditioning/Impaired mobility and ADLs/At risk for falling  - Fall precautions  - PT/OT eval and treat - completed short term rehab now to long term care     Vitamins/supplements as r/t deficiencies  - KAREN RD to follow while in rehab; supplementation/diet as per KAREN RD  - May continue home supplements  Vitamin C 1000mg daily   Vitamin D 3000U daily      At risk for malnutrition  - Dietician consult  - Weekly weights  - Supplements as indicated  - Encourage po intake     Bowel management  Constipation  - Monitor for Bms  - SennaPlus 2 tabs BID   - Miralax daily prn   - Dulcolax suppository daily prn  - preparation H prn for hemorrhoids      Follow Ups:  - PCP from long term care to [facility] care  - Pain clinic with Dr Kevin Kovacs outpatient for HALLEY 1/28/25  - Cardiology [facility] outpatient as indicated  - Oncology outpatient as indicated         Medication Reconciliation Completed:  Yes    Future Appointments   Date Time Provider Department Center   4/1/2025  1:15 PM Krista Warren MD NP SW [facility] [place] [facility]   4/1/2025  1:30 PM NP [place] RN1 NP  Inf [place] [facility]   4/3/2025  1:00 PM Bonnie Danielson MD [facility] [address] [facility] [address] [place]       35 min spent in coordination of care in preparation for discharge.    Joanne Bernal, APRN  1/15/2025  2:26 PM

## 2025-02-03 ENCOUNTER — TELEPHONE (OUTPATIENT)
Age: OVER 89
End: 2025-02-03

## 2025-02-27 ENCOUNTER — LAB REQUISITION (OUTPATIENT)
Dept: LAB | Facility: HOSPITAL | Age: OVER 89
End: 2025-02-27
Payer: MEDICARE

## 2025-02-27 DIAGNOSIS — M62.81 MUSCLE WEAKNESS (GENERALIZED): ICD-10-CM

## 2025-02-27 DIAGNOSIS — R05.9 COUGH, UNSPECIFIED: ICD-10-CM

## 2025-02-27 LAB
ALBUMIN SERPL-MCNC: 3.4 G/DL (ref 3.2–4.8)
ALBUMIN/GLOB SERPL: 1.7 {RATIO} (ref 1–2)
ALP LIVER SERPL-CCNC: 59 U/L
ALT SERPL-CCNC: 14 U/L
ANION GAP SERPL CALC-SCNC: 3 MMOL/L (ref 0–18)
AST SERPL-CCNC: 16 U/L (ref ?–34)
BASOPHILS # BLD AUTO: 0.04 X10(3) UL (ref 0–0.2)
BASOPHILS NFR BLD AUTO: 1.1 %
BILIRUB SERPL-MCNC: 0.4 MG/DL (ref 0.2–0.9)
BUN BLD-MCNC: 21 MG/DL (ref 9–23)
CALCIUM BLD-MCNC: 9.1 MG/DL (ref 8.7–10.6)
CHLORIDE SERPL-SCNC: 107 MMOL/L (ref 98–112)
CO2 SERPL-SCNC: 28 MMOL/L (ref 21–32)
CREAT BLD-MCNC: 0.8 MG/DL
EGFRCR SERPLBLD CKD-EPI 2021: 70 ML/MIN/1.73M2 (ref 60–?)
EOSINOPHIL # BLD AUTO: 0.18 X10(3) UL (ref 0–0.7)
EOSINOPHIL NFR BLD AUTO: 4.7 %
ERYTHROCYTE [DISTWIDTH] IN BLOOD BY AUTOMATED COUNT: 13.4 %
FASTING STATUS PATIENT QL REPORTED: YES
GLOBULIN PLAS-MCNC: 2 G/DL (ref 2–3.5)
GLUCOSE BLD-MCNC: 78 MG/DL (ref 70–99)
HCT VFR BLD AUTO: 34.8 %
HGB BLD-MCNC: 11.5 G/DL
IMM GRANULOCYTES # BLD AUTO: 0.01 X10(3) UL (ref 0–1)
IMM GRANULOCYTES NFR BLD: 0.3 %
LYMPHOCYTES # BLD AUTO: 1.45 X10(3) UL (ref 1–4)
LYMPHOCYTES NFR BLD AUTO: 38.3 %
MCH RBC QN AUTO: 32.4 PG (ref 26–34)
MCHC RBC AUTO-ENTMCNC: 33 G/DL (ref 31–37)
MCV RBC AUTO: 98 FL
MONOCYTES # BLD AUTO: 0.74 X10(3) UL (ref 0.1–1)
MONOCYTES NFR BLD AUTO: 19.5 %
NEUTROPHILS # BLD AUTO: 1.37 X10 (3) UL (ref 1.5–7.7)
NEUTROPHILS # BLD AUTO: 1.37 X10(3) UL (ref 1.5–7.7)
NEUTROPHILS NFR BLD AUTO: 36.1 %
OSMOLALITY SERPL CALC.SUM OF ELEC: 288 MOSM/KG (ref 275–295)
PLATELET # BLD AUTO: 132 10(3)UL (ref 150–450)
POTASSIUM SERPL-SCNC: 4.1 MMOL/L (ref 3.5–5.1)
PROT SERPL-MCNC: 5.4 G/DL (ref 5.7–8.2)
RBC # BLD AUTO: 3.55 X10(6)UL
SODIUM SERPL-SCNC: 138 MMOL/L (ref 136–145)
WBC # BLD AUTO: 3.8 X10(3) UL (ref 4–11)

## 2025-02-27 PROCEDURE — 85025 COMPLETE CBC W/AUTO DIFF WBC: CPT | Performed by: FAMILY MEDICINE

## 2025-02-27 PROCEDURE — 80053 COMPREHEN METABOLIC PANEL: CPT | Performed by: FAMILY MEDICINE

## 2025-02-27 PROCEDURE — 36415 COLL VENOUS BLD VENIPUNCTURE: CPT | Performed by: FAMILY MEDICINE

## 2025-03-31 NOTE — PROGRESS NOTES
Cancer Center Progress Note      Patient Name:  Meena Whitman  YOB: 1933  Medical Record Number:  [id number]    Date of visit:  4/1/2025    CHIEF COMPLAINT: R breast ca    HPI:     89 year old female that I have followed since 9/22. H/o R  breast carcinoma in 4/22.      Mammogram 4/22 as w/u of wt loss and back pain-8 mm and 9 mm mass R breast. Had 2 site bx.     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  Path R  4:00-grade 1 ILC, 1 mm core, 100% ER, 40% AR, Her 2 neg, Ki 67 1%.    AI started.  Initial surgery was deferred due to age and PS.  W/u of back pain showed a compression fracture at L2 for which she underwent kyphoplasty 4/22.  Then she developed severe vaginal atrophy, itching, dryness and UTI and elected to discontinue AI.  Tamoxifen was prescribed by another provider 7/22.  When I met her in 9/22, we decided to discontinue tamoxifen as I was concerned about the risk of strokes.  After that, she has tried AI's sporadically but held off because of worsening vaginal dryness.     Mammogram 8/23 showed increasing right-sided calcifications.  She met with surgical oncology and elected to defer surgery.    H/o L breast carcinoma 1986.  S/p surgery, ALND, RT.  L  arm lymphedema since then.    Daughter had breast cancer at 44, genetic testing was negative.    Seen 6 months back, severe left arm lymphedema.  Hospital admission 12/24 for acute on chronic back pain with lumbar radiculopathy.  Since then she has moved into assisted living and is getting regular therapy which has helped the left arm swelling.  Continues to struggle with postherpetic neuralgia.    SOCIAL HISTORY:     .  In assisted living, [place].  Daughter is an .      ROS:     Constitutional: Fatigue, weight loss.  Neurologic: no headache, seizures, diplopia or weakness  Respiratory: no cough, SOB or hemoptysis  Cardiovascular: Left arm swelling.  GI: Anorexia,  weight loss.  Musculoskeletal: Chronic stable back pain.    Dermatologic: Vaginal dryness and pruritus.  : Vaginal dryness.  Psych: Mood swings.    Heme: no easy bruising or bleeding     ALLERGIES:    Allergies   Allergen Reactions    Hydralazine OTHER (SEE COMMENTS)     Headache    Bactrim [Sulfamethoxazole W/Trimethoprim] RASH     Per patient    Metronidazole ITCHING     Metronidazole gel caused irritation in vaginal area.       MEDICATIONS:    Current Outpatient Medications   Medication Sig Dispense Refill    oxyCODONE ER 10 MG Oral Tablet Extended Release 12 hour Abuse-Deterrent Take 1 tablet (10 mg total) by mouth Q12H. 60 tablet 0    pregabalin 75 MG Oral Cap Take 1 capsule (75 mg total) by mouth 3 (three) times daily. 2 capsule 0    acetaminophen 500 MG Oral Tab Take 2 tablets (1,000 mg total) by mouth in the morning, at noon, and at bedtime. 30 tablet 0    methocarbamol 500 MG Oral Tab Take 1 tablet (500 mg total) by mouth in the morning and 1 tablet (500 mg total) before bedtime. 2 tablet 0    CUSTOM MEDICATION Place into both eyes 3 (three) times daily. heparin pf 100 unit/ml ophth solution  1 drop both eyes  Indications: Keratoconjunctivitis Sicca      CUSTOM MEDICATION Place into both eyes 4 (four) times daily. flebogamma 10 mg/mL ophthalmic solution  1 drop both eyes qid  Indications: Redness or discharge of eye, Keratoconjunctivitis sicca of both eyes not specified as Sjogren's, Discomfort of both eyes      CUSTOM MEDICATION Place into both eyes 4 (four) times daily. 40% autologous serum eye drops  1 drop both eyes qid      furosemide 20 MG Oral Tab Take 1 tablet (20 mg total) by mouth daily as needed (Take if weight is >120 pounds.). 90 tablet 1    TEMAZEPAM 15 MG Oral Cap TAKE 1-2 CAPSULES (15-30 MG TOTAL) BY MOUTH NIGHTLY AS NEEDED FOR SLEEP. 60 capsule 0    Naloxone HCl 4 MG/0.1ML Nasal Liquid 4 mg by Intranasal route as needed (May repeat as needed in other nostril if symptoms persist). If  patient remains unresponsive, repeat dose in other nostril 2-5 minutes after first dose. 1 kit 0    Vaginal Lubricant (VAGISIL LUBRICANT) Vaginal Gel Place 1 Application vaginally daily. 1 each 2    losartan 100 MG Oral Tab TAKE 1 TABLET (100 MG TOTAL) BY MOUTH DAILY. 90 tablet 2    Ascorbic Acid (VITAMIN C) 1000 MG Oral Tab Take 1 tablet (1,000 mg total) by mouth daily.      Elastic Bandages & Supports (SKINEEZ COMPRESSION ARM SLEEVE) Does not apply Misc 2 Units daily as needed. 2 each 3    amLODIPine 5 MG Oral Tab Take 1 tablet (5 mg total) by mouth daily. 90 tablet 1    labetalol 100 MG Oral Tab Take 1 tablet (100 mg total) by mouth 2 (two) times daily. 180 tablet 1    estradiol 0.1 MG/GM Vaginal Cream Place 1 g vaginally daily. 42.5 g 3    Vitamin D, Cholecalciferol, 25 MCG (1000 UT) Oral Tab Take 1,000 Units by mouth daily. Take 3 caps po q am 270 tablet 3    acetaZOLAMIDE 250 MG Oral Tab Take 1 tablet (250 mg total) by mouth 2 (two) times daily.      Multiple Vitamins-Minerals (COMPLETE DAILY/LUTEIN) Oral Tab Take 1 tablet by mouth 3 (three) times daily.         VITALS:    There were no vitals filed for this visit.      PS:  ECOG: 3    PHYSICAL EXAM:    General: alert and oriented x 3, not in acute distress.  Accompanied by daughter.  In wheelchair.  HEENT: DELMY, oropharynx  clear.  Neck: supple.  No JVD /adenopathy.  CVS: S1S2, regular  Rhythm, no murmurs.   Lungs: Clear to auscultation and percussion.  Abdomen: Soft, non tender, no hepato-splenomegaly.  Extremities: Left arm lymphedema.  CNS: no focal deficit  Breast exam done when she was sitting upright.  No abnormal masses noted.  No axillary adenopathy.    Emotional well being: Patient's emotional well being was assessed.  No issues requiring acute psychosocial intervention were identified.     IMAGING:      LABS:     No results found for this or any previous visit (from the past 24 hours).      ASSESSMENT AND PLAN:     # Left arm lymphedema: Present  but better.  Currently in assisted living and she is getting regular therapy.  Has wrapping in place.    # R breast ca: Diag 4/22.  Clinical T1N0.  2 site biopsy shows grade 1 IDC and grade 1 ILC, strongly ER/NC+, HER2 neg  with low Ki-67.  AI started 4/22.  Severe vaginal dryness and atrophy.  AI discontinued 6/22, tamoxifen started 7/22 and discontinued 9/22 due to VTE risk.     Mammogram 11/22-stable.  Mammogram 8/23-increasing calcifications.  Met with surgical oncology and wishes to defer surgery.    R Mammogram 9/24-unchanged.   Left side was not done due to severe pain.  Continue observation.  We discussed repeating mammogram but in light of worsening performance status and limited life expectancy, would hold off unless she develops a palpable abnormality.  Not on any endocrine therapy since 9/22.    # R renal mass: Possible small and early RCC, follow expectantly for now.    # Compression fracture: S/p kyphoplasty L2 by IR 4/22.    # Osteoporosis: DEXA 8/21 showed osteoporosis with T score -2.5.  Was on Prolia twice a year.  Will resume here.      ORDERS PLACED:          Procedures    Comp Metabolic Panel (14)    CBC With Differential With Platelet    Vitamin D    MAGNESIUM [E]    PHOSPHORUS [E]         Return in about 6 months (around 10/1/2025) for Labs, MD visit, prolia inj.    Lida Warren M.D.    [facility]  [address] [place], [address]      4/1/2025

## 2025-04-01 ENCOUNTER — OFFICE VISIT (OUTPATIENT)
Age: OVER 89
End: 2025-04-01
Attending: INTERNAL MEDICINE
Payer: MEDICARE

## 2025-04-01 VITALS
RESPIRATION RATE: 16 BRPM | DIASTOLIC BLOOD PRESSURE: 66 MMHG | OXYGEN SATURATION: 96 % | SYSTOLIC BLOOD PRESSURE: 128 MMHG | TEMPERATURE: 97 F | HEIGHT: 62.99 IN | BODY MASS INDEX: 21.23 KG/M2 | WEIGHT: 119.81 LBS | HEART RATE: 64 BPM

## 2025-04-01 DIAGNOSIS — M80.00XD AGE-RELATED OSTEOPOROSIS WITH CURRENT PATHOLOGICAL FRACTURE WITH ROUTINE HEALING, SUBSEQUENT ENCOUNTER: ICD-10-CM

## 2025-04-01 DIAGNOSIS — M89.9 BONE DISORDER: ICD-10-CM

## 2025-04-01 DIAGNOSIS — C50.911 INVASIVE LOBULAR CARCINOMA OF RIGHT BREAST IN FEMALE (HCC): ICD-10-CM

## 2025-04-01 DIAGNOSIS — M80.00XD AGE-RELATED OSTEOPOROSIS WITH CURRENT PATHOLOGICAL FRACTURE WITH ROUTINE HEALING, SUBSEQUENT ENCOUNTER: Primary | ICD-10-CM

## 2025-04-01 DIAGNOSIS — T50.905D ADVERSE EFFECT OF DRUG, SUBSEQUENT ENCOUNTER: Primary | ICD-10-CM

## 2025-04-01 LAB
ALBUMIN SERPL-MCNC: 4 G/DL (ref 3.2–4.8)
CALCIUM BLD-MCNC: 9.5 MG/DL (ref 8.7–10.6)
CREAT BLD-MCNC: 0.85 MG/DL
EGFRCR SERPLBLD CKD-EPI 2021: 65 ML/MIN/1.73M2 (ref 60–?)
MAGNESIUM SERPL-MCNC: 2.2 MG/DL (ref 1.6–2.6)
PHOSPHATE SERPL-MCNC: 3.3 MG/DL (ref 2.4–5.1)

## 2025-04-01 NOTE — PROGRESS NOTES
Patient here for follow-up and planned prolia injection. Using sleeve to manage lymphedema. Had a corticosteroid injection to the spine this past January. Last CT December 2024.

## 2025-04-01 NOTE — PROGRESS NOTES
Education Record    Learner:  Patient and Family Member    Disease / Diagnosis: Breast Cancer    Barriers / Limitations:  None   Comments:    Method:  Brief focused   Comments:    General Topics:  Plan of care reviewed   Comments:    Outcome:  Shows understanding   Comments:

## 2025-05-14 PROCEDURE — 87637 SARSCOV2&INF A&B&RSV AMP PRB: CPT | Performed by: INTERNAL MEDICINE

## 2025-05-14 PROCEDURE — 0202U NFCT DS 22 TRGT SARS-COV-2: CPT | Performed by: INTERNAL MEDICINE

## 2025-05-15 ENCOUNTER — LAB REQUISITION (OUTPATIENT)
Dept: LAB | Facility: HOSPITAL | Age: OVER 89
End: 2025-05-15
Payer: MEDICARE

## 2025-05-15 DIAGNOSIS — M54.59 OTHER LOW BACK PAIN: ICD-10-CM

## 2025-05-15 DIAGNOSIS — Z00.00 ENCOUNTER FOR GENERAL ADULT MEDICAL EXAMINATION WITHOUT ABNORMAL FINDINGS: ICD-10-CM

## 2025-05-15 LAB
ALBUMIN SERPL-MCNC: 4 G/DL (ref 3.2–4.8)
ALBUMIN/GLOB SERPL: 1.8 {RATIO} (ref 1–2)
ALP LIVER SERPL-CCNC: 89 U/L (ref 55–142)
ALT SERPL-CCNC: 33 U/L (ref 10–49)
ANION GAP SERPL CALC-SCNC: 8 MMOL/L (ref 0–18)
AST SERPL-CCNC: 27 U/L (ref ?–34)
BASOPHILS # BLD AUTO: 0.02 X10(3) UL (ref 0–0.2)
BASOPHILS NFR BLD AUTO: 0.2 %
BILIRUB SERPL-MCNC: 0.6 MG/DL (ref 0.2–0.9)
BUN BLD-MCNC: 16 MG/DL (ref 9–23)
CALCIUM BLD-MCNC: 9 MG/DL (ref 8.7–10.6)
CHLORIDE SERPL-SCNC: 103 MMOL/L (ref 98–112)
CO2 SERPL-SCNC: 24 MMOL/L (ref 21–32)
CREAT BLD-MCNC: 0.64 MG/DL (ref 0.55–1.02)
EGFRCR SERPLBLD CKD-EPI 2021: 83 ML/MIN/1.73M2 (ref 60–?)
EOSINOPHIL # BLD AUTO: 0 X10(3) UL (ref 0–0.7)
EOSINOPHIL NFR BLD AUTO: 0 %
ERYTHROCYTE [DISTWIDTH] IN BLOOD BY AUTOMATED COUNT: 13.8 %
FASTING STATUS PATIENT QL REPORTED: YES
GLOBULIN PLAS-MCNC: 2.2 G/DL (ref 2–3.5)
GLUCOSE BLD-MCNC: 131 MG/DL (ref 70–99)
HCT VFR BLD AUTO: 33.4 % (ref 35–48)
HGB BLD-MCNC: 11.3 G/DL (ref 12–16)
IMM GRANULOCYTES # BLD AUTO: 0.03 X10(3) UL (ref 0–1)
IMM GRANULOCYTES NFR BLD: 0.4 %
LYMPHOCYTES # BLD AUTO: 0.63 X10(3) UL (ref 1–4)
LYMPHOCYTES NFR BLD AUTO: 7.4 %
MCH RBC QN AUTO: 32.8 PG (ref 26–34)
MCHC RBC AUTO-ENTMCNC: 33.8 G/DL (ref 31–37)
MCV RBC AUTO: 96.8 FL (ref 80–100)
MONOCYTES # BLD AUTO: 0.16 X10(3) UL (ref 0.1–1)
MONOCYTES NFR BLD AUTO: 1.9 %
NEUTROPHILS # BLD AUTO: 7.64 X10 (3) UL (ref 1.5–7.7)
NEUTROPHILS # BLD AUTO: 7.64 X10(3) UL (ref 1.5–7.7)
NEUTROPHILS NFR BLD AUTO: 90.1 %
OSMOLALITY SERPL CALC.SUM OF ELEC: 283 MOSM/KG (ref 275–295)
PLATELET # BLD AUTO: 104 10(3)UL (ref 150–450)
POTASSIUM SERPL-SCNC: 3.6 MMOL/L (ref 3.5–5.1)
PROT SERPL-MCNC: 6.2 G/DL (ref 5.7–8.2)
RBC # BLD AUTO: 3.45 X10(6)UL (ref 3.8–5.3)
SODIUM SERPL-SCNC: 135 MMOL/L (ref 136–145)
WBC # BLD AUTO: 8.5 X10(3) UL (ref 4–11)

## 2025-05-15 PROCEDURE — 85025 COMPLETE CBC W/AUTO DIFF WBC: CPT

## 2025-05-15 PROCEDURE — 80053 COMPREHEN METABOLIC PANEL: CPT

## 2025-05-17 LAB
FLUAV + FLUBV RNA SPEC NAA+PROBE: NOT DETECTED
FLUAV + FLUBV RNA SPEC NAA+PROBE: NOT DETECTED
RSV RNA SPEC NAA+PROBE: NOT DETECTED
SARS-COV-2 RNA RESP QL NAA+PROBE: NOT DETECTED

## 2025-06-10 ENCOUNTER — LAB REQUISITION (OUTPATIENT)
Dept: LAB | Facility: HOSPITAL | Age: OVER 89
End: 2025-06-10
Payer: MEDICARE

## 2025-06-10 DIAGNOSIS — E55.9 VITAMIN D DEFICIENCY, UNSPECIFIED: ICD-10-CM

## 2025-06-10 LAB
ALBUMIN SERPL-MCNC: 3.6 G/DL (ref 3.2–4.8)
ALBUMIN/GLOB SERPL: 1.8 {RATIO} (ref 1–2)
ALP LIVER SERPL-CCNC: 103 U/L (ref 55–142)
ALT SERPL-CCNC: 11 U/L (ref 10–49)
ANION GAP SERPL CALC-SCNC: 11 MMOL/L (ref 0–18)
AST SERPL-CCNC: 16 U/L (ref ?–34)
BILIRUB SERPL-MCNC: 0.5 MG/DL (ref 0.2–0.9)
BUN BLD-MCNC: 19 MG/DL (ref 9–23)
CALCIUM BLD-MCNC: 8.6 MG/DL (ref 8.7–10.6)
CHLORIDE SERPL-SCNC: 112 MMOL/L (ref 98–112)
CHOLEST SERPL-MCNC: 171 MG/DL (ref ?–200)
CO2 SERPL-SCNC: 22 MMOL/L (ref 21–32)
CREAT BLD-MCNC: 0.8 MG/DL (ref 0.55–1.02)
EGFRCR SERPLBLD CKD-EPI 2021: 70 ML/MIN/1.73M2 (ref 60–?)
FASTING PATIENT LIPID ANSWER: YES
FASTING STATUS PATIENT QL REPORTED: YES
GLOBULIN PLAS-MCNC: 2 G/DL (ref 2–3.5)
GLUCOSE BLD-MCNC: 76 MG/DL (ref 70–99)
HDLC SERPL-MCNC: 42 MG/DL (ref 40–59)
LDLC SERPL CALC-MCNC: 104 MG/DL (ref ?–100)
NONHDLC SERPL-MCNC: 129 MG/DL (ref ?–130)
OSMOLALITY SERPL CALC.SUM OF ELEC: 301 MOSM/KG (ref 275–295)
POTASSIUM SERPL-SCNC: 3.8 MMOL/L (ref 3.5–5.1)
PROT SERPL-MCNC: 5.6 G/DL (ref 5.7–8.2)
SODIUM SERPL-SCNC: 145 MMOL/L (ref 136–145)
TRIGL SERPL-MCNC: 140 MG/DL (ref 30–149)
TSI SER-ACNC: 2.01 UIU/ML (ref 0.55–4.78)
VIT D+METAB SERPL-MCNC: 42.4 NG/ML (ref 30–100)
VLDLC SERPL CALC-MCNC: 24 MG/DL (ref 0–30)

## 2025-06-10 PROCEDURE — 80061 LIPID PANEL: CPT | Performed by: INTERNAL MEDICINE

## 2025-06-10 PROCEDURE — 84443 ASSAY THYROID STIM HORMONE: CPT | Performed by: INTERNAL MEDICINE

## 2025-06-10 PROCEDURE — 80053 COMPREHEN METABOLIC PANEL: CPT | Performed by: INTERNAL MEDICINE

## 2025-06-10 PROCEDURE — 82306 VITAMIN D 25 HYDROXY: CPT | Performed by: INTERNAL MEDICINE

## 2025-06-22 ENCOUNTER — LAB REQUISITION (OUTPATIENT)
Dept: LAB | Facility: HOSPITAL | Age: OVER 89
End: 2025-06-22
Payer: MEDICARE

## 2025-06-22 DIAGNOSIS — N39.0 URINARY TRACT INFECTION, SITE NOT SPECIFIED: ICD-10-CM

## 2025-06-22 LAB
BILIRUB UR QL STRIP.AUTO: NEGATIVE
COLOR UR AUTO: YELLOW
GLUCOSE UR STRIP.AUTO-MCNC: NORMAL MG/DL
KETONES UR STRIP.AUTO-MCNC: NEGATIVE MG/DL
LEUKOCYTE ESTERASE UR QL STRIP.AUTO: 500
NITRITE UR QL STRIP.AUTO: NEGATIVE
PH UR STRIP.AUTO: 6.5 [PH] (ref 5–8)
PROT UR STRIP.AUTO-MCNC: 100 MG/DL
RBC #/AREA URNS AUTO: >10 /HPF
SP GR UR STRIP.AUTO: 1.02 (ref 1–1.03)
UROBILINOGEN UR STRIP.AUTO-MCNC: NORMAL MG/DL
WBC #/AREA URNS AUTO: >50 /HPF
WBC CLUMPS UR QL AUTO: PRESENT /HPF

## 2025-06-22 PROCEDURE — 87086 URINE CULTURE/COLONY COUNT: CPT | Performed by: INTERNAL MEDICINE

## 2025-06-22 PROCEDURE — 81001 URINALYSIS AUTO W/SCOPE: CPT | Performed by: INTERNAL MEDICINE

## 2025-06-22 PROCEDURE — 87077 CULTURE AEROBIC IDENTIFY: CPT | Performed by: INTERNAL MEDICINE

## 2025-07-24 PROCEDURE — 81001 URINALYSIS AUTO W/SCOPE: CPT | Performed by: NURSE PRACTITIONER

## 2025-07-24 PROCEDURE — 87086 URINE CULTURE/COLONY COUNT: CPT | Performed by: NURSE PRACTITIONER

## 2025-07-25 ENCOUNTER — LAB REQUISITION (OUTPATIENT)
Dept: LAB | Facility: HOSPITAL | Age: OVER 89
End: 2025-07-25

## 2025-07-25 DIAGNOSIS — R30.0 DYSURIA: ICD-10-CM

## 2025-07-25 LAB
BILIRUB UR QL STRIP.AUTO: NEGATIVE
CLARITY UR REFRACT.AUTO: CLEAR
COLOR UR AUTO: COLORLESS
GLUCOSE UR STRIP.AUTO-MCNC: NORMAL MG/DL
KETONES UR STRIP.AUTO-MCNC: NEGATIVE MG/DL
LEUKOCYTE ESTERASE UR QL STRIP.AUTO: 250
NITRITE UR QL STRIP.AUTO: NEGATIVE
PH UR STRIP.AUTO: 6 (ref 5–8)
PROT UR STRIP.AUTO-MCNC: NEGATIVE MG/DL
SP GR UR STRIP.AUTO: 1.01 (ref 1–1.03)
UROBILINOGEN UR STRIP.AUTO-MCNC: NORMAL MG/DL

## (undated) DIAGNOSIS — I10 ESSENTIAL HYPERTENSION: ICD-10-CM

## (undated) DIAGNOSIS — R92.8 ABNORMAL MAMMOGRAM OF RIGHT BREAST: Primary | ICD-10-CM

## (undated) DIAGNOSIS — R92.8 ABNORMAL MAMMOGRAM: Primary | ICD-10-CM

## (undated) DIAGNOSIS — S32.020A COMPRESSION FRACTURE OF L2 VERTEBRA, INITIAL ENCOUNTER (HCC): Primary | ICD-10-CM

## (undated) DEVICE — BREAST-HERNIA-PORT CDS-LF: Brand: MEDLINE INDUSTRIES, INC.

## (undated) DEVICE — SUTURE VICRYL 4-0 PC-1

## (undated) DEVICE — STERILE POLYISOPRENE POWDER-FREE SURGICAL GLOVES: Brand: PROTEXIS

## (undated) DEVICE — KENDALL SCD EXPRESS SLEEVES, KNEE LENGTH, MEDIUM: Brand: KENDALL SCD

## (undated) DEVICE — VIOLET BRAIDED (POLYGLACTIN 910), SYNTHETIC ABSORBABLE SUTURE: Brand: COATED VICRYL

## (undated) DEVICE — SOL  .9 1000ML BTL

## (undated) DEVICE — SUTURE VICRYL 3-0 CT-2

## (undated) DEVICE — GAUZE SPONGES,USP TYPE VII GAUZE, 12 PLY: Brand: CURITY

## (undated) NOTE — MR AVS SNAPSHOT
[place] [address] [id number] [phone]               Thank you for choosing us for your health care visit with Domi Woodward MD.  We are glad to serve you and happy to provide you with this Take 1 tablet (25 mg total) by mouth 2 (two) times daily. Commonly known as:  LOPRESSOR           Omega-3-acid Ethyl Esters 1 g Caps   Take 1 g by mouth 3 (three) times daily.    Commonly known as:  LOVAZA           ramipril 10 MG Caps   Take 1 capsule (1

## (undated) NOTE — LETTER
17    Patient: Ama Haddad  : 1933 Visit date: 2017    Dear  Dr. Candice Chase MD,    Thank you for referring Ama Haddad to my practice. Please find my assessment and plan below.              Assessment   Follow-up examination

## (undated) NOTE — LETTER
January 16, 2019    Johnson Whitman  [address]   [place] [address]      Dear Bree Barroso: The following are the results of your recent tests. Please review the list of test results.   Your result is the value on the left; we have

## (undated) NOTE — LETTER
10/12/17    Patient: Hyacinth Fox  : 1933 Visit date: 10/12/2017    Dear  Dr. Henok Angel MD,    Thank you for referring Hyacinth Fox to my practice. Please find my assessment and plan below.             Assessment   Umbilical hernia withou CC: No Recipients

## (undated) NOTE — MR AVS SNAPSHOT
[facility]  [address] [place] [id number] [phone]               Thank you for choosing us for your health care visit with Katie Suarez MD.  We are glad to serve you and happy to provide you with this ALPRAZolam 0.25 MG Tabs   1 tablet every 8 hours as needed   Commonly known as:  XANAX           aspirin 81 MG Tabs   Take 81 mg by mouth daily. COMPLETE DAILY/LUTEIN Tabs   Take 1 tablet by mouth 3 (three) times daily.            docusate sodium

## (undated) NOTE — MR AVS SNAPSHOT
[facility] [employer]  [address] [place] [address] [phone]               Thank you for choosing us for your health care visit with Mer Abdi MD.  We are glad to serve you and happy to provide you with this Take 1 tablet (100 mg total) by mouth 2 (two) times daily.    Commonly known as:  COLACE           Dorzolamide HCl 2 % Soln   INSTILL 1 DROP INTO RIGHT EYE TWICE A DAY   Commonly known as:  TRUSOPT           DORZOLAMIDE HCL-TIMOLOL MAL OP   Apply to both ey

## (undated) NOTE — MR AVS SNAPSHOT
[facility]  [address] [place] [id number] [phone]               Thank you for choosing us for your health care visit with Cm Romero MD.  We are glad to serve you and happy to provide you with this requirements for authorization, please wait 5-7 days and then contact your physician's office. At that time, you will be provided with any authorization numbers or be assured that none are required. You can then schedule your appointment.  Failure to obtain Flex Sigmoidoscopy Screen every 5 years No results found for this or any previous visit. No flowsheet data found. Fecal Occult Blood Annually No results found for: FOB, OCCULTSTOOL No flowsheet data found.     Glaucoma Screening      Ophthalmology Visi TAKE ONE TABLET BY MOUTH DAILY AS NEEDED FOR ANXIETY   Commonly known as:  XANAX           aspirin 81 MG Tabs   Take 81 mg by mouth daily.            Clobetasol Propionate 0.05 % Oint   Apply to AA of Feet BID x 2-3 weeks PRN   Commonly known as:  Isabella Mak

## (undated) NOTE — Clinical Note
February 28, 2017    Norma Luna  [address] [facility] [employer] [place] [address]      Dear Kirk Hernandez: The following are the results of your recent tests. Please review the list of test results.   Your result is the value on the left; we have als

## (undated) NOTE — LETTER
[facility]  Phone: [phone]  OUTSIDE TESTING RESULT REQUEST      TO:   Dr Rawls Late Date: 10/13/17    FAX #: [phone]     IMPORTANT: FOR YOUR IMMEDIATE ATTENTION  Please FAX all test results listed below to:

## (undated) NOTE — LETTER
April 13, 2021    Tommy [facility]  [address] [facility] [place] [address]      Dear Tommy Wood: The following are the results of your recent tests. Please review the list of test results.   Your result is the value on the left; we have also s - 7.70 x10(3) uL    Lymphocyte Absolute 2.05 1.00 - 4.00 x10(3) uL    Monocyte Absolute 0.71 0.10 - 1.00 x10(3) uL    Eosinophil Absolute 0.13 0.00 - 0.70 x10(3) uL    Basophil Absolute 0.05 0.00 - 0.20 x10(3) uL    Immature Granulocyte Absolute 0.03 0.00

## (undated) NOTE — MR AVS SNAPSHOT
[facility]  [address] [place] [id number] [phone]               Thank you for choosing us for your health care visit with Fernanda Dallas MD.  We are glad to serve you and happy to provide you with this alprazolam 0.25 MG Tabs   Two tab q hs   What changed:  See the new instructions. Commonly known as:  XANAX           amoxicillin 500 MG Caps   Take 1 capsule (500 mg total) by mouth 2 (two) times daily.    Commonly known as:  AMOXIL           aspirin 81